# Patient Record
Sex: FEMALE | Race: WHITE | NOT HISPANIC OR LATINO | Employment: OTHER | URBAN - METROPOLITAN AREA
[De-identification: names, ages, dates, MRNs, and addresses within clinical notes are randomized per-mention and may not be internally consistent; named-entity substitution may affect disease eponyms.]

---

## 2017-01-03 RX ORDER — CITALOPRAM 10 MG/1
TABLET ORAL
Qty: 90 TABLET | Refills: 0 | Status: SHIPPED | OUTPATIENT
Start: 2017-01-03 | End: 2017-03-28 | Stop reason: SDUPTHER

## 2017-01-12 ENCOUNTER — PATIENT MESSAGE (OUTPATIENT)
Dept: FAMILY MEDICINE | Facility: CLINIC | Age: 67
End: 2017-01-12

## 2017-01-13 NOTE — TELEPHONE ENCOUNTER
Obie Smith,   I am considering chiropractic care for my lower back and wanted your opinion before I commit.   The clinic is [facility], the chiropractor is Jamarcus Sanchez. I don't know if you are familiar with him, but I had a preliminary evaluation which included x rays. I felt comfortable with the treatment plan, but am interested in your opinion of this type of care.   Thank you,   Yolie Brambila

## 2017-01-30 ENCOUNTER — OFFICE VISIT (OUTPATIENT)
Dept: FAMILY MEDICINE | Facility: CLINIC | Age: 67
End: 2017-01-30
Payer: MEDICARE

## 2017-01-30 VITALS
RESPIRATION RATE: 16 BRPM | BODY MASS INDEX: 23.13 KG/M2 | SYSTOLIC BLOOD PRESSURE: 130 MMHG | WEIGHT: 135.5 LBS | HEART RATE: 60 BPM | TEMPERATURE: 99 F | HEIGHT: 64 IN | DIASTOLIC BLOOD PRESSURE: 70 MMHG

## 2017-01-30 DIAGNOSIS — H81.10 BPPV (BENIGN PAROXYSMAL POSITIONAL VERTIGO), UNSPECIFIED LATERALITY: Primary | ICD-10-CM

## 2017-01-30 PROCEDURE — 99214 OFFICE O/P EST MOD 30 MIN: CPT | Mod: S$PBB,,, | Performed by: PHYSICIAN ASSISTANT

## 2017-01-30 PROCEDURE — 99999 PR PBB SHADOW E&M-EST. PATIENT-LVL III: CPT | Mod: PBBFAC,,, | Performed by: PHYSICIAN ASSISTANT

## 2017-01-30 PROCEDURE — 99213 OFFICE O/P EST LOW 20 MIN: CPT | Mod: PBBFAC,PO | Performed by: PHYSICIAN ASSISTANT

## 2017-01-30 RX ORDER — METRONIDAZOLE 7.5 MG/G
CREAM TOPICAL
Refills: 2 | Status: ON HOLD | COMMUNITY
Start: 2016-11-01 | End: 2017-09-14

## 2017-01-30 RX ORDER — ONDANSETRON 4 MG/1
4 TABLET, ORALLY DISINTEGRATING ORAL EVERY 8 HOURS PRN
Qty: 20 TABLET | Refills: 1 | Status: SHIPPED | OUTPATIENT
Start: 2017-01-30 | End: 2017-02-06

## 2017-01-30 RX ORDER — MECLIZINE HYDROCHLORIDE 25 MG/1
25 TABLET ORAL 3 TIMES DAILY PRN
Qty: 30 TABLET | Refills: 2 | Status: SHIPPED | OUTPATIENT
Start: 2017-01-30 | End: 2018-09-20

## 2017-01-30 NOTE — PROGRESS NOTES
Subjective:       Patient ID: Yolie Brambila is a 66 y.o. female.    Chief Complaint: Ear Fullness (pt c/o dizziness, headache x 2 days, unsteady on feet) and Nausea    HPI   Dizziness started yesterday after workout at [employer]  Wave of nausea at times  No vomiting  Had HA yesterday but not today  No fever   Review of Systems   Constitutional: Positive for activity change. Negative for appetite change, chills, diaphoresis, fatigue, fever and unexpected weight change.   HENT: Negative for congestion, ear pain, hearing loss, postnasal drip, sinus pressure, sore throat and tinnitus.    Eyes: Negative.    Respiratory: Negative.  Negative for cough.    Cardiovascular: Negative for chest pain, palpitations and leg swelling.   Gastrointestinal: Positive for nausea. Negative for abdominal distention, abdominal pain, constipation, diarrhea and vomiting.   Endocrine: Negative.    Genitourinary: Negative.    Musculoskeletal: Negative.    Skin: Negative.  Negative for rash.   Neurological: Positive for dizziness and headaches. Negative for tremors, seizures, syncope, facial asymmetry, speech difficulty, weakness, light-headedness and numbness.       Objective:      Physical Exam   Constitutional: She is oriented to person, place, and time. She appears well-developed and well-nourished. No distress.   HENT:   Head: Normocephalic and atraumatic.   Left Ear: External ear normal.   Nose: Nose normal.   Mouth/Throat: Oropharynx is clear and moist. No oropharyngeal exudate.   Cerumen in R ear canal  TM's appear normal   Eyes: Conjunctivae and EOM are normal. Pupils are equal, round, and reactive to light. No scleral icterus.   Neck: Normal range of motion. Neck supple. No tracheal deviation present. No thyromegaly present.   Carotids clear   Cardiovascular: Normal rate, regular rhythm, normal heart sounds and intact distal pulses.  Exam reveals no gallop and no friction rub.    No murmur heard.  Pulmonary/Chest: Effort normal and  breath sounds normal. No respiratory distress. She has no wheezes. She has no rales.   Musculoskeletal: She exhibits no edema.   Lymphadenopathy:     She has no cervical adenopathy.   Neurological: She is alert and oriented to person, place, and time. She has normal reflexes. She displays normal reflexes. No cranial nerve deficit. She exhibits normal muscle tone. Coordination normal.   Romberg normal   Skin: Skin is warm and dry. No rash noted.   Psychiatric: She has a normal mood and affect. Her behavior is normal. Judgment and thought content normal.   Vitals reviewed.      Assessment:       1. BPPV (benign paroxysmal positional vertigo), unspecified laterality        Plan:       Yolie PANIAGUA was seen today for ear fullness and nausea.    Diagnoses and all orders for this visit:    BPPV (benign paroxysmal positional vertigo), unspecified laterality  -     ondansetron (ZOFRAN-ODT) 4 MG TbDL; Take 1 tablet (4 mg total) by mouth every 8 (eight) hours as needed.    Other orders  -     meclizine (ANTIVERT) 25 mg tablet; Take 1 tablet (25 mg total) by mouth 3 (three) times daily as needed.      Decrease activity  BPPV handout given to PT  Discussed ENT referral if needed

## 2017-02-20 ENCOUNTER — OFFICE VISIT (OUTPATIENT)
Dept: FAMILY MEDICINE | Facility: CLINIC | Age: 67
End: 2017-02-20
Payer: MEDICARE

## 2017-02-20 VITALS
HEIGHT: 64 IN | SYSTOLIC BLOOD PRESSURE: 130 MMHG | WEIGHT: 135.69 LBS | HEART RATE: 60 BPM | TEMPERATURE: 97 F | BODY MASS INDEX: 23.17 KG/M2 | DIASTOLIC BLOOD PRESSURE: 64 MMHG | OXYGEN SATURATION: 98 %

## 2017-02-20 DIAGNOSIS — J06.9 VIRAL URI: Primary | ICD-10-CM

## 2017-02-20 PROCEDURE — 99213 OFFICE O/P EST LOW 20 MIN: CPT | Mod: S$PBB,,, | Performed by: NURSE PRACTITIONER

## 2017-02-20 PROCEDURE — 99213 OFFICE O/P EST LOW 20 MIN: CPT | Mod: PBBFAC,PO | Performed by: NURSE PRACTITIONER

## 2017-02-20 PROCEDURE — 99999 PR PBB SHADOW E&M-EST. PATIENT-LVL III: CPT | Mod: PBBFAC,,, | Performed by: NURSE PRACTITIONER

## 2017-02-20 RX ORDER — DOXYCYCLINE 50 MG/1
CAPSULE ORAL
Refills: 2 | COMMUNITY
Start: 2017-02-11 | End: 2017-08-21 | Stop reason: ALTCHOICE

## 2017-02-20 NOTE — PROGRESS NOTES
Subjective:       Patient ID: Yolie Brambila is a 66 y.o. female.    Chief Complaint: Cough (x 2days) and Chest Congestion    Cough   Associated symptoms include headaches and wheezing (lying down ). Pertinent negatives include no chest pain, chills, ear pain, fever, sore throat or shortness of breath.      Patient experiencing cough for two days, non productive dry cough   Patient took Nyquil last night for cough and effective until 4am.   Experiencing a dull headache that lasts all day, ibuprofein relieves headache  States that she takes zyrtec and flonase daily but not taken today, does not help with the cough  Patient denies fever, chills, shortness of breath, chest pain, asthma or any restrictive airway illnesses     Review of Systems   Constitutional: Negative for chills and fever.   HENT: Negative for congestion, ear pain, sore throat and trouble swallowing.    Respiratory: Positive for cough and wheezing (lying down ). Negative for chest tightness and shortness of breath.    Cardiovascular: Negative for chest pain and palpitations.   Gastrointestinal: Negative for abdominal pain, constipation, diarrhea, nausea and vomiting.   Neurological: Positive for headaches. Negative for dizziness, speech difficulty and weakness.   Psychiatric/Behavioral: Negative for sleep disturbance.       Objective:      Physical Exam   Constitutional: She is oriented to person, place, and time. She appears well-developed and well-nourished.   HENT:   Head: Normocephalic and atraumatic.   Right Ear: Hearing, external ear and ear canal normal.   Left Ear: Hearing, external ear and ear canal normal.   Nose: Nose normal.   Mouth/Throat: Uvula is midline and mucous membranes are normal. Posterior oropharyngeal erythema present.   Eyes: EOM are normal. Pupils are equal, round, and reactive to light.   Neck: Normal range of motion. Neck supple.   Cardiovascular: Normal rate, regular rhythm, normal heart sounds and intact distal pulses.     Pulmonary/Chest: Effort normal and breath sounds normal.   Abdominal: Soft. Bowel sounds are normal.   Musculoskeletal: Normal range of motion.   Neurological: She is alert and oriented to person, place, and time.   Skin: Skin is warm and dry.   Psychiatric: She has a normal mood and affect. Her behavior is normal. Judgment and thought content normal.   Nursing note and vitals reviewed.      Assessment:       1. Viral URI        Plan:     Yolie PANIAGUA was seen today for cough and chest congestion.    Diagnoses and all orders for this visit:    Viral URI  Expectant management reviewed: increase fluid intake, otc analgesics prn, mucinex and antihistamines for sx relief. Call if sx persist or worsen.

## 2017-03-13 RX ORDER — GABAPENTIN 300 MG/1
CAPSULE ORAL
Qty: 90 CAPSULE | Refills: 0 | Status: SHIPPED | OUTPATIENT
Start: 2017-03-13 | End: 2017-08-23 | Stop reason: SDUPTHER

## 2017-03-28 RX ORDER — CITALOPRAM 10 MG/1
TABLET ORAL
Qty: 90 TABLET | Refills: 0 | Status: SHIPPED | OUTPATIENT
Start: 2017-03-28 | End: 2017-06-24 | Stop reason: SDUPTHER

## 2017-06-26 RX ORDER — CITALOPRAM 10 MG/1
TABLET ORAL
Qty: 90 TABLET | Refills: 0 | Status: SHIPPED | OUTPATIENT
Start: 2017-06-26 | End: 2017-09-26 | Stop reason: SDUPTHER

## 2017-06-29 RX ORDER — GABAPENTIN 300 MG/1
CAPSULE ORAL
Qty: 90 CAPSULE | Refills: 0 | Status: SHIPPED | OUTPATIENT
Start: 2017-06-29 | End: 2017-08-21 | Stop reason: SDUPTHER

## 2017-08-21 ENCOUNTER — HOSPITAL ENCOUNTER (OUTPATIENT)
Dept: RADIOLOGY | Facility: HOSPITAL | Age: 67
Discharge: HOME | End: 2017-08-21
Attending: PHYSICIAN ASSISTANT
Payer: MEDICARE

## 2017-08-21 ENCOUNTER — OFFICE VISIT (OUTPATIENT)
Dept: FAMILY MEDICINE | Facility: CLINIC | Age: 67
End: 2017-08-21
Payer: MEDICARE

## 2017-08-21 VITALS
BODY MASS INDEX: 23.18 KG/M2 | WEIGHT: 135.81 LBS | TEMPERATURE: 98 F | HEIGHT: 64 IN | DIASTOLIC BLOOD PRESSURE: 80 MMHG | SYSTOLIC BLOOD PRESSURE: 132 MMHG | OXYGEN SATURATION: 98 % | HEART RATE: 71 BPM

## 2017-08-21 DIAGNOSIS — M62.830 LUMBAR PARASPINAL MUSCLE SPASM: ICD-10-CM

## 2017-08-21 DIAGNOSIS — M54.40 BILATERAL LOW BACK PAIN WITH SCIATICA, SCIATICA LATERALITY UNSPECIFIED, UNSPECIFIED CHRONICITY: Primary | ICD-10-CM

## 2017-08-21 DIAGNOSIS — M54.40 BILATERAL LOW BACK PAIN WITH SCIATICA, SCIATICA LATERALITY UNSPECIFIED, UNSPECIFIED CHRONICITY: ICD-10-CM

## 2017-08-21 PROCEDURE — 1126F AMNT PAIN NOTED NONE PRSNT: CPT | Mod: ,,, | Performed by: PHYSICIAN ASSISTANT

## 2017-08-21 PROCEDURE — 72110 X-RAY EXAM L-2 SPINE 4/>VWS: CPT | Mod: TC,PO

## 2017-08-21 PROCEDURE — 1159F MED LIST DOCD IN RCRD: CPT | Mod: ,,, | Performed by: PHYSICIAN ASSISTANT

## 2017-08-21 PROCEDURE — 99213 OFFICE O/P EST LOW 20 MIN: CPT | Mod: S$PBB,,, | Performed by: PHYSICIAN ASSISTANT

## 2017-08-21 PROCEDURE — 99999 PR PBB SHADOW E&M-EST. PATIENT-LVL V: CPT | Mod: PBBFAC,,, | Performed by: PHYSICIAN ASSISTANT

## 2017-08-21 PROCEDURE — 1157F ADVNC CARE PLAN IN RCRD: CPT | Mod: ,,, | Performed by: PHYSICIAN ASSISTANT

## 2017-08-21 PROCEDURE — 72110 X-RAY EXAM L-2 SPINE 4/>VWS: CPT | Mod: 26,,, | Performed by: RADIOLOGY

## 2017-08-21 PROCEDURE — 99215 OFFICE O/P EST HI 40 MIN: CPT | Mod: PBBFAC,25,PN | Performed by: PHYSICIAN ASSISTANT

## 2017-08-21 RX ORDER — AZELASTINE 1 MG/ML
SPRAY, METERED NASAL
Refills: 4 | COMMUNITY
Start: 2017-07-18 | End: 2019-03-20

## 2017-08-21 RX ORDER — CHLORZOXAZONE 500 MG/1
TABLET ORAL
Qty: 60 TABLET | Refills: 5 | Status: SHIPPED | OUTPATIENT
Start: 2017-08-21 | End: 2018-11-08 | Stop reason: SDUPTHER

## 2017-08-21 RX ORDER — TRIAMCINOLONE ACETONIDE 55 UG/1
1 SPRAY, METERED NASAL DAILY
COMMUNITY

## 2017-08-21 NOTE — PROGRESS NOTES
Subjective:       Patient ID: Yolie Brambila is a 66 y.o. female.    Chief Complaint: Back Pain (6-8 weeks now )    HPI   Pt was doing a twisting motion when spasm occurred   Hx of back problems in past  Pt had previous back surgery   Back is improving but still has spasm and discomfort ( sciatic ) down both legs R>L  Review of Systems   Constitutional: Positive for activity change. Negative for appetite change, chills, diaphoresis, fatigue, fever and unexpected weight change.   HENT: Negative.    Eyes: Negative.    Respiratory: Negative.    Cardiovascular: Negative.  Negative for chest pain and leg swelling.   Gastrointestinal: Negative.  Negative for abdominal pain.   Endocrine: Negative.    Genitourinary: Negative.  Negative for dysuria, hematuria and pelvic pain.   Musculoskeletal: Positive for back pain. Negative for arthralgias, gait problem, joint swelling, myalgias, neck pain and neck stiffness.   Skin: Negative.  Negative for rash.   Neurological: Negative.  Negative for numbness and headaches.       Objective:      Physical Exam   Constitutional: She appears well-developed and well-nourished. No distress.   HENT:   Head: Normocephalic and atraumatic.   Eyes: Conjunctivae are normal. No scleral icterus.   Neck: Normal range of motion. Neck supple. No tracheal deviation present. No thyromegaly present.   Cardiovascular: Normal rate, regular rhythm, normal heart sounds and intact distal pulses.  Exam reveals no gallop and no friction rub.    No murmur heard.  Pulmonary/Chest: Effort normal and breath sounds normal. No respiratory distress. She has no wheezes. She has no rales.   Musculoskeletal: She exhibits tenderness. She exhibits no edema.   Tight and tender lumbar paravertebral muscles R>L  Mild bilat sciatic tenderness noted  No vertebral tenderness on percussion  SLR's 80 degrees bilat without back pain   Lymphadenopathy:     She has no cervical adenopathy.   Skin: Skin is warm and dry. No rash  noted.   Vitals reviewed.      Assessment:       1. Bilateral low back pain with sciatica, sciatica laterality unspecified, unspecified chronicity    2. Lumbar paraspinal muscle spasm        Plan:       Yolie PANIAGUA was seen today for back pain.    Diagnoses and all orders for this visit:    Bilateral low back pain with sciatica, sciatica laterality unspecified, unspecified chronicity  -     chlorzoxazone (PARAFON FORTE) 500 mg Tab; 1/2 tablet tid  -     X-Ray Lumbar Spine Complete 5 View; Future  -     Ambulatory consult to Physical Therapy    Lumbar paraspinal muscle spasm  -     chlorzoxazone (PARAFON FORTE) 500 mg Tab; 1/2 tablet tid  -     X-Ray Lumbar Spine Complete 5 View; Future  -     Ambulatory consult to Physical Therapy    discussed otc's  Home PT

## 2017-08-23 RX ORDER — GABAPENTIN 300 MG/1
CAPSULE ORAL
Qty: 180 CAPSULE | Refills: 0 | Status: SHIPPED | OUTPATIENT
Start: 2017-08-23 | End: 2017-11-19 | Stop reason: SDUPTHER

## 2017-08-23 NOTE — TELEPHONE ENCOUNTER
----- Message from Mary Gomez sent at 8/23/2017 11:10 AM CDT -----  Pharmacy is calling for gabapentin refill    [facility] [address] [place] [address] [address] [facility]  [address] [facility] [address]  Phone: [phone] Fax: [phone]

## 2017-09-13 ENCOUNTER — ANESTHESIA EVENT (OUTPATIENT)
Dept: SURGERY | Facility: HOSPITAL | Age: 67
End: 2017-09-13
Payer: MEDICARE

## 2017-09-14 ENCOUNTER — ANESTHESIA (OUTPATIENT)
Dept: SURGERY | Facility: HOSPITAL | Age: 67
End: 2017-09-14
Payer: MEDICARE

## 2017-09-14 ENCOUNTER — SURGERY (OUTPATIENT)
Age: 67
End: 2017-09-14

## 2017-09-14 ENCOUNTER — HOSPITAL ENCOUNTER (OUTPATIENT)
Facility: HOSPITAL | Age: 67
Discharge: HOME | End: 2017-09-14
Attending: OTOLARYNGOLOGY | Admitting: OTOLARYNGOLOGY
Payer: MEDICARE

## 2017-09-14 DIAGNOSIS — J34.2 DEVIATED SEPTUM: ICD-10-CM

## 2017-09-14 DIAGNOSIS — J34.3 HYPERTROPHY OF NASAL TURBINATES: Primary | ICD-10-CM

## 2017-09-14 PROCEDURE — 36000707: Mod: PO | Performed by: OTOLARYNGOLOGY

## 2017-09-14 PROCEDURE — 27201423 OPTIME MED/SURG SUP & DEVICES STERILE SUPPLY: Mod: PO | Performed by: OTOLARYNGOLOGY

## 2017-09-14 PROCEDURE — D9220A PRA ANESTHESIA: Mod: ANES,,, | Performed by: ANESTHESIOLOGY

## 2017-09-14 PROCEDURE — 71000039 HC RECOVERY, EACH ADD'L HOUR: Mod: PO | Performed by: OTOLARYNGOLOGY

## 2017-09-14 PROCEDURE — 36000706: Mod: PO | Performed by: OTOLARYNGOLOGY

## 2017-09-14 PROCEDURE — 25000003 PHARM REV CODE 250: Mod: PO | Performed by: NURSE ANESTHETIST, CERTIFIED REGISTERED

## 2017-09-14 PROCEDURE — D9220A PRA ANESTHESIA: Mod: CRNA,,, | Performed by: NURSE ANESTHETIST, CERTIFIED REGISTERED

## 2017-09-14 PROCEDURE — 63600175 PHARM REV CODE 636 W HCPCS: Mod: PO | Performed by: OTOLARYNGOLOGY

## 2017-09-14 PROCEDURE — 63600175 PHARM REV CODE 636 W HCPCS: Mod: PO | Performed by: NURSE ANESTHETIST, CERTIFIED REGISTERED

## 2017-09-14 PROCEDURE — 37000008 HC ANESTHESIA 1ST 15 MINUTES: Mod: PO | Performed by: OTOLARYNGOLOGY

## 2017-09-14 PROCEDURE — 25000003 PHARM REV CODE 250: Mod: PO | Performed by: ANESTHESIOLOGY

## 2017-09-14 PROCEDURE — 37000009 HC ANESTHESIA EA ADD 15 MINS: Mod: PO | Performed by: OTOLARYNGOLOGY

## 2017-09-14 PROCEDURE — 71000033 HC RECOVERY, INTIAL HOUR: Mod: PO | Performed by: OTOLARYNGOLOGY

## 2017-09-14 PROCEDURE — 25000003 PHARM REV CODE 250: Mod: PO | Performed by: OTOLARYNGOLOGY

## 2017-09-14 PROCEDURE — 63600175 PHARM REV CODE 636 W HCPCS: Mod: PO | Performed by: ANESTHESIOLOGY

## 2017-09-14 RX ORDER — OXYCODONE HYDROCHLORIDE 5 MG/1
5 TABLET ORAL
Status: DISCONTINUED | OUTPATIENT
Start: 2017-09-14 | End: 2017-09-14 | Stop reason: HOSPADM

## 2017-09-14 RX ORDER — ACETAMINOPHEN 10 MG/ML
INJECTION, SOLUTION INTRAVENOUS
Status: DISCONTINUED | OUTPATIENT
Start: 2017-09-14 | End: 2017-09-14

## 2017-09-14 RX ORDER — PROPOFOL 10 MG/ML
VIAL (ML) INTRAVENOUS
Status: DISCONTINUED | OUTPATIENT
Start: 2017-09-14 | End: 2017-09-14

## 2017-09-14 RX ORDER — KETAMINE HYDROCHLORIDE 100 MG/ML
INJECTION, SOLUTION INTRAMUSCULAR; INTRAVENOUS
Status: DISCONTINUED | OUTPATIENT
Start: 2017-09-14 | End: 2017-09-14

## 2017-09-14 RX ORDER — ONDANSETRON 2 MG/ML
INJECTION INTRAMUSCULAR; INTRAVENOUS
Status: DISCONTINUED | OUTPATIENT
Start: 2017-09-14 | End: 2017-09-14

## 2017-09-14 RX ORDER — HYDROCODONE BITARTRATE AND ACETAMINOPHEN 5; 325 MG/1; MG/1
1 TABLET ORAL EVERY 4 HOURS PRN
Status: DISCONTINUED | OUTPATIENT
Start: 2017-09-14 | End: 2017-09-14 | Stop reason: HOSPADM

## 2017-09-14 RX ORDER — ROCURONIUM BROMIDE 10 MG/ML
INJECTION, SOLUTION INTRAVENOUS
Status: DISCONTINUED | OUTPATIENT
Start: 2017-09-14 | End: 2017-09-14

## 2017-09-14 RX ORDER — SODIUM CHLORIDE 0.9 % (FLUSH) 0.9 %
3 SYRINGE (ML) INJECTION
Status: DISCONTINUED | OUTPATIENT
Start: 2017-09-14 | End: 2017-09-14 | Stop reason: HOSPADM

## 2017-09-14 RX ORDER — LIDOCAINE HYDROCHLORIDE AND EPINEPHRINE 10; 10 MG/ML; UG/ML
INJECTION, SOLUTION INFILTRATION; PERINEURAL
Status: DISCONTINUED | OUTPATIENT
Start: 2017-09-14 | End: 2017-09-14 | Stop reason: HOSPADM

## 2017-09-14 RX ORDER — MIDAZOLAM HYDROCHLORIDE 1 MG/ML
INJECTION INTRAMUSCULAR; INTRAVENOUS
Status: DISCONTINUED | OUTPATIENT
Start: 2017-09-14 | End: 2017-09-14

## 2017-09-14 RX ORDER — FENTANYL CITRATE 50 UG/ML
INJECTION, SOLUTION INTRAMUSCULAR; INTRAVENOUS
Status: DISCONTINUED | OUTPATIENT
Start: 2017-09-14 | End: 2017-09-14

## 2017-09-14 RX ORDER — LIDOCAINE HCL/PF 100 MG/5ML
SYRINGE (ML) INTRAVENOUS
Status: DISCONTINUED | OUTPATIENT
Start: 2017-09-14 | End: 2017-09-14

## 2017-09-14 RX ORDER — LIDOCAINE HYDROCHLORIDE 10 MG/ML
1 INJECTION, SOLUTION EPIDURAL; INFILTRATION; INTRACAUDAL; PERINEURAL
Status: DISCONTINUED | OUTPATIENT
Start: 2017-09-14 | End: 2017-09-14 | Stop reason: HOSPADM

## 2017-09-14 RX ORDER — SODIUM CHLORIDE 0.9 G/100ML
IRRIGANT IRRIGATION
Status: DISCONTINUED | OUTPATIENT
Start: 2017-09-14 | End: 2017-09-14 | Stop reason: HOSPADM

## 2017-09-14 RX ORDER — SODIUM CHLORIDE, SODIUM LACTATE, POTASSIUM CHLORIDE, CALCIUM CHLORIDE 600; 310; 30; 20 MG/100ML; MG/100ML; MG/100ML; MG/100ML
10 INJECTION, SOLUTION INTRAVENOUS CONTINUOUS
Status: DISCONTINUED | OUTPATIENT
Start: 2017-09-15 | End: 2017-09-14 | Stop reason: HOSPADM

## 2017-09-14 RX ORDER — EPINEPHRINE 1 MG/ML
INJECTION INTRAMUSCULAR; INTRAVENOUS; SUBCUTANEOUS
Status: DISCONTINUED | OUTPATIENT
Start: 2017-09-14 | End: 2017-09-14 | Stop reason: HOSPADM

## 2017-09-14 RX ORDER — DEXAMETHASONE SODIUM PHOSPHATE 4 MG/ML
INJECTION, SOLUTION INTRA-ARTICULAR; INTRALESIONAL; INTRAMUSCULAR; INTRAVENOUS; SOFT TISSUE
Status: DISCONTINUED | OUTPATIENT
Start: 2017-09-14 | End: 2017-09-14

## 2017-09-14 RX ORDER — FENTANYL CITRATE 50 UG/ML
25 INJECTION, SOLUTION INTRAMUSCULAR; INTRAVENOUS EVERY 5 MIN PRN
Status: DISCONTINUED | OUTPATIENT
Start: 2017-09-14 | End: 2017-09-14 | Stop reason: HOSPADM

## 2017-09-14 RX ADMIN — EPINEPHRINE 1 MG: 1 INJECTION, SOLUTION INTRAMUSCULAR; INTRAVENOUS; SUBCUTANEOUS at 08:09

## 2017-09-14 RX ADMIN — LIDOCAINE HYDROCHLORIDE AND EPINEPHRINE 10 ML: 10; 10 INJECTION, SOLUTION INFILTRATION; PERINEURAL at 08:09

## 2017-09-14 RX ADMIN — ONDANSETRON 4 MG: 2 INJECTION, SOLUTION INTRAMUSCULAR; INTRAVENOUS at 08:09

## 2017-09-14 RX ADMIN — EPHEDRINE SULFATE 5 MG: 50 INJECTION, SOLUTION INTRAMUSCULAR; INTRAVENOUS; SUBCUTANEOUS at 08:09

## 2017-09-14 RX ADMIN — KETAMINE HYDROCHLORIDE 25 MG: 100 INJECTION, SOLUTION, CONCENTRATE INTRAMUSCULAR; INTRAVENOUS at 08:09

## 2017-09-14 RX ADMIN — OXYCODONE HYDROCHLORIDE 5 MG: 5 TABLET ORAL at 10:09

## 2017-09-14 RX ADMIN — FENTANYL CITRATE 50 MCG: 50 INJECTION, SOLUTION INTRAMUSCULAR; INTRAVENOUS at 07:09

## 2017-09-14 RX ADMIN — FENTANYL CITRATE 50 MCG: 50 INJECTION, SOLUTION INTRAMUSCULAR; INTRAVENOUS at 08:09

## 2017-09-14 RX ADMIN — DEXAMETHASONE SODIUM PHOSPHATE 8 MG: 4 INJECTION, SOLUTION INTRAMUSCULAR; INTRAVENOUS at 08:09

## 2017-09-14 RX ADMIN — SODIUM CHLORIDE, SODIUM LACTATE, POTASSIUM CHLORIDE, AND CALCIUM CHLORIDE 10 ML/HR: .6; .31; .03; .02 INJECTION, SOLUTION INTRAVENOUS at 07:09

## 2017-09-14 RX ADMIN — SODIUM CHLORIDE 9 ML: 0.9 IRRIGANT IRRIGATION at 08:09

## 2017-09-14 RX ADMIN — FENTANYL CITRATE 25 MCG: 50 INJECTION INTRAMUSCULAR; INTRAVENOUS at 10:09

## 2017-09-14 RX ADMIN — PROPOFOL 150 MG: 10 INJECTION, EMULSION INTRAVENOUS at 08:09

## 2017-09-14 RX ADMIN — ROCURONIUM BROMIDE 30 MG: 10 INJECTION, SOLUTION INTRAVENOUS at 08:09

## 2017-09-14 RX ADMIN — ACETAMINOPHEN 1000 MG: 10 INJECTION, SOLUTION INTRAVENOUS at 08:09

## 2017-09-14 RX ADMIN — MIDAZOLAM HYDROCHLORIDE 2 MG: 1 INJECTION, SOLUTION INTRAMUSCULAR; INTRAVENOUS at 07:09

## 2017-09-14 RX ADMIN — SODIUM CHLORIDE, SODIUM LACTATE, POTASSIUM CHLORIDE, AND CALCIUM CHLORIDE: .6; .31; .03; .02 INJECTION, SOLUTION INTRAVENOUS at 09:09

## 2017-09-14 RX ADMIN — EPHEDRINE SULFATE 10 MG: 50 INJECTION, SOLUTION INTRAMUSCULAR; INTRAVENOUS; SUBCUTANEOUS at 08:09

## 2017-09-14 RX ADMIN — LIDOCAINE HYDROCHLORIDE 100 MG: 20 INJECTION PARENTERAL at 08:09

## 2017-09-14 NOTE — OP NOTE
DATE OF PROCEDURE:  09/14/2017    PREOPERATIVE DIAGNOSES:  Deviated septum, inferior turbinate hypertrophy with   nasal obstruction and mild obstructive sleep apnea and allergic rhinitis.    POSTOPERATIVE DIAGNOSES:  Deviated septum, inferior turbinate hypertrophy with   nasal obstruction and mild obstructive sleep apnea and allergic rhinitis.    PROCEDURES:  1.  Septoplasty.  2.  Bilateral inferior turbinate submucosal resection of inferior turbinates.  3.  Bilateral inferior turbinate outfracture.    ANESTHESIA:  GETA.    SURGEON:  Kraig Maxwell M.D.    ESTIMATED BLOOD LOSS:  Less than 30 mL    PACKING:  Marge.    FINDINGS:  S-shaped septal deviation with inferior turbinate hypertrophy.    COMPLICATIONS:  None.    DISPOSITION:  Stable to Recovery Room.    INDICATION FOR PROCEDURE:  This is a 66-year-old female referred over by Dr. Dylon Ragland for evaluation of snoring and mild obstructive sleep apnea.  The   patient underwent a sleep study showing very mild CARYL with snoring, had pillar   palatal implants with persistence of snoring.  She reported nasal obstruction.    She had been treated with nasal sprays for allergies as well as mild allergic   rhinitis, but limited improvement.  She was noted to have an S-shaped septal   deviation and turbinate hypertrophy.  She was with some lateral wall collapse.    The patient used Breathe Right strips successfully, wished to proceed with   septoplasty, turbinate reduction.  Risks, benefits and alternative of therapies   were discussed with the patient who wished to proceed.    DESCRIPTION OF PROCEDURE:  On 09/14/2017, the patient was taken to the Operating   Room and after adequate general anesthesia was obtained, she was placed in the   beach chair position and draped in normal standard fashion.  Both nasal cavities   were decongested with Afrin pledgets and nasal hair was trimmed, packs were   removed and the septum was then injected with 1% lidocaine with  epinephrine   and packed again.  Then some preoperative pictures were taken, right   hemitransfixion incision was made, mucoperichondrial osteal flap was raised   preserving a 1 cm inferior strut, superior strut, cartilage was divided and   mucoperichondrial osteal flap was raised along on the opposite side.  [place]   swivel knife was used to harvest the deviated portion and the anterior septum   was placed on the back table for modification replacement at the end of the   procedure.  Then, [place] Thru-Cuts were used to remove some of the   bony portions of septum superiorly.  The deviated portion of the inferior septum   was also removed using suction Frontier knife.  Reexamination showed marked   improvement in nasal diameter.  Therefore, the inferior turbinates were   examined, both the anterior heads were injected with local and then using an   inferior turbinate, 2 mm microdebrider wand at 3000 rpm.  Submucosal pocket was   made to the length of the whole entire inferior turbinate and was reduced   submucosally.  Once this was completed, both inferior turbinates were   outfractured with a Panacea elevator and Real elevator.  There was noted to be   marked improvement.  The harvested cartilage was then modified and placed back   into the anterior pocket and then hemitransfixion incision was closed with 5-0   chromic interrupted stitches and then the mucosal envelope was reapproximated   and cartilage start stabilized with a 4-0 plain gut in running quilting mattress   technique.  Then, both sides were clean.  Some postoperative pictures were   taken and Marge powder was placed bilaterally.  The patient then was awoken   from general anesthesia after orogastric suction and then transferred to the   Recovery Room.  The patient tolerated the procedure well.  There were no   complications.      CHEN/SAHIL  dd: 09/14/2017 09:38:16 (CDT)  td: 09/14/2017 10:32:58 (CDT)  Doc ID   #[id number]  Job ID #[id number]    CC:  Dylon Ragland M.D.

## 2017-09-14 NOTE — ANESTHESIA PREPROCEDURE EVALUATION
09/14/2017  Yolie Brambila is a 66 y.o., female.    Pre-op Assessment    I have reviewed the Patient Summary Reports.     I have reviewed the Nursing Notes.      Review of Systems  Anesthesia Hx:  No problems with previous Anesthesia    Social:  Non-Smoker    EENT/Dental:   chronic allergic rhinitis   Cardiovascular:  Cardiovascular Normal     Pulmonary:   Sleep Apnea    Renal/:  Renal/ Normal     Hepatic/GI:  Hepatic/GI Normal    Neurological:   Headaches    Endocrine:  Endocrine Normal    Psych:   anxiety depression          Physical Exam  General:  Well nourished    Airway/Jaw/Neck:  Airway Findings: Mouth Opening: Normal Tongue: Normal  General Airway Assessment: Adult  Mallampati: I  TM Distance: 4-6 cm       Chest/Lungs:  Chest/Lungs Findings: Clear to auscultation, Normal Respiratory Rate     Heart/Vascular:  Heart Findings: Rate: Normal  Rhythm: Regular Rhythm             Anesthesia Plan  Type of Anesthesia, risks & benefits discussed:  Anesthesia Type:  general  Patient's Preference:   Intra-op Monitoring Plan: standard ASA monitors  Intra-op Monitoring Plan Comments:   Post Op Pain Control Plan:   Post Op Pain Control Plan Comments:   Induction:   IV  Beta Blocker:  Patient is not currently on a Beta-Blocker (No further documentation required).       Informed Consent: Patient understands risks and agrees with Anesthesia plan.  Questions answered. Anesthesia consent signed with patient.  ASA Score: 2     Day of Surgery Review of History & Physical: I have interviewed and examined the patient. I have reviewed the patient's H&P dated:  There are no significant changes.          Ready For Surgery From Anesthesia Perspective.

## 2017-09-14 NOTE — ANESTHESIA POSTPROCEDURE EVALUATION
"Anesthesia Post Evaluation    Patient: Yolie Brambila    Procedure(s) Performed: Procedure(s) (LRB):  RESECTION-TURBINATES (SMR) (N/A)  SEPTOPLASTY (N/A)    Final Anesthesia Type: general  Patient location during evaluation: PACU  Patient participation: Yes- Able to Participate  Level of consciousness: awake and alert and oriented  Post-procedure vital signs: reviewed and stable  Pain management: adequate  Airway patency: patent  PONV status at discharge: No PONV  Anesthetic complications: no      Cardiovascular status: hemodynamically stable  Respiratory status: unassisted, spontaneous ventilation and room air  Hydration status: euvolemic  Follow-up not needed.        Visit Vitals  BP (!) 146/65 (BP Location: Left arm, Patient Position: Sitting)   Pulse 76   Temp 36.9 °C (98.4 °F)   Resp 16   Ht 5' 4" (1.626 m)   Wt 61.2 kg (135 lb)   SpO2 95%   Breastfeeding? No   BMI 23.17 kg/m²       Pain/Yesy Score: Pain Assessment Performed: Yes (9/14/2017 10:48 AM)  Presence of Pain: complains of pain/discomfort (9/14/2017 10:48 AM)  Pain Rating Prior to Med Admin: 5 (9/14/2017 10:24 AM)  Yesy Score: 10 (9/14/2017 10:48 AM)      "

## 2017-09-14 NOTE — DISCHARGE INSTRUCTIONS
SINUS SURGERY  DOS:   May take tub bath in 24 hours   Minimal activity for 24 hours. Gradually resume normal activities in five (5) days.   Keep your head elevated at all times on two pillows.   Lie only on your back.   Expect bloody drainage. Use gauze bandage under nose for drainage. Change as often as needed.   Sip water frequently for mouth dryness.   Vaporizer may be used to humidify room air.   Splints may be present and will be removed by physician.   Advance to regular diet as tolerated  ?  Afrin Nasal Spray 2 sprays each nostril twice daily as needed for        bleeding, not to be used for longer than two days.  ?  Normal Saline Nasal Spray 3-4 times per day for dryness   Resume home medications as prescribed    DONT:   Do not blow nose for one week. If you must sneeze, open mouth and sneeze through mouth.   Do not use C-Pap unless approved by MD.   No showers.   No driving for 24 hours or while taking narcotic pain medication   DO NOT TAKE ADDITIONAL TYLENOL/ACETAMINOPHEN WHILE TAKING NARCOTIC PAIN MEDICATION THAT CONTAINS TYLENOL/ACETAMINOPHEN.    CALL PHYSICIAN FOR:   Continued bleeding after using Afrin spray as directed.   Drainage (pus) from the nose.   Fever.101   Persistent pain not relieved by pain medication.    Make return appointment with ____________for_____________  at [phone]  FOR EMERGENCIES CONTACT YOUR DOCTOR @[phone]    Discharge Instructions: After Your Surgery  Youve just had surgery. During surgery, you were given medicine called anesthesia to keep you relaxed and free of pain. After surgery, you may have some pain or nausea. This is common. Here are some tips for feeling better and getting well after surgery.     Stay on schedule with your medicine.   Going home  Your healthcare provider will show you how to take care of yourself when you go home. He or she will also answer your questions. Have an adult family member or friend drive you home. For the  first 24 hours after your surgery:  · Do not drive or use heavy equipment.  · Do not make important decisions or sign legal papers.  · Do not drink alcohol.  · Have someone stay with you, if needed. He or she can watch for problems and help keep you safe.  Be sure to go to all follow-up visits with your healthcare provider. And rest after your surgery for as long as your healthcare provider tells you to.  Coping with pain  If you have pain after surgery, pain medicine will help you feel better. Take it as told, before pain becomes severe. Also, ask your healthcare provider or pharmacist about other ways to control pain. This might be with heat, ice, or relaxation. And follow any other instructions your surgeon or nurse gives you.  Tips for taking pain medicine  To get the best relief possible, remember these points:  · Pain medicines can upset your stomach. Taking them with a little food may help.  · Most pain relievers taken by mouth need at least 20 to 30 minutes to start to work.  · Taking medicine on a schedule can help you remember to take it. Try to time your medicine so that you can take it before starting an activity. This might be before you get dressed, go for a walk, or sit down for dinner.  · Constipation is a common side effect of pain medicines. Call your healthcare provider before taking any medicines such as laxatives or stool softeners to help ease constipation. Also ask if you should skip any foods. Drinking lots of fluids and eating foods such as fruits and vegetables that are high in fiber can also help. Remember, do not take laxatives unless your surgeon has prescribed them.  · Drinking alcohol and taking pain medicine can cause dizziness and slow your breathing. It can even be deadly. Do not drink alcohol while taking pain medicine.  · Pain medicine can make you react more slowly to things. Do not drive or run machinery while taking pain medicine.  Your healthcare provider may tell you to take  acetaminophen to help ease your pain. Ask him or her how much you are supposed to take each day. Acetaminophen or other pain relievers may interact with your prescription medicines or other over-the-counter (OTC) medicines. Some prescription medicines have acetaminophen and other ingredients. Using both prescription and OTC acetaminophen for pain can cause you to overdose. Read the labels on your OTC medicines with care. This will help you to clearly know the list of ingredients, how much to take, and any warnings. It may also help you not take too much acetaminophen. If you have questions or do not understand the information, ask your pharmacist or healthcare provider to explain it to you before you take the OTC medicine.  Managing nausea  Some people have an upset stomach after surgery. This is often because of anesthesia, pain, or pain medicine, or the stress of surgery. These tips will help you handle nausea and eat healthy foods as you get better. If you were on a special food plan before surgery, ask your healthcare provider if you should follow it while you get better. These tips may help:  · Do not push yourself to eat. Your body will tell you when to eat and how much.  · Start off with clear liquids and soup. They are easier to digest.  · Next try semi-solid foods, such as mashed potatoes, applesauce, and gelatin, as you feel ready.  · Slowly move to solid foods. Dont eat fatty, rich, or spicy foods at first.  · Do not force yourself to have 3 large meals a day. Instead eat smaller amounts more often.  · Take pain medicines with a small amount of solid food, such as crackers or toast, to avoid nausea.     Call your surgeon if  · You still have pain an hour after taking medicine. The medicine may not be strong enough.  · You feel too sleepy, dizzy, or groggy. The medicine may be too strong.  · You have side effects like nausea, vomiting, or skin changes, such as rash, itching, or hives.       If you have  obstructive sleep apnea  You were given anesthesia medicine during surgery to keep you comfortable and free of pain. After surgery, you may have more apnea spells because of this medicine and other medicines you were given. The spells may last longer than usual.   At home:  · Keep using the continuous positive airway pressure (CPAP) device when you sleep. Unless your healthcare provider tells you not to, use it when you sleep, day or night. CPAP is a common device used to treat obstructive sleep apnea.  · Talk with your provider before taking any pain medicine, muscle relaxants, or sedatives. Your provider will tell you about the possible dangers of taking these medicines.  Date Last Reviewed: 12/1/2016  © 2887-3281 [employer]. [address] [place] [address], [place] [address]. All rights reserved. This information is not intended as a substitute for professional medical care. Always follow your healthcare professional's instructions.

## 2017-09-14 NOTE — TRANSFER OF CARE
"Anesthesia Transfer of Care Note    Patient: Yolie Brambila    Procedure(s) Performed: Procedure(s) (LRB):  RESECTION-TURBINATES (SMR) (N/A)  SEPTOPLASTY (N/A)    Patient location: PACU    Anesthesia Type: general    Transport from OR: Transported from OR on room air with adequate spontaneous ventilation    Post pain: adequate analgesia    Post assessment: no apparent anesthetic complications and tolerated procedure well    Post vital signs: stable    Level of consciousness: lethargic and responds to stimulation    Nausea/Vomiting: no nausea/vomiting    Complications: none    Transfer of care protocol was followed      Last vitals:   Visit Vitals  BP (!) 172/73 (BP Location: Right arm, Patient Position: Sitting)   Pulse 63   Temp 36.4 °C (97.5 °F) (Skin)   Resp 18   Ht 5' 4" (1.626 m)   Wt 61.2 kg (135 lb)   SpO2 98%   Breastfeeding? No   BMI 23.17 kg/m²     "

## 2017-09-14 NOTE — BRIEF OP NOTE
Septoplasty & IT Reduction  Brief Operative Note     SUMMARY     Surgery Date: 9/14/2017     Surgeon(s) and Role:     * Kraig Maxwell MD - Primary    Assisting Surgeon: None    Pre-op Diagnosis:  Deviated nasal septum [J34.2]  Hypertrophy of nasal turbinates [J34.3]  Obstructive sleep apnea (adult) (pediatric) [G47.33]    Post-op Diagnosis:  Post-Op Diagnosis Codes:     * Deviated nasal septum [J34.2]     * Hypertrophy of nasal turbinates [J34.3]     * Obstructive sleep apnea (adult) (pediatric) [G47.33]    Procedure(s) (LRB):  RESECTION-TURBINATES (SMR) (N/A)  SEPTOPLASTY (N/A)    Anesthesia: General    Description of the findings of the procedure: Septoplasty & IT Reduction    Findings/Key Components: s-shaped deviation and ITH    Estimated Blood Loss: * No values recorded between 9/14/2017  8:09 AM and 9/14/2017  9:27 AM *         Specimens:   Specimen (12h ago through future)    None          Discharge Note    SUMMARY     Admit Date: 9/14/2017    Discharge Date and Time:  09/14/2017 9:31 AM    Hospital Course (synopsis of major diagnoses, care, treatment, and services provided during the course of the hospital stay): no problems    Final Diagnosis: Post-Op Diagnosis Codes:     * Deviated nasal septum [J34.2]     * Hypertrophy of nasal turbinates [J34.3]     * Obstructive sleep apnea (adult) (pediatric) [G47.33]    Disposition: Home or Self care  Follow Up/Patient Instructions: Head elevation >30 degrees, no nose blowing/sneezing x 2 weeks. Start nasal saline lavages (>4x/day) in 24 hrs if not bleeding. Hold until bleeding has stopped.    Medications:  Reconciled Home Medications:   Current Discharge Medication List      CONTINUE these medications which have NOT CHANGED    Details   azelastine (ASTELIN) 137 mcg (0.1 %) nasal spray INL 2 SPRAYS IEN BID MORNING AND NIGHT UTD  Refills: 4      chlorzoxazone (PARAFON FORTE) 500 mg Tab 1/2 tablet tid  Qty: 60 tablet, Refills: 5    Associated Diagnoses: Bilateral  low back pain with sciatica, sciatica laterality unspecified, unspecified chronicity; Lumbar paraspinal muscle spasm      citalopram (CELEXA) 10 MG tablet TAKE 1 TABLET BY MOUTH EVERY DAY  Qty: 90 tablet, Refills: 0      cycloSPORINE (RESTASIS) 0.05 % ophthalmic emulsion Place 1 drop into both eyes 2 (two) times daily.      etodolac (LODINE) 400 MG tablet Take 1 tablet (400 mg total) by mouth 2 (two) times daily.  Qty: 60 tablet, Refills: 5      gabapentin (NEURONTIN) 300 MG capsule take 2 tablets at night  Qty: 180 capsule, Refills: 0      sodium chloride (SALINE NASAL) 0.65 % nasal spray 1 spray by Nasal route as needed for Congestion.      triamcinolone (NASACORT) 55 mcg nasal inhaler 1 spray by Nasal route once daily.      meclizine (ANTIVERT) 25 mg tablet Take 1 tablet (25 mg total) by mouth 3 (three) times daily as needed.  Qty: 30 tablet, Refills: 2             Discharge Procedure Orders  Diet general     Other restrictions (specify):   Order Comments: Patient has the following activity restrictions:  Head of bed 45-60 degree elevation.  Activity AD INGRID, no strenuous activity for 2 weeks.  No nose blowing, sneezing or bending over for 2 weeks.  May apply drip pad to nose if needed for bleeding (call MD if more than 3 drip pads are needed per hour)  Follow up appointment scheduled with Dr. Maxwell.  If not made, please schedule.  Any questions or problems, patient is to call [phone].    May use CPAP if needed for sleep       Follow-up Information     Kraig Maxwell MD In 2 weeks.    Specialty:  Otolaryngology  Contact information:  [address] [facility] [address] [phone]                   Kraig Maxwell MD, FAAOA

## 2017-09-14 NOTE — PLAN OF CARE
SEE EPIC DETAILS FOR PACU TIME PT HERE S/P RHINOPLASTY ET AL.  PAIN MGT WITH POST OP MEDS AS ORDERED.  PT MILEY WELL.  , LUIS, AT BEDSIDE FOR DISCHARGE TEACHING.  ALREADY HAS WITH HIM HER HYDROCODONE PER RX GIVEN TO PT PER SURGEON.  TIME ALLOWED FOR QUESTIONS.

## 2017-09-15 VITALS
RESPIRATION RATE: 16 BRPM | HEIGHT: 64 IN | BODY MASS INDEX: 23.05 KG/M2 | HEART RATE: 76 BPM | SYSTOLIC BLOOD PRESSURE: 146 MMHG | OXYGEN SATURATION: 95 % | WEIGHT: 135 LBS | DIASTOLIC BLOOD PRESSURE: 65 MMHG | TEMPERATURE: 98 F

## 2017-09-27 RX ORDER — CITALOPRAM 10 MG/1
TABLET ORAL
Qty: 90 TABLET | Refills: 0 | Status: SHIPPED | OUTPATIENT
Start: 2017-09-27 | End: 2018-01-01 | Stop reason: SDUPTHER

## 2017-10-05 ENCOUNTER — OFFICE VISIT (OUTPATIENT)
Dept: FAMILY MEDICINE | Facility: CLINIC | Age: 67
End: 2017-10-05
Payer: MEDICARE

## 2017-10-05 VITALS
TEMPERATURE: 98 F | SYSTOLIC BLOOD PRESSURE: 112 MMHG | BODY MASS INDEX: 22.94 KG/M2 | WEIGHT: 134.38 LBS | HEIGHT: 64 IN | DIASTOLIC BLOOD PRESSURE: 70 MMHG | HEART RATE: 64 BPM

## 2017-10-05 DIAGNOSIS — Z00.00 ROUTINE ADULT HEALTH MAINTENANCE: Primary | ICD-10-CM

## 2017-10-05 DIAGNOSIS — F32.A DEPRESSION, UNSPECIFIED DEPRESSION TYPE: ICD-10-CM

## 2017-10-05 DIAGNOSIS — G47.33 OSA (OBSTRUCTIVE SLEEP APNEA): ICD-10-CM

## 2017-10-05 DIAGNOSIS — E78.1 HYPERTRIGLYCERIDEMIA: ICD-10-CM

## 2017-10-05 DIAGNOSIS — J34.2 DEVIATED SEPTUM: ICD-10-CM

## 2017-10-05 DIAGNOSIS — G60.9 IDIOPATHIC PERIPHERAL NEUROPATHY: ICD-10-CM

## 2017-10-05 DIAGNOSIS — M54.50 CHRONIC BILATERAL LOW BACK PAIN WITHOUT SCIATICA: ICD-10-CM

## 2017-10-05 DIAGNOSIS — G89.29 CHRONIC BILATERAL LOW BACK PAIN WITHOUT SCIATICA: ICD-10-CM

## 2017-10-05 PROCEDURE — 99999 PR PBB SHADOW E&M-EST. PATIENT-LVL III: CPT | Mod: PBBFAC,,, | Performed by: FAMILY MEDICINE

## 2017-10-05 PROCEDURE — 99214 OFFICE O/P EST MOD 30 MIN: CPT | Mod: S$PBB,,, | Performed by: FAMILY MEDICINE

## 2017-10-05 PROCEDURE — G0008 ADMIN INFLUENZA VIRUS VAC: HCPCS | Mod: PBBFAC,PN

## 2017-10-05 PROCEDURE — 99213 OFFICE O/P EST LOW 20 MIN: CPT | Mod: PBBFAC,PN,25 | Performed by: FAMILY MEDICINE

## 2017-10-05 RX ORDER — ETODOLAC 400 MG/1
400 TABLET, FILM COATED ORAL 2 TIMES DAILY
Qty: 60 TABLET | Refills: 5 | Status: SHIPPED | OUTPATIENT
Start: 2017-10-05 | End: 2018-09-20

## 2017-10-05 NOTE — PROGRESS NOTES
"Subjective:       Patient ID: Yolie Brambila is a 67 y.o. female.    Chief Complaint: Annual Exam    Here for her yearly checkup. NO concerns, no problems today.    She was seen for acute back strain in August.  Doing better now. Has not returned to yoga. Had previous referral for PT, never got a call, does not want to follow up with PT now. Pain doing better now, she exercises at [employer].    Doing well after her nasal septal surgery. No complications.    Chlorthaxazone no longer covered by insurance It helps her so she paid for it. Used flexaril in past, and that was effective.      Review of Systems   Constitutional: Negative for activity change, appetite change, fatigue, fever and unexpected weight change.   HENT: Negative for postnasal drip, rhinorrhea, sinus pain and sinus pressure.    Eyes: Negative.    Respiratory: Negative for cough, shortness of breath and wheezing.    Cardiovascular: Negative for chest pain, palpitations and leg swelling.   Gastrointestinal: Negative for abdominal pain, blood in stool, constipation, diarrhea, nausea and vomiting.   Endocrine: Negative.    Genitourinary: Negative for frequency, hematuria and urgency.   Musculoskeletal: Positive for back pain and myalgias.   Neurological: Positive for headaches (yesterday, fumes from painting house).   Psychiatric/Behavioral: Negative.        Objective:     Blood pressure 112/70, pulse 64, temperature 98 °F (36.7 °C), temperature source Oral, height 5' 4" (1.626 m), weight 61 kg (134 lb 5.9 oz).      Physical Exam   Constitutional: She is oriented to person, place, and time. She appears well-developed and well-nourished.   HENT:   Head: Normocephalic and atraumatic.   Right Ear: External ear normal.   Left Ear: External ear normal.   Nose: Nose normal.   Mouth/Throat: Oropharynx is clear and moist.   Eyes: Conjunctivae and EOM are normal.   Neck: Normal range of motion. Neck supple.   Cardiovascular: Normal rate, regular rhythm, normal " heart sounds and intact distal pulses.    Pulmonary/Chest: Effort normal and breath sounds normal.   Abdominal: Soft. Bowel sounds are normal.   Musculoskeletal: Normal range of motion.   Neurological: She is alert and oriented to person, place, and time.   Skin: Skin is warm and dry.   Psychiatric: She has a normal mood and affect. Her behavior is normal. Judgment and thought content normal.       Assessment:       1. Routine adult health maintenance    2. Depression, unspecified depression type    3. CARYL (obstructive sleep apnea)    4. Idiopathic peripheral neuropathy    5. Deviated septum    6. Chronic bilateral low back pain without sciatica        Plan:       1. Health maintenance  -flu shot given today  -refills of medications given    2. LBP  -shown some exercises for lower back pain and strength    3. Hypertriglyceridemia  -Lipid, BMP ordered

## 2017-10-10 ENCOUNTER — LAB VISIT (OUTPATIENT)
Dept: LAB | Facility: HOSPITAL | Age: 67
End: 2017-10-10
Attending: FAMILY MEDICINE
Payer: MEDICARE

## 2017-10-10 DIAGNOSIS — E78.1 HYPERTRIGLYCERIDEMIA: ICD-10-CM

## 2017-10-10 LAB
ANION GAP SERPL CALC-SCNC: 10 MMOL/L
BUN SERPL-MCNC: 15 MG/DL
CALCIUM SERPL-MCNC: 9.2 MG/DL
CHLORIDE SERPL-SCNC: 104 MMOL/L
CHOLEST SERPL-MCNC: 199 MG/DL
CHOLEST/HDLC SERPL: 3.8 {RATIO}
CO2 SERPL-SCNC: 27 MMOL/L
CREAT SERPL-MCNC: 0.8 MG/DL
EST. GFR  (AFRICAN AMERICAN): >60 ML/MIN/1.73 M^2
EST. GFR  (NON AFRICAN AMERICAN): >60 ML/MIN/1.73 M^2
GLUCOSE SERPL-MCNC: 89 MG/DL
HDLC SERPL-MCNC: 53 MG/DL
HDLC SERPL: 26.6 %
LDLC SERPL CALC-MCNC: 108.6 MG/DL
NONHDLC SERPL-MCNC: 146 MG/DL
POTASSIUM SERPL-SCNC: 4.3 MMOL/L
SODIUM SERPL-SCNC: 141 MMOL/L
TRIGL SERPL-MCNC: 187 MG/DL

## 2017-10-10 PROCEDURE — 80061 LIPID PANEL: CPT

## 2017-10-10 PROCEDURE — 36415 COLL VENOUS BLD VENIPUNCTURE: CPT | Mod: PO

## 2017-10-10 PROCEDURE — 80048 BASIC METABOLIC PNL TOTAL CA: CPT

## 2017-11-20 RX ORDER — GABAPENTIN 300 MG/1
CAPSULE ORAL
Qty: 180 CAPSULE | Refills: 3 | Status: SHIPPED | OUTPATIENT
Start: 2017-11-20 | End: 2018-03-15 | Stop reason: SDUPTHER

## 2018-01-02 RX ORDER — CITALOPRAM 10 MG/1
TABLET ORAL
Qty: 90 TABLET | Refills: 0 | Status: SHIPPED | OUTPATIENT
Start: 2018-01-02 | End: 2018-03-21 | Stop reason: SDUPTHER

## 2018-03-15 ENCOUNTER — PATIENT MESSAGE (OUTPATIENT)
Dept: FAMILY MEDICINE | Facility: CLINIC | Age: 68
End: 2018-03-15

## 2018-03-15 RX ORDER — GABAPENTIN 300 MG/1
600 CAPSULE ORAL 2 TIMES DAILY
Qty: 360 CAPSULE | Refills: 3 | Status: SHIPPED | OUTPATIENT
Start: 2018-03-15 | End: 2018-04-18 | Stop reason: DRUGHIGH

## 2018-03-16 RX ORDER — GABAPENTIN 600 MG/1
600 TABLET ORAL 2 TIMES DAILY
Qty: 180 TABLET | Refills: 1 | Status: SHIPPED | OUTPATIENT
Start: 2018-03-16 | End: 2018-09-20

## 2018-03-16 NOTE — TELEPHONE ENCOUNTER
----- Message from Efrain So sent at 3/16/2018  2:07 PM CDT -----  Contact: Parviz Jj with [facility]'s pharmacy called regarding gabapentin Rx insurance will not pay for it. Call to get dosage changed to 600 mg. Call back at [phone]

## 2018-03-21 RX ORDER — CITALOPRAM 10 MG/1
TABLET ORAL
Qty: 90 TABLET | Refills: 0 | Status: SHIPPED | OUTPATIENT
Start: 2018-03-21 | End: 2018-06-13 | Stop reason: SDUPTHER

## 2018-04-02 ENCOUNTER — PATIENT MESSAGE (OUTPATIENT)
Dept: FAMILY MEDICINE | Facility: CLINIC | Age: 68
End: 2018-04-02

## 2018-04-18 ENCOUNTER — OFFICE VISIT (OUTPATIENT)
Dept: FAMILY MEDICINE | Facility: CLINIC | Age: 68
End: 2018-04-18
Payer: MEDICARE

## 2018-04-18 VITALS
DIASTOLIC BLOOD PRESSURE: 72 MMHG | WEIGHT: 136.38 LBS | HEIGHT: 64 IN | BODY MASS INDEX: 23.28 KG/M2 | SYSTOLIC BLOOD PRESSURE: 138 MMHG | TEMPERATURE: 98 F

## 2018-04-18 DIAGNOSIS — H25.033 ANTERIOR SUBCAPSULAR POLAR AGE-RELATED CATARACT OF BOTH EYES: ICD-10-CM

## 2018-04-18 DIAGNOSIS — G60.9 IDIOPATHIC PERIPHERAL NEUROPATHY: Primary | ICD-10-CM

## 2018-04-18 DIAGNOSIS — G47.33 OSA (OBSTRUCTIVE SLEEP APNEA): ICD-10-CM

## 2018-04-18 PROCEDURE — 99999 PR PBB SHADOW E&M-EST. PATIENT-LVL III: CPT | Mod: PBBFAC,,, | Performed by: FAMILY MEDICINE

## 2018-04-18 PROCEDURE — 99214 OFFICE O/P EST MOD 30 MIN: CPT | Mod: S$PBB,,, | Performed by: FAMILY MEDICINE

## 2018-04-18 PROCEDURE — 99213 OFFICE O/P EST LOW 20 MIN: CPT | Mod: PBBFAC,PN | Performed by: FAMILY MEDICINE

## 2018-04-18 NOTE — PROGRESS NOTES
"Subjective:       Patient ID: Yolie Brambila is a 67 y.o. female.    Chief Complaint: Pre-op Exam    She is having cataract surgery with Dr. Hassan.  Good health. She had lumbar surgery years ago and a nasal septoplasty in Sept 2017.  No anesthesia problems or excess bleeding. Peripheral neuropathic pain controlled with gabapentin 600 bid. Anxiety controlled with citalopram.   Past Medical History, Surgical History, Family History, Social History, Medications and Allergies reviewed.         Review of Systems   Constitutional: Negative for fever and unexpected weight change.   Eyes: Positive for visual disturbance (daytime glare).   Respiratory: Negative for cough and shortness of breath.    Cardiovascular: Negative for chest pain, palpitations and leg swelling.   Neurological: Negative for weakness and headaches.       Objective:     Blood pressure 138/72, temperature 97.8 °F (36.6 °C), height 5' 4" (1.626 m), weight 61.8 kg (136 lb 5.7 oz).      Physical Exam   Constitutional: She appears well-developed and well-nourished. No distress.   Eyes:   bilat cataracts   Cardiovascular: Normal rate, regular rhythm and normal heart sounds.    No carotid bruits   Pulmonary/Chest: Effort normal and breath sounds normal. No respiratory distress. She has no wheezes.   Musculoskeletal: She exhibits no edema.   Neurological: She is alert.   Psychiatric: She has a normal mood and affect.       Assessment:       1. Idiopathic peripheral neuropathy    2. CARYL (obstructive sleep apnea)    3. Anterior subcapsular polar age-related cataract of both eyes        Plan:       She had a normal bmp in October. Cleared for cataract surgery. Note to Dr. Hassan     "

## 2018-06-13 RX ORDER — CITALOPRAM 10 MG/1
10 TABLET ORAL DAILY
Qty: 90 TABLET | Refills: 2 | Status: SHIPPED | OUTPATIENT
Start: 2018-06-13 | End: 2019-10-10 | Stop reason: ALTCHOICE

## 2018-06-13 RX ORDER — CITALOPRAM 10 MG/1
TABLET ORAL
Qty: 90 TABLET | Refills: 3 | Status: SHIPPED | OUTPATIENT
Start: 2018-06-13 | End: 2018-09-20

## 2018-09-08 ENCOUNTER — PATIENT MESSAGE (OUTPATIENT)
Dept: FAMILY MEDICINE | Facility: CLINIC | Age: 68
End: 2018-09-08

## 2018-09-20 ENCOUNTER — OFFICE VISIT (OUTPATIENT)
Dept: FAMILY MEDICINE | Facility: CLINIC | Age: 68
End: 2018-09-20
Payer: MEDICARE

## 2018-09-20 VITALS
HEIGHT: 64 IN | TEMPERATURE: 98 F | WEIGHT: 135.81 LBS | DIASTOLIC BLOOD PRESSURE: 64 MMHG | SYSTOLIC BLOOD PRESSURE: 136 MMHG | HEART RATE: 60 BPM | BODY MASS INDEX: 23.18 KG/M2

## 2018-09-20 DIAGNOSIS — F33.42 RECURRENT MAJOR DEPRESSIVE DISORDER, IN FULL REMISSION: Primary | ICD-10-CM

## 2018-09-20 DIAGNOSIS — G47.33 OSA (OBSTRUCTIVE SLEEP APNEA): ICD-10-CM

## 2018-09-20 DIAGNOSIS — Z12.31 ENCOUNTER FOR SCREENING MAMMOGRAM FOR BREAST CANCER: ICD-10-CM

## 2018-09-20 PROCEDURE — 90662 IIV NO PRSV INCREASED AG IM: CPT | Mod: PBBFAC,PN

## 2018-09-20 PROCEDURE — 99999 PR PBB SHADOW E&M-EST. PATIENT-LVL III: CPT | Mod: PBBFAC,,, | Performed by: FAMILY MEDICINE

## 2018-09-20 PROCEDURE — 99214 OFFICE O/P EST MOD 30 MIN: CPT | Mod: S$PBB,,, | Performed by: FAMILY MEDICINE

## 2018-09-20 PROCEDURE — 99213 OFFICE O/P EST LOW 20 MIN: CPT | Mod: PBBFAC,PN,25 | Performed by: FAMILY MEDICINE

## 2018-09-20 RX ORDER — GABAPENTIN 600 MG/1
600 TABLET ORAL NIGHTLY
Qty: 180 TABLET | Refills: 1
Start: 2018-09-20 | End: 2019-03-20 | Stop reason: SDUPTHER

## 2018-09-20 RX ORDER — ROPINIROLE 0.25 MG/1
0.5 TABLET, FILM COATED ORAL NIGHTLY
Qty: 60 TABLET | Refills: 1 | Status: SHIPPED | OUTPATIENT
Start: 2018-09-20 | End: 2018-11-08 | Stop reason: SDUPTHER

## 2018-09-20 NOTE — PATIENT INSTRUCTIONS
Reduce gabapentin to 600 at night.   Try requip .25 to .5 mg at night.   Consider seeing Dr. Frankel again

## 2018-09-20 NOTE — PROGRESS NOTES
Subjective:       Patient ID: Yolie Brambila is a 67 y.o. female.    Chief Complaint: Annual Exam (annual check up. Poss due for lab. Needs flu shot and)    She is here for an annual exam.  She has been in pretty good health.  She does have a history of chronic peripheral neuropathy.  She describes the symptoms in her feet and going up her legs as a throbbing pins and needle sensation.  She does have to frequently move her feet and the symptoms interfere with sleep.  She has been able to get some partial relief with gabapentin but despite the increasing dose the symptoms have been bothering her more.  She does not sleep well.  She gets some increased discomfort after she has walked on the elliptical .  She had an evaluation by Dr. Wagoner in 2014.  That included nerve conduction test that showed some mild musculosensory neuropathy.  She complains of some right lateral hip pain that interferes with sleeping on the right side.  She has allergic rhinitis and takes symptomatic medication.  Her depression is well controlled with psy tele friend.  She had a past history of us drug active sleep apnea but her sinus surgery helped with that.  Past Medical History, Surgical History, Family History, Social History, Medications and Allergies reviewed.         Review of Systems   Constitutional: Negative for fatigue, fever and unexpected weight change.   HENT: Negative for congestion, hearing loss and rhinorrhea.    Eyes: Negative for photophobia and visual disturbance.   Respiratory: Negative for cough, shortness of breath and wheezing.    Cardiovascular: Negative for chest pain and palpitations.   Gastrointestinal: Negative for abdominal pain, blood in stool, constipation, diarrhea and nausea.   Genitourinary: Negative for difficulty urinating, dysuria, hematuria, urgency, vaginal bleeding and vaginal discharge.   Musculoskeletal: Negative for arthralgias, back pain and joint swelling.   Neurological: Positive for  "numbness. Negative for headaches.       Objective:     Blood pressure 136/64, pulse 60, temperature 98.2 °F (36.8 °C), temperature source Oral, height 5' 4" (1.626 m), weight 61.6 kg (135 lb 12.9 oz).      Physical Exam   Constitutional: She is oriented to person, place, and time. She appears well-developed and well-nourished. No distress.   HENT:   Right Ear: External ear normal.   Left Ear: External ear normal.   Mouth/Throat: No oropharyngeal exudate.   Eyes: Conjunctivae and EOM are normal. Pupils are equal, round, and reactive to light. No scleral icterus.   Neck: Normal range of motion. No thyromegaly present.   Cardiovascular: Normal rate, regular rhythm and normal heart sounds.   No murmur heard.  Pulmonary/Chest: Effort normal. No respiratory distress. She has no wheezes. She has no rales. She exhibits no tenderness.   Abdominal: Soft. She exhibits no distension and no mass. There is no tenderness.   Musculoskeletal: She exhibits no edema.   Her hip range of motion is normal.  She does have some pain just below her anterior pelvic rim that is reproduced by resisted hip abduction.  No trochanteric tenderness   Lymphadenopathy:     She has no cervical adenopathy.   Neurological: She is alert and oriented to person, place, and time.   Ft sensation is intact to light touch in monofilament.  She does have a decreased vibratory sensation in both big toes.  Vibratory sensation in the smaller toes seems almost normal.   Skin: No rash noted.   Psychiatric: She has a normal mood and affect.       Assessment:       1. Recurrent major depressive disorder, in full remission    2. CARYL (obstructive sleep apnea)    3. Encounter for screening mammogram for breast cancer        Plan:       Mammogram.  Reduce gabapentin to 600 mg HS.  I will give a trial of ropinirole 0.25 mg HS.  I wonder if a component of her symptoms is restless leg      "

## 2018-09-24 ENCOUNTER — HOSPITAL ENCOUNTER (OUTPATIENT)
Dept: RADIOLOGY | Facility: HOSPITAL | Age: 68
Discharge: HOME | End: 2018-09-24
Attending: FAMILY MEDICINE
Payer: MEDICARE

## 2018-09-24 DIAGNOSIS — Z12.31 ENCOUNTER FOR SCREENING MAMMOGRAM FOR BREAST CANCER: ICD-10-CM

## 2018-09-24 PROCEDURE — 77067 SCR MAMMO BI INCL CAD: CPT | Mod: 26,,, | Performed by: RADIOLOGY

## 2018-09-24 PROCEDURE — 77063 BREAST TOMOSYNTHESIS BI: CPT | Mod: 26,,, | Performed by: RADIOLOGY

## 2018-09-24 PROCEDURE — 77063 BREAST TOMOSYNTHESIS BI: CPT | Mod: TC,PO

## 2018-11-08 DIAGNOSIS — M62.830 LUMBAR PARASPINAL MUSCLE SPASM: ICD-10-CM

## 2018-11-08 DIAGNOSIS — M54.40 BILATERAL LOW BACK PAIN WITH SCIATICA, SCIATICA LATERALITY UNSPECIFIED, UNSPECIFIED CHRONICITY: ICD-10-CM

## 2018-11-08 RX ORDER — CHLORZOXAZONE 500 MG/1
TABLET ORAL
Qty: 60 TABLET | Refills: 0 | Status: SHIPPED | OUTPATIENT
Start: 2018-11-08 | End: 2019-10-10

## 2018-11-08 RX ORDER — ROPINIROLE 0.25 MG/1
0.5 TABLET, FILM COATED ORAL NIGHTLY
Qty: 60 TABLET | Refills: 3 | Status: SHIPPED | OUTPATIENT
Start: 2018-11-08 | End: 2019-03-13 | Stop reason: SDUPTHER

## 2018-11-08 NOTE — TELEPHONE ENCOUNTER
Last seen on: 09/20/2018    Next appt: N/A    Last refill: 09/20/2018    Allergies:   Review of patient's allergies indicates:   Allergen Reactions    Penicillins Shortness Of Breath         Labs: Please review.    CMP  Sodium   Date Value Ref Range Status   10/10/2017 141 136 - 145 mmol/L Final     Potassium   Date Value Ref Range Status   10/10/2017 4.3 3.5 - 5.1 mmol/L Final     Chloride   Date Value Ref Range Status   10/10/2017 104 95 - 110 mmol/L Final     CO2   Date Value Ref Range Status   10/10/2017 27 23 - 29 mmol/L Final     Glucose   Date Value Ref Range Status   10/10/2017 89 70 - 110 mg/dL Final     BUN, Bld   Date Value Ref Range Status   10/10/2017 15 8 - 23 mg/dL Final     Creatinine   Date Value Ref Range Status   10/10/2017 0.8 0.5 - 1.4 mg/dL Final     Calcium   Date Value Ref Range Status   10/10/2017 9.2 8.7 - 10.5 mg/dL Final     Total Protein   Date Value Ref Range Status   05/24/2016 7.1 6.0 - 8.4 g/dL Final     Albumin   Date Value Ref Range Status   05/24/2016 4.0 3.5 - 5.2 g/dL Final     Total Bilirubin   Date Value Ref Range Status   05/24/2016 0.5 0.1 - 1.0 mg/dL Final     Comment:     For infants and newborns, interpretation of results should be based  on gestational age, weight and in agreement with clinical  observations.  Premature Infant recommended reference ranges:  Up to 24 hours.............<8.0 mg/dL  Up to 48 hours............<12.0 mg/dL  3-5 days..................<15.0 mg/dL  6-29 days.................<15.0 mg/dL       Alkaline Phosphatase   Date Value Ref Range Status   05/24/2016 76 55 - 135 U/L Final     AST   Date Value Ref Range Status   05/24/2016 20 10 - 40 U/L Final     ALT   Date Value Ref Range Status   05/24/2016 16 10 - 44 U/L Final     Anion Gap   Date Value Ref Range Status   10/10/2017 10 8 - 16 mmol/L Final     eGFR if    Date Value Ref Range Status   10/10/2017 >60.0 >60 mL/min/1.73 m^2 Final     eGFR if non    Date Value Ref  Range Status   10/10/2017 >60.0 >60 mL/min/1.73 m^2 Final     Comment:     Calculation used to obtain the estimated glomerular filtration  rate (eGFR) is the CKD-EPI equation. Since race is unknown   in our information system, the eGFR values for   -American and Non--American patients are given   for each creatinine result.         Lab Results   Component Value Date    TSH 1.306 04/28/2014       Lab Results   Component Value Date    WBC 4.22 05/24/2016    HGB 13.2 05/24/2016    HCT 40.3 05/24/2016    MCV 94 05/24/2016     05/24/2016         Please review! Thank you!

## 2018-11-12 RX ORDER — GABAPENTIN 600 MG/1
TABLET ORAL
Qty: 180 TABLET | Refills: 0 | Status: SHIPPED | OUTPATIENT
Start: 2018-11-12 | End: 2019-02-03 | Stop reason: SDUPTHER

## 2019-02-04 RX ORDER — GABAPENTIN 600 MG/1
TABLET ORAL
Qty: 180 TABLET | Refills: 0 | Status: SHIPPED | OUTPATIENT
Start: 2019-02-04 | End: 2019-04-24

## 2019-02-28 ENCOUNTER — HOSPITAL ENCOUNTER (OUTPATIENT)
Dept: RADIOLOGY | Facility: HOSPITAL | Age: 69
Discharge: HOME | End: 2019-02-28
Attending: FAMILY MEDICINE
Payer: MEDICARE

## 2019-02-28 ENCOUNTER — OFFICE VISIT (OUTPATIENT)
Dept: FAMILY MEDICINE | Facility: CLINIC | Age: 69
End: 2019-02-28
Payer: MEDICARE

## 2019-02-28 DIAGNOSIS — M19.071 PRIMARY OSTEOARTHRITIS OF BOTH FEET: ICD-10-CM

## 2019-02-28 DIAGNOSIS — M19.072 PRIMARY OSTEOARTHRITIS OF BOTH FEET: ICD-10-CM

## 2019-02-28 DIAGNOSIS — G60.9 IDIOPATHIC PERIPHERAL NEUROPATHY: Primary | ICD-10-CM

## 2019-02-28 DIAGNOSIS — N95.2 ATROPHIC VAGINITIS: ICD-10-CM

## 2019-02-28 DIAGNOSIS — M70.61 TROCHANTERIC BURSITIS OF RIGHT HIP: ICD-10-CM

## 2019-02-28 PROCEDURE — 73630 X-RAY EXAM OF FOOT: CPT | Mod: 26,50,, | Performed by: RADIOLOGY

## 2019-02-28 PROCEDURE — 73521 X-RAY EXAM HIPS BI 2 VIEWS: CPT | Mod: 26,,, | Performed by: RADIOLOGY

## 2019-02-28 PROCEDURE — 99214 PR OFFICE/OUTPT VISIT, EST, LEVL IV, 30-39 MIN: ICD-10-PCS | Mod: 25,S$PBB,, | Performed by: FAMILY MEDICINE

## 2019-02-28 PROCEDURE — 99999 PR PBB SHADOW E&M-EST. PATIENT-LVL III: CPT | Mod: PBBFAC,,, | Performed by: FAMILY MEDICINE

## 2019-02-28 PROCEDURE — 99214 OFFICE O/P EST MOD 30 MIN: CPT | Mod: 25,S$PBB,, | Performed by: FAMILY MEDICINE

## 2019-02-28 PROCEDURE — 99213 OFFICE O/P EST LOW 20 MIN: CPT | Mod: PBBFAC,25,PN | Performed by: FAMILY MEDICINE

## 2019-02-28 PROCEDURE — 73521 XR HIPS BILATERAL 2 VIEW INCL AP PELVIS: ICD-10-PCS | Mod: 26,,, | Performed by: RADIOLOGY

## 2019-02-28 PROCEDURE — 73630 X-RAY EXAM OF FOOT: CPT | Mod: 50,TC,PN

## 2019-02-28 PROCEDURE — 73521 X-RAY EXAM HIPS BI 2 VIEWS: CPT | Mod: TC,PN

## 2019-02-28 PROCEDURE — 20610 DRAIN/INJ JOINT/BURSA W/O US: CPT | Mod: PBBFAC,PN | Performed by: FAMILY MEDICINE

## 2019-02-28 PROCEDURE — 99999 PR PBB SHADOW E&M-EST. PATIENT-LVL III: ICD-10-PCS | Mod: PBBFAC,,, | Performed by: FAMILY MEDICINE

## 2019-02-28 PROCEDURE — 20610 LARGE JOINT ASPIRATION/INJECTION: ICD-10-PCS | Mod: S$PBB,RT,, | Performed by: FAMILY MEDICINE

## 2019-02-28 PROCEDURE — 73630 XR FOOT COMPLETE 3 VIEW BILATERAL: ICD-10-PCS | Mod: 26,50,, | Performed by: RADIOLOGY

## 2019-02-28 RX ORDER — ESTRADIOL 0.1 MG/G
1 CREAM VAGINAL DAILY
Qty: 30 G | Refills: 11 | Status: SHIPPED | OUTPATIENT
Start: 2019-02-28 | End: 2023-01-29 | Stop reason: SDUPTHER

## 2019-02-28 RX ORDER — TRIAMCINOLONE ACETONIDE 40 MG/ML
40 INJECTION, SUSPENSION INTRA-ARTICULAR; INTRAMUSCULAR
Status: DISCONTINUED | OUTPATIENT
Start: 2019-02-28 | End: 2019-02-28 | Stop reason: HOSPADM

## 2019-02-28 RX ADMIN — TRIAMCINOLONE ACETONIDE 40 MG: 40 INJECTION, SUSPENSION INTRA-ARTICULAR; INTRAMUSCULAR at 09:02

## 2019-02-28 NOTE — PROGRESS NOTES
Subjective:       Patient ID: Yolie Brambila is a 68 y.o. female.    Chief Complaint:  Hip pain foot pain and vaginal dryness  I had seen her for right foot pain in September.  It has gotten worse.  It interferes with her sleeping especially since she likes to sleep on her side.  Mostly right hip pain but some on the left.  She does not complain of increased pain with walking or standing.  No trauma no overuse.  The pain does go down to her knee on the lateral aspect of her leg.  She has been exercising using an elliptical  and a bicycle and doing some resistance work.  She has a history of previous sciatica but this feels different.  She does not complain of back pain. Bilateral foot pain limits her walking and also her exercising.  We have been blaming it on her peripheral neuropathy but it sounds more like a soreness on the bottom of her feet.  She does have tingling sensation is well but does not describe a burning sensation.  Vaginal dryness is interfering with intercourse.  No history of breast cancer.      Review of Systems   Constitutional: Negative for activity change and unexpected weight change.   HENT: Negative for hearing loss, rhinorrhea and trouble swallowing.    Eyes: Positive for discharge and visual disturbance.   Respiratory: Negative for chest tightness and wheezing.    Cardiovascular: Negative for chest pain and palpitations.   Gastrointestinal: Negative for blood in stool, constipation, diarrhea and vomiting.   Endocrine: Negative for polydipsia and polyuria.   Genitourinary: Positive for vaginal pain. Negative for difficulty urinating, dysuria, hematuria and menstrual problem.   Musculoskeletal: Positive for arthralgias. Negative for joint swelling and neck pain.   Neurological: Negative for weakness and headaches.   Psychiatric/Behavioral: Negative for confusion and dysphoric mood.       Objective:     There were no vitals taken for this visit.      Physical Exam   Constitutional:  She appears well-developed and well-nourished. No distress.   Cardiovascular: Normal rate and regular rhythm.   Pulmonary/Chest: Effort normal and breath sounds normal.   Musculoskeletal: She exhibits no edema.   Full range of motion of both hips.  She does get some right hip pain with full flexion.  No pain with abduction or adduction.  No pain with resisted rotation.  She is tender just posterior to the upper right greater trochanter.  Similar but less tender on the left.  Ft exam shows some tenderness dorsal and plantar 1st MTPs bilaterally. No heel tenderness. Good range of motion of the toes ankle and forefoot.   Neurological: She is alert.   Absent monofilament on the right great toe and middle toe but present on the small toe.  Monofilament sensation present on the left.  Decreased vibration sense bilateral toes.       Assessment:       1. Idiopathic peripheral neuropathy    2. Atrophic vaginitis    3. Trochanteric bursitis of right hip    4. Primary osteoarthritis of both feet        Plan:       We discussed options for trochanteric bursitis.  She was not interested in physical therapy because she has not had success for other problems with physical therapy.  I injected her right greater trochanteric bursa as below.  Hip x-ray and foot x-ray and podiatry consult.  Estradiol vaginal cream.

## 2019-02-28 NOTE — PROCEDURES
Large Joint Aspiration/Injection  Date/Time: 2/28/2019 9:42 AM  Performed by: Dylon Ragland MD  Authorized by: Dylon Ragland MD     Consent Done?:  Yes (Verbal)  Indications:  Pain  Needle size:  25 G  Medications:  40 mg triamcinolone acetonide 40 mg/mL    Additional Comments: Injected posterior aspect of right greater trochanteric bursa with 1 cc kenalog and 4 cc marcaine.  Alcohol prep and sterile gloves.

## 2019-03-12 ENCOUNTER — OFFICE VISIT (OUTPATIENT)
Dept: PODIATRY | Facility: CLINIC | Age: 69
End: 2019-03-12
Payer: MEDICARE

## 2019-03-12 VITALS
DIASTOLIC BLOOD PRESSURE: 64 MMHG | SYSTOLIC BLOOD PRESSURE: 140 MMHG | WEIGHT: 135.81 LBS | BODY MASS INDEX: 23.18 KG/M2 | HEART RATE: 57 BPM | HEIGHT: 64 IN

## 2019-03-12 DIAGNOSIS — G60.9 IDIOPATHIC PERIPHERAL NEUROPATHY: Primary | ICD-10-CM

## 2019-03-12 DIAGNOSIS — M79.672 BILATERAL FOOT PAIN: ICD-10-CM

## 2019-03-12 DIAGNOSIS — M79.671 BILATERAL FOOT PAIN: ICD-10-CM

## 2019-03-12 DIAGNOSIS — M54.16 LUMBAR RADICULOPATHY, CHRONIC: ICD-10-CM

## 2019-03-12 PROCEDURE — 99214 OFFICE O/P EST MOD 30 MIN: CPT | Mod: PBBFAC,PN | Performed by: PODIATRIST

## 2019-03-12 PROCEDURE — 99203 OFFICE O/P NEW LOW 30 MIN: CPT | Mod: S$PBB,,, | Performed by: PODIATRIST

## 2019-03-12 PROCEDURE — 99999 PR PBB SHADOW E&M-EST. PATIENT-LVL IV: ICD-10-PCS | Mod: PBBFAC,,, | Performed by: PODIATRIST

## 2019-03-12 PROCEDURE — 99203 PR OFFICE/OUTPT VISIT, NEW, LEVL III, 30-44 MIN: ICD-10-PCS | Mod: S$PBB,,, | Performed by: PODIATRIST

## 2019-03-12 PROCEDURE — 99999 PR PBB SHADOW E&M-EST. PATIENT-LVL IV: CPT | Mod: PBBFAC,,, | Performed by: PODIATRIST

## 2019-03-12 NOTE — LETTER
March 24, 2019      Dylon Ragland MD  [address]  [facility] [address]           [facility]  [address] [facility] [address] [place] [address]  Phone: [phone]          Patient: Yolie Brambila   MR Number: [id number]   YOB: 1950   Date of Visit: 3/12/2019       Dear Dr. Dylon Ragland:    Thank you for referring Yolie Brambila to me for evaluation. Attached you will find relevant portions of my assessment and plan of care.    If you have questions, please do not hesitate to call me. I look forward to following Yolie Brambila along with you.    Sincerely,    Rich Valdivia, DPCARYL    Enclosure  CC:  No Recipients    If you would like to receive this communication electronically, please contact [email or web address] or [phone] to request more information on [employer] Link access.    For providers and/or their staff who would like to refer a patient to [facility], please contact us through our one-stop-shop provider referral line, [facility] , at [phone].    If you feel you have received this communication in error or would no longer like to receive these types of communications, please e-mail [email or web address]

## 2019-03-12 NOTE — PATIENT INSTRUCTIONS
600 mg Alpha Lipoic Acid with a Complex B vitamin daily    Jay Torres found at varsity    2. Supportive shoes at all times (athletic shoe including red, new balance, asics, HOKA or casual shoes like Dansko, Cheyenne, Naot, Vionoic, Fit flop  clog or wedge with extra heel padding and arch support. For house slippers would recommend Fitflop or Spenco found on amazon.com, Never walk barefoot or in flats.    ([employer] sports, [place], [place] running company, Masseys, Goodfeet, Cantilever, Feet First, Foot Solutions, Therapeutic shoes, SAS, [facility] pro shop) http://www.StarCard.com/    3. Orthotic (recommend the following brands: Superfeet, Spenco, Powerstep, Sof Sole Fit Series)

## 2019-03-13 RX ORDER — ROPINIROLE 0.25 MG/1
0.5 TABLET, FILM COATED ORAL NIGHTLY
Qty: 60 TABLET | Refills: 3 | Status: SHIPPED | OUTPATIENT
Start: 2019-03-13 | End: 2019-06-14 | Stop reason: SDUPTHER

## 2019-03-13 NOTE — TELEPHONE ENCOUNTER
Pt Of Dylon Ragland MD    Last seen on: 2-28-19    Next appt: n/a    Last refill: 11-8-18    Allergies:   Review of patient's allergies indicates:   Allergen Reactions    Penicillins Shortness Of Breath       [facility]   [facility] [address] [place] [address] [address] [facility] [address] [facility]  [address] [facility] [address]  Phone: [phone] Fax: [phone]    Labs: Please review.      Please review! Thank you!

## 2019-03-20 ENCOUNTER — OFFICE VISIT (OUTPATIENT)
Dept: FAMILY MEDICINE | Facility: CLINIC | Age: 69
End: 2019-03-20
Payer: MEDICARE

## 2019-03-20 VITALS
WEIGHT: 135.13 LBS | HEIGHT: 64 IN | DIASTOLIC BLOOD PRESSURE: 60 MMHG | HEART RATE: 74 BPM | TEMPERATURE: 98 F | SYSTOLIC BLOOD PRESSURE: 110 MMHG | BODY MASS INDEX: 23.07 KG/M2

## 2019-03-20 DIAGNOSIS — J06.9 VIRAL URI WITH COUGH: Primary | ICD-10-CM

## 2019-03-20 DIAGNOSIS — J30.9 CHRONIC ALLERGIC RHINITIS: ICD-10-CM

## 2019-03-20 PROCEDURE — 99214 OFFICE O/P EST MOD 30 MIN: CPT | Mod: PBBFAC,PN | Performed by: FAMILY MEDICINE

## 2019-03-20 PROCEDURE — 99999 PR PBB SHADOW E&M-EST. PATIENT-LVL IV: ICD-10-PCS | Mod: PBBFAC,,, | Performed by: FAMILY MEDICINE

## 2019-03-20 PROCEDURE — 99213 PR OFFICE/OUTPT VISIT, EST, LEVL III, 20-29 MIN: ICD-10-PCS | Mod: S$PBB,,, | Performed by: FAMILY MEDICINE

## 2019-03-20 PROCEDURE — 99213 OFFICE O/P EST LOW 20 MIN: CPT | Mod: S$PBB,,, | Performed by: FAMILY MEDICINE

## 2019-03-20 PROCEDURE — 99999 PR PBB SHADOW E&M-EST. PATIENT-LVL IV: CPT | Mod: PBBFAC,,, | Performed by: FAMILY MEDICINE

## 2019-03-20 RX ORDER — MONTELUKAST SODIUM 10 MG/1
10 TABLET ORAL NIGHTLY
Qty: 30 TABLET | Refills: 0 | Status: SHIPPED | OUTPATIENT
Start: 2019-03-20 | End: 2019-04-19

## 2019-03-20 RX ORDER — BENZONATATE 100 MG/1
100 CAPSULE ORAL 3 TIMES DAILY PRN
Qty: 30 CAPSULE | Refills: 2 | Status: SHIPPED | OUTPATIENT
Start: 2019-03-20 | End: 2019-04-19

## 2019-03-20 RX ORDER — CODEINE PHOSPHATE AND GUAIFENESIN 10; 100 MG/5ML; MG/5ML
5 SOLUTION ORAL EVERY 8 HOURS PRN
Qty: 180 ML | Refills: 1 | Status: SHIPPED | OUTPATIENT
Start: 2019-03-20 | End: 2019-03-30

## 2019-03-20 NOTE — PROGRESS NOTES
"Subjective:       Patient ID: Yolie Brambila is a 68 y.o. female.    Chief Complaint: Cough (Cough x 1.5 wk. Started w/ ST. Some sinus/facial pressure.)    69 yo, F, presents c/o Cough x 1.5 wks. Cough is non productive, has not improved with  Zyrtec, Nasacort, delsym. Pt has chronic allergic rhinitis, reports that she normally has a runny nose and sneezing. States this cough is different from her chronic allergic rhinitis. She reports the cough is worse at night, she is having difficulty sleeping. Reports sinus pressure, denies fever/chills, chest pain, NVD, denies gastrointestinal reflux.       Review of Systems   Constitutional: Negative for activity change and unexpected weight change.   HENT: Positive for rhinorrhea and sinus pressure. Negative for hearing loss, sinus pain, sore throat and trouble swallowing.    Eyes: Positive for redness. Negative for pain, discharge and visual disturbance.        Chronic dry eye, reports chronic lacrimation   Respiratory: Positive for cough. Negative for chest tightness, shortness of breath and wheezing.    Cardiovascular: Negative for chest pain and palpitations.   Gastrointestinal: Negative for blood in stool, constipation, diarrhea and vomiting.   Endocrine: Negative for polyuria.   Genitourinary: Negative for difficulty urinating, dysuria, hematuria and menstrual problem.   Musculoskeletal: Negative for arthralgias, joint swelling and neck pain.   Neurological: Negative for weakness and headaches.   Psychiatric/Behavioral: Negative for confusion and dysphoric mood.       Objective:     Blood pressure 110/60, pulse 74, temperature 98.3 °F (36.8 °C), temperature source Oral, height 5' 4" (1.626 m), weight 61.3 kg (135 lb 2.3 oz).    Physical Exam   Constitutional: She is oriented to person, place, and time. She appears well-developed and well-nourished. No distress.   HENT:   Head: Normocephalic and atraumatic.   Right Ear: External ear normal.   Left Ear: External ear " normal.   Mouth/Throat: Oropharynx is clear and moist. No oropharyngeal exudate.   Clear rhinorrhea, partial occlusion of R- nares   Eyes: Conjunctivae, EOM and lids are normal. Pupils are equal, round, and reactive to light. Right eye exhibits no discharge and no exudate. Left eye exhibits no discharge and no exudate. No scleral icterus.   Neck: Normal range of motion. Neck supple.   Cardiovascular: Normal rate, regular rhythm, normal heart sounds and intact distal pulses.   Pulmonary/Chest: Effort normal and breath sounds normal. No stridor. No respiratory distress. She has no wheezes. She has no rales. She exhibits no tenderness.   Musculoskeletal: Normal range of motion.   Lymphadenopathy:     She has no cervical adenopathy.   Neurological: She is alert and oriented to person, place, and time. She displays normal reflexes. She exhibits normal muscle tone.   Skin: She is not diaphoretic.   Psychiatric: She has a normal mood and affect. Her behavior is normal. Judgment and thought content normal.       Assessment:       1. Viral URI with cough    2. Chronic allergic rhinitis        Plan:         Viral URI w/ cough  - benzonatate 100mg  - guanfenesin- codeine 100-10mg   - montlukast 10mg   -F/u in clinic if cough not better by friday

## 2019-03-22 ENCOUNTER — TELEPHONE (OUTPATIENT)
Dept: FAMILY MEDICINE | Facility: CLINIC | Age: 69
End: 2019-03-22

## 2019-03-22 ENCOUNTER — PATIENT MESSAGE (OUTPATIENT)
Dept: PODIATRY | Facility: CLINIC | Age: 69
End: 2019-03-22

## 2019-03-22 RX ORDER — DOXYCYCLINE 100 MG/1
100 TABLET ORAL 2 TIMES DAILY
Qty: 14 TABLET | Refills: 0 | Status: SHIPPED | OUTPATIENT
Start: 2019-03-22 | End: 2019-03-29

## 2019-03-22 NOTE — TELEPHONE ENCOUNTER
Please see message and advise.     Contacted and spoke with pt who says that her cough has not improved since her recent visit with Dr. Ragland and was told by Dr. Ragland that if there is no improvement to contact the clinic. Pt is still complaining of cough and now has ear throbbing with white noise sound.

## 2019-03-22 NOTE — TELEPHONE ENCOUNTER
----- Message from Sherine Allen sent at 3/22/2019  7:50 AM CDT -----  Contact: pt  Pt states that when she was in on Wed the Dr told her if she is no better to call the office so that is what she is doing. Pt would like for the nurse to please give her a call back at ..[phone]

## 2019-03-22 NOTE — TELEPHONE ENCOUNTER
----- Message from Tri Holcomb sent at 3/22/2019  4:48 PM CDT -----  Contact: patient  Type: Needs Medical Advice    Who Called:  patient  Symptoms (please be specific):  na  How long has patient had these symptoms:  jonathan  Pharmacy name and phone #:  [facility]  Best Call Back Number: [phone]  Additional Information: patient states she was suppose to call her back but she has not received a call. Patient refused to give me anymore info. Please call back to advise

## 2019-03-22 NOTE — TELEPHONE ENCOUNTER
----- Message from Tri Holcomb sent at 3/22/2019  4:48 PM CDT -----  Contact: patient  Type: Needs Medical Advice    Who Called:  patient  Symptoms (please be specific):  na  How long has patient had these symptoms:  jonathan  Pharmacy name and phone #:  [facility]  Best Call Back Number: [phone]  Additional Information: patient states she was suppose to call her back but she has not received a call. Patient refused to give me anymore info. Please call back to advise

## 2019-03-24 NOTE — PROGRESS NOTES
Subjective:      Patient ID: Yolie Brambila is a 68 y.o. female.    Chief Complaint: Peripheral Neuropathy and Foot Pain (b/l foot pain hurts when walks a 1-2 miles the whole bottom of the foot )    Yolie PANIAGUA is a 68 y.o. female who presents to the podiatry clinic  with complaint of  bilateral foot pain. Onset of the symptoms was several months ago. Precipitating event: none known. Current symptoms include: burning pain worsening symptoms after a period of activity. Aggravating factors: any weight bearing for long periods of times. Symptoms have gradually worsened. Evaluation to date: none. Treatment to date: none. Patients rates pain 7/10 on pain scale. Relates a history of chronic back problems.        Review of Systems   Constitution: Negative for chills and fever.   Cardiovascular: Negative for claudication and leg swelling.   Respiratory: Negative for shortness of breath.    Skin: Positive for nail changes. Negative for itching and rash.   Musculoskeletal: Positive for arthritis. Negative for muscle cramps, muscle weakness and myalgias.   Gastrointestinal: Negative for nausea and vomiting.   Neurological: Positive for numbness and paresthesias. Negative for focal weakness.           Objective:      Physical Exam   Constitutional: She is oriented to person, place, and time. She appears well-developed and well-nourished. No distress.   Cardiovascular:   Pulses:       Dorsalis pedis pulses are 2+ on the right side, and 2+ on the left side.        Posterior tibial pulses are 2+ on the right side, and 2+ on the left side.   < 3 sec capillary refill time to toes 1-5 bilateral. Toes and feet are warm to touch proximally with normal distal cooling b/l. There is some hair growth on the feet and toes b/l. There is no edema b/l. No spider veins or varicosities present b/l.      Musculoskeletal:   No pain with palpation or ROM of pedal joints.    Equinus noted b/l ankles with < 10 deg DF noted. MMT 5/5 in DF/PF/Inv/Ev  resistance with no reproduction of pain in any direction. Passive range of motion of ankle and pedal joints is painless b/l.     Neurological: She is alert and oriented to person, place, and time. She has normal strength. She displays no atrophy and no tremor. A sensory deficit is present. She exhibits normal muscle tone.   Negative tinel sign bilateral. Diminished vibratory sensation bilateral.   Skin: Skin is warm, dry and intact. No abrasion, no bruising, no burn, no ecchymosis, no laceration, no lesion, no petechiae and no rash noted. She is not diaphoretic. No cyanosis or erythema. No pallor. Nails show no clubbing.   Skin temperature, texture and turgor within normal limits.   Psychiatric: She has a normal mood and affect. Her behavior is normal.             Assessment:       Encounter Diagnoses   Name Primary?    Idiopathic peripheral neuropathy Yes    Lumbar radiculopathy, chronic     Bilateral foot pain          Plan:       Yolie PANIAGUA was seen today for peripheral neuropathy and foot pain.    Diagnoses and all orders for this visit:    Idiopathic peripheral neuropathy  -     Ambulatory consult to Pain Clinic    Lumbar radiculopathy, chronic  -     Ambulatory consult to Pain Clinic    Bilateral foot pain  -     Ambulatory consult to Pain Clinic      I counseled the patient on her conditions, their implications and medical management.    Patient will obtain over the counter arch supports and wear them in shoes whenever possible.  Athletic shoes intended for walking or running are usually best.    Recommend supportive shoes specifically Bondi Hoka    600 mg Alpha lipoic acid and B complex vitamin daily    I believe her pain is from the chronic back issues, ambulatory consult to pain clinic    Return JASS Valdivia DPM

## 2019-03-25 ENCOUNTER — PATIENT MESSAGE (OUTPATIENT)
Dept: PODIATRY | Facility: CLINIC | Age: 69
End: 2019-03-25

## 2019-04-16 ENCOUNTER — LAB VISIT (OUTPATIENT)
Dept: LAB | Facility: HOSPITAL | Age: 69
End: 2019-04-16
Attending: ANESTHESIOLOGY
Payer: MEDICARE

## 2019-04-16 ENCOUNTER — OFFICE VISIT (OUTPATIENT)
Dept: PAIN MEDICINE | Facility: CLINIC | Age: 69
End: 2019-04-16
Payer: MEDICARE

## 2019-04-16 VITALS
HEIGHT: 64 IN | SYSTOLIC BLOOD PRESSURE: 158 MMHG | HEART RATE: 60 BPM | BODY MASS INDEX: 23.49 KG/M2 | OXYGEN SATURATION: 99 % | DIASTOLIC BLOOD PRESSURE: 72 MMHG | WEIGHT: 137.56 LBS | RESPIRATION RATE: 18 BRPM | TEMPERATURE: 97 F

## 2019-04-16 DIAGNOSIS — M51.36 DDD (DEGENERATIVE DISC DISEASE), LUMBAR: ICD-10-CM

## 2019-04-16 DIAGNOSIS — M54.16 BILATERAL LUMBAR RADICULOPATHY: Primary | ICD-10-CM

## 2019-04-16 DIAGNOSIS — M54.16 BILATERAL LUMBAR RADICULOPATHY: ICD-10-CM

## 2019-04-16 DIAGNOSIS — G60.9 IDIOPATHIC PERIPHERAL NEUROPATHY: ICD-10-CM

## 2019-04-16 LAB
CREAT SERPL-MCNC: 0.8 MG/DL (ref 0.5–1.4)
EST. GFR  (AFRICAN AMERICAN): >60 ML/MIN/1.73 M^2
EST. GFR  (NON AFRICAN AMERICAN): >60 ML/MIN/1.73 M^2

## 2019-04-16 PROCEDURE — 99215 OFFICE O/P EST HI 40 MIN: CPT | Mod: PBBFAC,PN | Performed by: ANESTHESIOLOGY

## 2019-04-16 PROCEDURE — 36415 COLL VENOUS BLD VENIPUNCTURE: CPT | Mod: PO

## 2019-04-16 PROCEDURE — 82565 ASSAY OF CREATININE: CPT

## 2019-04-16 PROCEDURE — 99999 PR PBB SHADOW E&M-EST. PATIENT-LVL V: CPT | Mod: PBBFAC,,, | Performed by: ANESTHESIOLOGY

## 2019-04-16 PROCEDURE — 99204 OFFICE O/P NEW MOD 45 MIN: CPT | Mod: S$PBB,,, | Performed by: ANESTHESIOLOGY

## 2019-04-16 PROCEDURE — 99204 PR OFFICE/OUTPT VISIT, NEW, LEVL IV, 45-59 MIN: ICD-10-PCS | Mod: S$PBB,,, | Performed by: ANESTHESIOLOGY

## 2019-04-16 PROCEDURE — 99999 PR PBB SHADOW E&M-EST. PATIENT-LVL V: ICD-10-PCS | Mod: PBBFAC,,, | Performed by: ANESTHESIOLOGY

## 2019-04-16 NOTE — PROGRESS NOTES
This note was completed with dictation software and grammatical errors may exist.    CC:  Bilateral foot pain, back pain, leg pain    HPI:  The patient is a 68-year-old woman with a history of anxiety, depression, previous lumbar surgery who presents in referral from Dr. Adam for bilateral foot pain. The patient reports developing bilateral foot pain about 25 years ago with numbness and tingling, has been diagnosed with neuropathy but workup has not revealed any particular  etiology.  This has been progressing over the years and has gone from being located only in the feet and now extending proximally up to the knees bilaterally.  She describes it as throbbing, tingling with abnormal sensation, particularly worse with standing, walking, exercising.  She does get some relief with rest, heat and medications. She has been taking gabapentin for years, several years has been on 600 mg twice daily with about 40% relief.  She also has a history of low back and buttock pain, ended up having severe right leg pain and in 2001 underwent laminectomy, unclear what level but reports that her right back and leg pain resolved at that time.  However over the years she has developed more bilateral buttock pain, thigh pain worse with standing and walking, describes this as dull.  She denies any weakness in her legs, denies any bowel or bladder incontinence.    Pain intervention history:  She has done physical therapy multiple times, chiropractic care without relief.  She has not tried Lyrica or Cymbalta.  She has been taking gabapentin 600 mg twice daily with about 40% relief.      ROS:  She reports back pain only.  Balance of review of systems is negative.    Past Medical History:   Diagnosis Date    Anxiety     Arthritis     Chronic allergic rhinitis 11/19/2012    Depression     Herniated lumbar intervertebral disc     L3    Migraine     painless with numbness       Past Surgical History:   Procedure Laterality Date     "COLONOSCOPY N/A 2013    [facility]    NASAL SEPTUM SURGERY N/A 2017    RESECTION-TURBINATES (SMR) N/A 2017    [facility] OR    SEPTOPLASTY N/A 2017    [facility] OR    SPINE SURGERY  2001    L4    TONSILLECTOMY         Social History     Socioeconomic History    Marital status:      Spouse name: Not on file    Number of children: Not on file    Years of education: Not on file    Highest education level: Not on file   Occupational History    Not on file   Social Needs    Financial resource strain: Not on file    Food insecurity:     Worry: Not on file     Inability: Not on file    Transportation needs:     Medical: Not on file     Non-medical: Not on file   Tobacco Use    Smoking status: Former Smoker     Last attempt to quit: 1973     Years since quittin.3    Smokeless tobacco: Never Used   Substance and Sexual Activity    Alcohol use: Yes     Comment: 1 glass of wine per day    Drug use: No    Sexual activity: Not on file   Lifestyle    Physical activity:     Days per week: Not on file     Minutes per session: Not on file    Stress: Not on file   Relationships    Social connections:     Talks on phone: Not on file     Gets together: Not on file     Attends Latter day service: Not on file     Active member of club or organization: Not on file     Attends meetings of clubs or organizations: Not on file     Relationship status: Not on file   Other Topics Concern    Not on file   Social History Narrative    Not on file         Medications/Allergies: See med card    Vitals:    19 0922   BP: (!) 158/72   Pulse: 60   Resp: 18   Temp: 97.4 °F (36.3 °C)   TempSrc: Oral   SpO2: 99%   Weight: 62.4 kg (137 lb 9.1 oz)   Height: 5' 4" (1.626 m)   PainSc:   4   PainLoc: Back         Physical exam:    Gen: A and O x3, pleasant, well-groomed  Skin: No rashes or obvious " lesions  HEENT: PERRLA, no obvious deformities on ears or in canals. Trachea midline.  CVS: Regular rate and rhythm, normal palpable pulses.  Resp:No increased work of breathing, symmetrical chest rise.  Abdomen: Soft, NT/ND.  Musculoskeletal:  No antalgic gait.     Neuro:  Lower extremities: 5/5 strength bilaterally  Reflexes: Patellar 0+, Achilles 0+ bilaterally.  Sensory:  Intact and symmetrical to light touch and pinprick in L2-S1 dermatomes except for decreased sensation to light touch and pinprick from her knees distally bilaterally.    Lumbar spine:  Lumbar spine: ROM is full with flexion extension with increased pain in the bilateral buttocks on extension and especially oblique extension.   Magnus's test causes no increased pain on either side.    Supine straight leg raise is negative bilaterally.    Internal and external rotation of the hip causes no increased pain on either side.  Myofascial exam: No tenderness to palpation across lumbar paraspinous muscles.    Imagin17 Xray L-spine  Five views lumbar spine were obtained (AP, lateral, right oblique, left oblique and spot views). There is a upper lumbar dextroscoliosis and a mid lumbar levoscoliosis with superimposed multilevel degenerative disc and facet disease. There is loss of disc space height throughout the lumbar region there is lower lumbar degenerative facet disease.    4/15/14 BL Lower extremity EMG:   Mild sensorimotor predominantly axonal neuropathy.    Assessment:    The patient is a 68-year-old woman with a history of anxiety, depression, previous lumbar surgery who presents in referral from Dr. Adam for bilateral foot pain.     1. Bilateral lumbar radiculopathy  MRI Lumbar Spine W WO Cont    Creatinine, serum   2. DDD (degenerative disc disease), lumbar  MRI Lumbar Spine W WO Cont    Creatinine, serum   3. Idiopathic peripheral neuropathy         Plan:  1.  We discussed that she has had neuropathy for many years which seems to  be progressing, has tried gabapentin, we could consider switching her to Lyrica, may consider Cymbalta.  I would like to see an MRI of her lumbar spine however to rule out any stenosis which may cause similar symptoms but I suspect that she has two different issues occurring.  I am going to have her follow up in several weeks to review the images.  We discussed spinal cord stimulation as an option in the future which would likely help her back and also her bilateral leg pain.    Thank you for referring this interesting patient, and I look forward to continuing to collaborate in her care.

## 2019-04-24 ENCOUNTER — OFFICE VISIT (OUTPATIENT)
Dept: PAIN MEDICINE | Facility: CLINIC | Age: 69
End: 2019-04-24
Payer: MEDICARE

## 2019-04-24 VITALS
WEIGHT: 137.88 LBS | HEART RATE: 55 BPM | RESPIRATION RATE: 18 BRPM | SYSTOLIC BLOOD PRESSURE: 140 MMHG | TEMPERATURE: 98 F | DIASTOLIC BLOOD PRESSURE: 62 MMHG | BODY MASS INDEX: 23.67 KG/M2 | OXYGEN SATURATION: 96 %

## 2019-04-24 DIAGNOSIS — M47.816 LUMBAR SPONDYLOSIS: ICD-10-CM

## 2019-04-24 DIAGNOSIS — M54.16 LUMBAR RADICULOPATHY: Primary | ICD-10-CM

## 2019-04-24 PROCEDURE — 99999 PR PBB SHADOW E&M-EST. PATIENT-LVL IV: CPT | Mod: PBBFAC,,, | Performed by: ANESTHESIOLOGY

## 2019-04-24 PROCEDURE — 99999 PR PBB SHADOW E&M-EST. PATIENT-LVL IV: ICD-10-PCS | Mod: PBBFAC,,, | Performed by: ANESTHESIOLOGY

## 2019-04-24 PROCEDURE — 99214 PR OFFICE/OUTPT VISIT, EST, LEVL IV, 30-39 MIN: ICD-10-PCS | Mod: S$PBB,,, | Performed by: ANESTHESIOLOGY

## 2019-04-24 PROCEDURE — 99214 OFFICE O/P EST MOD 30 MIN: CPT | Mod: PBBFAC,PN | Performed by: ANESTHESIOLOGY

## 2019-04-24 PROCEDURE — 99214 OFFICE O/P EST MOD 30 MIN: CPT | Mod: S$PBB,,, | Performed by: ANESTHESIOLOGY

## 2019-04-24 RX ORDER — PREGABALIN 50 MG/1
50 CAPSULE ORAL 2 TIMES DAILY
Qty: 60 CAPSULE | Refills: 1 | Status: SHIPPED | OUTPATIENT
Start: 2019-04-24 | End: 2019-08-07

## 2019-04-24 RX ORDER — ALPRAZOLAM 0.5 MG/1
1 TABLET, ORALLY DISINTEGRATING ORAL ONCE AS NEEDED
Status: CANCELLED | OUTPATIENT
Start: 2019-05-20 | End: 2030-10-15

## 2019-04-24 NOTE — H&P (VIEW-ONLY)
This note was completed with dictation software and grammatical errors may exist.    CC:  Bilateral foot pain, back pain, leg pain    HPI:  The patient is a 68-year-old woman with a history of anxiety, depression, previous lumbar surgery who presents in referral from Dr. Adam for bilateral foot pain. She returns in follow-up today for bilateral lower leg pain, numbness and tingling.  Since the last visit, she has had an MRI of her lumbar spine and we reviewed this with her below.  She denies any worsening pain or weakness, no new bowel or bladder incontinence.    Previous history:  The patient reports developing bilateral foot pain about 25 years ago with numbness and tingling, has been diagnosed with neuropathy but workup has not revealed any particular  etiology.  This has been progressing over the years and has gone from being located only in the feet and now extending proximally up to the knees bilaterally.  She describes it as throbbing, tingling with abnormal sensation, particularly worse with standing, walking, exercising.  She does get some relief with rest, heat and medications. She has been taking gabapentin for years, several years has been on 600 mg twice daily with about 40% relief.  She also has a history of low back and buttock pain, ended up having severe right leg pain and in 2001 underwent laminectomy, unclear what level but reports that her right back and leg pain resolved at that time.  However over the years she has developed more bilateral buttock pain, thigh pain worse with standing and walking, describes this as dull.  She denies any weakness in her legs, denies any bowel or bladder incontinence.    Pain intervention history:  She has done physical therapy multiple times, chiropractic care without relief.  She has not tried Lyrica or Cymbalta.  She has been taking gabapentin 600 mg twice daily with about 40% relief.      ROS:  She reports back pain only.  Balance of review of systems is  negative.    Past Medical History:   Diagnosis Date    Anxiety     Arthritis     Chronic allergic rhinitis 2012    Depression     Herniated lumbar intervertebral disc     L3    Migraine     painless with numbness       Past Surgical History:   Procedure Laterality Date    COLONOSCOPY N/A 2013    [facility]    NASAL SEPTUM SURGERY N/A 2017    RESECTION-TURBINATES (SMR) N/A 2017    [facility]    SEPTOPLASTY N/A 2017    [facility]    SPINE SURGERY  2001    L4    TONSILLECTOMY         Social History     Socioeconomic History    Marital status:      Spouse name: Not on file    Number of children: Not on file    Years of education: Not on file    Highest education level: Not on file   Occupational History    Not on file   Social Needs    Financial resource strain: Not on file    Food insecurity:     Worry: Not on file     Inability: Not on file    Transportation needs:     Medical: Not on file     Non-medical: Not on file   Tobacco Use    Smoking status: Former Smoker     Last attempt to quit: 1973     Years since quittin.4    Smokeless tobacco: Never Used   Substance and Sexual Activity    Alcohol use: Yes     Comment: 1 glass of wine per day    Drug use: No    Sexual activity: Not on file   Lifestyle    Physical activity:     Days per week: Not on file     Minutes per session: Not on file    Stress: Not on file   Relationships    Social connections:     Talks on phone: Not on file     Gets together: Not on file     Attends [employer] service: Not on file     Active member of club or organization: Not on file     Attends meetings of clubs or organizations: Not on file     Relationship status: Not on file   Other Topics Concern    Not on file   Social History Narrative    Not on file         Medications/Allergies: See med card    Vitals:     19 0949   BP: (!) 140/62   Pulse: (!) 55   Resp: 18   Temp: 97.9 °F (36.6 °C)   TempSrc: Oral   SpO2: 96%   Weight: 62.5 kg (137 lb 14.4 oz)   PainSc:   4   PainLoc: Back         Physical exam:  Gen: A and O x3, pleasant, well-groomed  Skin: No rashes or obvious lesions  HEENT: PERRLA, no obvious deformities on ears or in canals. Trachea midline.  CVS: Regular rate and rhythm, normal palpable pulses.  Resp:No increased work of breathing, symmetrical chest rise.  Abdomen: Soft, NT/ND.  Musculoskeletal:  No antalgic gait.     Neuro:  Lower extremities: 5/5 strength bilaterally  Reflexes: Patellar 0+, Achilles 0+ bilaterally.  Sensory:  Intact and symmetrical to light touch and pinprick in L2-S1 dermatomes except for decreased sensation to light touch and pinprick from her knees distally bilaterally.    Lumbar spine:  Lumbar spine: ROM is full with flexion extension with increased pain in the bilateral buttocks on extension and especially oblique extension.   Magnus's test causes no increased pain on either side.    Supine straight leg raise is negative bilaterally.    Internal and external rotation of the hip causes no increased pain on either side.  Myofascial exam: No tenderness to palpation across lumbar paraspinous muscles.    Imagin17 Xray L-spine  Five views lumbar spine were obtained (AP, lateral, right oblique, left oblique and spot views). There is a upper lumbar dextroscoliosis and a mid lumbar levoscoliosis with superimposed multilevel degenerative disc and facet disease. There is loss of disc space height throughout the lumbar region there is lower lumbar degenerative facet disease.    4/15/14 BL Lower extremity EMG:   Mild sensorimotor predominantly axonal neuropathy.    19 MRI L-spine:  L5-S1: Spondylosis associated with disc space narrowing and mild marginal anterior spondylotic osteophytes.  There is a disc protrusion and osteophyte complex in the left neural foramen that causes a  moderate left foraminal stenosis.  There are postoperative changes from a left L5-S1 laminectomy without definite signs for recurrent disc herniations.  There is facet arthropathy.  L4-5: Disc space narrowing associated with marginal anterior spondylotic osteophytes.  There is a mild circumferential annular disc bulge.  Facet arthropathy noted bilaterally.  There is also at least a moderate right lateral recess stenosis.  L3-4: Disc degeneration with with the or disc space narrowing and marginal anterior spondylotic osteophyte.  There is a broad-based posterior osteophyte the and disc bulge complex with compression of the thecal sac greater on the right.  There is a moderate to severe right lateral recess stenosis (image 32 series 5002.  There is associated facet arthropathy.  Mild central canal spinal stenosis.  L2-3: Disc degeneration with disc space narrowing.  There is a broad-based circumferential annular disc bulge.  In the left neural foramen in addition a disc protrusion with moderate obscuration of the foraminal fat is noted.  In the far lateral aspect of the neural foramen (image 16 series 5 the disc protrusion contacts the exiting left L2 root.  Clinical correlation for a left L2 radiculopathy suggested.  L1-2: Disc degeneration with disc space narrowing and marginal anterior spondylotic osteophyte.  There is a broad-based left paracentral disc protrusion with compression of the thecal sac anterolaterally.  There is an extension of the disc protrusion in the left neural foramen associated with at least a moderate foraminal stenosis.  Compression of the exiting left L1 root far laterally in the foramen not excluded.  Right neural foramen is unremarkable.  T12-L1: No disc herniations or spinal stenosis or foraminal stenosis.    Assessment:    The patient is a 68-year-old woman with a history of anxiety, depression, previous lumbar surgery who presents in referral from Dr. Adam for bilateral foot pain.      1. Lumbar radiculopathy  Vital signs    Verify informed consent    Notify physician     Notify physician     Notify physician (specify)    Diet NPO    Case Request Operating Room: Injection-steroid-epidural-lumbar    Place in Outpatient    alprazolam ODT dissolvable tablet 1 mg   2. Lumbar spondylosis         Plan:1.  We reviewed her lumbar spine MRI together which demonstrates foraminal narrowing out to the left side at L5/S1 and recess stenosis out to the right side at L4/5 in addition to canal narrowing at L3/4.  While these changes are not without a doubt causing her lower leg pain, before proceeding with any more invasive treatments for her neuropathy, we discussed trying an epidural steroid injection to see if this changes her symptoms.  I am going to set her up with an L5/S1 CHEIKH, were going to approach from the right side since she has had a hemilaminectomy on the left side.  2.  I am also going to change her gabapentin over to Lyrica starting at 50 mg twice daily and then we will continue increasing from there.  I will have her follow up in several weeks or sooner as needed.

## 2019-04-24 NOTE — PROGRESS NOTES
This note was completed with dictation software and grammatical errors may exist.    CC:  Bilateral foot pain, back pain, leg pain    HPI:  The patient is a 68-year-old woman with a history of anxiety, depression, previous lumbar surgery who presents in referral from Dr. Adam for bilateral foot pain. She returns in follow-up today for bilateral lower leg pain, numbness and tingling.  Since the last visit, she has had an MRI of her lumbar spine and we reviewed this with her below.  She denies any worsening pain or weakness, no new bowel or bladder incontinence.    Previous history:  The patient reports developing bilateral foot pain about 25 years ago with numbness and tingling, has been diagnosed with neuropathy but workup has not revealed any particular  etiology.  This has been progressing over the years and has gone from being located only in the feet and now extending proximally up to the knees bilaterally.  She describes it as throbbing, tingling with abnormal sensation, particularly worse with standing, walking, exercising.  She does get some relief with rest, heat and medications. She has been taking gabapentin for years, several years has been on 600 mg twice daily with about 40% relief.  She also has a history of low back and buttock pain, ended up having severe right leg pain and in 2001 underwent laminectomy, unclear what level but reports that her right back and leg pain resolved at that time.  However over the years she has developed more bilateral buttock pain, thigh pain worse with standing and walking, describes this as dull.  She denies any weakness in her legs, denies any bowel or bladder incontinence.    Pain intervention history:  She has done physical therapy multiple times, chiropractic care without relief.  She has not tried Lyrica or Cymbalta.  She has been taking gabapentin 600 mg twice daily with about 40% relief.      ROS:  She reports back pain only.  Balance of review of systems is  negative.    Past Medical History:   Diagnosis Date    Anxiety     Arthritis     Chronic allergic rhinitis 2012    Depression     Herniated lumbar intervertebral disc     L3    Migraine     painless with numbness       Past Surgical History:   Procedure Laterality Date    COLONOSCOPY N/A 2013    [facility]    NASAL SEPTUM SURGERY N/A 2017    RESECTION-TURBINATES (SMR) N/A 2017    [facility]    SEPTOPLASTY N/A 2017    [facility]    SPINE SURGERY  2001    L4    TONSILLECTOMY         Social History     Socioeconomic History    Marital status:      Spouse name: Not on file    Number of children: Not on file    Years of education: Not on file    Highest education level: Not on file   Occupational History    Not on file   Social Needs    Financial resource strain: Not on file    Food insecurity:     Worry: Not on file     Inability: Not on file    Transportation needs:     Medical: Not on file     Non-medical: Not on file   Tobacco Use    Smoking status: Former Smoker     Last attempt to quit: 1973     Years since quittin.4    Smokeless tobacco: Never Used   Substance and Sexual Activity    Alcohol use: Yes     Comment: 1 glass of wine per day    Drug use: No    Sexual activity: Not on file   Lifestyle    Physical activity:     Days per week: Not on file     Minutes per session: Not on file    Stress: Not on file   Relationships    Social connections:     Talks on phone: Not on file     Gets together: Not on file     Attends [employer] service: Not on file     Active member of club or organization: Not on file     Attends meetings of clubs or organizations: Not on file     Relationship status: Not on file   Other Topics Concern    Not on file   Social History Narrative    Not on file         Medications/Allergies: See med card    Vitals:     19 0949   BP: (!) 140/62   Pulse: (!) 55   Resp: 18   Temp: 97.9 °F (36.6 °C)   TempSrc: Oral   SpO2: 96%   Weight: 62.5 kg (137 lb 14.4 oz)   PainSc:   4   PainLoc: Back         Physical exam:  Gen: A and O x3, pleasant, well-groomed  Skin: No rashes or obvious lesions  HEENT: PERRLA, no obvious deformities on ears or in canals. Trachea midline.  CVS: Regular rate and rhythm, normal palpable pulses.  Resp:No increased work of breathing, symmetrical chest rise.  Abdomen: Soft, NT/ND.  Musculoskeletal:  No antalgic gait.     Neuro:  Lower extremities: 5/5 strength bilaterally  Reflexes: Patellar 0+, Achilles 0+ bilaterally.  Sensory:  Intact and symmetrical to light touch and pinprick in L2-S1 dermatomes except for decreased sensation to light touch and pinprick from her knees distally bilaterally.    Lumbar spine:  Lumbar spine: ROM is full with flexion extension with increased pain in the bilateral buttocks on extension and especially oblique extension.   Magnus's test causes no increased pain on either side.    Supine straight leg raise is negative bilaterally.    Internal and external rotation of the hip causes no increased pain on either side.  Myofascial exam: No tenderness to palpation across lumbar paraspinous muscles.    Imagin17 Xray L-spine  Five views lumbar spine were obtained (AP, lateral, right oblique, left oblique and spot views). There is a upper lumbar dextroscoliosis and a mid lumbar levoscoliosis with superimposed multilevel degenerative disc and facet disease. There is loss of disc space height throughout the lumbar region there is lower lumbar degenerative facet disease.    4/15/14 BL Lower extremity EMG:   Mild sensorimotor predominantly axonal neuropathy.    19 MRI L-spine:  L5-S1: Spondylosis associated with disc space narrowing and mild marginal anterior spondylotic osteophytes.  There is a disc protrusion and osteophyte complex in the left neural foramen that causes a  moderate left foraminal stenosis.  There are postoperative changes from a left L5-S1 laminectomy without definite signs for recurrent disc herniations.  There is facet arthropathy.  L4-5: Disc space narrowing associated with marginal anterior spondylotic osteophytes.  There is a mild circumferential annular disc bulge.  Facet arthropathy noted bilaterally.  There is also at least a moderate right lateral recess stenosis.  L3-4: Disc degeneration with with the or disc space narrowing and marginal anterior spondylotic osteophyte.  There is a broad-based posterior osteophyte the and disc bulge complex with compression of the thecal sac greater on the right.  There is a moderate to severe right lateral recess stenosis (image 32 series 5002.  There is associated facet arthropathy.  Mild central canal spinal stenosis.  L2-3: Disc degeneration with disc space narrowing.  There is a broad-based circumferential annular disc bulge.  In the left neural foramen in addition a disc protrusion with moderate obscuration of the foraminal fat is noted.  In the far lateral aspect of the neural foramen (image 16 series 5 the disc protrusion contacts the exiting left L2 root.  Clinical correlation for a left L2 radiculopathy suggested.  L1-2: Disc degeneration with disc space narrowing and marginal anterior spondylotic osteophyte.  There is a broad-based left paracentral disc protrusion with compression of the thecal sac anterolaterally.  There is an extension of the disc protrusion in the left neural foramen associated with at least a moderate foraminal stenosis.  Compression of the exiting left L1 root far laterally in the foramen not excluded.  Right neural foramen is unremarkable.  T12-L1: No disc herniations or spinal stenosis or foraminal stenosis.    Assessment:    The patient is a 68-year-old woman with a history of anxiety, depression, previous lumbar surgery who presents in referral from Dr. Adam for bilateral foot pain.      1. Lumbar radiculopathy  Vital signs    Verify informed consent    Notify physician     Notify physician     Notify physician (specify)    Diet NPO    Case Request Operating Room: Injection-steroid-epidural-lumbar    Place in Outpatient    alprazolam ODT dissolvable tablet 1 mg   2. Lumbar spondylosis         Plan:1.  We reviewed her lumbar spine MRI together which demonstrates foraminal narrowing out to the left side at L5/S1 and recess stenosis out to the right side at L4/5 in addition to canal narrowing at L3/4.  While these changes are not without a doubt causing her lower leg pain, before proceeding with any more invasive treatments for her neuropathy, we discussed trying an epidural steroid injection to see if this changes her symptoms.  I am going to set her up with an L5/S1 CHEIKH, were going to approach from the right side since she has had a hemilaminectomy on the left side.  2.  I am also going to change her gabapentin over to Lyrica starting at 50 mg twice daily and then we will continue increasing from there.  I will have her follow up in several weeks or sooner as needed.

## 2019-04-29 ENCOUNTER — PATIENT MESSAGE (OUTPATIENT)
Dept: SURGERY | Facility: HOSPITAL | Age: 69
End: 2019-04-29

## 2019-04-30 ENCOUNTER — PATIENT MESSAGE (OUTPATIENT)
Dept: SURGERY | Facility: HOSPITAL | Age: 69
End: 2019-04-30

## 2019-04-30 NOTE — TELEPHONE ENCOUNTER
Increase to 100 mg per night for three days and if no side effects, then increase to 100 mg twice daily to see if she has response.

## 2019-05-12 ENCOUNTER — PATIENT MESSAGE (OUTPATIENT)
Dept: SURGERY | Facility: HOSPITAL | Age: 69
End: 2019-05-12

## 2019-05-13 RX ORDER — PREGABALIN 100 MG/1
100 CAPSULE ORAL 2 TIMES DAILY
Qty: 60 CAPSULE | Refills: 2 | Status: SHIPPED | OUTPATIENT
Start: 2019-05-13 | End: 2019-08-15 | Stop reason: SDUPTHER

## 2019-05-17 DIAGNOSIS — M51.36 DDD (DEGENERATIVE DISC DISEASE), LUMBAR: Primary | ICD-10-CM

## 2019-05-20 ENCOUNTER — HOSPITAL ENCOUNTER (OUTPATIENT)
Dept: RADIOLOGY | Facility: HOSPITAL | Age: 69
Discharge: HOME | End: 2019-05-20
Attending: ANESTHESIOLOGY
Payer: MEDICARE

## 2019-05-20 ENCOUNTER — HOSPITAL ENCOUNTER (OUTPATIENT)
Facility: HOSPITAL | Age: 69
Discharge: HOME | End: 2019-05-20
Attending: ANESTHESIOLOGY | Admitting: ANESTHESIOLOGY
Payer: MEDICARE

## 2019-05-20 VITALS
OXYGEN SATURATION: 98 % | WEIGHT: 135 LBS | DIASTOLIC BLOOD PRESSURE: 65 MMHG | HEIGHT: 64 IN | HEART RATE: 50 BPM | TEMPERATURE: 98 F | RESPIRATION RATE: 16 BRPM | SYSTOLIC BLOOD PRESSURE: 142 MMHG | BODY MASS INDEX: 23.05 KG/M2

## 2019-05-20 DIAGNOSIS — M51.36 DDD (DEGENERATIVE DISC DISEASE), LUMBAR: ICD-10-CM

## 2019-05-20 DIAGNOSIS — M47.816 LUMBAR SPONDYLOSIS: ICD-10-CM

## 2019-05-20 DIAGNOSIS — M54.16 LUMBAR RADICULOPATHY: Primary | ICD-10-CM

## 2019-05-20 PROCEDURE — 25500020 PHARM REV CODE 255: Mod: PO | Performed by: ANESTHESIOLOGY

## 2019-05-20 PROCEDURE — 63600175 PHARM REV CODE 636 W HCPCS: Mod: PO | Performed by: ANESTHESIOLOGY

## 2019-05-20 PROCEDURE — 62323 PR INJ LUMBAR/SACRAL, W/IMAGING GUIDANCE: ICD-10-PCS | Mod: ,,, | Performed by: ANESTHESIOLOGY

## 2019-05-20 PROCEDURE — 76000 FLUOROSCOPY <1 HR PHYS/QHP: CPT | Mod: TC,PO

## 2019-05-20 PROCEDURE — 25000003 PHARM REV CODE 250: Mod: PO | Performed by: ANESTHESIOLOGY

## 2019-05-20 PROCEDURE — 62323 NJX INTERLAMINAR LMBR/SAC: CPT | Mod: PO | Performed by: ANESTHESIOLOGY

## 2019-05-20 PROCEDURE — 62323 NJX INTERLAMINAR LMBR/SAC: CPT | Mod: ,,, | Performed by: ANESTHESIOLOGY

## 2019-05-20 RX ORDER — ALPRAZOLAM 0.5 MG/1
1 TABLET, ORALLY DISINTEGRATING ORAL ONCE AS NEEDED
Status: COMPLETED | OUTPATIENT
Start: 2019-05-20 | End: 2019-05-20

## 2019-05-20 RX ORDER — LIDOCAINE HYDROCHLORIDE 10 MG/ML
INJECTION, SOLUTION EPIDURAL; INFILTRATION; INTRACAUDAL; PERINEURAL
Status: DISCONTINUED | OUTPATIENT
Start: 2019-05-20 | End: 2019-05-20 | Stop reason: HOSPADM

## 2019-05-20 RX ORDER — METHYLPREDNISOLONE ACETATE 80 MG/ML
INJECTION, SUSPENSION INTRA-ARTICULAR; INTRALESIONAL; INTRAMUSCULAR; SOFT TISSUE
Status: DISCONTINUED | OUTPATIENT
Start: 2019-05-20 | End: 2019-05-20 | Stop reason: HOSPADM

## 2019-05-20 RX ADMIN — ALPRAZOLAM 1 MG: 0.5 TABLET, ORALLY DISINTEGRATING ORAL at 01:05

## 2019-05-20 NOTE — DISCHARGE INSTRUCTIONS
Home care instructions  Apply ice pack to the injection site for 20 minutes periods for the first 24 hrs for soreness/discomfort at injection site DO NOT USE HEAT FOR 24 HOURS  Keep site clean and dry for 24 hours, remove bandaid when desired  Do not drive until tomorrow  Take care when walking after a lumbar injection  Avoid strenuous activities for 2 days  Make take 2 weeks to feel the full effects   Resume home medication as prescribed today  Resume Aspirin, Plavix, or Coumadin the day after the procedure unless otherwise instructed.    SEE IMMEDIATE MEDICAL HELP FOR:  Severe increase in your usual pain or appearance of new pain  Prolonged or increasing weakness or numbness in the legs or arms  Drainage, redness, active bleeding, or increased swelling at the injection site  Temperature over 100.0 degrees F.  Headache that increases when your head is upright and decreases when you lie flat    CALL 911 OR GO DIRECTLY TO EMERGENCY DEPARTMENT FOR:  Shortness of breath, chest pain, or problems breathing      Recovery After Procedural Sedation (Adult)  You have been given medicine by vein to make you sleep during your surgery. This may have included both a pain medicine and sleeping medicine. Most of the effects have worn off. But you may still have some drowsiness for the next 6 to 8 hours.  Home care  Follow these guidelines when you get home:  · For the next 8 hours, you should be watched by a responsible adult. This person should make sure your condition is not getting worse.  · Don't drink any alcohol for the next 24 hours.  · Don't drive, operate dangerous machinery, or make important business or personal decisions during the next 24 hours.  Note: Your healthcare provider may tell you not to take any medicine by mouth for pain or sleep in the next 4 hours. These medicines may react with the medicines you were given in the hospital. This could cause a much stronger response than usual.  Follow-up care  Follow up  with your healthcare provider if you are not alert and back to your usual level of activity within 12 hours.  When to seek medical advice  Call your healthcare provider right away if any of these occur:  · Drowsiness gets worse  · Weakness or dizziness gets worse  · Repeated vomiting  · You can't be awakened   Date Last Reviewed: 10/18/2016  © 2911-8516 [employer]. [address] [place] [address], [place] [address]. All rights reserved. This information is not intended as a substitute for professional medical care. Always follow your healthcare professional's instructions.

## 2019-05-20 NOTE — DISCHARGE SUMMARY
[facility]  Discharge Note  Short Stay    Admit Date: 5/20/2019    Discharge Date: 5/20/2019    Attending Physician: Patrick Goyal MD     Discharge Provider: Patrick Goyal    Diagnoses:  Active Hospital Problems    Diagnosis  POA    *Lumbar radiculopathy [M54.16]  Yes      Resolved Hospital Problems   No resolved problems to display.       Discharged Condition: good    Final Diagnoses: Lumbar radiculopathy [M54.16]    Disposition: Home or Self Care    Hospital Course: no complications, uneventful    Outcome of Hospitalization, Treatment, Procedure, or Surgery:  Patient was admitted for outpatient procedure. The patient underwent procedure without complications and are discharged home    Follow up/Patient Instructions:  Follow up as scheduled in Pain Management clinic in 3-4 weeks/Patient has received instructions and follow up date and time    Medications:  Continue previous medications    Discharge Procedure Orders   Call MD for:  temperature >100.4     Call MD for:  severe uncontrolled pain     Call MD for:  redness, tenderness, or signs of infection (pain, swelling, redness, odor or green/yellow discharge around incision site)     Call MD for:  severe persistent headache     No dressing needed         Discharge Procedure Orders (must include Diet, Follow-up, Activity):   Discharge Procedure Orders (must include Diet, Follow-up, Activity)   Call MD for:  temperature >100.4     Call MD for:  severe uncontrolled pain     Call MD for:  redness, tenderness, or signs of infection (pain, swelling, redness, odor or green/yellow discharge around incision site)     Call MD for:  severe persistent headache     No dressing needed

## 2019-05-22 ENCOUNTER — PATIENT MESSAGE (OUTPATIENT)
Dept: PAIN MEDICINE | Facility: CLINIC | Age: 69
End: 2019-05-22

## 2019-06-10 ENCOUNTER — OFFICE VISIT (OUTPATIENT)
Dept: PAIN MEDICINE | Facility: CLINIC | Age: 69
End: 2019-06-10
Payer: MEDICARE

## 2019-06-10 VITALS
SYSTOLIC BLOOD PRESSURE: 118 MMHG | OXYGEN SATURATION: 98 % | TEMPERATURE: 98 F | WEIGHT: 136.88 LBS | HEART RATE: 60 BPM | RESPIRATION RATE: 18 BRPM | BODY MASS INDEX: 23.5 KG/M2 | DIASTOLIC BLOOD PRESSURE: 52 MMHG

## 2019-06-10 DIAGNOSIS — M54.16 LUMBAR RADICULOPATHY: ICD-10-CM

## 2019-06-10 DIAGNOSIS — M54.16 BILATERAL LUMBAR RADICULOPATHY: Primary | ICD-10-CM

## 2019-06-10 DIAGNOSIS — M51.36 DDD (DEGENERATIVE DISC DISEASE), LUMBAR: ICD-10-CM

## 2019-06-10 DIAGNOSIS — G60.9 IDIOPATHIC PERIPHERAL NEUROPATHY: ICD-10-CM

## 2019-06-10 DIAGNOSIS — M47.816 LUMBAR SPONDYLOSIS: ICD-10-CM

## 2019-06-10 PROCEDURE — 99213 PR OFFICE/OUTPT VISIT, EST, LEVL III, 20-29 MIN: ICD-10-PCS | Mod: S$PBB,,, | Performed by: ANESTHESIOLOGY

## 2019-06-10 PROCEDURE — 99213 OFFICE O/P EST LOW 20 MIN: CPT | Mod: S$PBB,,, | Performed by: ANESTHESIOLOGY

## 2019-06-10 PROCEDURE — 99999 PR PBB SHADOW E&M-EST. PATIENT-LVL III: ICD-10-PCS | Mod: PBBFAC,,, | Performed by: ANESTHESIOLOGY

## 2019-06-10 PROCEDURE — 99213 OFFICE O/P EST LOW 20 MIN: CPT | Mod: PBBFAC,PN | Performed by: ANESTHESIOLOGY

## 2019-06-10 PROCEDURE — 99999 PR PBB SHADOW E&M-EST. PATIENT-LVL III: CPT | Mod: PBBFAC,,, | Performed by: ANESTHESIOLOGY

## 2019-06-10 RX ORDER — PREDNISOLONE ACETATE 10 MG/ML
1 SUSPENSION/ DROPS OPHTHALMIC 4 TIMES DAILY
COMMUNITY
Start: 2019-06-04 | End: 2019-07-04

## 2019-06-10 RX ORDER — DOXYCYCLINE 100 MG/1
100 CAPSULE ORAL EVERY 12 HOURS
COMMUNITY
Start: 2019-06-04 | End: 2019-06-14

## 2019-06-10 NOTE — PROGRESS NOTES
This note was completed with dictation software and grammatical errors may exist.    CC:  Bilateral foot pain, back pain, leg pain    HPI:  The patient is a 68-year-old woman with a history of anxiety, depression, previous lumbar surgery who presents in referral from Dr. Adam for bilateral foot pain. She returns in follow-up today for low back, bilateral leg pain and bilateral foot pain. She is status post L5/S1 CHEIKH with approach from the right side due to previous left hemilaminectomy, L5/S1 CHEIKH performed on 05/20/2019 with greater than 70% relief ongoing of her back and leg pain. She still does have some occasional right lateral hip pain, reports that she has had bursitis in her right hip with injections in the past and it has helped.  This seems to be coming back slightly, bothering her at night.  She continues to have bilateral foot pain, this was unchanged with the injection.  However we started her on Lyrica and she is taking 100 mg twice daily and she reports that this has actually helped her feet a good deal, reports 30% relief with this.    Previous history:  The patient reports developing bilateral foot pain about 25 years ago with numbness and tingling, has been diagnosed with neuropathy but workup has not revealed any particular  etiology.  This has been progressing over the years and has gone from being located only in the feet and now extending proximally up to the knees bilaterally.  She describes it as throbbing, tingling with abnormal sensation, particularly worse with standing, walking, exercising.  She does get some relief with rest, heat and medications. She has been taking gabapentin for years, several years has been on 600 mg twice daily with about 40% relief.  She also has a history of low back and buttock pain, ended up having severe right leg pain and in 2001 underwent laminectomy, unclear what level but reports that her right back and leg pain resolved at that time.  However over the  years she has developed more bilateral buttock pain, thigh pain worse with standing and walking, describes this as dull.  She denies any weakness in her legs, denies any bowel or bladder incontinence.    Pain intervention history:  She has done physical therapy multiple times, chiropractic care without relief.  She has not tried Lyrica or Cymbalta.  She has been taking gabapentin 600 mg twice daily with about 40% relief.  L5/S1 CHEIKH performed on 05/20/2019 with greater than 70% relief ongoing of her back and leg pain, approach from the right due to previous left hemilaminectomy.      ROS:  She reports back pain only.  Balance of review of systems is negative.    Past Medical History:   Diagnosis Date    Anxiety     Arthritis     Chronic allergic rhinitis 11/19/2012    Depression     Herniated lumbar intervertebral disc     L3    Migraine     painless with numbness       Past Surgical History:   Procedure Laterality Date    COLONOSCOPY N/A 12/9/2013    [facility]    Injection-steroid-epidural-lumbar N/A 5/20/2019    [facility] Patrick [facility]    NASAL SEPTUM SURGERY N/A 09/2017    RESECTION-TURBINATES (SMR) N/A 9/14/2017    [facility] Kraig Maxwell [facility]    SEPTOPLASTY N/A 9/14/2017    [facility]    SPINE SURGERY  2001    L4    TONSILLECTOMY         Social History     Socioeconomic History    Marital status:      Spouse name: Not on file    Number of children: Not on file    Years of education: Not on file    Highest education level: Not on file   Occupational History    Not on file   Social Needs    Financial resource strain: Not on file    Food insecurity:     Worry: Not on file     Inability: Not on file    Transportation needs:     Medical: Not on file     Non-medical: Not on file   Tobacco Use    Smoking status: Former Smoker     Last attempt to quit: 12/4/1973     Years since  quittin.5    Smokeless tobacco: Never Used   Substance and Sexual Activity    Alcohol use: Yes     Comment: 1 glass of wine per day    Drug use: No    Sexual activity: Not on file   Lifestyle    Physical activity:     Days per week: Not on file     Minutes per session: Not on file    Stress: Not on file   Relationships    Social connections:     Talks on phone: Not on file     Gets together: Not on file     Attends Orthodoxy service: Not on file     Active member of club or organization: Not on file     Attends meetings of clubs or organizations: Not on file     Relationship status: Not on file   Other Topics Concern    Not on file   Social History Narrative    Not on file         Medications/Allergies: See med card    Vitals:    06/10/19 0829   BP: (!) 118/52   Pulse: 60   Resp: 18   Temp: 97.5 °F (36.4 °C)   TempSrc: Oral   SpO2: 98%   Weight: 62.1 kg (136 lb 14.5 oz)   PainSc:   2   PainLoc: Back         Physical exam:  Gen: A and O x3, pleasant, well-groomed  Skin: No rashes or obvious lesions  HEENT: PERRLA, no obvious deformities on ears or in canals. Trachea midline.  CVS: Regular rate and rhythm, normal palpable pulses.  Resp:No increased work of breathing, symmetrical chest rise.  Abdomen: Soft, NT/ND.  Musculoskeletal:  No antalgic gait.     Neuro:  Lower extremities: 5/5 strength bilaterally  Reflexes: Patellar 0+, Achilles 0+ bilaterally.  Sensory:  Intact and symmetrical to light touch and pinprick in L2-S1 dermatomes except for decreased sensation to light touch and pinprick from her knees distally bilaterally.    Lumbar spine:  Lumbar spine: ROM is full with flexion extension with no major increased pain   Magnus's test causes no increased pain on either side.    Supine straight leg raise is negative bilaterally.    Internal and external rotation of the hip causes no increased pain on either side.  Myofascial exam: No tenderness to palpation across lumbar paraspinous  muscles.    Imagin17 Xray L-spine  Five views lumbar spine were obtained (AP, lateral, right oblique, left oblique and spot views). There is a upper lumbar dextroscoliosis and a mid lumbar levoscoliosis with superimposed multilevel degenerative disc and facet disease. There is loss of disc space height throughout the lumbar region there is lower lumbar degenerative facet disease.    4/15/14 BL Lower extremity EMG:   Mild sensorimotor predominantly axonal neuropathy.    19 MRI L-spine:  L5-S1: Spondylosis associated with disc space narrowing and mild marginal anterior spondylotic osteophytes.  There is a disc protrusion and osteophyte complex in the left neural foramen that causes a moderate left foraminal stenosis.  There are postoperative changes from a left L5-S1 laminectomy without definite signs for recurrent disc herniations.  There is facet arthropathy.  L4-5: Disc space narrowing associated with marginal anterior spondylotic osteophytes.  There is a mild circumferential annular disc bulge.  Facet arthropathy noted bilaterally.  There is also at least a moderate right lateral recess stenosis.  L3-4: Disc degeneration with with the or disc space narrowing and marginal anterior spondylotic osteophyte.  There is a broad-based posterior osteophyte the and disc bulge complex with compression of the thecal sac greater on the right.  There is a moderate to severe right lateral recess stenosis (image 32 series 5002.  There is associated facet arthropathy.  Mild central canal spinal stenosis.  L2-3: Disc degeneration with disc space narrowing.  There is a broad-based circumferential annular disc bulge.  In the left neural foramen in addition a disc protrusion with moderate obscuration of the foraminal fat is noted.  In the far lateral aspect of the neural foramen (image 16 series 5 the disc protrusion contacts the exiting left L2 root.  Clinical correlation for a left L2 radiculopathy suggested.  L1-2:  Disc degeneration with disc space narrowing and marginal anterior spondylotic osteophyte.  There is a broad-based left paracentral disc protrusion with compression of the thecal sac anterolaterally.  There is an extension of the disc protrusion in the left neural foramen associated with at least a moderate foraminal stenosis.  Compression of the exiting left L1 root far laterally in the foramen not excluded.  Right neural foramen is unremarkable.  T12-L1: No disc herniations or spinal stenosis or foraminal stenosis.    Assessment:    The patient is a 68-year-old woman with a history of anxiety, depression, previous lumbar surgery who presents in referral from Dr. Adam for bilateral foot pain.     1. Bilateral lumbar radiculopathy     2. DDD (degenerative disc disease), lumbar     3. Idiopathic peripheral neuropathy     4. Lumbar spondylosis     5. Lumbar radiculopathy         Plan:  1.  Since she is doing well I will have her follow up in three months or sooner as needed.  We can refill her Lyrica at that time.

## 2019-06-14 RX ORDER — ROPINIROLE 0.25 MG/1
TABLET, FILM COATED ORAL
Qty: 60 TABLET | Refills: 0 | Status: SHIPPED | OUTPATIENT
Start: 2019-06-14 | End: 2019-08-05 | Stop reason: SDUPTHER

## 2019-06-15 RX ORDER — CITALOPRAM 10 MG/1
TABLET ORAL
Qty: 90 TABLET | Refills: 3 | Status: SHIPPED | OUTPATIENT
Start: 2019-06-15 | End: 2019-08-07 | Stop reason: SDUPTHER

## 2019-08-01 ENCOUNTER — PATIENT MESSAGE (OUTPATIENT)
Dept: FAMILY MEDICINE | Facility: CLINIC | Age: 69
End: 2019-08-01

## 2019-08-05 RX ORDER — ROPINIROLE 0.25 MG/1
TABLET, FILM COATED ORAL
Qty: 60 TABLET | Refills: 0 | Status: SHIPPED | OUTPATIENT
Start: 2019-08-05 | End: 2019-09-09 | Stop reason: SDUPTHER

## 2019-08-07 ENCOUNTER — OFFICE VISIT (OUTPATIENT)
Dept: FAMILY MEDICINE | Facility: CLINIC | Age: 69
End: 2019-08-07
Payer: MEDICARE

## 2019-08-07 VITALS
DIASTOLIC BLOOD PRESSURE: 72 MMHG | HEART RATE: 52 BPM | BODY MASS INDEX: 23.11 KG/M2 | HEIGHT: 64 IN | TEMPERATURE: 98 F | WEIGHT: 135.38 LBS | SYSTOLIC BLOOD PRESSURE: 142 MMHG

## 2019-08-07 DIAGNOSIS — M70.61 TROCHANTERIC BURSITIS OF BOTH HIPS: ICD-10-CM

## 2019-08-07 DIAGNOSIS — M70.62 TROCHANTERIC BURSITIS OF BOTH HIPS: ICD-10-CM

## 2019-08-07 DIAGNOSIS — Z80.0 FHX: COLON CANCER: ICD-10-CM

## 2019-08-07 DIAGNOSIS — M54.16 LUMBAR RADICULOPATHY: ICD-10-CM

## 2019-08-07 DIAGNOSIS — G25.81 RESTLESS LEG: ICD-10-CM

## 2019-08-07 DIAGNOSIS — Z12.11 COLON CANCER SCREENING: ICD-10-CM

## 2019-08-07 DIAGNOSIS — J30.9 CHRONIC ALLERGIC RHINITIS: Primary | ICD-10-CM

## 2019-08-07 PROCEDURE — 99214 OFFICE O/P EST MOD 30 MIN: CPT | Mod: S$PBB,25,, | Performed by: FAMILY MEDICINE

## 2019-08-07 PROCEDURE — 99999 PR PBB SHADOW E&M-EST. PATIENT-LVL III: CPT | Mod: PBBFAC,,, | Performed by: FAMILY MEDICINE

## 2019-08-07 PROCEDURE — 20610 LARGE JOINT ASPIRATION/INJECTION: R GREATER TROCHANTERIC BURSA, L GREATER TROCHANTERIC BURSA: ICD-10-PCS | Mod: 50,S$PBB,, | Performed by: FAMILY MEDICINE

## 2019-08-07 PROCEDURE — 99213 OFFICE O/P EST LOW 20 MIN: CPT | Mod: PBBFAC,PN | Performed by: FAMILY MEDICINE

## 2019-08-07 PROCEDURE — 20610 DRAIN/INJ JOINT/BURSA W/O US: CPT | Mod: 50,PBBFAC,PN | Performed by: FAMILY MEDICINE

## 2019-08-07 PROCEDURE — 99999 PR PBB SHADOW E&M-EST. PATIENT-LVL III: ICD-10-PCS | Mod: PBBFAC,,, | Performed by: FAMILY MEDICINE

## 2019-08-07 PROCEDURE — 99214 PR OFFICE/OUTPT VISIT, EST, LEVL IV, 30-39 MIN: ICD-10-PCS | Mod: S$PBB,25,, | Performed by: FAMILY MEDICINE

## 2019-08-07 RX ORDER — TRIAMCINOLONE ACETONIDE 40 MG/ML
80 INJECTION, SUSPENSION INTRA-ARTICULAR; INTRAMUSCULAR
Status: DISCONTINUED | OUTPATIENT
Start: 2019-08-07 | End: 2019-08-07 | Stop reason: HOSPADM

## 2019-08-07 RX ADMIN — TRIAMCINOLONE ACETONIDE 80 MG: 40 INJECTION, SUSPENSION INTRA-ARTICULAR; INTRAMUSCULAR at 05:08

## 2019-08-07 NOTE — PROGRESS NOTES
"Subjective:       Patient ID: Yolie Brambila is a 68 y.o. female.    Chief Complaint: Pre-op Exam (Preop clearance, eye surgery. 8/20/19)    She needs preop clearance for eye surgery.  She is having a conjunctiva procedure because of dry eyes.  Her ophthalmologist is Dr. Alma Delia Hassan.  I reviewed her medication list.  It is possible that Zyrtec and or ropinirole cause some of her dry eyes.  She had no side effects with previous cataract surgery.  She has no heart or lung disease. She is complaining of a recurrence of trochanteric bursitis and would like injections.  She got good relief from back and upper leg pain with an epidural injection.  She has a family history of colon cancer and is due for colonoscopy.    Review of Systems   Constitutional: Negative for activity change, fever and unexpected weight change.   HENT: Negative for hearing loss, rhinorrhea and trouble swallowing.    Eyes: Positive for discharge. Negative for visual disturbance.   Respiratory: Negative for chest tightness, shortness of breath and wheezing.    Cardiovascular: Negative for chest pain and palpitations.   Gastrointestinal: Negative for blood in stool, constipation, diarrhea and vomiting.   Endocrine: Negative for polydipsia and polyuria.   Genitourinary: Negative for difficulty urinating, dysuria, hematuria and menstrual problem.   Musculoskeletal: Positive for arthralgias. Negative for joint swelling and neck pain.   Neurological: Negative for weakness and headaches.   Psychiatric/Behavioral: Negative for confusion and dysphoric mood.       Objective:     Blood pressure (!) 142/72, pulse (!) 52, temperature 98.2 °F (36.8 °C), temperature source Oral, height 5' 4" (1.626 m), weight 61.4 kg (135 lb 5.8 oz).      Physical Exam   Constitutional: She appears well-developed and well-nourished. No distress.   Cardiovascular: Normal rate and regular rhythm.   Pulmonary/Chest: Effort normal and breath sounds normal.   Musculoskeletal: She " exhibits no edema.   She has some tenderness over both trochanteric bursae   Neurological: She is alert.       Assessment:       1. Chronic allergic rhinitis    2. Colon cancer screening    3. Restless leg    4. Lumbar radiculopathy    5. FHx: colon cancer        Plan:       Cleared for eye surgery.  I will send a note to Dr. Alma Delia Hassan.  We may consider adjusting some of her medications in the future.  Injection as below

## 2019-08-07 NOTE — PROCEDURES
Large Joint Aspiration/Injection: R greater trochanteric bursa, L greater trochanteric bursa  Date/Time: 8/7/2019 5:23 PM  Performed by: Dylon Ragland MD  Authorized by: Dylon Ragland MD     Indications:  Pain    Location:  Hip  Site:  R greater trochanteric bursa and L greater trochanteric bursa  Needle size:  25 G  Medications:  80 mg triamcinolone acetonide 40 mg/mL    Additional Comments: Injected each trochanteric bursa with 1 cc kenalog and 4 cc marcaine. betadyne prep and sterile gloves.

## 2019-08-15 RX ORDER — PREGABALIN 100 MG/1
CAPSULE ORAL
Qty: 60 CAPSULE | Refills: 2 | Status: SHIPPED | OUTPATIENT
Start: 2019-08-15 | End: 2019-11-26 | Stop reason: SDUPTHER

## 2019-09-09 ENCOUNTER — OFFICE VISIT (OUTPATIENT)
Dept: PAIN MEDICINE | Facility: CLINIC | Age: 69
End: 2019-09-09
Payer: MEDICARE

## 2019-09-09 VITALS
SYSTOLIC BLOOD PRESSURE: 142 MMHG | WEIGHT: 134.25 LBS | HEIGHT: 64 IN | DIASTOLIC BLOOD PRESSURE: 60 MMHG | HEART RATE: 59 BPM | BODY MASS INDEX: 22.92 KG/M2

## 2019-09-09 DIAGNOSIS — M54.16 LUMBAR RADICULOPATHY: ICD-10-CM

## 2019-09-09 DIAGNOSIS — G60.9 IDIOPATHIC PERIPHERAL NEUROPATHY: Primary | ICD-10-CM

## 2019-09-09 PROCEDURE — 99213 PR OFFICE/OUTPT VISIT, EST, LEVL III, 20-29 MIN: ICD-10-PCS | Mod: S$PBB,,, | Performed by: ANESTHESIOLOGY

## 2019-09-09 PROCEDURE — 99999 PR PBB SHADOW E&M-EST. PATIENT-LVL III: ICD-10-PCS | Mod: PBBFAC,,, | Performed by: ANESTHESIOLOGY

## 2019-09-09 PROCEDURE — 99213 OFFICE O/P EST LOW 20 MIN: CPT | Mod: S$PBB,,, | Performed by: ANESTHESIOLOGY

## 2019-09-09 PROCEDURE — 99213 OFFICE O/P EST LOW 20 MIN: CPT | Mod: PBBFAC,PN | Performed by: ANESTHESIOLOGY

## 2019-09-09 PROCEDURE — 99999 PR PBB SHADOW E&M-EST. PATIENT-LVL III: CPT | Mod: PBBFAC,,, | Performed by: ANESTHESIOLOGY

## 2019-09-09 RX ORDER — PREGABALIN 75 MG/1
75 CAPSULE ORAL 2 TIMES DAILY
Qty: 90 CAPSULE | Refills: 2 | Status: SHIPPED | OUTPATIENT
Start: 2019-09-09 | End: 2019-10-10

## 2019-09-09 RX ORDER — ROPINIROLE 0.25 MG/1
TABLET, FILM COATED ORAL
Qty: 180 TABLET | Refills: 3 | Status: SHIPPED | OUTPATIENT
Start: 2019-09-09 | End: 2020-06-08

## 2019-09-09 NOTE — PROGRESS NOTES
This note was completed with dictation software and grammatical errors may exist.    CC:  Bilateral foot pain, back pain, leg pain    HPI:  The patient is a 68-year-old woman with a history of anxiety, depression, previous lumbar surgery who presents in referral from Dr. Adam for bilateral foot pain. She returns in follow-up today for back pain, bilateral foot pain. She still has excellent relief from her last lumbar epidural steroid injection so that she no longer has any significant back pain.  She continues to have some burning pain in her feet but states that the Lyrica his continued to do very well. However, we discussed that she feels that she is having incomplete voids with urination, states that this began happening when she started taking the Lyrica and she had similar issues when taking gabapentin in the past.  She denies any abdominal pain, denies any urinary tract infections.        Previous history:  The patient reports developing bilateral foot pain about 25 years ago with numbness and tingling, has been diagnosed with neuropathy but workup has not revealed any particular  etiology.  This has been progressing over the years and has gone from being located only in the feet and now extending proximally up to the knees bilaterally.  She describes it as throbbing, tingling with abnormal sensation, particularly worse with standing, walking, exercising.  She does get some relief with rest, heat and medications. She has been taking gabapentin for years, several years has been on 600 mg twice daily with about 40% relief.  She also has a history of low back and buttock pain, ended up having severe right leg pain and in 2001 underwent laminectomy, unclear what level but reports that her right back and leg pain resolved at that time.  However over the years she has developed more bilateral buttock pain, thigh pain worse with standing and walking, describes this as dull.  She denies any weakness in her legs,  denies any bowel or bladder incontinence.    Pain intervention history:  She has done physical therapy multiple times, chiropractic care without relief.  She has not tried Lyrica or Cymbalta.  She has been taking gabapentin 600 mg twice daily with about 40% relief.  L5/S1 CHEIKH performed on 2019 with greater than 70% relief ongoing of her back and leg pain, approach from the right due to previous left hemilaminectomy.      ROS:  She reports back pain only.  Balance of review of systems is negative.    Past Medical History:   Diagnosis Date    Anxiety     Arthritis     Chronic allergic rhinitis 2012    Depression     Herniated lumbar intervertebral disc     L3    Migraine     painless with numbness       Past Surgical History:   Procedure Laterality Date    COLONOSCOPY N/A 2013    [facility]    Injection-steroid-epidural-lumbar N/A 2019    [facility] Patrick [facility]    NASAL SEPTUM SURGERY N/A 2017    RESECTION-TURBINATES (SMR) N/A 2017    [facility] Kraig [facility]    SEPTOPLASTY N/A 2017    [facility]    SPINE SURGERY  2001    L4    TONSILLECTOMY         Social History     Socioeconomic History    Marital status:      Spouse name: Not on file    Number of children: Not on file    Years of education: Not on file    Highest education level: Not on file   Occupational History    Not on file   Social Needs    Financial resource strain: Not hard at all    Food insecurity:     Worry: Never true     Inability: Never true    Transportation needs:     Medical: No     Non-medical: No   Tobacco Use    Smoking status: Former Smoker     Last attempt to quit: 1973     Years since quittin.7    Smokeless tobacco: Never Used   Substance and Sexual Activity    Alcohol use: Yes     Frequency: 4 or more times a week     Drinks per session: 1 or 2      "Binge frequency: Never     Comment: 1 glass of wine per day    Drug use: No    Sexual activity: Not on file   Lifestyle    Physical activity:     Days per week: 3 days     Minutes per session: 40 min    Stress: Not at all   Relationships    Social connections:     Talks on phone: More than three times a week     Gets together: Twice a week     Attends Mandaen service: Not on file     Active member of club or organization: No     Attends meetings of clubs or organizations: Never     Relationship status:    Other Topics Concern    Not on file   Social History Narrative    Not on file         Medications/Allergies: See med card    Vitals:    19 0851   BP: (!) 142/60   Pulse: (!) 59   Weight: 60.9 kg (134 lb 4.2 oz)   Height: 5' 4" (1.626 m)   PainSc: 0-No pain         Physical exam:  Gen: A and O x3, pleasant, well-groomed  Skin: No rashes or obvious lesions  HEENT: PERRLA, no obvious deformities on ears or in canals. Trachea midline.  CVS: Regular rate and rhythm, normal palpable pulses.  Resp:No increased work of breathing, symmetrical chest rise.  Abdomen: Soft, NT/ND.  Musculoskeletal:  No antalgic gait.     Neuro:  Lower extremities: 5/5 strength bilaterally  Reflexes: Patellar 0+, Achilles 0+ bilaterally.  Sensory:  Intact and symmetrical to light touch and pinprick in L2-S1 dermatomes except for decreased sensation to light touch and pinprick from her knees distally bilaterally.    Lumbar spine:  Lumbar spine: ROM is full with flexion extension with no major increased pain   Magnus's test causes no increased pain on either side.    Supine straight leg raise is negative bilaterally.    Internal and external rotation of the hip causes no increased pain on either side.  Myofascial exam: No tenderness to palpation across lumbar paraspinous muscles.    Imagin17 Xray L-spine  Five views lumbar spine were obtained (AP, lateral, right oblique, left oblique and spot views). There is a " upper lumbar dextroscoliosis and a mid lumbar levoscoliosis with superimposed multilevel degenerative disc and facet disease. There is loss of disc space height throughout the lumbar region there is lower lumbar degenerative facet disease.    4/15/14 BL Lower extremity EMG:   Mild sensorimotor predominantly axonal neuropathy.    4/17/19 MRI L-spine:  L5-S1: Spondylosis associated with disc space narrowing and mild marginal anterior spondylotic osteophytes.  There is a disc protrusion and osteophyte complex in the left neural foramen that causes a moderate left foraminal stenosis.  There are postoperative changes from a left L5-S1 laminectomy without definite signs for recurrent disc herniations.  There is facet arthropathy.  L4-5: Disc space narrowing associated with marginal anterior spondylotic osteophytes.  There is a mild circumferential annular disc bulge.  Facet arthropathy noted bilaterally.  There is also at least a moderate right lateral recess stenosis.  L3-4: Disc degeneration with with the or disc space narrowing and marginal anterior spondylotic osteophyte.  There is a broad-based posterior osteophyte the and disc bulge complex with compression of the thecal sac greater on the right.  There is a moderate to severe right lateral recess stenosis (image 32 series 5002.  There is associated facet arthropathy.  Mild central canal spinal stenosis.  L2-3: Disc degeneration with disc space narrowing.  There is a broad-based circumferential annular disc bulge.  In the left neural foramen in addition a disc protrusion with moderate obscuration of the foraminal fat is noted.  In the far lateral aspect of the neural foramen (image 16 series 5 the disc protrusion contacts the exiting left L2 root.  Clinical correlation for a left L2 radiculopathy suggested.  L1-2: Disc degeneration with disc space narrowing and marginal anterior spondylotic osteophyte.  There is a broad-based left paracentral disc protrusion with  compression of the thecal sac anterolaterally.  There is an extension of the disc protrusion in the left neural foramen associated with at least a moderate foraminal stenosis.  Compression of the exiting left L1 root far laterally in the foramen not excluded.  Right neural foramen is unremarkable.  T12-L1: No disc herniations or spinal stenosis or foraminal stenosis.    Assessment:    The patient is a 68-year-old woman with a history of anxiety, depression, previous lumbar surgery who presents in referral from Dr. Adam for bilateral foot pain.     1. Idiopathic peripheral neuropathy     2. Lumbar radiculopathy         Plan:  1.  She is doing fairly well, other than the incomplete voiding that she is having, we suspect that this may be due to the Lyrica.  I am going to change her dose from 100 mg twice daily down to 75 mg twice daily.  I have sent this to the pharmacy for her.  She is going to contact me if this has helped or not.  Otherwise she is going to follow up in six months or sooner as needed.

## 2019-09-19 ENCOUNTER — PATIENT MESSAGE (OUTPATIENT)
Dept: PAIN MEDICINE | Facility: CLINIC | Age: 69
End: 2019-09-19

## 2019-09-19 ENCOUNTER — PATIENT MESSAGE (OUTPATIENT)
Dept: FAMILY MEDICINE | Facility: CLINIC | Age: 69
End: 2019-09-19

## 2019-09-24 ENCOUNTER — IMMUNIZATION (OUTPATIENT)
Dept: FAMILY MEDICINE | Facility: CLINIC | Age: 69
End: 2019-09-24
Payer: MEDICARE

## 2019-09-24 PROCEDURE — 90662 IIV NO PRSV INCREASED AG IM: CPT | Mod: PBBFAC,PN

## 2019-10-10 ENCOUNTER — OFFICE VISIT (OUTPATIENT)
Dept: FAMILY MEDICINE | Facility: CLINIC | Age: 69
End: 2019-10-10
Payer: MEDICARE

## 2019-10-10 VITALS
TEMPERATURE: 98 F | DIASTOLIC BLOOD PRESSURE: 62 MMHG | HEIGHT: 64 IN | BODY MASS INDEX: 22.49 KG/M2 | WEIGHT: 131.75 LBS | SYSTOLIC BLOOD PRESSURE: 134 MMHG

## 2019-10-10 DIAGNOSIS — R33.9 INCOMPLETE BLADDER EMPTYING: Primary | ICD-10-CM

## 2019-10-10 DIAGNOSIS — M54.16 LUMBAR RADICULOPATHY: ICD-10-CM

## 2019-10-10 DIAGNOSIS — F33.42 RECURRENT MAJOR DEPRESSIVE DISORDER, IN FULL REMISSION: ICD-10-CM

## 2019-10-10 DIAGNOSIS — G60.9 IDIOPATHIC PERIPHERAL NEUROPATHY: ICD-10-CM

## 2019-10-10 PROCEDURE — 99214 OFFICE O/P EST MOD 30 MIN: CPT | Mod: PBBFAC,PN | Performed by: FAMILY MEDICINE

## 2019-10-10 PROCEDURE — 99214 OFFICE O/P EST MOD 30 MIN: CPT | Mod: S$PBB,,, | Performed by: FAMILY MEDICINE

## 2019-10-10 PROCEDURE — 99999 PR PBB SHADOW E&M-EST. PATIENT-LVL IV: ICD-10-PCS | Mod: PBBFAC,,, | Performed by: FAMILY MEDICINE

## 2019-10-10 PROCEDURE — 99999 PR PBB SHADOW E&M-EST. PATIENT-LVL IV: CPT | Mod: PBBFAC,,, | Performed by: FAMILY MEDICINE

## 2019-10-10 PROCEDURE — 99214 PR OFFICE/OUTPT VISIT, EST, LEVL IV, 30-39 MIN: ICD-10-PCS | Mod: S$PBB,,, | Performed by: FAMILY MEDICINE

## 2019-10-10 RX ORDER — DIPHENHYDRAMINE HCL 25 MG
25 CAPSULE ORAL EVERY 6 HOURS PRN
COMMUNITY
End: 2020-06-01

## 2019-10-10 NOTE — PROGRESS NOTES
"Subjective:       Patient ID: Yolie Brambila is a 69 y.o. female.    Chief Complaint: Annual Exam (Annual check up. Poss due for lab and mammo.)    She sees Dr. Goyal for back pain with sciatica.  Lyrica has been helping somewhat.  She has been having trouble with bladder emptying.  That has been a problem for the past year.  She feels that she cannot completely empty her bladder.  She does not complain of dysuria or frequency or nocturia.  She urinates about every 3 hr.  Dr. Goyal suggested that maybe her psy tele parameter was slowing her bladder emptying.  She is taking 10 mg.  She was started on it in 2004 when her son went to [place].  She has had trouble with depression in the past.  She has a positive family history of anxiety and depression.  She is currently emotionally stable.  Her symptoms in the past have been impaired sleep, impaired concentration, excessive worrying, irritability.  She has allergic rhinitis and has been taking some Benadryl.    Review of Systems   Constitutional: Negative for fatigue, fever and unexpected weight change.   Respiratory: Negative for cough and shortness of breath.    Cardiovascular: Negative for chest pain, palpitations and leg swelling.   Genitourinary: Positive for difficulty urinating. Negative for frequency and urgency.   Musculoskeletal: Positive for back pain.       Objective:     Blood pressure 134/62, temperature 98.3 °F (36.8 °C), temperature source Oral, height 5' 3.5" (1.613 m), weight 59.8 kg (131 lb 11.6 oz).      Physical Exam   Constitutional: She appears well-developed and well-nourished.   Cardiovascular: Normal rate and regular rhythm.   Pulmonary/Chest: Effort normal and breath sounds normal. No respiratory distress.   Neurological: She is alert.       Assessment:       1. Incomplete bladder emptying    2. Recurrent major depressive disorder, in full remission    3. Idiopathic peripheral neuropathy    4. Lumbar radiculopathy        Plan:     "   Taper off citalopram.  It may be that she does not need a at antidepressant.  We might consider Wellbutrin or Prozac if needed.  Urology consult if difficulty urinating persists.

## 2019-10-23 ENCOUNTER — PATIENT MESSAGE (OUTPATIENT)
Dept: FAMILY MEDICINE | Facility: CLINIC | Age: 69
End: 2019-10-23

## 2019-10-23 RX ORDER — SCOLOPAMINE TRANSDERMAL SYSTEM 1 MG/1
1 PATCH, EXTENDED RELEASE TRANSDERMAL
Qty: 4 PATCH | Refills: 1 | Status: SHIPPED | OUTPATIENT
Start: 2019-10-23 | End: 2020-06-29

## 2019-11-26 RX ORDER — PREGABALIN 100 MG/1
CAPSULE ORAL
Qty: 60 CAPSULE | Refills: 2 | Status: SHIPPED | OUTPATIENT
Start: 2019-11-26 | End: 2020-03-05

## 2020-01-15 ENCOUNTER — TELEPHONE (OUTPATIENT)
Dept: PAIN MEDICINE | Facility: CLINIC | Age: 70
End: 2020-01-15

## 2020-01-15 NOTE — TELEPHONE ENCOUNTER
----- Message from Gypsy Perez sent at 1/14/2020  4:17 PM CST -----  Contact: patient  Type: Needs Medical Advice    Who Called:  Patient  Best Call Back Number:   Additional Information: Per patient would like to schedule 6 month follow up-please advise-thank you

## 2020-01-15 NOTE — TELEPHONE ENCOUNTER
----- Message from Kyle Velez sent at 1/15/2020  9:28 AM CST -----  Contact: pt  Type:  Patient Returning Call    Who Called:  pt  Who Left Message for Patient:  Linette  Does the patient know what this is regarding?:    Best Call Back Number: [phone]  Additional Information:  Pt just needs to schedule an appointment. Told pt I could help her with that. Phone got disconnected. She will be calling central scheduling back to schedule a 6 month follow up

## 2020-01-27 ENCOUNTER — TELEPHONE (OUTPATIENT)
Dept: GASTROENTEROLOGY | Facility: CLINIC | Age: 70
End: 2020-01-27

## 2020-02-06 ENCOUNTER — TELEPHONE (OUTPATIENT)
Dept: GASTROENTEROLOGY | Facility: CLINIC | Age: 70
End: 2020-02-06

## 2020-02-06 NOTE — TELEPHONE ENCOUNTER
Spoke with pt and scheduled ordered procedure. Prep instructions and reminder letter placed in mail, phone number provided for questions.

## 2020-02-27 ENCOUNTER — PATIENT MESSAGE (OUTPATIENT)
Dept: ENDOSCOPY | Facility: HOSPITAL | Age: 70
End: 2020-02-27

## 2020-03-02 ENCOUNTER — HOSPITAL ENCOUNTER (OUTPATIENT)
Facility: HOSPITAL | Age: 70
Discharge: HOME | End: 2020-03-02
Attending: INTERNAL MEDICINE | Admitting: INTERNAL MEDICINE
Payer: MEDICARE

## 2020-03-02 ENCOUNTER — ANESTHESIA (OUTPATIENT)
Dept: ENDOSCOPY | Facility: HOSPITAL | Age: 70
End: 2020-03-02
Payer: MEDICARE

## 2020-03-02 ENCOUNTER — ANESTHESIA EVENT (OUTPATIENT)
Dept: ENDOSCOPY | Facility: HOSPITAL | Age: 70
End: 2020-03-02
Payer: MEDICARE

## 2020-03-02 DIAGNOSIS — Z12.11 SCREEN FOR COLON CANCER: ICD-10-CM

## 2020-03-02 PROCEDURE — G0105 COLORECTAL SCRN; HI RISK IND: HCPCS | Mod: ,,, | Performed by: INTERNAL MEDICINE

## 2020-03-02 PROCEDURE — D9220A PRA ANESTHESIA: ICD-10-PCS | Mod: ANES,,, | Performed by: ANESTHESIOLOGY

## 2020-03-02 PROCEDURE — 37000009 HC ANESTHESIA EA ADD 15 MINS: Mod: PO | Performed by: INTERNAL MEDICINE

## 2020-03-02 PROCEDURE — 63600175 PHARM REV CODE 636 W HCPCS: Mod: PO | Performed by: INTERNAL MEDICINE

## 2020-03-02 PROCEDURE — 63600175 PHARM REV CODE 636 W HCPCS: Mod: PO | Performed by: NURSE ANESTHETIST, CERTIFIED REGISTERED

## 2020-03-02 PROCEDURE — G0105 COLORECTAL SCRN; HI RISK IND: ICD-10-PCS | Mod: ,,, | Performed by: INTERNAL MEDICINE

## 2020-03-02 PROCEDURE — G0121 COLON CA SCRN NOT HI RSK IND: HCPCS | Mod: PO | Performed by: INTERNAL MEDICINE

## 2020-03-02 PROCEDURE — D9220A PRA ANESTHESIA: ICD-10-PCS | Mod: CRNA,,, | Performed by: NURSE ANESTHETIST, CERTIFIED REGISTERED

## 2020-03-02 PROCEDURE — 37000008 HC ANESTHESIA 1ST 15 MINUTES: Mod: PO | Performed by: INTERNAL MEDICINE

## 2020-03-02 PROCEDURE — D9220A PRA ANESTHESIA: Mod: CRNA,,, | Performed by: NURSE ANESTHETIST, CERTIFIED REGISTERED

## 2020-03-02 PROCEDURE — D9220A PRA ANESTHESIA: Mod: ANES,,, | Performed by: ANESTHESIOLOGY

## 2020-03-02 RX ORDER — SODIUM CHLORIDE, SODIUM LACTATE, POTASSIUM CHLORIDE, CALCIUM CHLORIDE 600; 310; 30; 20 MG/100ML; MG/100ML; MG/100ML; MG/100ML
INJECTION, SOLUTION INTRAVENOUS CONTINUOUS
Status: DISCONTINUED | OUTPATIENT
Start: 2020-03-02 | End: 2020-03-02 | Stop reason: HOSPADM

## 2020-03-02 RX ORDER — PROPOFOL 10 MG/ML
VIAL (ML) INTRAVENOUS
Status: DISCONTINUED | OUTPATIENT
Start: 2020-03-02 | End: 2020-03-02

## 2020-03-02 RX ORDER — SODIUM CHLORIDE 0.9 % (FLUSH) 0.9 %
10 SYRINGE (ML) INJECTION
Status: DISCONTINUED | OUTPATIENT
Start: 2020-03-02 | End: 2020-03-02 | Stop reason: HOSPADM

## 2020-03-02 RX ORDER — LIDOCAINE HCL/PF 100 MG/5ML
SYRINGE (ML) INTRAVENOUS
Status: DISCONTINUED | OUTPATIENT
Start: 2020-03-02 | End: 2020-03-02

## 2020-03-02 RX ADMIN — SODIUM CHLORIDE, SODIUM LACTATE, POTASSIUM CHLORIDE, AND CALCIUM CHLORIDE: .6; .31; .03; .02 INJECTION, SOLUTION INTRAVENOUS at 12:03

## 2020-03-02 RX ADMIN — PROPOFOL 100 MG: 10 INJECTION, EMULSION INTRAVENOUS at 12:03

## 2020-03-02 RX ADMIN — PROPOFOL 40 MG: 10 INJECTION, EMULSION INTRAVENOUS at 01:03

## 2020-03-02 RX ADMIN — PROPOFOL 40 MG: 10 INJECTION, EMULSION INTRAVENOUS at 12:03

## 2020-03-02 RX ADMIN — PROPOFOL 50 MG: 10 INJECTION, EMULSION INTRAVENOUS at 12:03

## 2020-03-02 RX ADMIN — LIDOCAINE HYDROCHLORIDE 100 MG: 20 INJECTION, SOLUTION INTRAVENOUS at 12:03

## 2020-03-02 NOTE — DISCHARGE SUMMARY
Discharge Note  Short Stay      SUMMARY     Admit Date: 3/2/2020    Attending Physician: Flash Garnett MD     Discharge Physician: Flash Garnett MD    Discharge Date: 3/2/2020 1:08 PM    Final Diagnosis: Screening [Z13.9]    Disposition: HOME OR SELF CARE    Patient Instructions:   Current Discharge Medication List      CONTINUE these medications which have NOT CHANGED    Details   cycloSPORINE (RESTASIS) 0.05 % ophthalmic emulsion Place 1 drop into both eyes 2 (two) times daily.      pregabalin (LYRICA) 100 MG capsule TAKE 1 CAPSULE(100 MG) BY MOUTH TWICE DAILY  Qty: 60 capsule, Refills: 2      rOPINIRole (REQUIP) 0.25 MG tablet TAKE 2 TABLETS BY MOUTH EVERY EVENING  Qty: 180 tablet, Refills: 3      diphenhydrAMINE (BENADRYL) 25 mg capsule Take 25 mg by mouth every 6 (six) hours as needed for Itching.      estradiol (ESTRACE) 0.01 % (0.1 mg/gram) vaginal cream Place 1 g vaginally once daily.  Qty: 30 g, Refills: 11      scopolamine (TRANSDERM-SCOP) 1.3-1.5 mg (1 mg over 3 days) Place 1 patch onto the skin every 72 hours.  Qty: 4 patch, Refills: 1      sodium chloride (SALINE NASAL) 0.65 % nasal spray 1 spray by Nasal route as needed for Congestion.      triamcinolone (NASACORT) 55 mcg nasal inhaler 1 spray by Nasal route once daily.             Discharge Procedure Orders (must include Diet, Follow-up, Activity)    Follow Up:  Follow up with PCP as previously scheduled  Resume routine diet.  Activity as tolerated.    No driving day of procedure.

## 2020-03-02 NOTE — [employer] PATIENT INSTRUCTIONS
Discharge Summary/Instructions after an Endoscopic Procedure  Patient Name: Yolie Brambila  Patient MRN: [id number]  Patient YOB: 1950 Monday, March 02, 2020  Flash Garnett MD  RESTRICTIONS:  During your procedure today, you received medications for sedation.  These   medications may affect your judgment, balance and coordination.  Therefore,   for 24 hours, you have the following restrictions:   - DO NOT drive a car, operate machinery, make legal/financial decisions,   sign important papers or drink alcohol.    ACTIVITY:  Today: no heavy lifting, straining or running due to procedural   sedation/anesthesia.  The following day: return to full activity including work.  DIET:  Eat and drink normally unless instructed otherwise.     TREATMENT FOR COMMON SIDE EFFECTS:  - Mild abdominal pain, nausea, belching, bloating or excessive gas:  rest,   eat lightly and use a heating pad.  - Sore Throat: treat with throat lozenges and/or gargle with warm salt   water.  - Because air was used during the procedure, expelling large amounts of air   from your rectum or belching is normal.  - If a bowel prep was taken, you may not have a bowel movement for 1-3 days.    This is normal.  SYMPTOMS TO WATCH FOR AND REPORT TO YOUR PHYSICIAN:  1. Abdominal pain or bloating, other than gas cramps.  2. Chest pain.  3. Back pain.  4. Signs of infection such as: chills or fever occurring within 24 hours   after the procedure.  5. Rectal bleeding, which would show as bright red, maroon, or black stools.   (A tablespoon of blood from the rectum is not serious, especially if   hemorrhoids are present.)  6. Vomiting.  7. Weakness or dizziness.  GO DIRECTLY TO THE NEAREST EMERGENCY ROOM IF YOU HAVE ANY OF THE FOLLOWING:      Difficulty breathing              Chills and/or fever over 101 F   Persistent vomiting and/or vomiting blood   Severe abdominal pain   Severe chest pain   Black, tarry stools   Bleeding- more than one  tablespoon   Any other symptom or condition that you feel may need urgent attention  Your doctor recommends these additional instructions:  If any biopsies were taken, your doctors clinic will contact you in 1 to 2   weeks with any results.  Your physician has recommended a repeat colonoscopy in five years for   screening purposes.   You are being discharged to home.  For questions, problems or results please call your physician - Flash Garnett MD at Work:  [phone].  EMERGENCY PHONE NUMBER: [phone], LAB RESULTS: [phone]  IF A COMPLICATION OR EMERGENCY SITUATION ARISES AND YOU ARE UNABLE TO REACH   YOUR PHYSICIAN - GO DIRECTLY TO THE EMERGENCY ROOM.  ___________________________________________  Nurse Signature  ___________________________________________  Patient/Designated Responsible Party Signature  Flash Garnett MD  3/2/2020 1:08:05 PM  This report has been verified and signed electronically.  [employer]

## 2020-03-02 NOTE — ANESTHESIA PREPROCEDURE EVALUATION
03/02/2020  Yolie Brambila is a 69 y.o., female.    Anesthesia Evaluation    I have reviewed the Patient Summary Reports.    I have reviewed the Nursing Notes.      Review of Systems  Anesthesia Hx:  No problems with previous Anesthesia    Pulmonary:   Sleep Apnea        Physical Exam  General:  Well nourished    Airway/Jaw/Neck:  Airway Findings: Mallampati: II                Anesthesia Plan  Type of Anesthesia, risks & benefits discussed:  Anesthesia Type:  general  Patient's Preference:   Intra-op Monitoring Plan:   Intra-op Monitoring Plan Comments:   Post Op Pain Control Plan:   Post Op Pain Control Plan Comments:   Induction:   IV  Beta Blocker:  Patient is not currently on a Beta-Blocker (No further documentation required).       Informed Consent: Patient understands risks and agrees with Anesthesia plan.  Questions answered. Anesthesia consent signed with patient.  ASA Score: 2     Day of Surgery Review of History & Physical:    H&P update referred to the surgeon.         Ready For Surgery From Anesthesia Perspective.

## 2020-03-02 NOTE — H&P
History & Physical - Short Stay  Gastroenterology      SUBJECTIVE:     Procedure: Colonoscopy    Chief Complaint/Indication for Procedure: Family History of Colon Cancer    PTA Medications   Medication Sig    cycloSPORINE (RESTASIS) 0.05 % ophthalmic emulsion Place 1 drop into both eyes 2 (two) times daily.    pregabalin (LYRICA) 100 MG capsule TAKE 1 CAPSULE(100 MG) BY MOUTH TWICE DAILY    rOPINIRole (REQUIP) 0.25 MG tablet TAKE 2 TABLETS BY MOUTH EVERY EVENING    diphenhydrAMINE (BENADRYL) 25 mg capsule Take 25 mg by mouth every 6 (six) hours as needed for Itching.    estradiol (ESTRACE) 0.01 % (0.1 mg/gram) vaginal cream Place 1 g vaginally once daily. (Patient not taking: Reported on 2/28/2020)    scopolamine (TRANSDERM-SCOP) 1.3-1.5 mg (1 mg over 3 days) Place 1 patch onto the skin every 72 hours.    sodium chloride (SALINE NASAL) 0.65 % nasal spray 1 spray by Nasal route as needed for Congestion.    triamcinolone (NASACORT) 55 mcg nasal inhaler 1 spray by Nasal route once daily.       Review of patient's allergies indicates:   Allergen Reactions    Penicillins Shortness Of Breath        Past Medical History:   Diagnosis Date    Anxiety     Arthritis     Chronic allergic rhinitis 11/19/2012    Depression     Herniated lumbar intervertebral disc     L3    Migraine     painless with numbness     Past Surgical History:   Procedure Laterality Date    COLONOSCOPY      EPIDURAL STEROID INJECTION INTO LUMBAR SPINE N/A 5/20/2019    Procedure: Injection-steroid-epidural-lumbar;  Surgeon: Patrick Goyal MD;  Location: [facility] OR;  Service: Pain Management;  Laterality: N/A;  L5/S1 from right side    NASAL SEPTUM SURGERY N/A 09/2017    SPINE SURGERY  2001    L4    TONSILLECTOMY       Family History   Problem Relation Age of Onset    Cancer Mother 78        colon cancer    Heart disease Mother 78        cabg    Diabetes Father     Heart disease Father 71        cabg    Diabetes Brother      Cancer Brother         CM L.    Heart disease Brother 50        Mi     Social History     Tobacco Use    Smoking status: Former Smoker     Last attempt to quit: 1973     Years since quittin.2    Smokeless tobacco: Never Used   Substance Use Topics    Alcohol use: Yes     Frequency: 4 or more times a week     Drinks per session: 1 or 2     Binge frequency: Never     Comment: 1 glass of wine per day    Drug use: No         OBJECTIVE:     Vital Signs (Most Recent)       Physical Exam:                                                       GENERAL:  Comfortable, in no acute distress.                                 HEENT EXAM:  Nonicteric.  No adenopathy.  Oropharynx is clear.               NECK:  Supple.                                                               LUNGS:  Clear.                                                               CARDIAC:  Regular rate and rhythm.  S1, S2.  No murmur.                      ABDOMEN:  Soft, positive bowel sounds, nontender.  No hepatosplenomegaly or masses.  No rebound or guarding.                                             EXTREMITIES:  No edema.     MENTAL STATUS:  Normal, alert and oriented.      ASSESSMENT/PLAN:     Assessment: Family History of Colon Cancer    Plan: Colonoscopy    Anesthesia Plan: General    ASA Grade: ASA 2 - Patient with mild systemic disease with no functional limitations    MALLAMPATI SCORE:  I (soft palate, uvula, fauces, and tonsillar pillars visible)

## 2020-03-02 NOTE — TRANSFER OF CARE
"Anesthesia Transfer of Care Note    Patient: Yolie Brambila    Procedure(s) Performed: Procedure(s) (LRB):  COLONOSCOPY (N/A)    Patient location: PACU    Anesthesia Type: general    Transport from OR: Transported from OR on room air with adequate spontaneous ventilation    Post pain: adequate analgesia    Post assessment: no apparent anesthetic complications and tolerated procedure well    Post vital signs: stable    Level of consciousness: sedated    Nausea/Vomiting: no nausea/vomiting    Complications: none    Transfer of care protocol was followed      Last vitals:   Visit Vitals  BP (!) 145/80   Pulse (!) 54   Temp 36.6 °C (97.9 °F)   Resp 16   Ht 5' 3" (1.6 m)   Wt 59 kg (130 lb)   SpO2 98%   Breastfeeding? No   BMI 23.03 kg/m²     "

## 2020-03-02 NOTE — ANESTHESIA POSTPROCEDURE EVALUATION
Anesthesia Post Evaluation    Patient: Yolie Brambila    Procedure(s) Performed: Procedure(s) (LRB):  COLONOSCOPY (N/A)    Final Anesthesia Type: general    Patient location during evaluation: PACU  Patient participation: Yes- Able to Participate  Level of consciousness: awake and alert  Post-procedure vital signs: reviewed and stable  Pain management: adequate  Airway patency: patent    PONV status at discharge: No PONV  Anesthetic complications: no      Cardiovascular status: blood pressure returned to baseline and hemodynamically stable  Respiratory status: unassisted  Hydration status: euvolemic  Follow-up not needed.          Vitals Value Taken Time   /74 3/2/2020  1:30 PM   Temp 36.4 °C (97.5 °F) 3/2/2020  1:08 PM   Pulse 74 3/2/2020  1:30 PM   Resp 18 3/2/2020  1:30 PM   SpO2 98 % 3/2/2020  1:30 PM         Event Time     Out of Recovery 13:35:52          Pain/Yesy Score: Yesy Score: 10 (3/2/2020  1:31 PM)

## 2020-03-03 VITALS
WEIGHT: 130 LBS | OXYGEN SATURATION: 98 % | HEIGHT: 63 IN | RESPIRATION RATE: 18 BRPM | TEMPERATURE: 98 F | SYSTOLIC BLOOD PRESSURE: 130 MMHG | DIASTOLIC BLOOD PRESSURE: 74 MMHG | HEART RATE: 74 BPM | BODY MASS INDEX: 23.04 KG/M2

## 2020-03-05 RX ORDER — PREGABALIN 100 MG/1
CAPSULE ORAL
Qty: 60 CAPSULE | Refills: 2 | Status: SHIPPED | OUTPATIENT
Start: 2020-03-05 | End: 2020-03-06

## 2020-03-06 RX ORDER — PREGABALIN 100 MG/1
CAPSULE ORAL
Qty: 60 CAPSULE | Refills: 2 | Status: SHIPPED | OUTPATIENT
Start: 2020-03-06 | End: 2020-05-26 | Stop reason: SDUPTHER

## 2020-04-25 ENCOUNTER — PATIENT MESSAGE (OUTPATIENT)
Dept: FAMILY MEDICINE | Facility: CLINIC | Age: 70
End: 2020-04-25

## 2020-04-27 NOTE — TELEPHONE ENCOUNTER
Please review and advise if you will be in clinic prior to June 1 and info about her citalopram. Thanks

## 2020-05-26 ENCOUNTER — OFFICE VISIT (OUTPATIENT)
Dept: PAIN MEDICINE | Facility: CLINIC | Age: 70
End: 2020-05-26
Payer: MEDICARE

## 2020-05-26 VITALS
BODY MASS INDEX: 23.04 KG/M2 | WEIGHT: 130.06 LBS | OXYGEN SATURATION: 98 % | RESPIRATION RATE: 18 BRPM | HEART RATE: 64 BPM | DIASTOLIC BLOOD PRESSURE: 64 MMHG | SYSTOLIC BLOOD PRESSURE: 158 MMHG | TEMPERATURE: 98 F

## 2020-05-26 DIAGNOSIS — G60.9 IDIOPATHIC PERIPHERAL NEUROPATHY: Primary | ICD-10-CM

## 2020-05-26 PROCEDURE — 99213 PR OFFICE/OUTPT VISIT, EST, LEVL III, 20-29 MIN: ICD-10-PCS | Mod: S$PBB,,, | Performed by: ANESTHESIOLOGY

## 2020-05-26 PROCEDURE — 99213 OFFICE O/P EST LOW 20 MIN: CPT | Mod: S$PBB,,, | Performed by: ANESTHESIOLOGY

## 2020-05-26 PROCEDURE — 99999 PR PBB SHADOW E&M-EST. PATIENT-LVL III: ICD-10-PCS | Mod: PBBFAC,,, | Performed by: ANESTHESIOLOGY

## 2020-05-26 PROCEDURE — 99999 PR PBB SHADOW E&M-EST. PATIENT-LVL III: CPT | Mod: PBBFAC,,, | Performed by: ANESTHESIOLOGY

## 2020-05-26 PROCEDURE — 99213 OFFICE O/P EST LOW 20 MIN: CPT | Mod: PBBFAC,PN | Performed by: ANESTHESIOLOGY

## 2020-05-26 RX ORDER — PREGABALIN 100 MG/1
100 CAPSULE ORAL 2 TIMES DAILY
Qty: 60 CAPSULE | Refills: 2 | Status: SHIPPED | OUTPATIENT
Start: 2020-05-26 | End: 2020-10-15

## 2020-05-26 RX ORDER — AMITRIPTYLINE HYDROCHLORIDE 10 MG/1
10 TABLET, FILM COATED ORAL NIGHTLY
Qty: 30 TABLET | Refills: 2 | Status: SHIPPED | OUTPATIENT
Start: 2020-05-26 | End: 2020-08-10

## 2020-05-26 NOTE — PROGRESS NOTES
This note was completed with dictation software and grammatical errors may exist.    CC:  Bilateral foot pain, back pain, leg pain    HPI:  The patient is a 69-year-old woman with a history of anxiety, depression, previous lumbar surgery who presents in referral from Dr. Adam for bilateral foot pain.  She returns in follow-up today, reports that her back continues to do fairly well.  However she is having worsening bilateral foot pain.  Since the COVID pandemic, she has not been able to go to the gym so she has been walking more often up to 3 miles a day.  She reports that while she has enjoyed the exercise, walking seems to make her foot pain much worse since she is having troubles sleeping because of the pain.  She states that walking causes the feet to burn much more than the other exercises that she was doing which included elliptical, light weights.  Since we had last seen her we, we had tried decreasing her Lyrica down to 75 milligrams but she ended up going back up to 100 milligrams twice daily and it does help slightly.    Previous history:  The patient reports developing bilateral foot pain about 25 years ago with numbness and tingling, has been diagnosed with neuropathy but workup has not revealed any particular  etiology.  This has been progressing over the years and has gone from being located only in the feet and now extending proximally up to the knees bilaterally.  She describes it as throbbing, tingling with abnormal sensation, particularly worse with standing, walking, exercising.  She does get some relief with rest, heat and medications. She has been taking gabapentin for years, several years has been on 600 mg twice daily with about 40% relief.  She also has a history of low back and buttock pain, ended up having severe right leg pain and in 2001 underwent laminectomy, unclear what level but reports that her right back and leg pain resolved at that time.  However over the years she has  developed more bilateral buttock pain, thigh pain worse with standing and walking, describes this as dull.  She denies any weakness in her legs, denies any bowel or bladder incontinence.    Pain intervention history:  She has done physical therapy multiple times, chiropractic care without relief.  She has not tried Cymbalta.  She has been taking gabapentin 600 mg twice daily with about 40% relief, switched to Lyrica.  L5/S1 CHEIKH performed on 05/20/2019 with greater than 70% relief ongoing of her back and leg pain, approach from the right due to previous left hemilaminectomy.      ROS:  She reports back pain only.  Balance of review of systems is negative.    Past Medical History:   Diagnosis Date    Anxiety     Arthritis     Chronic allergic rhinitis 11/19/2012    Depression     Herniated lumbar intervertebral disc     L3    Migraine     painless with numbness       Past Surgical History:   Procedure Laterality Date    COLONOSCOPY      COLONOSCOPY N/A 3/2/2020    Procedure: COLONOSCOPY;  Surgeon: Flash Garnett MD;  Location: [facility] ENDO;  Service: Endoscopy;  Laterality: N/A;    EPIDURAL STEROID INJECTION INTO LUMBAR SPINE N/A 5/20/2019    Procedure: Injection-steroid-epidural-lumbar;  Surgeon: Patrick Goyal MD;  Location: [facility] OR;  Service: Pain Management;  Laterality: N/A;  L5/S1 from right side    NASAL SEPTUM SURGERY N/A 09/2017    SPINE SURGERY  2001    L4    TONSILLECTOMY         Social History     Socioeconomic History    Marital status:      Spouse name: Not on file    Number of children: Not on file    Years of education: Not on file    Highest education level: Not on file   Occupational History    Not on file   Social Needs    Financial resource strain: Not hard at all    Food insecurity:     Worry: Never true     Inability: Never true    Transportation needs:     Medical: No     Non-medical: No   Tobacco Use    Smoking status: Former Smoker     Last attempt to quit:  1973     Years since quittin.5    Smokeless tobacco: Never Used   Substance and Sexual Activity    Alcohol use: Yes     Frequency: 4 or more times a week     Drinks per session: 1 or 2     Binge frequency: Never     Comment: 1 glass of wine per day    Drug use: No    Sexual activity: Not on file   Lifestyle    Physical activity:     Days per week: 3 days     Minutes per session: 40 min    Stress: Not at all   Relationships    Social connections:     Talks on phone: More than three times a week     Gets together: Twice a week     Attends Congregation service: Not on file     Active member of club or organization: No     Attends meetings of clubs or organizations: Never     Relationship status:    Other Topics Concern    Not on file   Social History Narrative    Not on file         Medications/Allergies: See med card    Vitals:    20 0926   BP: (!) 158/64   Pulse: 64   Resp: 18   Temp: 98.1 °F (36.7 °C)   TempSrc: Temporal   SpO2: 98%   Weight: 59 kg (130 lb 1.1 oz)   PainSc:   4   PainLoc: Foot         Physical exam:  Gen: A and O x3, pleasant, well-groomed  Skin: No rashes or obvious lesions  HEENT: PERRLA, no obvious deformities on ears or in canals. Trachea midline.  CVS: Regular rate and rhythm, normal palpable pulses.  Resp:No increased work of breathing, symmetrical chest rise.  Abdomen: Soft, NT/ND.  Musculoskeletal:  No antalgic gait.     Neuro:  Lower extremities: 5/5 strength bilaterally  Reflexes: Patellar 0+, Achilles 0+ bilaterally.  Sensory:  Intact and symmetrical to light touch and pinprick in L2-S1 dermatomes except for decreased sensation to light touch and pinprick from her knees distally bilaterally.    Lumbar spine:  Lumbar spine: ROM is full with flexion extension with no major increased pain   Magnus's test causes no increased pain on either side.    Supine straight leg raise is negative bilaterally.    Internal and external rotation of the hip causes no increased  pain on either side.  Myofascial exam: No tenderness to palpation across lumbar paraspinous muscles.    Imagin17 Xray L-spine  Five views lumbar spine were obtained (AP, lateral, right oblique, left oblique and spot views). There is a upper lumbar dextroscoliosis and a mid lumbar levoscoliosis with superimposed multilevel degenerative disc and facet disease. There is loss of disc space height throughout the lumbar region there is lower lumbar degenerative facet disease.    4/15/14 BL Lower extremity EMG:   Mild sensorimotor predominantly axonal neuropathy.    19 MRI L-spine:  L5-S1: Spondylosis associated with disc space narrowing and mild marginal anterior spondylotic osteophytes.  There is a disc protrusion and osteophyte complex in the left neural foramen that causes a moderate left foraminal stenosis.  There are postoperative changes from a left L5-S1 laminectomy without definite signs for recurrent disc herniations.  There is facet arthropathy.  L4-5: Disc space narrowing associated with marginal anterior spondylotic osteophytes.  There is a mild circumferential annular disc bulge.  Facet arthropathy noted bilaterally.  There is also at least a moderate right lateral recess stenosis.  L3-4: Disc degeneration with with the or disc space narrowing and marginal anterior spondylotic osteophyte.  There is a broad-based posterior osteophyte the and disc bulge complex with compression of the thecal sac greater on the right.  There is a moderate to severe right lateral recess stenosis (image 32 series 5002.  There is associated facet arthropathy.  Mild central canal spinal stenosis.  L2-3: Disc degeneration with disc space narrowing.  There is a broad-based circumferential annular disc bulge.  In the left neural foramen in addition a disc protrusion with moderate obscuration of the foraminal fat is noted.  In the far lateral aspect of the neural foramen (image 16 series 5 the disc protrusion contacts the  exiting left L2 root.  Clinical correlation for a left L2 radiculopathy suggested.  L1-2: Disc degeneration with disc space narrowing and marginal anterior spondylotic osteophyte.  There is a broad-based left paracentral disc protrusion with compression of the thecal sac anterolaterally.  There is an extension of the disc protrusion in the left neural foramen associated with at least a moderate foraminal stenosis.  Compression of the exiting left L1 root far laterally in the foramen not excluded.  Right neural foramen is unremarkable.  T12-L1: No disc herniations or spinal stenosis or foraminal stenosis.    Assessment:    The patient is a 69-year-old woman with a history of anxiety, depression, previous lumbar surgery who presents in referral from Dr. Adam for bilateral foot pain.     1. Idiopathic peripheral neuropathy         Plan:  1.  We discussed that for her neuropathy since it is bothering her more at night, I am going to try a TCA with her, I have given her Elavil 10 milligrams to take at night over the next month, if it is not helping I will have her try 20 milligrams.  If she is not finding any benefit from this, we may consider Cymbalta in the future.  2.  We also discussed trying to get back into the gym so that she may stay away from walking which likely has aggravated her pain, she should get back on the elliptical and perhaps try stationary bicycle is well.

## 2020-06-01 ENCOUNTER — OFFICE VISIT (OUTPATIENT)
Dept: FAMILY MEDICINE | Facility: CLINIC | Age: 70
End: 2020-06-01
Payer: MEDICARE

## 2020-06-01 VITALS
HEIGHT: 63 IN | BODY MASS INDEX: 23.65 KG/M2 | SYSTOLIC BLOOD PRESSURE: 146 MMHG | WEIGHT: 133.5 LBS | DIASTOLIC BLOOD PRESSURE: 74 MMHG | HEART RATE: 58 BPM | OXYGEN SATURATION: 98 % | TEMPERATURE: 98 F

## 2020-06-01 DIAGNOSIS — M70.61 GREATER TROCHANTERIC BURSITIS OF RIGHT HIP: ICD-10-CM

## 2020-06-01 DIAGNOSIS — G60.9 IDIOPATHIC PERIPHERAL NEUROPATHY: ICD-10-CM

## 2020-06-01 DIAGNOSIS — I10 ESSENTIAL HYPERTENSION: Primary | ICD-10-CM

## 2020-06-01 PROCEDURE — 99214 OFFICE O/P EST MOD 30 MIN: CPT | Mod: PBBFAC,PN | Performed by: FAMILY MEDICINE

## 2020-06-01 PROCEDURE — 20610 DRAIN/INJ JOINT/BURSA W/O US: CPT | Mod: S$PBB,RT,, | Performed by: FAMILY MEDICINE

## 2020-06-01 PROCEDURE — 99999 PR PBB SHADOW E&M-EST. PATIENT-LVL IV: CPT | Mod: PBBFAC,,, | Performed by: FAMILY MEDICINE

## 2020-06-01 PROCEDURE — 20610 PR DRAIN/INJECT LARGE JOINT/BURSA: ICD-10-PCS | Mod: S$PBB,RT,, | Performed by: FAMILY MEDICINE

## 2020-06-01 PROCEDURE — 99214 PR OFFICE/OUTPT VISIT, EST, LEVL IV, 30-39 MIN: ICD-10-PCS | Mod: S$PBB,25,, | Performed by: FAMILY MEDICINE

## 2020-06-01 PROCEDURE — 99999 PR PBB SHADOW E&M-EST. PATIENT-LVL IV: ICD-10-PCS | Mod: PBBFAC,,, | Performed by: FAMILY MEDICINE

## 2020-06-01 PROCEDURE — 99214 OFFICE O/P EST MOD 30 MIN: CPT | Mod: S$PBB,25,, | Performed by: FAMILY MEDICINE

## 2020-06-01 RX ORDER — TRIAMCINOLONE ACETONIDE 40 MG/ML
40 INJECTION, SUSPENSION INTRA-ARTICULAR; INTRAMUSCULAR
Status: DISCONTINUED | OUTPATIENT
Start: 2020-06-01 | End: 2020-06-01 | Stop reason: HOSPADM

## 2020-06-01 RX ORDER — NAPROXEN 250 MG/1
500 TABLET ORAL 2 TIMES DAILY WITH MEALS
COMMUNITY

## 2020-06-01 RX ORDER — CITALOPRAM 10 MG/1
10 TABLET ORAL DAILY
COMMUNITY
End: 2020-09-03

## 2020-06-01 RX ADMIN — TRIAMCINOLONE ACETONIDE 40 MG: 40 INJECTION, SUSPENSION INTRA-ARTICULAR; INTRAMUSCULAR at 08:06

## 2020-06-01 NOTE — PROGRESS NOTES
"Subjective:       Patient ID: Yolie Brambila is a 69 y.o. female.    Chief Complaint: Hip Pain (rt hip pain)    Right hip pain.  It has been bothering her about 1 and half months this time.  I injected her right greater trochanteric bursa in August of 2019 with good results.  She has idiopathic peripheral neuropathy.  She has been taking Lyrica and recently amitriptyline was added with good results.  She has had some elevated blood pressure readings.  There is high blood pressure in her family as well as coronary disease.    Review of Systems   Constitutional: Negative for fever and unexpected weight change.   Respiratory: Negative for cough and shortness of breath.    Cardiovascular: Negative for chest pain, palpitations and leg swelling.   Musculoskeletal: Positive for arthralgias. Negative for back pain (Previous back pain was improved with epidural steroid injection).       Objective:     Blood pressure (!) 146/74, pulse (!) 58, temperature 97.7 °F (36.5 °C), temperature source Oral, height 5' 3" (1.6 m), weight 60.6 kg (133 lb 7.8 oz), SpO2 98 %.      Physical Exam   Constitutional: She appears well-developed and well-nourished. She appears distressed.   Cardiovascular: Normal rate and regular rhythm.   Pulmonary/Chest: Effort normal and breath sounds normal.   Musculoskeletal: She exhibits no edema.   Full range of motion of the lumbar spine.  No sacroiliac or gluteal tenderness.  She is tender over the upper posterior part of the greater trochanter on the right.       Assessment:       1. Essential hypertension    2. Greater trochanteric bursitis of right hip    3. Idiopathic peripheral neuropathy        Plan:       I injected her right greater trochanteric bursa.  Monitor blood pressure in turn to clinic in 1 month.  Lab work ordered.  Dash diet.      "

## 2020-06-01 NOTE — PROCEDURES
Large Joint Aspiration/Injection  Date/Time: 6/1/2020 8:00 AM  Performed by: Dylon Ragland MD  Authorized by: Dylon Ragland MD     Consent Done?:  Yes (Verbal)  Indications:  Pain    Details:  Needle Size:  25 G  Medications:  40 mg triamcinolone acetonide 40 mg/mL     Injected right greater trochanteric bursa with 1 cc kenalog and 4 cc marcaine. betadyne prep and exam gloves.

## 2020-06-01 NOTE — PATIENT INSTRUCTIONS
Record blood pressure      Eating Heart-Healthy Food: Using the DASH Plan    Eating for your heart doesnt have to be hard or boring. You just need to know how to make healthier choices. The DASH eating plan has been developed to help you do just that. DASH stands for Dietary Approaches to Stop Hypertension. It is a plan that has been proven to be healthier for your heart and to lower your risk for high blood pressure. It can also help lower your risk for cancer, heart disease, osteoporosis, and diabetes.  Choosing from each food group  Choose foods from each of the food groups below each day. Try to get the recommended number of servings for each food group. The serving numbers are based on a diet of 2,000 calories a day. Talk to your doctor if youre unsure about your calorie needs. Along with getting the correct servings, the DASH plan also recommends a sodium intake less than 2,300 mg per day.        Grains  Servings: 6 to 8 a day  A serving is:  · 1 slice bread  · 1 ounce dry cereal  · Half a cup cooked rice, pasta or cereal  Best choices: Whole grains and any grains high in fiber. Vegetables  Servings: 4 to 5 a day  A serving is:  · 1 cup raw leafy vegetable  · Half a cup cut-up raw or cooked vegetable  · Half a cup vegetable juice  Best choices: Fresh or frozen vegetables prepared without added salt or fat.   Fruits  Servings: 4 to 5 a day  A serving is:  · 1 medium fruit  · One-quarter cup dried fruit  · Half a cup fresh, frozen, or canned fruit  · Half a cup of 100% fruit juices  Best choices: A variety of fresh fruits of different colors. Whole fruits are a better choice than fruit juices. Low-fat or fat-free dairy  Servings: 2 to 3 a day  A serving is:  · 1 cup milk  · 1 cup yogurt  · One and a half ounces cheese  Best choices: Skim or 1% milk, low-fat or fat-free yogurt or buttermilk, and low-fat cheeses.         Lean meats, poultry, fish  Servings: 6 or fewer a day  A serving is:  · 1 ounce cooked  meats, poultry, or fish  · 1 egg  Best choices: Lean poultry and fish. Trim away visible fat. Broil, grill, roast, or boil instead of frying. Remove skin from poultry before eating. Limit how much red meat you eat.  Nuts, seeds, beans  Servings: 4 to 5 a week  A serving is:  · One-third cup nuts (one and a half ounces)  · 2 tablespoons nut butter or seeds  · Half a cup cooked dry beans or legumes  Best choices: Dry roasted nuts with no salt added, lentils, kidney beans, garbanzo beans, and whole lares beans.   Fats and oils  Servings: 2 to 3 a day  A serving is:  · 1 teaspoon vegetable oil  · 1 teaspoon soft margarine  · 1 tablespoon mayonnaise  · 2 tablespoons salad dressing  Best choices: Nut and vegetable oils (nontropical vegetable oils), such as olive and canola oil. Sweets  Servings: 5 a week or fewer  A serving is:  · 1 tablespoon sugar, maple syrup, or honey  · 1 tablespoon jam or jelly  · 1 half-ounce jelly beans (about 15)  · 1 cup lemonade  Best choices: Dried fruit can be a satisfying sweet. Choose low-fat sweets. And watch your serving sizes!      For more on the DASH eating plan, visit:  www.nhlbi.nih.gov/health/health-topics/topics/dash   Date Last Reviewed: 6/1/2016  © 5197-8922 [employer] [address] [place] [address], [place] [address]. All rights reserved. This information is not intended as a substitute for professional medical care. Always follow your healthcare professional's instructions.

## 2020-06-02 ENCOUNTER — LAB VISIT (OUTPATIENT)
Dept: LAB | Facility: HOSPITAL | Age: 70
End: 2020-06-02
Attending: FAMILY MEDICINE
Payer: MEDICARE

## 2020-06-02 DIAGNOSIS — I10 ESSENTIAL HYPERTENSION: ICD-10-CM

## 2020-06-02 LAB
ERYTHROCYTE [DISTWIDTH] IN BLOOD BY AUTOMATED COUNT: 12.2 % (ref 11.5–14.5)
HCT VFR BLD AUTO: 45.3 % (ref 37–48.5)
HGB BLD-MCNC: 14.3 G/DL (ref 12–16)
MCH RBC QN AUTO: 30.8 PG (ref 27–31)
MCHC RBC AUTO-ENTMCNC: 31.6 G/DL (ref 32–36)
MCV RBC AUTO: 97 FL (ref 82–98)
PLATELET # BLD AUTO: 305 K/UL (ref 150–350)
PMV BLD AUTO: 10.6 FL (ref 9.2–12.9)
RBC # BLD AUTO: 4.65 M/UL (ref 4–5.4)
WBC # BLD AUTO: 7.12 K/UL (ref 3.9–12.7)

## 2020-06-02 PROCEDURE — 80053 COMPREHEN METABOLIC PANEL: CPT

## 2020-06-02 PROCEDURE — 36415 COLL VENOUS BLD VENIPUNCTURE: CPT | Mod: PN

## 2020-06-02 PROCEDURE — 80061 LIPID PANEL: CPT

## 2020-06-02 PROCEDURE — 85027 COMPLETE CBC AUTOMATED: CPT

## 2020-06-03 LAB
ALBUMIN SERPL BCP-MCNC: 4.5 G/DL (ref 3.5–5.2)
ALP SERPL-CCNC: 76 U/L (ref 55–135)
ALT SERPL W/O P-5'-P-CCNC: 13 U/L (ref 10–44)
ANION GAP SERPL CALC-SCNC: 9 MMOL/L (ref 8–16)
AST SERPL-CCNC: 20 U/L (ref 10–40)
BILIRUB SERPL-MCNC: 0.6 MG/DL (ref 0.1–1)
BUN SERPL-MCNC: 16 MG/DL (ref 8–23)
CALCIUM SERPL-MCNC: 9.8 MG/DL (ref 8.7–10.5)
CHLORIDE SERPL-SCNC: 102 MMOL/L (ref 95–110)
CHOLEST SERPL-MCNC: 206 MG/DL (ref 120–199)
CHOLEST/HDLC SERPL: 3.1 {RATIO} (ref 2–5)
CO2 SERPL-SCNC: 27 MMOL/L (ref 23–29)
CREAT SERPL-MCNC: 0.8 MG/DL (ref 0.5–1.4)
EST. GFR  (AFRICAN AMERICAN): >60 ML/MIN/1.73 M^2
EST. GFR  (NON AFRICAN AMERICAN): >60 ML/MIN/1.73 M^2
GLUCOSE SERPL-MCNC: 125 MG/DL (ref 70–110)
HDLC SERPL-MCNC: 66 MG/DL (ref 40–75)
HDLC SERPL: 32 % (ref 20–50)
LDLC SERPL CALC-MCNC: 117.2 MG/DL (ref 63–159)
NONHDLC SERPL-MCNC: 140 MG/DL
POTASSIUM SERPL-SCNC: 4.1 MMOL/L (ref 3.5–5.1)
PROT SERPL-MCNC: 7.7 G/DL (ref 6–8.4)
SODIUM SERPL-SCNC: 138 MMOL/L (ref 136–145)
TRIGL SERPL-MCNC: 114 MG/DL (ref 30–150)

## 2020-06-09 ENCOUNTER — OFFICE VISIT (OUTPATIENT)
Dept: URGENT CARE | Facility: CLINIC | Age: 70
End: 2020-06-09
Payer: MEDICARE

## 2020-06-09 VITALS
OXYGEN SATURATION: 97 % | HEART RATE: 70 BPM | DIASTOLIC BLOOD PRESSURE: 76 MMHG | SYSTOLIC BLOOD PRESSURE: 170 MMHG | WEIGHT: 130 LBS | TEMPERATURE: 98 F | BODY MASS INDEX: 22.2 KG/M2 | HEIGHT: 64 IN

## 2020-06-09 DIAGNOSIS — S49.92XA SHOULDER INJURY, LEFT, INITIAL ENCOUNTER: Primary | ICD-10-CM

## 2020-06-09 PROCEDURE — 73030 XR SHOULDER TRAUMA 3 VIEW LEFT: ICD-10-PCS | Mod: LT,S$GLB,, | Performed by: RADIOLOGY

## 2020-06-09 PROCEDURE — 99214 PR OFFICE/OUTPT VISIT, EST, LEVL IV, 30-39 MIN: ICD-10-PCS | Mod: S$GLB,,, | Performed by: PHYSICIAN ASSISTANT

## 2020-06-09 PROCEDURE — 73030 X-RAY EXAM OF SHOULDER: CPT | Mod: LT,S$GLB,, | Performed by: RADIOLOGY

## 2020-06-09 PROCEDURE — 99214 OFFICE O/P EST MOD 30 MIN: CPT | Mod: S$GLB,,, | Performed by: PHYSICIAN ASSISTANT

## 2020-06-09 RX ORDER — HYDROCODONE BITARTRATE AND ACETAMINOPHEN 5; 325 MG/1; MG/1
1 TABLET ORAL EVERY 6 HOURS PRN
Qty: 10 TABLET | Refills: 0 | Status: SHIPPED | OUTPATIENT
Start: 2020-06-09 | End: 2020-06-29

## 2020-06-10 ENCOUNTER — OFFICE VISIT (OUTPATIENT)
Dept: PHYSICAL MEDICINE AND REHAB | Facility: CLINIC | Age: 70
End: 2020-06-10
Payer: MEDICARE

## 2020-06-10 VITALS — WEIGHT: 130 LBS | BODY MASS INDEX: 22.2 KG/M2 | HEIGHT: 64 IN

## 2020-06-10 DIAGNOSIS — S46.012A TRAUMATIC COMPLETE TEAR OF LEFT ROTATOR CUFF, INITIAL ENCOUNTER: Primary | ICD-10-CM

## 2020-06-10 PROCEDURE — 99204 PR OFFICE/OUTPT VISIT, NEW, LEVL IV, 45-59 MIN: ICD-10-PCS | Mod: 25,S$PBB,GC, | Performed by: PHYSICAL MEDICINE & REHABILITATION

## 2020-06-10 PROCEDURE — 99213 OFFICE O/P EST LOW 20 MIN: CPT | Mod: PBBFAC,PN,25 | Performed by: PHYSICAL MEDICINE & REHABILITATION

## 2020-06-10 PROCEDURE — 99204 OFFICE O/P NEW MOD 45 MIN: CPT | Mod: 25,S$PBB,GC, | Performed by: PHYSICAL MEDICINE & REHABILITATION

## 2020-06-10 PROCEDURE — 99999 PR PBB SHADOW E&M-EST. PATIENT-LVL III: ICD-10-PCS | Mod: PBBFAC,,, | Performed by: PHYSICAL MEDICINE & REHABILITATION

## 2020-06-10 PROCEDURE — 99999 PR PBB SHADOW E&M-EST. PATIENT-LVL III: CPT | Mod: PBBFAC,,, | Performed by: PHYSICAL MEDICINE & REHABILITATION

## 2020-06-10 PROCEDURE — 76881 PR US EXTREMITY OR AXILLA, TISSUE/MUSCLE/JOINT/NERVE; COMPLETE: ICD-10-PCS | Mod: 26,S$PBB,, | Performed by: PHYSICAL MEDICINE & REHABILITATION

## 2020-06-10 PROCEDURE — 76881 US COMPL JOINT R-T W/IMG: CPT | Mod: 26,S$PBB,, | Performed by: PHYSICAL MEDICINE & REHABILITATION

## 2020-06-10 PROCEDURE — 76881 US COMPL JOINT R-T W/IMG: CPT | Mod: PBBFAC,PN | Performed by: PHYSICAL MEDICINE & REHABILITATION

## 2020-06-10 NOTE — LETTER
Tasneem 10, 2020      [facility]  [address] [facility] [address]           [facility]  [address] [facility] [facility] [address]  Phone: [phone]  Fax: [phone]          Patient: Yolie Brambila   MR Number: [id number]   YOB: 1950   Date of Visit: 6/10/2020       Dear Yasmeen Servin:    Thank you for referring Yolie Brambila to me for evaluation. Attached you will find relevant portions of my assessment and plan of care.    If you have questions, please do not hesitate to call me. I look forward to following Yolie Brambila along with you.    Sincerely,    Yovany Amador MD    Enclosure  CC:  No Recipients    If you would like to receive this communication electronically, please contact [email or web address] or [phone] to request more information on [facility] access.    For providers and/or their staff who would like to refer a patient to [facility], please contact us through our one-stop-shop provider referral line, [facility], at [phone].    If you feel you have received this communication in error or would no longer like to receive these types of communications, please e-mail [email or web address]

## 2020-06-10 NOTE — PROGRESS NOTES
"[facility]    Consulting Provider: Yasmeen Servin PA    CHIEF COMPLAINT:   Chief Complaint   Patient presents with    Shoulder Pain     Left       HISTORY OF PRESENT ILLNESS: Yolie Brambila is a RHD 69 y.o. female s/p lumbar fusion PMH anxiety and depression who presents to me for evaluation for left shoulder pain.  Yesterday, she was standing on her elevated dog bed cleaning overhead when she fell off the bed. She does not recall how she fell but denies head trauma/LOC. Immediately, she had severe sharp pain in the entire shoulder that "almost made me pass out."  That night, she went to urgent care where she was given a shoulder sling and Norco. She has been taking 2.5 mg Q4H which has alleviated her pain and helped her sleep. The pain is still sharp and severe, worse with all movement especially leaning forward, and located upper 1/3rd humerus anteriorly.  She reports increased swelling in the arm and hand today. She is not using the arm. She is very upset about not being able to do the elliptical or resistance training at [employer] as she just resumed her exercise routine.  She also has right GTBursitis which was injected by her PCP, Dr. Ragladn, with excellent relief. She denied neck pain, numbness, tingling, warmth, or erythema of the joint.    Review of Systems   Constitutional: Negative for fever.   HENT: Negative for drooling.    Eyes: Negative for discharge.   Respiratory: Negative for choking.    Cardiovascular: Negative for chest pain.   Genitourinary: Negative for flank pain.   Skin: Negative for wound.   Allergic/Immunologic: Negative for immunocompromised state.   Neurological: Negative for tremors and syncope.   Psychiatric/Behavioral: Negative for behavioral problems.     Past Medical History:   Past Medical History:   Diagnosis Date    Anxiety     Arthritis     Chronic allergic rhinitis 11/19/2012    Depression     Herniated lumbar intervertebral disc     L3    Migraine  "    painless with numbness       Past Surgical History:   Past Surgical History:   Procedure Laterality Date    COLONOSCOPY      COLONOSCOPY N/A 3/2/2020    Procedure: COLONOSCOPY;  Surgeon: Flash Garnett MD;  Location: [facility] ENDO;  Service: Endoscopy;  Laterality: N/A;    EPIDURAL STEROID INJECTION INTO LUMBAR SPINE N/A 5/20/2019    Procedure: Injection-steroid-epidural-lumbar;  Surgeon: Patrick Goyal MD;  Location: [facility] OR;  Service: Pain Management;  Laterality: N/A;  L5/S1 from right side    NASAL SEPTUM SURGERY N/A 09/2017    SPINE SURGERY  2001    L4    TONSILLECTOMY         Family History:   Family History   Problem Relation Age of Onset    Cancer Mother 78        colon cancer    Heart disease Mother 78        cabg    Diabetes Father     Heart disease Father 71        cabg    Diabetes Brother     Cancer Brother         CM L.    Heart disease Brother 50        Mi       Medications:   Current Outpatient Medications on File Prior to Visit   Medication Sig Dispense Refill    amitriptyline (ELAVIL) 10 MG tablet Take 1 tablet (10 mg total) by mouth every evening. 30 tablet 2    citalopram (CELEXA) 10 MG tablet Take 10 mg by mouth once daily.      cycloSPORINE (RESTASIS) 0.05 % ophthalmic emulsion Place 1 drop into both eyes 2 (two) times daily.      estradiol (ESTRACE) 0.01 % (0.1 mg/gram) vaginal cream Place 1 g vaginally once daily. 30 g 11    HYDROcodone-acetaminophen (NORCO) 5-325 mg per tablet Take 1 tablet by mouth every 6 (six) hours as needed for Pain. 10 tablet 0    naproxen (NAPROSYN) 250 MG tablet Take 500 mg by mouth 2 (two) times daily with meals.      pregabalin (LYRICA) 100 MG capsule Take 1 capsule (100 mg total) by mouth 2 (two) times daily. 60 capsule 2    rOPINIRole (REQUIP) 0.25 MG tablet TAKE 2 TABLETS BY MOUTH EVERY EVENING 180 tablet 3    scopolamine (TRANSDERM-SCOP) 1.3-1.5 mg (1 mg over 3 days) Place 1 patch onto the skin every 72 hours. 4 patch 1     "triamcinolone (NASACORT) 55 mcg nasal inhaler 1 spray by Nasal route once daily.       No current facility-administered medications on file prior to visit.        Allergies:   Review of patient's allergies indicates:   Allergen Reactions    Penicillins Shortness Of Breath       Social History:   Social History     Socioeconomic History    Marital status:      Spouse name: Not on file    Number of children: Not on file    Years of education: Not on file    Highest education level: Not on file   Occupational History    Not on file   Social Needs    Financial resource strain: Not hard at all    Food insecurity:     Worry: Never true     Inability: Never true    Transportation needs:     Medical: No     Non-medical: No   Tobacco Use    Smoking status: Former Smoker     Last attempt to quit: 1973     Years since quittin.5    Smokeless tobacco: Never Used   Substance and Sexual Activity    Alcohol use: Yes     Frequency: 4 or more times a week     Drinks per session: 1 or 2     Binge frequency: Never     Comment: 1 glass of wine per day    Drug use: No    Sexual activity: Not on file   Lifestyle    Physical activity:     Days per week: 3 days     Minutes per session: 40 min    Stress: Not at all   Relationships    Social connections:     Talks on phone: More than three times a week     Gets together: Twice a week     Attends [employer] service: Not on file     Active member of club or organization: No     Attends meetings of clubs or organizations: Never     Relationship status:    Other Topics Concern    Not on file   Social History Narrative    Not on file     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 No apparent distress. Pleasant.  HENT:   Head: Normocephalic and atraumatic. "   Eyes: Right eye exhibits no discharge. Left eye exhibits no discharge. No scleral icterus.   Pulmonary/Chest: Effort normal. No respiratory distress.   Abdominal: There is no guarding.   Neurological: Alert and oriented to person, place, and time.   Psychiatric: Behavior is normal.   Back Exam     Reflexes   Biceps: 2/4      Left Shoulder Exam     Tenderness   The patient is experiencing tenderness in the acromion and biceps tendon (Point of maximal tenderness at proximal 3rd of humerus anteriorly).    Range of Motion   Active abduction: 0 (All ROM significantly limited due to pain)   Passive abduction: 20   External rotation: 0   Forward flexion: 0     Other   Erythema: absent  Scars: absent  Sensation: normal  Pulse: present         INSPECTION: There is no swelling, ecchymoses, erythema or gross deformity of the left shoulder.    Imaging  X-ray of the left shoulder from 6/10/20:  Impression       1. No acute displaced fracture or dislocation of the left shoulder noting degenerative changes and additional findings above.      Diagnostic Ultrasound Exam:  FINDINGS: Real time examination was performed. Selected static and dynamic images were obtained, and correlated with prior x-ray and other available imaging.  Of note, the exam was somewhat limited secondary to inability for the patient to position appropriately secondary to pain.    The left shoulder was examined and findings were as follows: the long head of the biceps tendon was observed to have a normal fibular appearance and was positioned appropriately in the bicipital groove. There was a small amount of hypoechoic fluid surrounding the tendon. The acromioclavicular joint has some cortical irregularity consistent with degenerative change, however there was no significant AC displacement. There is no evidence for abnormal translation of the acromioclavicular joint on cross-body adduction.     Although the exam was limited due to pain, the rotator cuff was  examined. The subscapularis is heterogenous in echotexture and did appear to move in unison with the humerus on limited internal and external rotation.  As such, there is not appear to be complete tearing of the subscapularis tendon, however I cannot rule out partial/incomplete tears due to limited visualization. The supraspinatus was not clearly visualized, were the patient was unable to assume the Crass grasp position limiting visualization.  The distal fibers however were not visualized, which is concerning for full-thickness tearing with some retraction. The infraspinatus was heterogenous in internal echotexture consistent with tendonopathy, however no significant tearing was visualized. The teres minor was not assessed. The subacromial/subdeltoid bursa was thickened.     There was a moderate effusion within the posterior glenohumeral recess. The spinoglenoid notch is normal, without a suprascapular ganglion cyst.    Data Reviewed: X-ray, ultrasound    Supportive Actions: Independent visualization of images or test specimens    ASSESSMENT:   1. Traumatic complete tear of left rotator cuff, initial encounter         PLAN:     1. Time was spent reviewing the above diagnosis in depth with Yolie PANIAGUA today, including acute management and rehabilitation.     2. Her history and physical exam are suggestive of left rotator cuff tear.  Diagnostic ultrasound exam today of the left shoulder (see report above for full details), was somewhat limited secondary to limitations in positioning secondary to severe pain.  There was however evidence of complete tearing of the supraspinatus tendon with significant intra-articular effusion.  we discussed that with complete tearing, surgical intervention may be her best option, considering her age and her degree of physical activity. We discussed potential treatment plans with patient to include corticosteroid injection/effusion aspiration, physical therapy and rest, and surgical  "evaluation. After further discussion, we agreed to consult with Orthopedic surgery.       3. Discussed treatments to address pain including additional oral medications such as NSAIDs, topical medications, and aspiration.  We discussed that I cannot guarantee significant pain reduction from the glenohumeral aspiration.  Due to potential upcoming arthroscopic surgery, we will hold injections of corticosteroid.  She may continue her current pain regiment with Norco and plans to transition to Ibuprofen and ice.  Continue sling for comfort care.     4. MRI of left shoulder ordered for preop planning.    5. RTC PRN.  She has been set up to see Dr. Deng to discuss arthroscopic surgery.    This is a consult from Yasmeen Servin. Please see the "Communications" section of Epic to see how the consulting physician received the report of today's findings and recommendations. If it's an [facility] physician, it will be forwarded to his/her "in basket".    The above note was completed, in part, with the aid of Dragon dictation software/hardware. Translation errors may be present.    "

## 2020-06-10 NOTE — PATIENT INSTRUCTIONS
Shoulder Pain with Uncertain Cause  Shoulder pain can have many causes. Pain often comes from the structures that surround the shoulder joint. These are the joint capsule, ligaments, tendons, muscles, and bursa. Pain can also come from cartilage in the joint. Cartilage can become worn out or injured. Its important to know whats causing your pain so the healthcare provider can use the correct treatment. But sometimes its difficult to find the exact cause of shoulder pain. You may need to see a specialist (orthopedist). You may also need special tests such as a CT scan or MRI. The provider may need to use special tools to look inside the joint (arthroscopy).  Shoulder pain can be treated with a sling or a device that keeps your shoulder from moving. You can take an anti-inflammatory medicine such as ibuprofen to ease pain. You may need to do special shoulder exercises. Follow up with a specialist if the pain is severe or doesnt go away after a few weeks.  Home care  Follow these tips when caring for yourself at home:  · If a sling was given to you, leave it in place for the time advised by your healthcare provider. If you arent sure how long to wear it, ask for advice. If the sling becomes loose, adjust it so that your forearm is level with the ground. Your shoulder should feel well supported.  · Put an ice pack on the injured area for 20 minutes every 1 to 2 hours the first day. You can make your own ice pack by putting ice cubes in a plastic bag. Wrap the bag in a thin towel. Continue with ice packs 3 to 4 times a day for the next 2 days. Then use the pack as needed to ease pain and swelling.  · You may use acetaminophen or ibuprofen to control pain, unless another pain medicine was prescribed. If you have chronic liver or kidney disease, talk with your healthcare provider before using these medicines. Also talk with your provider if youve ever had a stomach ulcer or GI bleeding.  · Shoulder pain may seem  worse at night, when there is less to distract you from the pain. If you sleep on your side, try to keep weight off your painful shoulder. Propping pillows behind you may stop you from rolling over onto that shoulder during sleep.   · Shoulder and elbow joints can become stiff if left in a sling for too long. You should start range of motion exercises about 7 to 10 days after the injury. Talk with your provider to find out what type of exercises to do and how soon to start.  · You can take the sling off to shower or bathe.  Follow-up care  Follow up with your healthcare provider if you dont start to get better in the next 5 days.  When to seek medical advice  Call your healthcare provider right away if any of these occur:  · Pain or swelling gets worse or continues for more than a few days  · Your hand or fingers become cold, blue, numb, or tingly  · Large amount of bruising on your shoulder or upper arm  · Difficulty moving your hand or fingers  · Weakness in your hand or fingers  · Your shoulder becomes stiff  · It feels like your shoulder is popping out  · You are less able to do your daily activities  Date Last Reviewed: 10/1/2016  © 8308-5250 [employer]. [address] [place] [address], [place] [address]. All rights reserved. This information is not intended as a substitute for professional medical care. Always follow your healthcare professional's instructions.

## 2020-06-10 NOTE — PROGRESS NOTES
"Subjective:       Patient ID: Yolie Brambila is a 69 y.o. female.    Vitals:  height is 5' 4" (1.626 m) and weight is 59 kg (130 lb). Her temperature is 98.3 °F (36.8 °C). Her blood pressure is 170/76 (abnormal) and her pulse is 70. Her oxygen saturation is 97%.     Chief Complaint: Shoulder Injury    PT tripped over the dog bed about an hour. Lt shoulder is injuryed    Shoulder Injury    The incident occurred at home. The left shoulder is affected. The incident occurred 6 to 12 hours ago. Injury mechanism: dog bed  The pain radiates to the left arm. The pain is at a severity of 7/10. The pain is severe. Pertinent negatives include no chest pain, muscle weakness, numbness or tingling. The symptoms are aggravated by movement. She has tried elevation for the symptoms. The treatment provided mild relief.       Constitution: Negative for fatigue.   HENT: Negative for facial swelling and facial trauma.    Neck: Negative for neck stiffness.   Cardiovascular: Negative for chest trauma and chest pain.   Eyes: Negative for eye trauma, double vision and blurred vision.   Gastrointestinal: Negative for abdominal trauma, abdominal pain and rectal bleeding.   Genitourinary: Negative for hematuria, missed menses, genital trauma and pelvic pain.   Musculoskeletal: Positive for pain, trauma, joint pain, joint swelling and abnormal ROM of joint.   Skin: Negative for color change, wound, abrasion, laceration, erythema and bruising.   Neurological: Negative for dizziness, history of vertigo, light-headedness, coordination disturbances, altered mental status, loss of consciousness and numbness.   Hematologic/Lymphatic: Negative for history of bleeding disorder.   Psychiatric/Behavioral: Negative for altered mental status.       Objective:      Physical Exam   Constitutional: She appears well-developed and well-nourished. No distress.   HENT:   Head: Normocephalic and atraumatic.   Right Ear: External ear normal.   Left Ear: External " ear normal.   Eyes: Conjunctivae and EOM are normal.   Cardiovascular:   Pulses:       Radial pulses are 2+ on the left side.   Pulmonary/Chest: Effort normal. No respiratory distress.   Musculoskeletal:        Left upper arm: She exhibits bony tenderness.        Arms:  Neurological: She is alert. No sensory deficit.   Skin: Skin is warm, dry, not diaphoretic and no rash. erythema  Psychiatric: She has a normal mood and affect. Her behavior is normal. Judgment and thought content normal.   Nursing note and vitals reviewed.        Assessment:       1. Shoulder injury, left, initial encounter        Plan:         Shoulder injury, left, initial encounter  -     XR SHOULDER TRAUMA 3 VIEW LEFT; Future; Expected date: 06/09/2020    Xr Shoulder Trauma 3 View Left    Result Date: 6/9/2020  EXAMINATION: XR SHOULDER TRAUMA 3 VIEW LEFT CLINICAL HISTORY: Unspecified injury of left shoulder and upper arm, initial encounter TECHNIQUE: Three views of the left shoulder were performed. COMPARISON None FINDINGS: Three views left shoulder. There is osteopenia.  Degenerative changes are noted of the glenohumeral joint and acromioclavicular joint.  There is calcific tendinosis.  No findings to suggest acute displaced left rib fracture.  The left humeral head maintains appropriate relationship with the glenoid.     1. No acute displaced fracture or dislocation of the left shoulder noting degenerative changes and additional findings above. Electronically signed by: Deon Beauchamp MD Date:    06/09/2020 Time:    19:22  -     HME - OTHER  -     Ambulatory referral/consult to Orthopedics    Other orders  -     HYDROcodone-acetaminophen (NORCO) 5-325 mg per tablet; Take 1 tablet by mouth every 6 (six) hours as needed for Pain.  Dispense: 10 tablet; Refill: 0    Patient with a significant amount of pain despite negative x-ray.  Will place in shoulder sling and place referral for orthopedics.  Small amount of pain medication given.  Explained  that this is a narcotic and has potential for addiction.  Discussed no drinking, driving, operating heavy machinery while on this medication.  She does have experience with this pain medication in the past and understands.    Patient Instructions     Shoulder Pain with Uncertain Cause  Shoulder pain can have many causes. Pain often comes from the structures that surround the shoulder joint. These are the joint capsule, ligaments, tendons, muscles, and bursa. Pain can also come from cartilage in the joint. Cartilage can become worn out or injured. Its important to know whats causing your pain so the healthcare provider can use the correct treatment. But sometimes its difficult to find the exact cause of shoulder pain. You may need to see a specialist (orthopedist). You may also need special tests such as a CT scan or MRI. The provider may need to use special tools to look inside the joint (arthroscopy).  Shoulder pain can be treated with a sling or a device that keeps your shoulder from moving. You can take an anti-inflammatory medicine such as ibuprofen to ease pain. You may need to do special shoulder exercises. Follow up with a specialist if the pain is severe or doesnt go away after a few weeks.  Home care  Follow these tips when caring for yourself at home:  · If a sling was given to you, leave it in place for the time advised by your healthcare provider. If you arent sure how long to wear it, ask for advice. If the sling becomes loose, adjust it so that your forearm is level with the ground. Your shoulder should feel well supported.  · Put an ice pack on the injured area for 20 minutes every 1 to 2 hours the first day. You can make your own ice pack by putting ice cubes in a plastic bag. Wrap the bag in a thin towel. Continue with ice packs 3 to 4 times a day for the next 2 days. Then use the pack as needed to ease pain and swelling.  · You may use acetaminophen or ibuprofen to control pain, unless another  pain medicine was prescribed. If you have chronic liver or kidney disease, talk with your healthcare provider before using these medicines. Also talk with your provider if youve ever had a stomach ulcer or GI bleeding.  · Shoulder pain may seem worse at night, when there is less to distract you from the pain. If you sleep on your side, try to keep weight off your painful shoulder. Propping pillows behind you may stop you from rolling over onto that shoulder during sleep.   · Shoulder and elbow joints can become stiff if left in a sling for too long. You should start range of motion exercises about 7 to 10 days after the injury. Talk with your provider to find out what type of exercises to do and how soon to start.  · You can take the sling off to shower or bathe.  Follow-up care  Follow up with your healthcare provider if you dont start to get better in the next 5 days.  When to seek medical advice  Call your healthcare provider right away if any of these occur:  · Pain or swelling gets worse or continues for more than a few days  · Your hand or fingers become cold, blue, numb, or tingly  · Large amount of bruising on your shoulder or upper arm  · Difficulty moving your hand or fingers  · Weakness in your hand or fingers  · Your shoulder becomes stiff  · It feels like your shoulder is popping out  · You are less able to do your daily activities  Date Last Reviewed: 10/1/2016  © 4338-8316 [employer]. [address] [place] [address], [place] [address]. All rights reserved. This information is not intended as a substitute for professional medical care. Always follow your healthcare professional's instructions.

## 2020-06-15 ENCOUNTER — PATIENT OUTREACH (OUTPATIENT)
Dept: ADMINISTRATIVE | Facility: HOSPITAL | Age: 70
End: 2020-06-15

## 2020-06-15 DIAGNOSIS — Z78.0 POST-MENOPAUSAL: Primary | ICD-10-CM

## 2020-06-15 NOTE — PROGRESS NOTES
Chart review completed 06/15/2020.  Care Everywhere updates requested and reviewed.  Immunizations reconciled. Media reviewed.     DIS, [facility], and [facility] reviewed.  [facility] orders placed for bone density scan.  Message sent to patient's portal.    Health Maintenance Due   Topic Date Due    Shingles Vaccine (2 of 3) 03/19/2014    Cervical Cancer Screening  10/07/2014    DEXA SCAN  12/08/2019

## 2020-06-15 NOTE — LETTER
June 22, 2020    Yolie ARCELIA AltamiranoKosta  [address]  [place] [address]             [facility]  [address]  [facility] [address]  Phone: [phone] [facility] is committed to your overall health.  To help you get the most out of each of your visits, we will review your information to make sure you are up to date on all of your recommended tests and/or procedures.       Dylon Ragland MD  has found that your chart shows you may be due for the following:     Pap Smear   Bone density scan       If you have had any of the above done at another facility, please bring the records with you or Fax them to [phone] so that your record at [facility] will be complete. If you have not had any of these tests or procedures done recently and would like to complete this testing ,  please call [phone] or send a message through your [facility] portal to your provider's office.     If you have an upcoming scheduled appointment for the above test and/or procedures, please disregard this letter.     If you are currently taking medication, please bring it with you to your appointment for review.       Thank you for letting us care for you,         Funmilayo Mcrae, Care Coordinator   [facility]   Phone: [phone]   Fax: [phone]

## 2020-06-16 ENCOUNTER — PATIENT OUTREACH (OUTPATIENT)
Dept: ADMINISTRATIVE | Facility: OTHER | Age: 70
End: 2020-06-16

## 2020-06-17 ENCOUNTER — OFFICE VISIT (OUTPATIENT)
Dept: ORTHOPEDICS | Facility: CLINIC | Age: 70
End: 2020-06-17
Payer: MEDICARE

## 2020-06-17 VITALS — WEIGHT: 130 LBS | RESPIRATION RATE: 16 BRPM | BODY MASS INDEX: 22.2 KG/M2 | HEIGHT: 64 IN

## 2020-06-17 DIAGNOSIS — S42.292A CLOSED 3-PART FRACTURE OF PROXIMAL HUMERUS, LEFT, INITIAL ENCOUNTER: Primary | ICD-10-CM

## 2020-06-17 PROCEDURE — 99203 PR OFFICE/OUTPT VISIT, NEW, LEVL III, 30-44 MIN: ICD-10-PCS | Mod: S$PBB,,, | Performed by: ORTHOPAEDIC SURGERY

## 2020-06-17 PROCEDURE — 99203 OFFICE O/P NEW LOW 30 MIN: CPT | Mod: S$PBB,,, | Performed by: ORTHOPAEDIC SURGERY

## 2020-06-17 PROCEDURE — 99213 OFFICE O/P EST LOW 20 MIN: CPT | Mod: PBBFAC,PN | Performed by: ORTHOPAEDIC SURGERY

## 2020-06-17 PROCEDURE — 99999 PR PBB SHADOW E&M-EST. PATIENT-LVL III: ICD-10-PCS | Mod: PBBFAC,,, | Performed by: ORTHOPAEDIC SURGERY

## 2020-06-17 PROCEDURE — 99999 PR PBB SHADOW E&M-EST. PATIENT-LVL III: CPT | Mod: PBBFAC,,, | Performed by: ORTHOPAEDIC SURGERY

## 2020-06-17 NOTE — LETTER
June 17, 2020      Yovany Amador MD  [address] [place] [address]   [address]  [facility] [address]           [facility]  [address] [place] [address]  Phone: [phone]          Patient: Yolie Brambila   MR Number: [id number]   YOB: 1950   Date of Visit: 6/17/2020       Dear Dr. Yovany Amador:    Thank you for referring Yolie Brambila to me for evaluation. Attached you will find relevant portions of my assessment and plan of care.    If you have questions, please do not hesitate to call me. I look forward to following Yolie Brambila along with you.    Sincerely,    Ant Deng MD    Enclosure  CC:  No Recipients    If you would like to receive this communication electronically, please contact [email or web address] or [phone] to request more information on [facility] access.    For providers and/or their staff who would like to refer a patient to [facility], please contact us through our one-stop-shop provider referral line, [facility] , at [phone].    If you feel you have received this communication in error or would no longer like to receive these types of communications, please e-mail [email or web address]

## 2020-06-17 NOTE — PROGRESS NOTES
Past Medical History:   Diagnosis Date    Anxiety     Arthritis     Chronic allergic rhinitis 11/19/2012    Depression     Herniated lumbar intervertebral disc     L3    Migraine     painless with numbness       Past Surgical History:   Procedure Laterality Date    COLONOSCOPY      COLONOSCOPY N/A 3/2/2020    Procedure: COLONOSCOPY;  Surgeon: Flash Garnett MD;  Location: [facility] ENDO;  Service: Endoscopy;  Laterality: N/A;    EPIDURAL STEROID INJECTION INTO LUMBAR SPINE N/A 5/20/2019    Procedure: Injection-steroid-epidural-lumbar;  Surgeon: Patrick Goyal MD;  Location: [facility] OR;  Service: Pain Management;  Laterality: N/A;  L5/S1 from right side    NASAL SEPTUM SURGERY N/A 09/2017    SPINE SURGERY  2001    L4    TONSILLECTOMY         Current Outpatient Medications   Medication Sig    amitriptyline (ELAVIL) 10 MG tablet Take 1 tablet (10 mg total) by mouth every evening.    citalopram (CELEXA) 10 MG tablet Take 10 mg by mouth once daily.    cycloSPORINE (RESTASIS) 0.05 % ophthalmic emulsion Place 1 drop into both eyes 2 (two) times daily.    HYDROcodone-acetaminophen (NORCO) 5-325 mg per tablet Take 1 tablet by mouth every 6 (six) hours as needed for Pain.    naproxen (NAPROSYN) 250 MG tablet Take 500 mg by mouth 2 (two) times daily with meals.    pregabalin (LYRICA) 100 MG capsule Take 1 capsule (100 mg total) by mouth 2 (two) times daily.    rOPINIRole (REQUIP) 0.25 MG tablet TAKE 2 TABLETS BY MOUTH EVERY EVENING    scopolamine (TRANSDERM-SCOP) 1.3-1.5 mg (1 mg over 3 days) Place 1 patch onto the skin every 72 hours.    triamcinolone (NASACORT) 55 mcg nasal inhaler 1 spray by Nasal route once daily.    estradiol (ESTRACE) 0.01 % (0.1 mg/gram) vaginal cream Place 1 g vaginally once daily.     No current facility-administered medications for this visit.        Review of patient's allergies indicates:   Allergen Reactions    Penicillins Shortness Of Breath       Family History    Problem Relation Age of Onset    Cancer Mother 78        colon cancer    Heart disease Mother 78        cabg    Diabetes Father     Heart disease Father 71        cabg    Diabetes Brother     Cancer Brother         CM L.    Heart disease Brother 50        Mi       Social History     Socioeconomic History    Marital status:      Spouse name: Not on file    Number of children: Not on file    Years of education: Not on file    Highest education level: Not on file   Occupational History    Not on file   Social Needs    Financial resource strain: Not hard at all    Food insecurity     Worry: Never true     Inability: Never true    Transportation needs     Medical: No     Non-medical: No   Tobacco Use    Smoking status: Former Smoker     Quit date: 1973     Years since quittin.5    Smokeless tobacco: Never Used   Substance and Sexual Activity    Alcohol use: Yes     Frequency: 4 or more times a week     Drinks per session: 1 or 2     Binge frequency: Never     Comment: 1 glass of wine per day    Drug use: No    Sexual activity: Not on file   Lifestyle    Physical activity     Days per week: 3 days     Minutes per session: 40 min    Stress: Not at all   Relationships    Social connections     Talks on phone: More than three times a week     Gets together: Twice a week     Attends Spiritism service: Not on file     Active member of club or organization: No     Attends meetings of clubs or organizations: Never     Relationship status:    Other Topics Concern    Not on file   Social History Narrative    Not on file       Chief Complaint:   Chief Complaint   Patient presents with    Shoulder Pain     left shoulder injury        History of present illness:  69-year-old female seen a little more than 1 week after a fall onto her left shoulder.  Patient was tripped by some dogs.  Patient had significant pain at the time and inability to raise her arm up.  X-rays initially showed  no fracture and there was concerned about rotator cuff injury.  MRI was done which showed intact rotator cuff but a nondisplaced proximal humerus fracture.  Pain is slowly improved over the course of the week and she is starting to get a little more mobility back in the arm.  Rates her pain today is a 4/10.      Review of Systems:    Constitution: Negative for chills, fever, and sweats.  Negative for unexplained weight loss.    HENT:  Negative for headaches and blurry vision.    Cardiovascular:Negative for chest pain or irregular heart beat. Negative for hypertension.    Respiratory:  Negative for cough and shortness of breath.    Gastrointestinal: Negative for abdominal pain, heartburn, melena, nausea, and vomitting.    Genitourinary:  Negative bladder incontinence and dysuria.    Musculoskeletal:  See HPI    Neurological: Negative for numbness.    Psychiatric/Behavioral: Negative for depression.  The patient is not nervous/anxious.      Endocrine: Negative for polyuria    Hematologic/Lymphatic: Negative for bleeding problem.  Does not bruise/bleed easily.    Skin: Negative for poor would healing and rash      Physical Examination:    Vital Signs:    Vitals:    06/17/20 0821   Resp: 16       Body mass index is 22.31 kg/m².    This a well-developed, well nourished patient in no acute distress.  They are alert and oriented and cooperative to examination.  Pt. walks without an antalgic gait.      Examination left shoulder shows no rashes or erythema.  There are no masses or atrophy.  Patient has decreased active and passive range of motion because of pain.  4/5 strength.  2+ radial pulse.  Normal light touch sensation in all distributions.    Examination of the right shoulder shows no rashes or erythema. There are no masses, ecchymosis, or atrophy. The patient has full range of motion in forward flexion, external rotation, and internal rotation to the mid T-spine. The patient has - impingement signs. - Buffalo's test.  - Speeds test. Nontender to palpation over a.c. joint. Normal stability anteriorly, posteriorly, and negative sulcus sign. Passive range of motion: Forward flexion of 180°, external rotation at 90° of 90°, internal rotation of 50°, and external rotation at 0° of 50°. 2+ radial pulse. Intact axillary, radial, median and ulnar sensation. 5 out of 5 resisted forward flexion, external rotation, and negative lift off test.      X-rays:  X-rays of the left shoulder available for review which show a very small nondisplaced crack of the humeral neck    MRI of the left shoulder:1. There is marrow contusion, fracture nondisplaced appearance of the humeral head with maintained glenohumeral alignment.  2. There is a moderate amount of joint fluid present with some minimal debris.  3. The joint capsule is patulous overall with some pericapsular stranding indicative of injury, sprain sequela inferiorly predominant as well as anteriorly.  This correlates with some incongruity, incomplete overall visualization of the inferior glenohumeral ligamentous components.  4. The rotator cuff musculature is grossly intact with chronic tendinopathy and sub surface articular partial tears supraspinatus and infraspinatus predominantly.  5. There is subacromial spurring and narrowing demonstrated.  6. There is diffuse soft tissue and perimuscular edema stranding demonstrated overall throughout.  This could be seen with posttraumatic as well as postinflammatory related sequela.  No organized subcutaneous fluid collections are currently appreciated.     Assessment::  Left proximal humerus fracture    Plan:  Reviewed the x-rays and the MRI with her today.  We talked about proximal humerus fractures and their treatment.  Patient will continue to use the sling and protected range of motion.  She can start to progress her mobility as the pain improves.  I will see her back in 3 weeks.  No new x-ray needed.  We will likely start some occupational or  physical therapy at that time.    This note was created using [employer] voice recognition software that occasionally misinterpreted phrases or words.    Consult note is delivered via Epic messaging service.

## 2020-06-29 ENCOUNTER — OFFICE VISIT (OUTPATIENT)
Dept: FAMILY MEDICINE | Facility: CLINIC | Age: 70
End: 2020-06-29
Payer: MEDICARE

## 2020-06-29 VITALS
HEIGHT: 64 IN | OXYGEN SATURATION: 98 % | HEART RATE: 73 BPM | WEIGHT: 134.69 LBS | SYSTOLIC BLOOD PRESSURE: 146 MMHG | DIASTOLIC BLOOD PRESSURE: 52 MMHG | TEMPERATURE: 97 F | BODY MASS INDEX: 22.99 KG/M2

## 2020-06-29 DIAGNOSIS — I10 ESSENTIAL HYPERTENSION: Primary | ICD-10-CM

## 2020-06-29 DIAGNOSIS — R73.01 IMPAIRED FASTING GLUCOSE: ICD-10-CM

## 2020-06-29 PROCEDURE — 99999 PR PBB SHADOW E&M-EST. PATIENT-LVL III: ICD-10-PCS | Mod: PBBFAC,,, | Performed by: FAMILY MEDICINE

## 2020-06-29 PROCEDURE — 99214 OFFICE O/P EST MOD 30 MIN: CPT | Mod: S$PBB,,, | Performed by: FAMILY MEDICINE

## 2020-06-29 PROCEDURE — 93005 ELECTROCARDIOGRAM TRACING: CPT | Mod: PBBFAC,PN | Performed by: INTERNAL MEDICINE

## 2020-06-29 PROCEDURE — 93010 ELECTROCARDIOGRAM REPORT: CPT | Mod: S$PBB,,, | Performed by: INTERNAL MEDICINE

## 2020-06-29 PROCEDURE — 99999 PR PBB SHADOW E&M-EST. PATIENT-LVL III: CPT | Mod: PBBFAC,,, | Performed by: FAMILY MEDICINE

## 2020-06-29 PROCEDURE — 93010 EKG 12-LEAD: ICD-10-PCS | Mod: S$PBB,,, | Performed by: INTERNAL MEDICINE

## 2020-06-29 PROCEDURE — 99213 OFFICE O/P EST LOW 20 MIN: CPT | Mod: PBBFAC,25,PN | Performed by: FAMILY MEDICINE

## 2020-06-29 PROCEDURE — 99214 PR OFFICE/OUTPT VISIT, EST, LEVL IV, 30-39 MIN: ICD-10-PCS | Mod: S$PBB,,, | Performed by: FAMILY MEDICINE

## 2020-06-29 RX ORDER — LOSARTAN POTASSIUM 50 MG/1
50 TABLET ORAL DAILY
Qty: 90 TABLET | Refills: 3 | Status: SHIPPED | OUTPATIENT
Start: 2020-06-29 | End: 2021-06-23 | Stop reason: SDUPTHER

## 2020-06-29 NOTE — PROGRESS NOTES
"Subjective:       Patient ID: Yolie Brambila is a 69 y.o. female.    Chief Complaint: follow up hypertension    Follow-up hypertension.  Her blood pressure has been persistently elevated.  She does admit to salty foods.  She does not have much weight to lose.  She had a recent left humeral fracture.  It is being treated with rest and she will probably start rehab in a week.  Her hip bursitis is better.  I do not find an EKG in her chart.  She went to [facility] emergency room in January of 2015 for chest pain.  Apparently the workup was negative.    Review of Systems   Constitutional: Negative for unexpected weight change.   Respiratory: Positive for shortness of breath (Mild dyspnea climbing stairs).    Cardiovascular: Negative for chest pain.         Objective:     Blood pressure (!) 146/52, pulse 73, temperature 97.3 °F (36.3 °C), temperature source Oral, height 5' 4" (1.626 m), weight 61.1 kg (134 lb 11.2 oz), SpO2 98 %.      Physical Exam  Constitutional:       Appearance: She is not ill-appearing.   Cardiovascular:      Rate and Rhythm: Normal rate and regular rhythm.   Pulmonary:      Effort: Pulmonary effort is normal.      Breath sounds: Normal breath sounds. No wheezing.   Musculoskeletal:         General: No swelling.   Neurological:      Mental Status: She is alert.   Psychiatric:         Mood and Affect: Mood normal.         Assessment:       1. Essential hypertension    2. Impaired fasting glucose        Plan:       Add losartan 50 mg.  She has a family history of diabetes.  Her most recent blood sugar was 125.  We will repeat in 2 months.    I reviewed an EKG today.  It was basically normal.    "

## 2020-07-08 ENCOUNTER — OFFICE VISIT (OUTPATIENT)
Dept: ORTHOPEDICS | Facility: CLINIC | Age: 70
End: 2020-07-08
Payer: MEDICARE

## 2020-07-08 VITALS — WEIGHT: 134 LBS | HEIGHT: 64 IN | TEMPERATURE: 100 F | BODY MASS INDEX: 22.88 KG/M2

## 2020-07-08 DIAGNOSIS — S42.292A CLOSED 3-PART FRACTURE OF PROXIMAL HUMERUS, LEFT, INITIAL ENCOUNTER: Primary | ICD-10-CM

## 2020-07-08 PROCEDURE — 99999 PR PBB SHADOW E&M-EST. PATIENT-LVL III: CPT | Mod: PBBFAC,,, | Performed by: ORTHOPAEDIC SURGERY

## 2020-07-08 PROCEDURE — 99213 OFFICE O/P EST LOW 20 MIN: CPT | Mod: PBBFAC,PN | Performed by: ORTHOPAEDIC SURGERY

## 2020-07-08 PROCEDURE — 99213 PR OFFICE/OUTPT VISIT, EST, LEVL III, 20-29 MIN: ICD-10-PCS | Mod: S$PBB,,, | Performed by: ORTHOPAEDIC SURGERY

## 2020-07-08 PROCEDURE — 99999 PR PBB SHADOW E&M-EST. PATIENT-LVL III: ICD-10-PCS | Mod: PBBFAC,,, | Performed by: ORTHOPAEDIC SURGERY

## 2020-07-08 PROCEDURE — 99213 OFFICE O/P EST LOW 20 MIN: CPT | Mod: S$PBB,,, | Performed by: ORTHOPAEDIC SURGERY

## 2020-07-08 NOTE — PROGRESS NOTES
Past Medical History:   Diagnosis Date    Anxiety     Arthritis     Chronic allergic rhinitis 11/19/2012    Depression     Herniated lumbar intervertebral disc     L3    Migraine     painless with numbness       Past Surgical History:   Procedure Laterality Date    COLONOSCOPY      COLONOSCOPY N/A 3/2/2020    Procedure: COLONOSCOPY;  Surgeon: Flash Garnett MD;  Location: [facility] ENDO;  Service: Endoscopy;  Laterality: N/A;    EPIDURAL STEROID INJECTION INTO LUMBAR SPINE N/A 5/20/2019    Procedure: Injection-steroid-epidural-lumbar;  Surgeon: Patrick Goyal MD;  Location: [facility] OR;  Service: Pain Management;  Laterality: N/A;  L5/S1 from right side    NASAL SEPTUM SURGERY N/A 09/2017    SPINE SURGERY  2001    L4    TONSILLECTOMY         Current Outpatient Medications   Medication Sig    amitriptyline (ELAVIL) 10 MG tablet Take 1 tablet (10 mg total) by mouth every evening.    citalopram (CELEXA) 10 MG tablet Take 10 mg by mouth once daily.    cycloSPORINE (RESTASIS) 0.05 % ophthalmic emulsion Place 1 drop into both eyes 2 (two) times daily.    losartan (COZAAR) 50 MG tablet Take 1 tablet (50 mg total) by mouth once daily.    naproxen (NAPROSYN) 250 MG tablet Take 500 mg by mouth 2 (two) times daily with meals.    pregabalin (LYRICA) 100 MG capsule Take 1 capsule (100 mg total) by mouth 2 (two) times daily.    rOPINIRole (REQUIP) 0.25 MG tablet TAKE 2 TABLETS BY MOUTH EVERY EVENING    triamcinolone (NASACORT) 55 mcg nasal inhaler 1 spray by Nasal route once daily.    estradiol (ESTRACE) 0.01 % (0.1 mg/gram) vaginal cream Place 1 g vaginally once daily.     No current facility-administered medications for this visit.        Review of patient's allergies indicates:   Allergen Reactions    Penicillins Shortness Of Breath       Family History   Problem Relation Age of Onset    Cancer Mother 78        colon cancer    Heart disease Mother 78        cabg    Diabetes Father     Heart disease  Father 71        cabg    Diabetes Brother     Cancer Brother         CM L.    Heart disease Brother 50        Mi       Social History     Socioeconomic History    Marital status:      Spouse name: Not on file    Number of children: Not on file    Years of education: Not on file    Highest education level: Not on file   Occupational History    Not on file   Social Needs    Financial resource strain: Not hard at all    Food insecurity     Worry: Never true     Inability: Never true    Transportation needs     Medical: No     Non-medical: No   Tobacco Use    Smoking status: Former Smoker     Quit date: 1973     Years since quittin.6    Smokeless tobacco: Never Used   Substance and Sexual Activity    Alcohol use: Yes     Frequency: 4 or more times a week     Drinks per session: 1 or 2     Binge frequency: Never     Comment: 1 glass of wine per day    Drug use: No    Sexual activity: Not on file   Lifestyle    Physical activity     Days per week: 3 days     Minutes per session: 40 min    Stress: Not at all   Relationships    Social connections     Talks on phone: More than three times a week     Gets together: Twice a week     Attends [employer] service: Not on file     Active member of club or organization: No     Attends meetings of clubs or organizations: Never     Relationship status:    Other Topics Concern    Not on file   Social History Narrative    Not on file       Chief Complaint:   Chief Complaint   Patient presents with    Left Shoulder - Pain, Follow-up       History of present illness:  69-year-old female seen a little more than 1 week after a fall onto her left shoulder.Date of injury .   Patient was tripped by some dogs.  Patient had significant pain at the time and inability to raise her arm up.  X-rays initially showed no fracture and there was concerned about rotator cuff injury.  MRI was done which showed intact rotator cuff but a nondisplaced  proximal humerus fracture.  Pain continues to improve.  Her range of motions much better but just feels weak still.      Review of Systems:    Constitution: Negative for chills, fever, and sweats.  Negative for unexplained weight loss.    HENT:  Negative for headaches and blurry vision.    Cardiovascular:Negative for chest pain or irregular heart beat. Negative for hypertension.    Respiratory:  Negative for cough and shortness of breath.    Gastrointestinal: Negative for abdominal pain, heartburn, melena, nausea, and vomitting.    Genitourinary:  Negative bladder incontinence and dysuria.    Musculoskeletal:  See HPI    Neurological: Negative for numbness.    Psychiatric/Behavioral: Negative for depression.  The patient is not nervous/anxious.      Endocrine: Negative for polyuria    Hematologic/Lymphatic: Negative for bleeding problem.  Does not bruise/bleed easily.    Skin: Negative for poor would healing and rash      Physical Examination:    Vital Signs:    Vitals:    07/08/20 0902   Temp: 99.7 °F (37.6 °C)       Body mass index is 23 kg/m².    This a well-developed, well nourished patient in no acute distress.  They are alert and oriented and cooperative to examination.  Pt. walks without an antalgic gait.      Examination left shoulder shows no rashes or erythema.  There are no masses or atrophy.  Patient has improving active and passive range of motion with less pain.  4/5 strength.  2+ radial pulse.  Normal light touch sensation in all distributions.        X-rays:  X-rays of the left shoulder available for review which show a very small nondisplaced crack of the humeral neck    MRI of the left shoulder:1. There is marrow contusion, fracture nondisplaced appearance of the humeral head with maintained glenohumeral alignment.  2. There is a moderate amount of joint fluid present with some minimal debris.  3. The joint capsule is patulous overall with some pericapsular stranding indicative of injury, sprain  sequela inferiorly predominant as well as anteriorly.  This correlates with some incongruity, incomplete overall visualization of the inferior glenohumeral ligamentous components.  4. The rotator cuff musculature is grossly intact with chronic tendinopathy and sub surface articular partial tears supraspinatus and infraspinatus predominantly.  5. There is subacromial spurring and narrowing demonstrated.  6. There is diffuse soft tissue and perimuscular edema stranding demonstrated overall throughout.  This could be seen with posttraumatic as well as postinflammatory related sequela.  No organized subcutaneous fluid collections are currently appreciated.     Assessment::  Left proximal humerus fracture    Plan:  Reviewed the x-rays and the MRI with her today.  We talked about proximal humerus fractures and their treatment.  She can stop the sling.  We will start some formal physical therapy.  I will see her back in 6 weeks.  X-ray of the left shoulder at that time.    This note was created using M Scale[employer] voice recognition software that occasionally misinterpreted phrases or words.    Consult note is delivered via Epic messaging service.

## 2020-07-09 ENCOUNTER — CLINICAL SUPPORT (OUTPATIENT)
Dept: REHABILITATION | Facility: HOSPITAL | Age: 70
End: 2020-07-09
Attending: ORTHOPAEDIC SURGERY
Payer: MEDICARE

## 2020-07-09 DIAGNOSIS — S42.292A CLOSED 3-PART FRACTURE OF PROXIMAL HUMERUS, LEFT, INITIAL ENCOUNTER: ICD-10-CM

## 2020-07-09 DIAGNOSIS — M62.81 MUSCLE WEAKNESS: ICD-10-CM

## 2020-07-09 DIAGNOSIS — M25.60 DECREASED RANGE OF MOTION: ICD-10-CM

## 2020-07-09 DIAGNOSIS — R52 PAIN: ICD-10-CM

## 2020-07-09 PROCEDURE — 97161 PT EVAL LOW COMPLEX 20 MIN: CPT | Mod: PN

## 2020-07-09 PROCEDURE — 97110 THERAPEUTIC EXERCISES: CPT | Mod: PN

## 2020-07-10 NOTE — PLAN OF CARE
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  During the evaluation, additional symptoms were mentioned and discussed including:  Noticing over past year or so, gradual haziness/filminess left eye like always wanted to clean lens on glasses.  Noticing it is blurry for distance, driving, tv, night vision.  Marked worsening depth perception.  No dm, no injury to eye, neg fh of young cataracts, mom had phaco in her mid 70's.  Patient used lot of oral courses of c-s for allergy issues.   Recent evaluation by optometrist at [facility].  Did not give glasses because could not improve vision os to justify expense, was told to be evaluated for cataract surgery  PAST MEDICAL HISTORY, PAST OCULAR HISTORY, FAMILY HISTORY AND SOCIAL HISTORY:  I reviewed the technician's history as outlined above; there are no additions/changes.  REVIEW OF SYSTEMS:  No eye pain, diplopia, field cuts, blackouts of vision, headaches, nausea, vomiting or jaw claudication.  Alert and oriented x 3, in no acute distress  EXTERNAL EXAM:  normal strength, normal sensation, negative periorbital/lacrimal palpation and no adenopathy  FIELD:  FULL TO CONFRONTATION OU  EOM (extraocular movements):  Full OU (both eyes) with normal obliques  ALIGNMENT:  orthophoric at distance and near.  SLIT LAMP EXAM:       LIDS, LASHES AND LACRIMAL:  OD (right eye): dermatochalasis //OS (left eye): dermatochalasis       CONJUNCTIVAE AND SCLERAE:  OD (right eye): benign flat conj pigmentation inferior fornix //OS (left eye): clear          CORNEA:  OD (right eye): clear  //OS (left eye):  clear       ANTERIOR CHAMBER:  OD (right eye): deep and quiet  // OS (left eye): deep and quiet       IRIS:  OD (right eye): round and regular without neovascularization  //OS (left eye): round and regular without  [facility]  Physical Therapy Initial Evaluation    Name: Yolie Brambila  Clinic Number: [id number]    Therapy Diagnosis:   Encounter Diagnoses   Name Primary?    Closed 3-part fracture of proximal humerus, left, initial encounter     Pain     Decreased range of motion     Muscle weakness      Physician: Ant Deng,*    Physician Orders: PT Eval and Treat,  Follow a protocol for proximal humerus fracture treated non operatively  Medical Diagnosis from Referral: Closed 3-part fracture of proximal humerus, left, initial encounter  Evaluation Date: 7/9/2020  Authorization Period Expiration: 7/10/2021  Plan of Care Expiration: 9/18/2020  Visit # / Visits authorized: 1/ 13    Time In: 2:45  Time Out: 3:30  Total Billable Time: 45 minutes    Precautions: anxiety and depression    Subjective   Date of onset: June 9, 2020  History of current condition - Yolie reports: pt states she tripped on her dogs' beds and fell on her L shoulder last month.  She states the pain has subsided, but she still has some bruising to L upper arm.  Pt states she has been able to start to use her arm to wash her hair, but it hurts after awhile.  Pt states she is R hand dominant.     Medical History:   Past Medical History:   Diagnosis Date    Anxiety     Arthritis     Chronic allergic rhinitis 11/19/2012    Depression     Herniated lumbar intervertebral disc     L3    Migraine     painless with numbness       Surgical History:   Yolie Brambila  has a past surgical history that includes Spine surgery (2001); Tonsillectomy; Nasal septum surgery (N/A, 09/2017); Epidural steroid injection into lumbar spine (N/A, 5/20/2019); Colonoscopy; and Colonoscopy (N/A, 3/2/2020).    Medications:   Yolie PANIAGUA has a current medication list which includes the following prescription(s): amitriptyline, citalopram, cyclosporine, estradiol, losartan, naproxen, pregabalin, ropinirole, and triamcinolone.    Allergies:    Review of patient's allergies indicates:   Allergen Reactions    Penicillins Shortness Of Breath        Imaging, MRI studies: Impression:     1. There is marrow contusion, fracture nondisplaced appearance of the humeral head with maintained glenohumeral alignment.  2. There is a moderate amount of joint fluid present with some minimal debris.  3. The joint capsule is patulous overall with some pericapsular stranding indicative of injury, sprain sequela inferiorly predominant as well as anteriorly.  This correlates with some incongruity, incomplete overall visualization of the inferior glenohumeral ligamentous components.  4. The rotator cuff musculature is grossly intact with chronic tendinopathy and sub surface articular partial tears supraspinatus and infraspinatus predominantly.  5. There is subacromial spurring and narrowing demonstrated.  6. There is diffuse soft tissue and perimuscular edema stranding demonstrated overall throughout.  This could be seen with posttraumatic as well as postinflammatory related sequela.  No organized subcutaneous fluid collections are currently appreciated.    Prior Therapy: none  Social History: 2 story house,  lives with their spouse and 3 dogs   Occupation: retired  Prior Level of Function: able to perform all ADLs and exercises without shoulder pain  Current Level of Function: difficulty and pain with using L UE for ADLs, overhead, and unable to perform ex at gym    Pain:  Current 3/10, worst 7/10, best 0/10   Location: left upper arm  Aggravating Factors: L arm overhead for prolonged time to wash hair, leaning on L upper extremity, opening jars  Easing Factors: Tylenol, Salon Pas    Pts goals: quilting on machine, using UE machines at gym, full use of L UE without pain    Objective     Observation: pt is pleasant and cooperative    Posture: slight fwd head      Passive Range of Motion: (deg)  Shoulder Left   Flexion 90   ER at 0 40      Active Range of Motion:  neovascularization       LENS:  OD (right eye): 1+ nuclear sclerosis and trace posterior subcapsular cataract  //OS (left eye): 2+ nuclear sclerosis, 2+ posterior subcapsular cataract and 2+ anterior subcapsular cataract    POSTERIOR SEGMENT EXAM:       VITREOUS:  OD (right eye):  clear // OS (left eye):  clear       OPTIC NERVE:  OD (right eye):  flat, pink, healthy with + SVP and C/D 0.35 //OS (left eye):  flat, pink, healthy with + SVP and C/D 0.35       MACULA:  OD (right eye):  healthy with sharp foveal reflex   //OS (left eye):  healthy with sharp foveal reflex       VESSELS:  OD (right eye):  healthy with normal A-V ratio    //OS (left eye):  healthy with normal A-V ratio       PERIPHERY:  OD (right eye):  flat, healthy without tears or retinal detachment    //OS (left eye):  flat, healthy without tears or retinal detachment    ASSESSMENT/PLAN:  1. CATARACT, OU(OS>OD), consistent with visual acuity/lifestyle complaints LEFT EYE:  I had a detailed discussion with Ms. Leo on the natural history, treatment options, expectations, and risks of cataract surgery. All her questions were answered. She is very symptomatic, and wants/needs to see better, so she elects to proceed with phacoemulsification with intraocular lens, OS. I will schedule the surgery.      2. Benign conjunctival pigmentation od:  Reassured, and counseled:  Follow with regular optometry exams, call sooner prn progressive changes in size, change in color, etc      SCHEDULE PHACO + IOL AS DISCUSSED, OR IMMEDIATELY PRN (as needed) AS INSTRUCTED.      PLEASE SCHEDULE THE FOLLOWING SURGERY:    Remove Cataract with phacoemulsification and insertion of intraocular lens implant (64730), LEFT EYE  DIAGNOSIS:  Cataract  H26.9  Surgery scheduling requirements include:  Facility: [facility] ([facility])  Admission Type: OUTPATIENT  Anesthesia: REGIONAL WITH MAC  Length of Procedure: 30 minutes  Date: next available  Medical Preop: YES WITH  (deg)  Shoulder Left Right   Flexion 85 150   Abduction 65 164   ER at 0 35 70   ER at 90 Unable to perform 70   IR Unable to perform T7     Will assess shoulder strength next visit.     strength: Leo L2 (avg of 3 trials in lbs)  R: 45, 45, 45 = 45  L: 40, 40, 40 = 40    Sensation: grossly intact to light touch      FOTO: CMS Impairment/Limitation/Restriction for FOTO Upper Arm Survey  Status Limitation G-Code CMS Severity Modifier  Intake 48% 52% Current Status CK - At least 40 percent but less than 60 percent  Predicted 66% 34% Goal Status+ CJ - At least 20 percent but less than 40 percent    TREATMENT   Treatment Time In: 3:15  Treatment Time Out: 3:30  Total Treatment time separate from Evaluation: 15 minutes    Yolie received therapeutic exercises to develop ROM and flexibility for 15 minutes including:  PROM of L shoulder in flexion and ER (pain free ROM)  AA sh ER with 1# dowel in supine to 40 deg (pain free) 10x5 sec  Attempted AA sh flex supine with dowel, but stopped due to pt unable to relax L UE to perform pain free  Table slides for sh flexion 10x5 sec  Gripping teal putty  Pendulums x10    Will add: PROM in all planes, submaximal L shoulder isometrics (IR, ER, ext, abd)    Home Exercises and Patient Education Provided    Education provided:   - reviewed precautions including avoiding WBing onto L UE  -healing process and importance of regaining full shoulder ROM  -role of PT  Pt gave verbal understanding to all education provided    Written Home Exercises Provided: yes.  Exercises were reviewed and Yolie was able to demonstrate them prior to the end of the session.  Yolie demonstrated good  understanding of the education provided.     See EMR under Patient Instructions for exercises provided 7/9/2020.    Assessment   Yolie PANIAGUA is a 69 y.o. female referred to outpatient Physical Therapy with a medical diagnosis of Closed 3-part fracture of proximal humerus, left, initial encounter. Pt presents with  PMD (primary medical doctor)   A-Scan: needed  Postoperative: POSTOP DAY 1, 1 WEEK AND 4 TO 5 WEEKS AT [facility] ALL WITH ME                 L shoulder pain, decreased L shoulder ROM, decreased  strength, decreased functional mobility.    Pt prognosis is Good.   Pt will benefit from skilled outpatient Physical Therapy to address the deficits stated above and in the chart below, provide pt/family education, and to maximize pt's level of independence.     Plan of care discussed with patient: Yes  Pt's spiritual, cultural and educational needs considered and patient is agreeable to the plan of care and goals as stated below:     Anticipated Barriers for therapy: none    Medical Necessity is demonstrated by the following  History  Co-morbidities and personal factors that may impact the plan of care Co-morbidities:   anxiety and depression    Personal Factors:   no deficits     moderate   Examination  Body Structures and Functions, activity limitations and participation restrictions that may impact the plan of care Body Regions:   upper extremities    Body Systems:    ROM  strength    Participation Restrictions:   Quilting, exercising    Activity limitations:   Learning and applying knowledge  no deficits    General Tasks and Commands  no deficits    Communication  no deficits    Mobility  lifting and carrying objects  fine hand use (grasping/picking up)    Self care  washing oneself (bathing, drying, washing hands)  caring for body parts (brushing teeth, shaving, grooming)  dressing    Domestic Life  shopping  cooking  doing house work (cleaning house, washing dishes, laundry)    Interactions/Relationships  no deficits    Life Areas  no deficits    Community and Social Life  community life  recreation and leisure         high   Clinical Presentation stable and uncomplicated low   Decision Making/ Complexity Score: low       GOALS: Short Term Goals:  3 weeks  1.Report decreased    L shoulder    pain  <   / =  3  /10  to increase tolerance for ADLs  2. Increased L shoulder PROM flexion to 150 deg for required ROM for ADLs  3. Increased L shoulder AROM  flexion to 90 deg without shoulder hike for increased ease with dressing.  4.  Increased L shoulder PROM abduction to 150 deg for increased ease with ADLs.  5. Increased L shoulder PROM ER at 90 deg abd to 70 deg for increased ROM for ADLs  5. Pt to tolerate HEP to improve ROM and independence with ADL's  6. Pt will report ability to sleep through the night without L shoulder pain.    Will assess UE strength in 2 weeks and add appropriate goals per protocol    Long Term Goals: 10 weeks  1.Report decreased    L shoulder    pain  <   / =  1  /10  to increase tolerance for ADLs  2. Increased L shoulder AROM flexion to 140 deg for increased ease reaching overhead.   3. Increased L shoulder AROM abduction to 140 deg for increased ease with upper body dressing.  4.  Increased L shoulder IR to T10 for increased ease donning bras.  5. Pt to tolerate HEP to improve ROM and independence with ADL's        Plan   Plan of care Certification: 7/9/2020 to 9/18/2020.    Outpatient Physical Therapy 2 times weekly for 10 weeks to include the following interventions: Electrical Stimulation  , Manual Therapy, Moist Heat/ Ice, Patient Education, Self Care, Therapeutic Activites and Therapeutic Exercise.     Kacie Ferrell, PT

## 2020-07-13 ENCOUNTER — CLINICAL SUPPORT (OUTPATIENT)
Dept: REHABILITATION | Facility: HOSPITAL | Age: 70
End: 2020-07-13
Payer: MEDICARE

## 2020-07-13 DIAGNOSIS — R52 PAIN: ICD-10-CM

## 2020-07-13 DIAGNOSIS — M62.81 MUSCLE WEAKNESS: ICD-10-CM

## 2020-07-13 DIAGNOSIS — M25.60 DECREASED RANGE OF MOTION: ICD-10-CM

## 2020-07-13 PROCEDURE — 97110 THERAPEUTIC EXERCISES: CPT | Mod: PN

## 2020-07-13 NOTE — PROGRESS NOTES
Physical Therapy Treatment Note     Name: Yolie Brambila  Clinic Number: [id number]    Therapy Diagnosis: No diagnosis found.  Physician: Ant Deng,*    Visit Date: 7/13/2020    Physician Orders: PT Eval and Treat,  Follow a protocol for proximal humerus fracture treated non operatively  Medical Diagnosis from Referral: Closed 3-part fracture of proximal humerus, left, initial encounter  Evaluation Date: 7/9/2020  Authorization Period Expiration: 7/10/2021  Plan of Care Expiration: 9/18/2020  Visit # / Visits authorized: 2/ 13    Time In: 2:45  Time Out: 3:30  Total Billable Time: 40 minutes    Precautions: anxiety and depression      Subjective     Pt reports: pt states she may have over done it with the gripping putty because her whole arm was sore last night.  She was compliant with home exercise program.  Response to previous treatment: did well  Functional change: too soon to tell    Pain: 4/10  Location: left shoulder and upper arm    Objective     PROM L shoulder flexion in supine: 115 deg    Yolie received therapeutic exercises to develop strength, ROM and flexibility for 40 minutes including:  PROM of L shoulder in flexion, scaption, abduction, IR and ER (pain free ROM)  AA sh ER with 1# dowel in supine to 40 deg (pain free) 10x5 sec  Attempted AA sh flex supine with dowel, but stopped due to pt unable to relax L UE to perform pain free  Table slides for sh flexion 10x5 sec (vc for PROM)  Gripping red digi    Pendulums x10 (vc for PROM)  Supine submaximal sh isometrics by PT--IR, ER, ext, abd (10x5 sec ea)    Yolie received cold pack for 10 minutes to L shoulder (seated with L UE supported on mat).      Home Exercises Provided and Patient Education Provided     Education provided:   - pt educated on importance of PROM for a stretch and avoiding carol muscles with PROM exercises  -ice for decreased soreness  -importance of regaining full ROM  Pt gave verbal understanding to all  education provided    Written Home Exercises Provided: Patient instructed to cont prior HEP.  Exercises were reviewed and Yolie was able to demonstrate them prior to the end of the session.  Yolie demonstrated good  understanding of the education provided.     See EMR under Patient Instructions for exercises provided 7/9/2020.    Assessment     Pt tolerated tx session well.  She was able to relax more for PROM by PT.  She continues to require cues to relax muscles for PROM and AAROM exercises.  She will continue to benefit from skilled PT for increased L shoulder ROM and function.    Yolie is progressing well towards her goals.   Pt prognosis is Good.     Pt will continue to benefit from skilled outpatient physical therapy to address the deficits listed in the problem list box on initial evaluation, provide pt/family education and to maximize pt's level of independence in the home and community environment.     Pt's spiritual, cultural and educational needs considered and pt agreeable to plan of care and goals.     Anticipated barriers to physical therapy: none    Goals:   Short Term Goals:  3 weeks (progressing)  1.Report decreased    L shoulder    pain  <   / =  3  /10  to increase tolerance for ADLs  2. Increased L shoulder PROM flexion to 150 deg for required ROM for ADLs  3. Increased L shoulder AROM flexion to 90 deg without shoulder hike for increased ease with dressing.  4.  Increased L shoulder PROM abduction to 150 deg for increased ease with ADLs.  5. Increased L shoulder PROM ER at 90 deg abd to 70 deg for increased ROM for ADLs  5. Pt to tolerate HEP to improve ROM and independence with ADL's  6. Pt will report ability to sleep through the night without L shoulder pain.    Will assess UE strength in 2 weeks and add appropriate goals per protocol    Long Term Goals: 10 weeks (progressing)  1.Report decreased    L shoulder    pain  <   / =  1  /10  to increase tolerance for ADLs  2. Increased L shoulder  AROM flexion to 140 deg for increased ease reaching overhead.   3. Increased L shoulder AROM abduction to 140 deg for increased ease with upper body dressing.  4.  Increased L shoulder IR to T10 for increased ease donning bras.  5. Pt to tolerate HEP to improve ROM and independence with ADL's          Plan     Continue PT towards established goals.  Follow [facility] protocol for proximal humerus fx (non-operative).    Kacie Ferrell, PT

## 2020-07-17 ENCOUNTER — CLINICAL SUPPORT (OUTPATIENT)
Dept: REHABILITATION | Facility: HOSPITAL | Age: 70
End: 2020-07-17
Payer: MEDICARE

## 2020-07-17 DIAGNOSIS — M25.60 DECREASED RANGE OF MOTION: ICD-10-CM

## 2020-07-17 DIAGNOSIS — Z71.89 COMPLEX CARE COORDINATION: ICD-10-CM

## 2020-07-17 DIAGNOSIS — M62.81 MUSCLE WEAKNESS: ICD-10-CM

## 2020-07-17 DIAGNOSIS — R52 PAIN: Primary | ICD-10-CM

## 2020-07-17 PROCEDURE — 97110 THERAPEUTIC EXERCISES: CPT | Mod: PN

## 2020-07-17 NOTE — PROGRESS NOTES
Physical Therapy Treatment Note     Name: Yolie Brambila  Clinic Number: [id number]    Therapy Diagnosis:   Encounter Diagnoses   Name Primary?    Pain Yes    Decreased range of motion     Muscle weakness      Physician: Ant Deng,*    Visit Date: 7/17/2020    Physician Orders: PT Eval and Treat,  Follow a protocol for proximal humerus fracture treated non operatively  Medical Diagnosis from Referral: Closed 3-part fracture of proximal humerus, left, initial encounter  Evaluation Date: 7/9/2020  Authorization Period Expiration: 7/10/2021  Plan of Care Expiration: 9/18/2020  Visit # / Visits authorized: 3/ 13    Time In: 8:49  Time Out: 9:40  Total Billable Time: 41 minutes    Precautions: anxiety and depression    Subjective     Pt reports: Pt reports her shoulder feels ok. She reached over her night stand this morning and felt pain but this has subsided. She has taken more iburpofen since her visit Monday, but she has also done more with her shoulder including cooking and moving pans.   She was compliant with home exercise program.  Response to previous treatment: felt very good  Functional change: able to reach into a cabinet, company last night and able to cook and lift pans     Pain: 4/10  Location: left shoulder and upper arm    Objective     PROM L shoulder flexion in supine: 115 deg    Yolie received therapeutic exercises to develop strength, ROM and flexibility for 41 minutes including:  PROM of L shoulder in flexion, scaption, abduction, IR and ER (pain free ROM)  AA sh ER with 1# dowel in supine to 40 deg (pain free) 10x5 sec  Attempted AA sh flex supine with dowel, but stopped due to pt unable to relax L UE to perform pain free  Table slides for sh flexion 10x5 sec (vc for PROM)  Gripping red digi    Pendulums x10 (vc for PROM)  Supine submaximal sh isometrics by PT--IR, ER, ext, abd (10x5 sec ea)  Scapular retraction seated x 10  Biceps curl x 20   Wrist flex/ext seated with elbow  flexed x 10 each    Next visit: Scapular strengthening - shrugs and depressions.     Yolie received cold pack for 10 minutes to L shoulder (seated with L UE supported on mat).    Home Exercises Provided and Patient Education Provided     Education provided:   - exercises in pain free range  - scapular stabilization to decrease stress to GH joint  -ice for decreased soreness  Pt gave verbal understanding to all education provided    Written Home Exercises Provided: yes.  Exercises were reviewed and Yolie was able to demonstrate them prior to the end of the session.  Yolie demonstrated good  understanding of the education provided.     See EMR under Patient Instructions for exercises provided 7/9/2020, 7/17/2020.    Assessment     Pt continues with limitations to L shoulder flexion and requires cues to relax arm. She was progressed with scapular strengthening and lower arm strengthening without reports of increased symptoms to L shoulder.  Yolie is progressing well towards her goals.   Pt prognosis is Good.     Pt will continue to benefit from skilled outpatient physical therapy to address the deficits listed in the problem list box on initial evaluation, provide pt/family education and to maximize pt's level of independence in the home and community environment.     Pt's spiritual, cultural and educational needs considered and pt agreeable to plan of care and goals.     Anticipated barriers to physical therapy: none    Goals:   Short Term Goals:  3 weeks (progressing)  1.Report decreased    L shoulder    pain  <   / =  3  /10  to increase tolerance for ADLs  2. Increased L shoulder PROM flexion to 150 deg for required ROM for ADLs  3. Increased L shoulder AROM flexion to 90 deg without shoulder hike for increased ease with dressing.  4.  Increased L shoulder PROM abduction to 150 deg for increased ease with ADLs.  5. Increased L shoulder PROM ER at 90 deg abd to 70 deg for increased ROM for ADLs  5. Pt to tolerate  HEP to improve ROM and independence with ADL's  6. Pt will report ability to sleep through the night without L shoulder pain.    Will assess UE strength in 2 weeks and add appropriate goals per protocol    Long Term Goals: 10 weeks (progressing)  1.Report decreased    L shoulder    pain  <   / =  1  /10  to increase tolerance for ADLs  2. Increased L shoulder AROM flexion to 140 deg for increased ease reaching overhead.   3. Increased L shoulder AROM abduction to 140 deg for increased ease with upper body dressing.  4.  Increased L shoulder IR to T10 for increased ease donning bras.  5. Pt to tolerate HEP to improve ROM and independence with ADL's    Plan     Continue PT towards established goals.  Follow [facility] protocol for proximal humerus fx (non-operative). Scapular strengthening - shrugs and depressions.     Luis Enrique Cornell, PT

## 2020-07-28 ENCOUNTER — CLINICAL SUPPORT (OUTPATIENT)
Dept: REHABILITATION | Facility: HOSPITAL | Age: 70
End: 2020-07-28
Payer: MEDICARE

## 2020-07-28 DIAGNOSIS — M25.60 DECREASED RANGE OF MOTION: ICD-10-CM

## 2020-07-28 DIAGNOSIS — M62.81 MUSCLE WEAKNESS: ICD-10-CM

## 2020-07-28 DIAGNOSIS — R52 PAIN: Primary | ICD-10-CM

## 2020-07-28 PROCEDURE — 97110 THERAPEUTIC EXERCISES: CPT | Mod: PN

## 2020-07-28 PROCEDURE — 97140 MANUAL THERAPY 1/> REGIONS: CPT | Mod: PN

## 2020-07-28 NOTE — PROGRESS NOTES
Physical Therapy Treatment Note     Name: Yolie Brambila  Clinic Number: [id number]    Therapy Diagnosis:   Encounter Diagnoses   Name Primary?    Pain Yes    Decreased range of motion     Muscle weakness      Physician: Ant Deng,*    Visit Date: 7/28/2020    Physician Orders: PT Eval and Treat,  Follow a protocol for proximal humerus fracture treated non operatively  Medical Diagnosis from Referral: Closed 3-part fracture of proximal humerus, left, initial encounter  Evaluation Date: 7/9/2020  Authorization Period Expiration: 7/10/2021  Plan of Care Expiration: 9/18/2020  Visit # / Visits authorized: 4/ 13    Time In: 8:48  Time Out: 9:40  Total Billable Time: 40 minutes    Precautions: anxiety and depression, L proximal humerus fx on 6/9/2020    Subjective     Pt reports: Pt was out of clinic last week due to self quarantine after possible exposure to COVID-19. She reports the person they were exposed to was negative. Her shoulder has been feeling pretty good. She is using her shoulder within pain free limitations.    She was compliant with home exercise program.  Response to previous treatment: felt very good  Functional change: able to reach into a cabinet, company last night and able to cook and lift pans     Pain: 2/10  Location: left shoulder and upper arm    Objective     Fx occurred on 6/9/2020 - 7/28/2020: 7 weeks (Phase III of protocol)     AAROM L shoulder flexion in supine:   Flexion - 122, 131 with wand for shoulder flexion  ER at 45 degrees - 62    Yolie received therapeutic exercises to develop strength, ROM and flexibility for 30 minutes including:  PROM of L shoulder in flexion, abduction and ER (pain free ROM)  AA sh ER with 1# dowel in supine to end range 10x5 sec  AA sh flex supine with dowel, but stopped due to pt unable to relax L UE to perform pain free  Scapular retraction seated 2 x 10  Biceps curl x 20   Pulley flexion x 2 minutes  Supine SA punch 2 x 10   Side lying  shoulder ER 2 x 10     Next visit: wall walks flexion, scapular PNF, progress to weights with supine flexion    Yolie received the following manual therapy techniques: Joint mobilizations, Myofacial release and Soft tissue Mobilization were applied to the: L shoulder for 10 minutes, including:  STM R deltoid, subscapularis, mid trap, upper trap  MFR pec major    Yolie received cold pack for 10 minutes to L shoulder (seated with L UE supported on mat).    Home Exercises Provided and Patient Education Provided     Education provided:   - exercises in pain free range  - ice for decreased soreness  - possible soreness along shoulder blade due to exercises today  Pt gave verbal understanding to all education provided    Written Home Exercises Provided: Patient instructed to cont prior HEP.  Exercises were reviewed and Yolie was able to demonstrate them prior to the end of the session.  Yolie demonstrated good  understanding of the education provided.     See EMR under Patient Instructions for exercises provided 7/9/2020, 7/17/2020.    Assessment     Pt presents with increased L shoulder AAROM. She was progressed with exercises in pain free range. Ache to L shoulder noted at end of treatment.    Yolie is progressing well towards her goals.   Pt prognosis is Good.     Pt will continue to benefit from skilled outpatient physical therapy to address the deficits listed in the problem list box on initial evaluation, provide pt/family education and to maximize pt's level of independence in the home and community environment.     Pt's spiritual, cultural and educational needs considered and pt agreeable to plan of care and goals.     Anticipated barriers to physical therapy: none    Goals:   Short Term Goals:  3 weeks (progressing)  1.Report decreased    L shoulder    pain  <   / =  3  /10  to increase tolerance for ADLs  2. Increased L shoulder PROM flexion to 150 deg for required ROM for ADLs  3. Increased L shoulder AROM  flexion to 90 deg without shoulder hike for increased ease with dressing.  4.  Increased L shoulder PROM abduction to 150 deg for increased ease with ADLs.  5. Increased L shoulder PROM ER at 90 deg abd to 70 deg for increased ROM for ADLs  5. Pt to tolerate HEP to improve ROM and independence with ADL's  6. Pt will report ability to sleep through the night without L shoulder pain.    Will assess UE strength in 2 weeks and add appropriate goals per protocol    Long Term Goals: 10 weeks (progressing)  1.Report decreased    L shoulder    pain  <   / =  1 /10  to increase tolerance for ADLs  2. Increased L shoulder AROM flexion to 140 deg for increased ease reaching overhead.   3. Increased L shoulder AROM abduction to 140 deg for increased ease with upper body dressing.  4.  Increased L shoulder IR to T10 for increased ease donning bras.  5. Pt to tolerate HEP to improve ROM and independence with ADL's    Plan     Continue PT towards established goals.  Follow [facility] protocol for proximal humerus fx (non-operative).   Wall slides,     Luis Enrique Cornell, PT

## 2020-08-03 ENCOUNTER — CLINICAL SUPPORT (OUTPATIENT)
Dept: REHABILITATION | Facility: HOSPITAL | Age: 70
End: 2020-08-03
Payer: MEDICARE

## 2020-08-03 DIAGNOSIS — M25.60 DECREASED RANGE OF MOTION: ICD-10-CM

## 2020-08-03 DIAGNOSIS — M62.81 MUSCLE WEAKNESS: ICD-10-CM

## 2020-08-03 DIAGNOSIS — R52 PAIN: ICD-10-CM

## 2020-08-03 PROCEDURE — 97110 THERAPEUTIC EXERCISES: CPT | Mod: PN

## 2020-08-03 NOTE — PROGRESS NOTES
Physical Therapy Treatment Note     Name: Yolie Brambila  Clinic Number: [id number]    Therapy Diagnosis:   Encounter Diagnoses   Name Primary?    Pain     Decreased range of motion     Muscle weakness      Physician: Ant Deng,*    Visit Date: 8/3/2020    Physician Orders: PT Eval and Treat,  Follow a protocol for proximal humerus fracture treated non operatively  Medical Diagnosis from Referral: Closed 3-part fracture of proximal humerus, left, initial encounter  Evaluation Date: 7/9/2020  Authorization Period Expiration: 7/10/2021  Plan of Care Expiration: 9/18/2020  Visit # / Visits authorized: 5/ 13    Time In: 11:35  Time Out: 12:15  Total Billable Time: 40 minutes    Precautions: anxiety and depression, L proximal humerus fx on 6/9/2020    Subjective     Pt reports: Pt states she lifted her sewing machine on Thursday and her L shoulder has been sore ever since.    She was compliant with home exercise program.  Response to previous treatment: felt very good  Functional change: able to reach into a cabinet    Pain: 4-5/10  Location: left shoulder and upper arm    Objective     Fx occurred on 8/3/2020 - 8/7/2020: 8 weeks (Phase III of protocol)     AAROM L shoulder flexion in supine:   Flexion - 122  ER at 45 degrees - 50    Yolie received therapeutic exercises to develop strength, ROM and flexibility for 40 minutes including:  PROM of L shoulder in flexion, abduction and ER (pain free ROM)  AA sh ER with 1# dowel in supine to end range 10x5 sec  AA sh flex supine with dowel, but stopped due to pt unable to relax L UE to perform pain free  Rows YTB 2 x 10  Sh hyperext x10 AROM  Biceps curl 2x10 1#   Pulley flexion and abduction x 2 minutes ea  Supine SA punch 2 x 10   Side lying shoulder ER 2 x 10   Sh flex wall slides x10    Next visit: scapular PNF, progress to weights with supine flexion    Yolie received the following manual therapy techniques: Joint mobilizations, Myofacial release and Soft  tissue Mobilization were applied to the: L shoulder for 5 minutes, including:  STM R deltoid, mid trap, upper trap    Yolie received cold pack for 5 minutes to L shoulder (seated with L UE supported on mat).    Home Exercises Provided and Patient Education Provided     Education provided:   - exercises in pain free range  - ice for decreased soreness  - get back to HEP consistently  Pt gave verbal understanding to all education provided    Written Home Exercises Provided: Patient instructed to cont prior HEP.  Exercises were reviewed and Yolie was able to demonstrate them prior to the end of the session.  Yolie demonstrated good  understanding of the education provided.     See EMR under Patient Instructions for exercises provided 7/9/2020, 7/17/2020.    Assessment     Pt did not progress with L shoulder ROM today after lifting a sewing machine last week.  She has not done her HEP since the incident due to increased soreness to L shoulder.  She reported some soreness to L shoulder after sh abduction pulleys, which subsided with rest.  Pt was able to progress to wall slides without increased pain, but some fatigue noted.  Yolie is progressing well towards her goals.   Pt prognosis is Good.     Pt will continue to benefit from skilled outpatient physical therapy to address the deficits listed in the problem list box on initial evaluation, provide pt/family education and to maximize pt's level of independence in the home and community environment.     Pt's spiritual, cultural and educational needs considered and pt agreeable to plan of care and goals.     Anticipated barriers to physical therapy: none    Goals:   Short Term Goals:  3 weeks (progressing)  1.Report decreased    L shoulder    pain  <   / =  3  /10  to increase tolerance for ADLs  2. Increased L shoulder PROM flexion to 150 deg for required ROM for ADLs  3. Increased L shoulder AROM flexion to 90 deg without shoulder hike for increased ease with  dressing.  4.  Increased L shoulder PROM abduction to 150 deg for increased ease with ADLs.  5. Increased L shoulder PROM ER at 90 deg abd to 70 deg for increased ROM for ADLs  5. Pt to tolerate HEP to improve ROM and independence with ADL's  6. Pt will report ability to sleep through the night without L shoulder pain.    Will assess UE strength in 2 weeks and add appropriate goals per protocol    Long Term Goals: 10 weeks (progressing)  1.Report decreased    L shoulder    pain  <   / =  1  /10  to increase tolerance for ADLs  2. Increased L shoulder AROM flexion to 140 deg for increased ease reaching overhead.   3. Increased L shoulder AROM abduction to 140 deg for increased ease with upper body dressing.  4.  Increased L shoulder IR to T10 for increased ease donning bras.  5. Pt to tolerate HEP to improve ROM and independence with ADL's    Plan     Continue PT towards established goals.  Follow [facility] protocol for proximal humerus fx (non-operative).   Wall slides,     Kacie Ferrell, PT

## 2020-08-06 ENCOUNTER — CLINICAL SUPPORT (OUTPATIENT)
Dept: REHABILITATION | Facility: HOSPITAL | Age: 70
End: 2020-08-06
Payer: MEDICARE

## 2020-08-06 DIAGNOSIS — M25.60 DECREASED RANGE OF MOTION: ICD-10-CM

## 2020-08-06 DIAGNOSIS — R52 PAIN: ICD-10-CM

## 2020-08-06 DIAGNOSIS — M62.81 MUSCLE WEAKNESS: ICD-10-CM

## 2020-08-06 PROCEDURE — 97110 THERAPEUTIC EXERCISES: CPT | Mod: PN

## 2020-08-06 NOTE — PROGRESS NOTES
Physical Therapy Treatment Note     Name: Yolie Brambila  Clinic Number: [id number]    Therapy Diagnosis:   Encounter Diagnoses   Name Primary?    Pain     Decreased range of motion     Muscle weakness      Physician: Ant Deng,*    Visit Date: 8/6/2020    Physician Orders: PT Eval and Treat,  Follow a protocol for proximal humerus fracture treated non operatively  Medical Diagnosis from Referral: Closed 3-part fracture of proximal humerus, left, initial encounter  Evaluation Date: 7/9/2020  Authorization Period Expiration: 7/10/2021  Plan of Care Expiration: 9/18/2020  Visit # / Visits authorized: 6/ 13    Time In: 8:45  Time Out: 9:40  Total Billable Time: 40 minutes    Precautions: anxiety and depression, L proximal humerus fx on 6/9/2020    Subjective     Pt reports: Pt states her L arm is not sore any more.  She states she was able to do some of her HEP, but not all due to watching her grandkids.  She was somewhat compliant with home exercise program.  Response to previous treatment: did well  Functional change: able to reach into a cabinet    Pain: 1/10  Location: left shoulder and upper arm    Objective     Fx occurred on 8/3/2020 - 8/7/2020: 8 weeks (Phase III of protocol)     AAROM L shoulder flexion in supine:   Flexion - 125  ER at 45 degrees - 55    Yolie received therapeutic exercises to develop strength, ROM and flexibility for 45 minutes including:  PROM of L shoulder in flexion, abduction and ER (pain free ROM)  AA sh ER with 1# dowel in supine to end range 10x5 sec  AA sh flex supine with dowel x10  AROM sh flex in supine x10  SL scap PNF (protraction/retraction)x10  SL scap depression 10x5 sec  SL sh flex x10  SL sh ER 2x10  Rows YTB 2 x 10  Sh hyperext x10 AROM  AA sh IR slides 1# dowel x10  Biceps curl 2x10 1#   Pulley flexion and abduction x 2 minutes ea  Supine SA punch 2 x 10   Sh flex wall slides x10    Yolie received cold pack for 5 minutes to L shoulder (seated with L UE  supported on mat).    Home Exercises Provided and Patient Education Provided     Education provided:   - exercises in pain free range  - ice for decreased soreness  - get back to HEP consistently  Pt gave verbal understanding to all education provided    Written Home Exercises Provided: Patient instructed to cont prior HEP.  Exercises were reviewed and Yolie was able to demonstrate them prior to the end of the session.  Yolie demonstrated good  understanding of the education provided.     See EMR under Patient Instructions for exercises provided 7/9/2020, 7/17/2020.    Assessment     Pt was able to perform AROM shoulder flexion and increased L shoulder ROM on pulleys without pain.  Pt reported 0/10 pain to L shoulder post tx.  She will continue to benefit from PT for increased L shoulder ROM and improved periscap strength.     Yolie is progressing well towards her goals.   Pt prognosis is Good.     Pt will continue to benefit from skilled outpatient physical therapy to address the deficits listed in the problem list box on initial evaluation, provide pt/family education and to maximize pt's level of independence in the home and community environment.     Pt's spiritual, cultural and educational needs considered and pt agreeable to plan of care and goals.     Anticipated barriers to physical therapy: none    Goals:   Short Term Goals:  3 weeks (progressing)  1.Report decreased    L shoulder    pain  <   / =  3  /10  to increase tolerance for ADLs  2. Increased L shoulder PROM flexion to 150 deg for required ROM for ADLs  3. Increased L shoulder AROM flexion to 90 deg without shoulder hike for increased ease with dressing.  4.  Increased L shoulder PROM abduction to 150 deg for increased ease with ADLs.  5. Increased L shoulder PROM ER at 90 deg abd to 70 deg for increased ROM for ADLs  5. Pt to tolerate HEP to improve ROM and independence with ADL's  6. Pt will report ability to sleep through the night without L  shoulder pain.    Will assess UE strength in 2 weeks and add appropriate goals per protocol    Long Term Goals: 10 weeks (progressing)  1.Report decreased    L shoulder    pain  <   / =  1  /10  to increase tolerance for ADLs  2. Increased L shoulder AROM flexion to 140 deg for increased ease reaching overhead.   3. Increased L shoulder AROM abduction to 140 deg for increased ease with upper body dressing.  4.  Increased L shoulder IR to T10 for increased ease donning bras.  5. Pt to tolerate HEP to improve ROM and independence with ADL's    Plan     Continue PT towards established goals.  Follow [facility] protocol for proximal humerus fx (non-operative).       Kacie Ferrell, PT

## 2020-08-10 ENCOUNTER — CLINICAL SUPPORT (OUTPATIENT)
Dept: REHABILITATION | Facility: HOSPITAL | Age: 70
End: 2020-08-10
Payer: MEDICARE

## 2020-08-10 DIAGNOSIS — R52 PAIN: ICD-10-CM

## 2020-08-10 DIAGNOSIS — M62.81 MUSCLE WEAKNESS: ICD-10-CM

## 2020-08-10 DIAGNOSIS — M25.60 DECREASED RANGE OF MOTION: ICD-10-CM

## 2020-08-10 PROCEDURE — 97110 THERAPEUTIC EXERCISES: CPT | Mod: PN

## 2020-08-10 NOTE — PROGRESS NOTES
Physical Therapy Treatment Note     Name: Yolie Brambila  Clinic Number: [id number]    Therapy Diagnosis:   Encounter Diagnoses   Name Primary?    Pain     Decreased range of motion     Muscle weakness      Physician: Ant Deng,*    Visit Date: 8/10/2020    Physician Orders: PT Eval and Treat,  Follow a protocol for proximal humerus fracture treated non operatively  Medical Diagnosis from Referral: Closed 3-part fracture of proximal humerus, left, initial encounter  Evaluation Date: 7/9/2020  Authorization Period Expiration: 7/10/2021  Plan of Care Expiration: 9/18/2020  Visit # / Visits authorized: 7/ 13    Time In: 10:50  Time Out: 11:45  Total Billable Time: 45 minutes    Precautions: anxiety and depression, L proximal humerus fx on 6/9/2020    Subjective     Pt reports: Pt states she does not have pain to L shoulder.  She was somewhat compliant with home exercise program.  Response to previous treatment: did well  Functional change: pt able to shave under L arm this am without pain    Pain: 0/10  Location: left shoulder and upper arm    Objective     Fx occurred on 8/10/2020 - 8/14/2020: 9 weeks (Phase III of protocol)     AAROM L shoulder flexion in supine:   Flexion - 130 deg  ER at 45 degrees - 55 deg    Yolie received therapeutic exercises to develop strength, ROM and flexibility for 45 minutes including:  PROM of L shoulder in flexion, abduction and ER (pain free ROM)  AA sh ER with 1# dowel in supine to end range 10x5 sec  AA sh flex supine with dowel x10  AROM sh flex in supine x10  Supine SA punch 2x10 1#  SL scap PNF (protraction/retraction)x10  SL scap depression 10x5 sec  SL sh flex x10  SL sh ER 2x10  Rows YTB 2 x 10  Sh hyperext x10 AROM  AA sh IR slides 1# dowel x10  Biceps curl 2x10 1#   Pulley flexion and abduction x 2 minutes ea  Sh flex wall slides x10    Yolie received cold pack for 8 minutes to L shoulder (seated with L UE supported on mat).    Home Exercises Provided and  Patient Education Provided     Education provided:   - exercises in pain free range  - ice for decreased soreness  - get back to HEP consistently  Pt gave verbal understanding to all education provided    Written Home Exercises Provided: yes  Exercises were reviewed and Yolie was able to demonstrate them prior to the end of the session.  Yolie demonstrated good  understanding of the education provided.     See EMR under Patient Instructions for exercises provided 7/9/2020, 7/17/2020, 8/10/2020    Assessment     Pt continues to improve with increased L shoulder ROM. She requires cues to perform lower trap activation with AROM.  Will continue to progress L shoulder ROM and periscap strengthening/scap stabilization.   Yolie is progressing well towards her goals.   Pt prognosis is Good.     Pt will continue to benefit from skilled outpatient physical therapy to address the deficits listed in the problem list box on initial evaluation, provide pt/family education and to maximize pt's level of independence in the home and community environment.     Pt's spiritual, cultural and educational needs considered and pt agreeable to plan of care and goals.     Anticipated barriers to physical therapy: none    Goals:   Short Term Goals:  3 weeks (progressing)  1.Report decreased    L shoulder    pain  <   / =  3  /10  to increase tolerance for ADLs  2. Increased L shoulder PROM flexion to 150 deg for required ROM for ADLs  3. Increased L shoulder AROM flexion to 90 deg without shoulder hike for increased ease with dressing.  4.  Increased L shoulder PROM abduction to 150 deg for increased ease with ADLs.  5. Increased L shoulder PROM ER at 90 deg abd to 70 deg for increased ROM for ADLs  5. Pt to tolerate HEP to improve ROM and independence with ADL's  6. Pt will report ability to sleep through the night without L shoulder pain.    Will assess UE strength in 2 weeks and add appropriate goals per protocol    Long Term Goals: 10  weeks (progressing)  1.Report decreased    L shoulder    pain  <   / =  1  /10  to increase tolerance for ADLs  2. Increased L shoulder AROM flexion to 140 deg for increased ease reaching overhead.   3. Increased L shoulder AROM abduction to 140 deg for increased ease with upper body dressing.  4.  Increased L shoulder IR to T10 for increased ease donning bras.  5. Pt to tolerate HEP to improve ROM and independence with ADL's    Plan     Continue PT towards established goals.  Follow [facility] protocol for proximal humerus fx (non-operative).       Kacie Ferrell, PT

## 2020-08-13 ENCOUNTER — CLINICAL SUPPORT (OUTPATIENT)
Dept: REHABILITATION | Facility: HOSPITAL | Age: 70
End: 2020-08-13
Payer: MEDICARE

## 2020-08-13 DIAGNOSIS — R52 PAIN: ICD-10-CM

## 2020-08-13 DIAGNOSIS — M62.81 MUSCLE WEAKNESS: ICD-10-CM

## 2020-08-13 DIAGNOSIS — M25.60 DECREASED RANGE OF MOTION: ICD-10-CM

## 2020-08-13 PROCEDURE — 97110 THERAPEUTIC EXERCISES: CPT | Mod: PN

## 2020-08-13 NOTE — PROGRESS NOTES
Physical Therapy Treatment Note     Name: Yolie Brambila  Clinic Number: [id number]    Therapy Diagnosis:   Encounter Diagnoses   Name Primary?    Pain     Decreased range of motion     Muscle weakness      Physician: Ant Deng,*    Visit Date: 8/13/2020    Physician Orders: PT Eval and Treat,  Follow a protocol for proximal humerus fracture treated non operatively  Medical Diagnosis from Referral: Closed 3-part fracture of proximal humerus, left, initial encounter  Evaluation Date: 7/9/2020  Authorization Period Expiration: 7/10/2021  Plan of Care Expiration: 9/18/2020  Visit # / Visits authorized: 8/ 13    Time In: 8:55  Time Out: 9:45  Total Billable Time: 40 minutes    Precautions: anxiety and depression, L proximal humerus fx on 6/9/2020    Subjective     Pt reports: Pt states she does not have pain to L shoulder.  She states she's been doing well since last visit.  She was somewhat compliant with home exercise program.  Response to previous treatment: did well  Functional change: pt able to shave under L arm this am without pain    Pain: 0/10  Location: left shoulder and upper arm    Objective     Fx occurred on 8/10/2020 - 8/14/2020: 9 weeks (Phase III of protocol)     AAROM L shoulder flexion in supine:   Flexion - 130 deg  ER at 45 degrees - 55 deg    Yolie received therapeutic exercises to develop strength, ROM and flexibility for 40 minutes including:  PROM of L shoulder in flexion, abduction and ER (pain free ROM)  AA sh ER with 1# dowel in supine to end range 10x10 sec  AA sh flex supine with dowel 10x10 sec  AROM sh flex in supine x10  Supine SA punch 2x10 1#  SL scap PNF (protraction/retraction)x10  SL scap depression 10x5 sec  SL sh flex 2x10  SL sh ER 2x12 (add 1# next visit)  Sh ext YTB 2x10 (mc for scap depression)  NP Rows YTB 2 x 10  AA sh IR slides 1# dowel x10  Towel sh IR stretch 3x20 sec  Biceps curl 2x10 2#   Pulley flexion and abduction x 2 minutes ea  Sh flex wall slides  x10    Yolie received cold pack for 8 minutes to L shoulder (seated with L UE supported on mat).    Home Exercises Provided and Patient Education Provided     Education provided:   - exercises in pain free range  - ice for decreased soreness  - get back to HEP consistently  Pt gave verbal understanding to all education provided    Written Home Exercises Provided: yes  Exercises were reviewed and Yolie was able to demonstrate them prior to the end of the session.  Yolie demonstrated good  understanding of the education provided.     See EMR under Patient Instructions for exercises provided 7/9/2020, 7/17/2020, 8/10/2020    Assessment     Pt was able to tolerate some increased there ex intensity without exacerbation of symptoms.  She is able to perform L shoulder IR with increased ease and was able to tolerate towel stretch.  She will continue to benefit from PT to improve L shoulder AROM and periscap strength/stabilization for improved functional mobility.   Yolie is progressing well towards her goals.   Pt prognosis is Good.     Pt will continue to benefit from skilled outpatient physical therapy to address the deficits listed in the problem list box on initial evaluation, provide pt/family education and to maximize pt's level of independence in the home and community environment.     Pt's spiritual, cultural and educational needs considered and pt agreeable to plan of care and goals.     Anticipated barriers to physical therapy: none    Goals:   Short Term Goals:  3 weeks (progressing)  1.Report decreased    L shoulder    pain  <   / =  3  /10  to increase tolerance for ADLs  2. Increased L shoulder PROM flexion to 150 deg for required ROM for ADLs  3. Increased L shoulder AROM flexion to 90 deg without shoulder hike for increased ease with dressing.  4.  Increased L shoulder PROM abduction to 150 deg for increased ease with ADLs.  5. Increased L shoulder PROM ER at 90 deg abd to 70 deg for increased ROM for  ADLs  5. Pt to tolerate HEP to improve ROM and independence with ADL's  6. Pt will report ability to sleep through the night without L shoulder pain.    Will assess UE strength in 2 weeks and add appropriate goals per protocol    Long Term Goals: 10 weeks (progressing)  1.Report decreased    L shoulder    pain  <   / =  1  /10  to increase tolerance for ADLs  2. Increased L shoulder AROM flexion to 140 deg for increased ease reaching overhead.   3. Increased L shoulder AROM abduction to 140 deg for increased ease with upper body dressing.  4.  Increased L shoulder IR to T10 for increased ease donning bras.  5. Pt to tolerate HEP to improve ROM and independence with ADL's    Plan     Continue PT towards established goals.  Follow [facility] protocol for proximal humerus fx (non-operative).       Kacie Ferrell, PT

## 2020-08-17 ENCOUNTER — CLINICAL SUPPORT (OUTPATIENT)
Dept: REHABILITATION | Facility: HOSPITAL | Age: 70
End: 2020-08-17
Payer: MEDICARE

## 2020-08-17 DIAGNOSIS — R52 PAIN: ICD-10-CM

## 2020-08-17 DIAGNOSIS — M25.60 DECREASED RANGE OF MOTION: ICD-10-CM

## 2020-08-17 DIAGNOSIS — M62.81 MUSCLE WEAKNESS: ICD-10-CM

## 2020-08-17 PROCEDURE — 97110 THERAPEUTIC EXERCISES: CPT | Mod: PN

## 2020-08-17 NOTE — PROGRESS NOTES
Physical Therapy Treatment Note     Name: Yolie Brambila  Clinic Number: [id number]    Therapy Diagnosis:   Encounter Diagnoses   Name Primary?    Pain     Decreased range of motion     Muscle weakness      Physician: Ant Deng,*    Visit Date: 8/17/2020    Physician Orders: PT Eval and Treat,  Follow a protocol for proximal humerus fracture treated non operatively  Medical Diagnosis from Referral: Closed 3-part fracture of proximal humerus, left, initial encounter  Evaluation Date: 7/9/2020  Authorization Period Expiration: 7/10/2021  Plan of Care Expiration: 9/18/2020  Visit # / Visits authorized: 8/ 13    Time In: 10:00 AM  Time Out: 10:52 AM  Total Billable Time: 44 minutes    Precautions: anxiety and depression, L proximal humerus fx on 6/9/2020    Subjective     Pt reports: she is doing much better, increase in ability to shower, get dressed, dry hair, reach into cabinets. Still has difficulty reaching out to the side, but improving. Overall, feeling stronger.   She was somewhat compliant with home exercise program.  Response to previous treatment: did well  Functional change: pt able to shave under L arm this am without pain    Pain: 0/10 Worst: 4-5/10  Location: left shoulder and upper arm    Objective     Fx occurred on 8/10/2020 - 8/14/2020: 9 weeks (Phase III of protocol)     Observation: pt is pleasant and cooperative     Posture: slight fwd head, L shoulder girdle hiking noted with end range abduction       Active Range of Motion: (deg)  Shoulder Left Right   Flexion 130 150   Abduction 105 164   ER at 0 70 70   ER at 90 38* 70   IR L1* T7   * denotes pain      Right Left Comment   Shoulder flexion: 5/5 4+/5    Shoulder Abduction: 5/5 4/5    Shoulder ER: 5/5 4+/5    Shoulder IR: 5/5 5/5    Elbow Flexion: 5/5 5/5    Elbow Extension: 5/5 5/5     See below See Below          strength: Leo L2 (avg of 3 trials in lbs)  R: 40, 42, 40 = 40  L: 38, 40, 40 = 39     Sensation: grossly  intact to light touch      AAROM L shoulder flexion in supine: - NT this date, see above measurements   Flexion - 130 deg  ER at 45 degrees - 55 deg    Yolie received therapeutic exercises to develop strength, ROM and flexibility for 44 minutes including:  PROM of L shoulder in flexion, abduction and ER (pain free ROM)  AA sh ER with 1# dowel in supine to end range 10x10 sec  AA sh flex supine with dowel 10x10 sec  AROM sh flex in supine x10  Supine SA punch 2x10 1#  SL scap PNF (protraction/retraction)x10  SL scap depression 10x5 sec  SL sh flex 2x10  SL sh ER 1# 2 x 10  Sh ext YTB 2x10 (mc for scap depression)  NP Rows YTB 2 x 10  AA sh IR slides 1# dowel x10  Towel sh IR stretch 3x20 sec  Biceps curl 2x10 2#   Pulley flexion and abduction x 2 minutes ea  Sh flex wall slides x10    Yolie received cold pack for 8 minutes to L shoulder (seated with L UE supported on mat).    Home Exercises Provided and Patient Education Provided     Education provided:   - exercises in pain free range  - ice for decreased soreness  - get back to HEP consistently  Pt gave verbal understanding to all education provided    Written Home Exercises Provided: yes  Exercises were reviewed and Yolie was able to demonstrate them prior to the end of the session.  Yolie demonstrated good  understanding of the education provided.     See EMR under Patient Instructions for exercises provided 7/9/2020, 7/17/2020, 8/10/2020    Assessment   Pt demonstrated improvements in L shoulder girdle AROM, and able to tolerate strength testing this date. Pt continues to demonstrate mild weakness to L shoulder girdle and impaired scapular mechanics. Pt able to tolerate slight progression of therex to include increased resistance with periscapular strengthening for improve scapular mechanics. Pt with pulling sensation to lateral shoulder during final 30 seconds of pulley abduction, and exercise was terminated. She will continue to benefit from PT to improve L  shoulder AROM and periscap strength/stabilization for improved functional mobility.     Yolie is progressing well towards her goals.   Pt prognosis is Good.     Pt will continue to benefit from skilled outpatient physical therapy to address the deficits listed in the problem list box on initial evaluation, provide pt/family education and to maximize pt's level of independence in the home and community environment.     Pt's spiritual, cultural and educational needs considered and pt agreeable to plan of care and goals.     Anticipated barriers to physical therapy: none    Goals:   Short Term Goals:  3 weeks (progressing)  1.Report decreased    L shoulder    pain  <   / =  3  /10  to increase tolerance for ADLs MET  2. Increased L shoulder PROM flexion to 150 deg for required ROM for ADLs  3. Increased L shoulder AROM flexion to 90 deg without shoulder hike for increased ease with dressing. MET  4.  Increased L shoulder PROM abduction to 150 deg for increased ease with ADLs.  5. Increased L shoulder PROM ER at 90 deg abd to 70 deg for increased ROM for ADLs  5. Pt to tolerate HEP to improve ROM and independence with ADL's MET  6. Pt will report ability to sleep through the night without L shoulder pain. MET    Will assess UE strength in 2 weeks and add appropriate goals per protocol    Long Term Goals: 10 weeks (progressing)  1.Report decreased    L shoulder    pain  <   / =  1  /10  to increase tolerance for ADLs  2. Increased L shoulder AROM flexion to 140 deg for increased ease reaching overhead.   3. Increased L shoulder AROM abduction to 140 deg for increased ease with upper body dressing.  4.  Increased L shoulder IR to T10 for increased ease donning bras.  5. Pt to tolerate HEP to improve ROM and independence with ADL's    Plan     Continue PT towards established goals.  Follow [facility] protocol for proximal humerus fx (non-operative).       Radha Zamora, PT

## 2020-08-18 DIAGNOSIS — S42.292A CLOSED 3-PART FRACTURE OF PROXIMAL HUMERUS, LEFT, INITIAL ENCOUNTER: Primary | ICD-10-CM

## 2020-08-19 ENCOUNTER — HOSPITAL ENCOUNTER (OUTPATIENT)
Dept: RADIOLOGY | Facility: HOSPITAL | Age: 70
Discharge: HOME | End: 2020-08-19
Attending: ORTHOPAEDIC SURGERY
Payer: MEDICARE

## 2020-08-19 ENCOUNTER — OFFICE VISIT (OUTPATIENT)
Dept: ORTHOPEDICS | Facility: CLINIC | Age: 70
End: 2020-08-19
Payer: MEDICARE

## 2020-08-19 VITALS — BODY MASS INDEX: 22.88 KG/M2 | HEIGHT: 64 IN | RESPIRATION RATE: 16 BRPM | WEIGHT: 134 LBS

## 2020-08-19 DIAGNOSIS — S42.292D CLOSED 3-PART FRACTURE OF PROXIMAL HUMERUS WITH ROUTINE HEALING, LEFT: Primary | ICD-10-CM

## 2020-08-19 DIAGNOSIS — S42.292A CLOSED 3-PART FRACTURE OF PROXIMAL HUMERUS, LEFT, INITIAL ENCOUNTER: ICD-10-CM

## 2020-08-19 PROCEDURE — 73030 X-RAY EXAM OF SHOULDER: CPT | Mod: 26,LT,, | Performed by: RADIOLOGY

## 2020-08-19 PROCEDURE — 99999 PR PBB SHADOW E&M-EST. PATIENT-LVL III: CPT | Mod: PBBFAC,,, | Performed by: ORTHOPAEDIC SURGERY

## 2020-08-19 PROCEDURE — 73030 XR SHOULDER COMPLETE 2 OR MORE VIEWS LEFT: ICD-10-PCS | Mod: 26,LT,, | Performed by: RADIOLOGY

## 2020-08-19 PROCEDURE — 73030 X-RAY EXAM OF SHOULDER: CPT | Mod: TC,PO,LT

## 2020-08-19 PROCEDURE — 99213 PR OFFICE/OUTPT VISIT, EST, LEVL III, 20-29 MIN: ICD-10-PCS | Mod: S$PBB,,, | Performed by: ORTHOPAEDIC SURGERY

## 2020-08-19 PROCEDURE — 99999 PR PBB SHADOW E&M-EST. PATIENT-LVL III: ICD-10-PCS | Mod: PBBFAC,,, | Performed by: ORTHOPAEDIC SURGERY

## 2020-08-19 PROCEDURE — 99213 OFFICE O/P EST LOW 20 MIN: CPT | Mod: S$PBB,,, | Performed by: ORTHOPAEDIC SURGERY

## 2020-08-19 PROCEDURE — 99213 OFFICE O/P EST LOW 20 MIN: CPT | Mod: PBBFAC,25,PN | Performed by: ORTHOPAEDIC SURGERY

## 2020-08-19 NOTE — PROGRESS NOTES
Past Medical History:   Diagnosis Date    Anxiety     Arthritis     Chronic allergic rhinitis 11/19/2012    Depression     Herniated lumbar intervertebral disc     L3    Migraine     painless with numbness       Past Surgical History:   Procedure Laterality Date    COLONOSCOPY      COLONOSCOPY N/A 3/2/2020    Procedure: COLONOSCOPY;  Surgeon: Flash Garnett MD;  Location: [facility];  Service: Endoscopy;  Laterality: N/A;    EPIDURAL STEROID INJECTION INTO LUMBAR SPINE N/A 5/20/2019    Procedure: Injection-steroid-epidural-lumbar;  Surgeon: Patrick Goyal MD;  Location: [facility] OR;  Service: Pain Management;  Laterality: N/A;  L5/S1 from right side    NASAL SEPTUM SURGERY N/A 09/2017    SPINE SURGERY  2001    L4    TONSILLECTOMY         Current Outpatient Medications   Medication Sig    amitriptyline (ELAVIL) 10 MG tablet TAKE 1 TABLET(10 MG) BY MOUTH EVERY EVENING    citalopram (CELEXA) 10 MG tablet Take 10 mg by mouth once daily.    cycloSPORINE (RESTASIS) 0.05 % ophthalmic emulsion Place 1 drop into both eyes 2 (two) times daily.    losartan (COZAAR) 50 MG tablet Take 1 tablet (50 mg total) by mouth once daily.    naproxen (NAPROSYN) 250 MG tablet Take 500 mg by mouth 2 (two) times daily with meals.    pregabalin (LYRICA) 100 MG capsule Take 1 capsule (100 mg total) by mouth 2 (two) times daily.    rOPINIRole (REQUIP) 0.25 MG tablet TAKE 2 TABLETS BY MOUTH EVERY EVENING    triamcinolone (NASACORT) 55 mcg nasal inhaler 1 spray by Nasal route once daily.    estradiol (ESTRACE) 0.01 % (0.1 mg/gram) vaginal cream Place 1 g vaginally once daily.     No current facility-administered medications for this visit.        Review of patient's allergies indicates:   Allergen Reactions    Penicillins Shortness Of Breath       Family History   Problem Relation Age of Onset    Cancer Mother 78        colon cancer    Heart disease Mother 78        cabg    Diabetes Father     Heart disease Father  71        cabg    Diabetes Brother     Cancer Brother         CM L.    Heart disease Brother 50        Mi       Social History     Socioeconomic History    Marital status:      Spouse name: Not on file    Number of children: Not on file    Years of education: Not on file    Highest education level: Not on file   Occupational History    Not on file   Social Needs    Financial resource strain: Not hard at all    Food insecurity     Worry: Never true     Inability: Never true    Transportation needs     Medical: No     Non-medical: No   Tobacco Use    Smoking status: Former Smoker     Quit date: 1973     Years since quittin.7    Smokeless tobacco: Never Used   Substance and Sexual Activity    Alcohol use: Yes     Frequency: 4 or more times a week     Drinks per session: 1 or 2     Binge frequency: Never     Comment: 1 glass of wine per day    Drug use: No    Sexual activity: Not on file   Lifestyle    Physical activity     Days per week: 3 days     Minutes per session: 40 min    Stress: Not at all   Relationships    Social connections     Talks on phone: More than three times a week     Gets together: Twice a week     Attends Mosque service: Not on file     Active member of club or organization: No     Attends meetings of clubs or organizations: Never     Relationship status:    Other Topics Concern    Not on file   Social History Narrative    Not on file       Chief Complaint:   Chief Complaint   Patient presents with    Left Shoulder - Follow-up, Pain       History of present illness:  69-year-old female seen after a fall onto her left shoulder. Date of injury .   Patient was tripped by some dogs.  Patient had significant pain at the time and inability to raise her arm up.  X-rays initially showed no fracture and there was concern about rotator cuff injury.  MRI was done which showed intact rotator cuff but a nondisplaced proximal humerus fracture.  Pain  continues to improve.  Her range of motion is much better but just feels weak still.      Review of Systems:  Musculoskeletal:  See HPI      Physical Examination:    Vital Signs:    Vitals:    08/19/20 0831   Resp: 16       Body mass index is 23 kg/m².    This a well-developed, well nourished patient in no acute distress.  They are alert and oriented and cooperative to examination.  Pt. walks without an antalgic gait.      Examination left shoulder shows no rashes or erythema.  There are no masses or atrophy.  Patient has improving active and passive range of motion with less pain.  4/5 strength.  2+ radial pulse.  Normal light touch sensation in all distributions.        X-rays:  X-rays of the left shoulder is ordered and review which show a very small nondisplaced crack of the humeral neck    MRI of the left shoulder:1. There is marrow contusion, fracture nondisplaced appearance of the humeral head with maintained glenohumeral alignment.  2. There is a moderate amount of joint fluid present with some minimal debris.  3. The joint capsule is patulous overall with some pericapsular stranding indicative of injury, sprain sequela inferiorly predominant as well as anteriorly.  This correlates with some incongruity, incomplete overall visualization of the inferior glenohumeral ligamentous components.  4. The rotator cuff musculature is grossly intact with chronic tendinopathy and sub surface articular partial tears supraspinatus and infraspinatus predominantly.  5. There is subacromial spurring and narrowing demonstrated.  6. There is diffuse soft tissue and perimuscular edema stranding demonstrated overall throughout.  This could be seen with posttraumatic as well as postinflammatory related sequela.  No organized subcutaneous fluid collections are currently appreciated.     Assessment::  Left proximal humerus fracture    Plan:   We talked about proximal humerus fractures and their treatment.   We will finish out the  last 2 weeks of her physical therapy and then progress to a home program.  We talked about getting back into her exercise routine.  Patient will follow-up as needed.     This note was created using [employer] voice recognition software that occasionally misinterpreted phrases or words.    Consult note is delivered via Epic messaging service.

## 2020-08-20 ENCOUNTER — CLINICAL SUPPORT (OUTPATIENT)
Dept: REHABILITATION | Facility: HOSPITAL | Age: 70
End: 2020-08-20
Payer: MEDICARE

## 2020-08-20 DIAGNOSIS — M62.81 MUSCLE WEAKNESS: ICD-10-CM

## 2020-08-20 DIAGNOSIS — M25.60 DECREASED RANGE OF MOTION: ICD-10-CM

## 2020-08-20 DIAGNOSIS — R52 PAIN: ICD-10-CM

## 2020-08-20 PROCEDURE — 97110 THERAPEUTIC EXERCISES: CPT | Mod: PN

## 2020-08-20 NOTE — PROGRESS NOTES
Physical Therapy Treatment Note     Name: Yolie Brambila  Clinic Number: [id number]    Therapy Diagnosis:   Encounter Diagnoses   Name Primary?    Pain     Decreased range of motion     Muscle weakness      Physician: Ant Deng,*    Visit Date: 8/20/2020    Physician Orders: PT Eval and Treat,  Follow a protocol for proximal humerus fracture treated non operatively  Medical Diagnosis from Referral: Closed 3-part fracture of proximal humerus, left, initial encounter  Evaluation Date: 7/9/2020  Authorization Period Expiration: 7/10/2021  Plan of Care Expiration: 9/18/2020  Visit # / Visits authorized: 8/ 13    Time In: 8:45  Time Out: 9:40  Total Billable Time: 45 minutes    Precautions: anxiety and depression, L proximal humerus fx on 6/9/2020    Subjective     Pt reports: pt states she is doing well and her MD discharged her yesterday.  Pt states she still has some fatigue to L arm with holding blow dryer and unable to hook bra behind back.  She was somewhat compliant with home exercise program.  Response to previous treatment: did well  Functional change: pt able to shave under L arm this am without pain    Pain: 0/10 Worst: 4-5/10  Location: left shoulder and upper arm    Objective     Fx occurred on 8/17/2020 - 8/21/2020: 10 weeks (Phase III of protocol)     Yolie received therapeutic exercises to develop strength, ROM and flexibility for 45 minutes including:  PROM of L shoulder in flexion, abduction and ER (pain free ROM)  AA sh ER with 1# dowel in supine to end range 10x10 sec  AA sh flex supine with dowel 10x10 sec  AROM sh flex in supine x10  Supine SA punch 2x10 2#  Supine PNF D1/D2 (D1 with YTB into flexion pattern)  SL scap PNF (protraction/retraction)x10  SL scap depression 10x5 sec  SL sh flex 2x10  SL sh ER 1# 2 x 10  Sh ext YTB 2x10 (mc for scap depression)  NP Rows YTB 2 x 10  AA sh IR slides 1# dowel x10  Towel sh IR stretch 3x20 sec  Biceps curl 2x10 2#   Sh ext YTB 2x10  Pulley  flexion and abduction x 2 minutes ea (focus on eccentric control returning to neutral)  Sh flex wall slides x10    Yolie received cold pack for 8 minutes to L shoulder (seated with L UE supported on mat).    Home Exercises Provided and Patient Education Provided     Education provided:   - exercises in pain free range  - ice for decreased soreness  - get back to HEP consistently  Pt gave verbal understanding to all education provided    Written Home Exercises Provided: yes  Exercises were reviewed and Yolie was able to demonstrate them prior to the end of the session.  Yolie demonstrated good  understanding of the education provided.     See EMR under Patient Instructions for exercises provided 7/9/2020, 7/17/2020, 8/10/2020    Assessment   Pt was able to tolerate addition of supine shoulder strengthening today without pain.  Will continue to progress L shoulder ROM and UE strengthening as tolerated for increased functional mobility.   She will continue to benefit from PT to improve L shoulder AROM and periscap strength/stabilization for improved functional mobility.     Yolie is progressing well towards her goals.   Pt prognosis is Good.     Pt will continue to benefit from skilled outpatient physical therapy to address the deficits listed in the problem list box on initial evaluation, provide pt/family education and to maximize pt's level of independence in the home and community environment.     Pt's spiritual, cultural and educational needs considered and pt agreeable to plan of care and goals.     Anticipated barriers to physical therapy: none    Goals:   Short Term Goals:  3 weeks (progressing)  1.Report decreased    L shoulder    pain  <   / =  3  /10  to increase tolerance for ADLs MET  2. Increased L shoulder PROM flexion to 150 deg for required ROM for ADLs  3. Increased L shoulder AROM flexion to 90 deg without shoulder hike for increased ease with dressing. MET  4.  Increased L shoulder PROM abduction to  150 deg for increased ease with ADLs.  5. Increased L shoulder PROM ER at 90 deg abd to 70 deg for increased ROM for ADLs  5. Pt to tolerate HEP to improve ROM and independence with ADL's MET  6. Pt will report ability to sleep through the night without L shoulder pain. MET    Long Term Goals: 10 weeks (progressing)  1.Report decreased    L shoulder    pain  <   / =  1  /10  to increase tolerance for ADLs  2. Increased L shoulder AROM flexion to 140 deg for increased ease reaching overhead.   3. Increased L shoulder AROM abduction to 140 deg for increased ease with upper body dressing.  4.  Increased L shoulder IR to T10 for increased ease donning bras.  5. Pt to tolerate HEP to improve ROM and independence with ADL's  6. Pt will demonstrate increased L shoulder abduction strength to 4+/5 to 5/5 for increased ease with ADLs    Plan     Continue PT towards established goals.  Follow [facility] protocol for proximal humerus fx (non-operative).       Kacie Ferrell, PT

## 2020-08-24 ENCOUNTER — CLINICAL SUPPORT (OUTPATIENT)
Dept: REHABILITATION | Facility: HOSPITAL | Age: 70
End: 2020-08-24
Payer: MEDICARE

## 2020-08-24 DIAGNOSIS — M62.81 MUSCLE WEAKNESS: ICD-10-CM

## 2020-08-24 DIAGNOSIS — M25.60 DECREASED RANGE OF MOTION: ICD-10-CM

## 2020-08-24 DIAGNOSIS — R52 PAIN: ICD-10-CM

## 2020-08-24 PROCEDURE — 97110 THERAPEUTIC EXERCISES: CPT | Mod: PN

## 2020-08-24 NOTE — PROGRESS NOTES
Physical Therapy Treatment Note     Name: Yolie Brambila  Clinic Number: [id number]    Therapy Diagnosis:   Encounter Diagnoses   Name Primary?    Pain     Decreased range of motion     Muscle weakness      Physician: Ant Deng,*    Visit Date: 8/24/2020    Physician Orders: PT Eval and Treat,  Follow a protocol for proximal humerus fracture treated non operatively  Medical Diagnosis from Referral: Closed 3-part fracture of proximal humerus, left, initial encounter  Evaluation Date: 7/9/2020  Authorization Period Expiration: 7/10/2021  Plan of Care Expiration: 9/18/2020  Visit # / Visits authorized: 9/ 13    Time In: 12:15  Time Out: 1:00  Total Billable Time: 45 minutes    Precautions: anxiety and depression, L proximal humerus fx on 6/9/2020    Subjective     Pt reports: pt states she does not have any pain today.  She states she went to [employer] on Saturday and had some soreness to L shoulder (bicep and tricep curls)  She was somewhat compliant with home exercise program.  Response to previous treatment: did well  Functional change: pt able to shave under L arm this am without pain    Pain: 0/10   Location: left shoulder and upper arm    Objective     Fx occurred on 8/17/2020 - 8/21/2020: 10 weeks (Phase III of protocol)     Yolie received therapeutic exercises to develop strength, ROM and flexibility for 45 minutes including:  PROM of L shoulder in flexion, abduction and ER (pain free ROM)  AA sh ER with 1# dowel in supine to end range 10x10 sec  AA sh flex supine with dowel 10x10 sec  AROM sh flex in supine x10  Supine SA punch 2x10 2#  Dynamic shoulder stabilization x2 to fatigue  Supine PNF D1 YTB resisting both directions  SL scap PNF (protraction/retraction)x10  SL scap depression 10x5 sec  SL sh flex 2x10  SL sh ER 1# 2 x 10 (increase to 2# next visit)  Sh ext YTB 2x10 (mc for scap depression)  NP Rows YTB 2 x 10  AA sh IR slides 1# dowel x10  Towel sh IR stretch 3x20 sec  NP Biceps curl  2x10 2#   Sh ext YTB 2x10  Pulley flexion and abduction x 2 minutes ea (focus on eccentric control returning to neutral)  NP Sh flex wall slides x10    Yolie received cold pack for 8 minutes to L shoulder (seated with L UE supported on mat).    Home Exercises Provided and Patient Education Provided     Education provided:   - exercises in pain free range  - ice for decreased soreness  - only use very light weights at gym and avoid pain  Pt gave verbal understanding to all education provided    Written Home Exercises Provided: yes  Exercises were reviewed and Yolie was able to demonstrate them prior to the end of the session.  Yolie demonstrated good  understanding of the education provided.     See EMR under Patient Instructions for exercises provided 7/9/2020, 7/17/2020, 8/10/2020    Assessment   Pt is making good progress with increased L shoulder ROM and increased UE strength.  She continues to require cues for scap depression with sh ext to avoid UT compensation.   She will continue to benefit from PT to improve L shoulder AROM and periscap strength/stabilization for improved functional mobility.     Yolie is progressing well towards her goals.   Pt prognosis is Good.     Pt will continue to benefit from skilled outpatient physical therapy to address the deficits listed in the problem list box on initial evaluation, provide pt/family education and to maximize pt's level of independence in the home and community environment.     Pt's spiritual, cultural and educational needs considered and pt agreeable to plan of care and goals.     Anticipated barriers to physical therapy: none    Goals:   Short Term Goals:  3 weeks (progressing)  1.Report decreased    L shoulder    pain  <   / =  3  /10  to increase tolerance for ADLs MET  2. Increased L shoulder PROM flexion to 150 deg for required ROM for ADLs  3. Increased L shoulder AROM flexion to 90 deg without shoulder hike for increased ease with dressing. MET  4.   Increased L shoulder PROM abduction to 150 deg for increased ease with ADLs.  5. Increased L shoulder PROM ER at 90 deg abd to 70 deg for increased ROM for ADLs  5. Pt to tolerate HEP to improve ROM and independence with ADL's MET  6. Pt will report ability to sleep through the night without L shoulder pain. MET    Long Term Goals: 10 weeks (progressing)  1.Report decreased    L shoulder    pain  <   / =  1  /10  to increase tolerance for ADLs  2. Increased L shoulder AROM flexion to 140 deg for increased ease reaching overhead.   3. Increased L shoulder AROM abduction to 140 deg for increased ease with upper body dressing.  4.  Increased L shoulder IR to T10 for increased ease donning bras.  5. Pt to tolerate HEP to improve ROM and independence with ADL's  6. Pt will demonstrate increased L shoulder abduction strength to 4+/5 to 5/5 for increased ease with ADLs    Plan     Continue PT towards established goals.  Follow [facility] protocol for proximal humerus fx (non-operative).       Kacie Ferrell, PT

## 2020-08-27 ENCOUNTER — CLINICAL SUPPORT (OUTPATIENT)
Dept: REHABILITATION | Facility: HOSPITAL | Age: 70
End: 2020-08-27
Payer: MEDICARE

## 2020-08-27 DIAGNOSIS — R52 PAIN: ICD-10-CM

## 2020-08-27 DIAGNOSIS — M25.60 DECREASED RANGE OF MOTION: ICD-10-CM

## 2020-08-27 DIAGNOSIS — M62.81 MUSCLE WEAKNESS: ICD-10-CM

## 2020-08-27 PROCEDURE — 97110 THERAPEUTIC EXERCISES: CPT | Mod: PN

## 2020-08-27 NOTE — PROGRESS NOTES
Physical Therapy Treatment Note     Name: Yolie Brambila  Clinic Number: [id number]    Therapy Diagnosis:   Encounter Diagnoses   Name Primary?    Pain     Decreased range of motion     Muscle weakness      Physician: Ant Deng,*    Visit Date: 8/27/2020    Physician Orders: PT Eval and Treat,  Follow a protocol for proximal humerus fracture treated non operatively  Medical Diagnosis from Referral: Closed 3-part fracture of proximal humerus, left, initial encounter  Evaluation Date: 7/9/2020  Authorization Period Expiration: 7/10/2021  Plan of Care Expiration: 9/18/2020  Visit # / Visits authorized: 10/ 13    Time In: 9:40  Time Out: 10:15  Total Billable Time: 35 minutes    Precautions: anxiety and depression, L proximal humerus fx on 6/9/2020    Subjective     Pt reports: pt states she forgot and picked up her 2yo grandson with both hands on Monday and had pain to L shoulder that night.  She states the pain was relieved with ibuprofen.  She states she has not had pain since that night  She was somewhat compliant with home exercise program.  Response to previous treatment: did well  Functional change: pt able to shave under L arm this am without pain    Pain: 0/10   Location: left shoulder and upper arm    Objective     Fx occurred on 8/17/2020 - 8/21/2020: 10 weeks (Phase III of protocol)     Yolie received therapeutic exercises to develop strength, ROM and flexibility for 35 minutes including:  PROM of L shoulder in flexion, abduction and ER (pain free ROM)  AA sh ER at 90 deg sh abd with 1# dowel in supine to end range 10x10 sec  AA sh flex supine with dowel 10x10 sec  AROM sh flex in supine 2x10, 1#  Supine SA punch 2x10 2#  Dynamic shoulder stabilization x2 to fatigue  Supine PNF D1 YTB resisting both directions, D2 without resist  SL scap PNF (protraction/retraction)x10  SL scap depression 10x5 sec  SL sh flex 2x10, 1#  SL sh ER 2# 2 x 10  AA sh IR slides 1# dowel x10  Towel sh IR stretch 3x20  sec  Pulley flexion and abduction x 2 minutes ea (focus on eccentric control returning to neutral)    Not performed due to time:  Sh ext YTB 2x10 (mc for scap depression)  Rows YTB 2 x 10  Biceps curl 2x10 2#   Sh ext YTB 2x10  Sh flex wall slides x10    Yolie received cold pack for 8 minutes to L shoulder (seated with L UE supported on mat).    Home Exercises Provided and Patient Education Provided     Education provided:   - exercises in pain free range  - ice for decreased soreness    - only use very light weights at gym and avoid pain  Pt gave verbal understanding to all education provided    Written Home Exercises Provided: yes  Exercises were reviewed and Yolie was able to demonstrate them prior to the end of the session.  Yolie demonstrated good  understanding of the education provided.     See EMR under Patient Instructions for exercises provided 7/9/2020, 7/17/2020, 8/10/2020    Assessment   Pt was able to tolerate increased resistance for strengthening exercises without exacerbation of symptoms.  She continues to improve with increased L shoudler ROM.  L shoulder IR remains most restricted.  Pt will continue to benefit from progressive periscap/shoulder strength/stabilization for improved functional mobility.     Yolie is progressing well towards her goals.   Pt prognosis is Good.     Pt will continue to benefit from skilled outpatient physical therapy to address the deficits listed in the problem list box on initial evaluation, provide pt/family education and to maximize pt's level of independence in the home and community environment.     Pt's spiritual, cultural and educational needs considered and pt agreeable to plan of care and goals.     Anticipated barriers to physical therapy: none    Goals:   Short Term Goals:  3 weeks (progressing)  1.Report decreased    L shoulder    pain  <   / =  3  /10  to increase tolerance for ADLs MET  2. Increased L shoulder PROM flexion to 150 deg for required ROM for  ADLs  3. Increased L shoulder AROM flexion to 90 deg without shoulder hike for increased ease with dressing. MET  4.  Increased L shoulder PROM abduction to 150 deg for increased ease with ADLs.  5. Increased L shoulder PROM ER at 90 deg abd to 70 deg for increased ROM for ADLs  5. Pt to tolerate HEP to improve ROM and independence with ADL's MET  6. Pt will report ability to sleep through the night without L shoulder pain. MET    Long Term Goals: 10 weeks (progressing)  1.Report decreased    L shoulder    pain  <   / =  1  /10  to increase tolerance for ADLs  2. Increased L shoulder AROM flexion to 140 deg for increased ease reaching overhead.   3. Increased L shoulder AROM abduction to 140 deg for increased ease with upper body dressing.  4.  Increased L shoulder IR to T10 for increased ease donning bras.  5. Pt to tolerate HEP to improve ROM and independence with ADL's  6. Pt will demonstrate increased L shoulder abduction strength to 4+/5 to 5/5 for increased ease with ADLs    Plan     Continue PT towards established goals.  Follow [facility] protocol for proximal humerus fx (non-operative).       Kacie Ferrell, PT

## 2020-08-31 ENCOUNTER — PATIENT OUTREACH (OUTPATIENT)
Dept: ADMINISTRATIVE | Facility: HOSPITAL | Age: 70
End: 2020-08-31

## 2020-08-31 ENCOUNTER — CLINICAL SUPPORT (OUTPATIENT)
Dept: REHABILITATION | Facility: HOSPITAL | Age: 70
End: 2020-08-31
Payer: MEDICARE

## 2020-08-31 DIAGNOSIS — M62.81 MUSCLE WEAKNESS: ICD-10-CM

## 2020-08-31 DIAGNOSIS — R52 PAIN: ICD-10-CM

## 2020-08-31 DIAGNOSIS — Z12.31 ENCOUNTER FOR SCREENING MAMMOGRAM FOR BREAST CANCER: Primary | ICD-10-CM

## 2020-08-31 DIAGNOSIS — M25.60 DECREASED RANGE OF MOTION: ICD-10-CM

## 2020-08-31 PROCEDURE — 97110 THERAPEUTIC EXERCISES: CPT | Mod: PN

## 2020-08-31 NOTE — PROGRESS NOTES
Physical Therapy Treatment Note     Name: Yolie Brambila  Clinic Number: [id number]    Therapy Diagnosis:   Encounter Diagnoses   Name Primary?    Pain     Decreased range of motion     Muscle weakness      Physician: Ant Deng,*    Visit Date: 8/31/2020    Physician Orders: PT Eval and Treat,  Follow a protocol for proximal humerus fracture treated non operatively  Medical Diagnosis from Referral: Closed 3-part fracture of proximal humerus, left, initial encounter  Evaluation Date: 7/9/2020  Authorization Period Expiration: 7/10/2021  Plan of Care Expiration: 9/18/2020  Visit # / Visits authorized: 11/ 13    Time In: 10:45  Time Out: 11:40  Total Billable Time: 45 minutes    Precautions: anxiety and depression, L proximal humerus fx on 6/9/2020    Subjective     Pt reports: pt states her L arm is feeling stronger and she is not having any pain.  She was somewhat compliant with home exercise program.  Response to previous treatment: did well  Functional change: pt able to shave under L arm this am without pain    Pain: 0/10   Location: left shoulder and upper arm    Objective     Fx occurred on 8/31/2020 - 9/4/2020: 12  weeks (Phase III of protocol)     Yolie received therapeutic exercises to develop strength, ROM and flexibility for 45 minutes including:  PROM of L shoulder in flexion, abduction and ER (pain free ROM)  AA sh ER at 90 deg sh abd with 1# dowel in supine to end range 10x10 sec  AA sh flex supine with dowel 10x10 sec  AROM sh flex in supine 2x10, 1#  Supine SA punch 2x10 2#  Dynamic shoulder stabilization x2 to fatigue  Supine PNF D1 YTB resisting both directions, D2 without resist  SL scap PNF (protraction/retraction)x10  SL scap depression 10x5 sec  SL sh flex 2x10, 1#  SL sh ER 2# 2 x 10  SL sh abd 2x10  Sh ext YTB 2x10  Rows YTB 2 x 10  AA sh IR slides 1# dowel x10  Towel sh IR stretch 3x20 sec  Sh flex wall slides x10    Not performed due to time:  Sh ext YTB 2x10 (mc for scap  depression)  Biceps curl 2x10 2#   Pulley flexion and abduction x 2 minutes ea (focus on eccentric control returning to neutral)    Yolie received cold pack for 8 minutes to L shoulder (seated with L UE supported on mat).    Home Exercises Provided and Patient Education Provided     Education provided:   - exercises in pain free range  - ice for decreased soreness  - only use very light weights at gym and avoid pain  Pt gave verbal understanding to all education provided    Written Home Exercises Provided: yes  Exercises were reviewed and Yolie was able to demonstrate them prior to the end of the session.  Yolie demonstrated good  understanding of the education provided.     See EMR under Patient Instructions for exercises provided 7/9/2020, 7/17/2020, 8/10/2020    Assessment   Pt demonstrated improved L shoulder strength in all planes with tolerance for resistance for both PNF diagonals.  She continues to improved with increased overall L shoulder ROM, but continues to have some deficits at end ROM.  Pt will continue to benefit from progressive periscap/shoulder strength/stabilization for improved functional mobility.     Yolie is progressing well towards her goals.   Pt prognosis is Good.     Pt will continue to benefit from skilled outpatient physical therapy to address the deficits listed in the problem list box on initial evaluation, provide pt/family education and to maximize pt's level of independence in the home and community environment.     Pt's spiritual, cultural and educational needs considered and pt agreeable to plan of care and goals.     Anticipated barriers to physical therapy: none    Goals:   Short Term Goals:  3 weeks (progressing)  1.Report decreased    L shoulder    pain  <   / =  3  /10  to increase tolerance for ADLs MET  2. Increased L shoulder PROM flexion to 150 deg for required ROM for ADLs  3. Increased L shoulder AROM flexion to 90 deg without shoulder hike for increased ease with  dressing. MET  4.  Increased L shoulder PROM abduction to 150 deg for increased ease with ADLs.  5. Increased L shoulder PROM ER at 90 deg abd to 70 deg for increased ROM for ADLs  5. Pt to tolerate HEP to improve ROM and independence with ADL's MET  6. Pt will report ability to sleep through the night without L shoulder pain. MET    Long Term Goals: 10 weeks (progressing)  1.Report decreased    L shoulder    pain  <   / =  1  /10  to increase tolerance for ADLs  2. Increased L shoulder AROM flexion to 140 deg for increased ease reaching overhead.   3. Increased L shoulder AROM abduction to 140 deg for increased ease with upper body dressing.  4.  Increased L shoulder IR to T10 for increased ease donning bras.  5. Pt to tolerate HEP to improve ROM and independence with ADL's  6. Pt will demonstrate increased L shoulder abduction strength to 4+/5 to 5/5 for increased ease with ADLs    Plan     Continue PT towards established goals.  Follow [facility] protocol for proximal humerus fx (non-operative).       Kacie Ferrell, PT

## 2020-08-31 NOTE — PROGRESS NOTES
Health Maintenance Due   Topic Date Due    Shingles Vaccine (2 of 3) 2014    DEXA SCAN  2019    Mammogram  2020       Chart review completed 2020.  Care Everywhere updates requested and reviewed.  Immunizations reconciled. Media reports reviewed.  Duplicate HM overrides and  orders removed.  Overdue HM topic chart audit and/or requested.  Overdue lab testing linked to upcoming lab appointments if applies.

## 2020-09-02 NOTE — PROGRESS NOTES
Physical Therapy Treatment Note     Name: Yolie Brambila  Clinic Number: [id number]    Therapy Diagnosis:   Encounter Diagnoses   Name Primary?    Pain     Decreased range of motion     Muscle weakness      Physician: Ant Deng,*    Visit Date: 9/3/2020    Physician Orders: PT Eval and Treat,  Follow a protocol for proximal humerus fracture treated non operatively  Medical Diagnosis from Referral: Closed 3-part fracture of proximal humerus, left, initial encounter  Evaluation Date: 7/9/2020  Authorization Period Expiration: 7/10/2021  Plan of Care Expiration: 9/18/2020  Visit # / Visits authorized: 12/ 20    Time In: 8:45  Time Out: 9:40  Total Billable Time: 45 minutes    Precautions: anxiety and depression, L proximal humerus fx on 6/9/2020    Subjective     Pt reports: pt states her L arm is feeling stronger and she is not having any pain.  She was somewhat compliant with home exercise program.  Response to previous treatment: did well  Functional change: pt able to shave under L arm this am without pain    Pain: 0/10   Location: left shoulder and upper arm    Objective     Fx occurred on 8/31/2020 - 9/4/2020: 12  weeks (Phase III of protocol)     Will reassess next visit    Yolie received therapeutic exercises to develop strength, ROM and flexibility for 45 minutes including:  PROM of L shoulder in flexion, abduction and ER (pain free ROM)  AA sh ER at 90 deg sh abd with 1# dowel in supine to end range 10x10 sec  AA sh flex supine with dowel 10x10 sec  AROM sh flex in supine 2x10, 1#  Supine SA punch 2x10 3#  Dynamic shoulder stabilization x2 to fatigue  Supine PNF D1 and D2 YTB resisting both directions  SL scap depression 10x5 sec  SL sh flex 2x10, 1#  SL sh ER 2# 2 x 10  SL sh abd 2x10  Sh ext YTB 2x10  Rows YTB 2 x 10  AA sh IR slides 1# dowel x5  Towel sh IR stretch 3x20 sec  ABCs on wall with towel for scap stab     Not performed due to time:  Sh ext YTB 2x10 (mc for scap  depression)  Biceps curl 2x10 2#   Pulley flexion and abduction x 2 minutes ea (focus on eccentric control returning to neutral)  Sh flex wall slides x10    Yolie received cold pack for 8 minutes to L shoulder (seated with L UE supported on mat).    Home Exercises Provided and Patient Education Provided     Education provided:   - exercises in pain free range  - ice for decreased soreness  - only use very light weights at gym and avoid pain  Pt gave verbal understanding to all education provided    Written Home Exercises Provided: yes  Exercises were reviewed and Yolie was able to demonstrate them prior to the end of the session.  Yolie demonstrated good  understanding of the education provided.     See EMR under Patient Instructions for exercises provided 7/9/2020, 7/17/2020, 8/10/2020    Assessment   Pt continues to improved with increased L shoulder ROM and strength.  Some muscle fatigue noted with SL shoulder flexion with increased ROM.  Pt will continue to benefit from progressive periscap/shoulder strength/stabilization for improved functional mobility.     Yolie is progressing well towards her goals.   Pt prognosis is Good.     Pt will continue to benefit from skilled outpatient physical therapy to address the deficits listed in the problem list box on initial evaluation, provide pt/family education and to maximize pt's level of independence in the home and community environment.     Pt's spiritual, cultural and educational needs considered and pt agreeable to plan of care and goals.     Anticipated barriers to physical therapy: none    Goals:   Short Term Goals:  3 weeks (progressing)  1.Report decreased    L shoulder    pain  <   / =  3  /10  to increase tolerance for ADLs MET  2. Increased L shoulder PROM flexion to 150 deg for required ROM for ADLs  3. Increased L shoulder AROM flexion to 90 deg without shoulder hike for increased ease with dressing. MET  4.  Increased L shoulder PROM abduction to 150 deg  for increased ease with ADLs.  5. Increased L shoulder PROM ER at 90 deg abd to 70 deg for increased ROM for ADLs  5. Pt to tolerate HEP to improve ROM and independence with ADL's MET  6. Pt will report ability to sleep through the night without L shoulder pain. MET    Long Term Goals: 10 weeks (progressing)  1.Report decreased    L shoulder    pain  <   / =  1  /10  to increase tolerance for ADLs  2. Increased L shoulder AROM flexion to 140 deg for increased ease reaching overhead.   3. Increased L shoulder AROM abduction to 140 deg for increased ease with upper body dressing.  4.  Increased L shoulder IR to T10 for increased ease donning bras.  5. Pt to tolerate HEP to improve ROM and independence with ADL's  6. Pt will demonstrate increased L shoulder abduction strength to 4+/5 to 5/5 for increased ease with ADLs    Plan     Continue PT towards established goals.  Follow [facility] protocol for proximal humerus fx (non-operative).  Reassess next visit.      Kacie Ferrell, PT

## 2020-09-03 ENCOUNTER — CLINICAL SUPPORT (OUTPATIENT)
Dept: REHABILITATION | Facility: HOSPITAL | Age: 70
End: 2020-09-03
Payer: MEDICARE

## 2020-09-03 DIAGNOSIS — M25.60 DECREASED RANGE OF MOTION: ICD-10-CM

## 2020-09-03 DIAGNOSIS — M62.81 MUSCLE WEAKNESS: ICD-10-CM

## 2020-09-03 DIAGNOSIS — R52 PAIN: ICD-10-CM

## 2020-09-03 PROCEDURE — 97110 THERAPEUTIC EXERCISES: CPT | Mod: PN

## 2020-09-03 RX ORDER — CITALOPRAM 10 MG/1
TABLET ORAL
Qty: 90 TABLET | Refills: 3 | Status: SHIPPED | OUTPATIENT
Start: 2020-09-03 | End: 2021-10-13 | Stop reason: SDUPTHER

## 2020-09-08 ENCOUNTER — LAB VISIT (OUTPATIENT)
Dept: LAB | Facility: HOSPITAL | Age: 70
End: 2020-09-08
Attending: FAMILY MEDICINE
Payer: MEDICARE

## 2020-09-08 DIAGNOSIS — R73.01 IMPAIRED FASTING GLUCOSE: ICD-10-CM

## 2020-09-08 DIAGNOSIS — I10 ESSENTIAL HYPERTENSION: ICD-10-CM

## 2020-09-08 PROCEDURE — 80048 BASIC METABOLIC PNL TOTAL CA: CPT

## 2020-09-08 PROCEDURE — 36415 COLL VENOUS BLD VENIPUNCTURE: CPT | Mod: PN

## 2020-09-08 PROCEDURE — 83036 HEMOGLOBIN GLYCOSYLATED A1C: CPT

## 2020-09-09 LAB
ANION GAP SERPL CALC-SCNC: 12 MMOL/L (ref 8–16)
BUN SERPL-MCNC: 11 MG/DL (ref 8–23)
CALCIUM SERPL-MCNC: 9.3 MG/DL (ref 8.7–10.5)
CHLORIDE SERPL-SCNC: 102 MMOL/L (ref 95–110)
CO2 SERPL-SCNC: 23 MMOL/L (ref 23–29)
CREAT SERPL-MCNC: 0.8 MG/DL (ref 0.5–1.4)
EST. GFR  (AFRICAN AMERICAN): >60 ML/MIN/1.73 M^2
EST. GFR  (NON AFRICAN AMERICAN): >60 ML/MIN/1.73 M^2
ESTIMATED AVG GLUCOSE: 108 MG/DL (ref 68–131)
GLUCOSE SERPL-MCNC: 81 MG/DL (ref 70–110)
HBA1C MFR BLD HPLC: 5.4 % (ref 4–5.6)
POTASSIUM SERPL-SCNC: 4.2 MMOL/L (ref 3.5–5.1)
SODIUM SERPL-SCNC: 137 MMOL/L (ref 136–145)

## 2020-09-14 ENCOUNTER — OFFICE VISIT (OUTPATIENT)
Dept: FAMILY MEDICINE | Facility: CLINIC | Age: 70
End: 2020-09-14
Payer: MEDICARE

## 2020-09-14 VITALS
DIASTOLIC BLOOD PRESSURE: 60 MMHG | HEIGHT: 64 IN | TEMPERATURE: 97 F | WEIGHT: 136.13 LBS | BODY MASS INDEX: 23.24 KG/M2 | HEART RATE: 65 BPM | SYSTOLIC BLOOD PRESSURE: 138 MMHG

## 2020-09-14 DIAGNOSIS — G25.81 RESTLESS LEG: ICD-10-CM

## 2020-09-14 DIAGNOSIS — G47.33 OSA (OBSTRUCTIVE SLEEP APNEA): ICD-10-CM

## 2020-09-14 DIAGNOSIS — I10 ESSENTIAL HYPERTENSION: Primary | ICD-10-CM

## 2020-09-14 DIAGNOSIS — M54.16 LUMBAR RADICULOPATHY: ICD-10-CM

## 2020-09-14 PROCEDURE — 90662 IIV NO PRSV INCREASED AG IM: CPT | Mod: PBBFAC,PN

## 2020-09-14 PROCEDURE — 99999 PR PBB SHADOW E&M-EST. PATIENT-LVL IV: ICD-10-PCS | Mod: PBBFAC,,, | Performed by: FAMILY MEDICINE

## 2020-09-14 PROCEDURE — 99214 OFFICE O/P EST MOD 30 MIN: CPT | Mod: S$PBB,,, | Performed by: FAMILY MEDICINE

## 2020-09-14 PROCEDURE — 99214 PR OFFICE/OUTPT VISIT, EST, LEVL IV, 30-39 MIN: ICD-10-PCS | Mod: S$PBB,,, | Performed by: FAMILY MEDICINE

## 2020-09-14 PROCEDURE — 99214 OFFICE O/P EST MOD 30 MIN: CPT | Mod: PBBFAC,PN,25 | Performed by: FAMILY MEDICINE

## 2020-09-14 PROCEDURE — 99999 PR PBB SHADOW E&M-EST. PATIENT-LVL IV: CPT | Mod: PBBFAC,,, | Performed by: FAMILY MEDICINE

## 2020-09-14 NOTE — PROGRESS NOTES
"Subjective:       Patient ID: Yolie Brambila is a 69 y.o. female.    Chief Complaint: Hypertension (2 month follow up BP) and Results (recent lab result review )    Follow-up hypertension.  Losartan 50 mg was added.  Her blood pressure range is between 102 and 137.  Her restless leg syndrome is controlled with ropinirole 0.5 mg at night.  She takes Lyrica and amitriptyline to help with painful peripheral neuropathy.  She has a past history of obstructive sleep apnea but she says that was a borderline diagnosis.  She was unable to tolerate a CPAP and had a follow-up test which did not show continued CARYL.  Her anxiety is well controlled on citalopram.  I reviewed her recent lab work.  Her previous elevated glucose has normalized and her hemoglobin A1c was normal.    Hypertension  Pertinent negatives include no chest pain or shortness of breath.     Review of Systems   Constitutional: Negative for fatigue, fever and unexpected weight change.   Respiratory: Negative for cough and shortness of breath.    Cardiovascular: Negative for chest pain and leg swelling.   Musculoskeletal:        She has recovered from her fractured humerus and continues with physical therapy with good results.         Objective:     Blood pressure 138/60, pulse 65, temperature 97 °F (36.1 °C), temperature source Temporal, height 5' 4" (1.626 m), weight 61.7 kg (136 lb 2.1 oz).      Physical Exam  Constitutional:       General: She is not in acute distress.     Appearance: She is normal weight. She is not ill-appearing.   Neck:      Vascular: No carotid bruit.   Cardiovascular:      Rate and Rhythm: Normal rate and regular rhythm.      Pulses: Normal pulses.      Heart sounds: No murmur.   Pulmonary:      Effort: No respiratory distress.      Breath sounds: No wheezing or rales.   Musculoskeletal:      Right lower leg: No edema.      Left lower leg: No edema.   Lymphadenopathy:      Cervical: No cervical adenopathy.   Neurological:      Mental " Status: She is alert.         Assessment:       1. Essential hypertension    2. Restless leg    3. Lumbar radiculopathy    4. CARYL (obstructive sleep apnea)        Plan:       Continue present medication.  Flu shot today.

## 2020-09-22 ENCOUNTER — CLINICAL SUPPORT (OUTPATIENT)
Dept: REHABILITATION | Facility: HOSPITAL | Age: 70
End: 2020-09-22
Payer: MEDICARE

## 2020-09-22 DIAGNOSIS — M62.81 MUSCLE WEAKNESS: ICD-10-CM

## 2020-09-22 DIAGNOSIS — R52 PAIN: ICD-10-CM

## 2020-09-22 DIAGNOSIS — M25.60 DECREASED RANGE OF MOTION: ICD-10-CM

## 2020-09-22 PROCEDURE — 97110 THERAPEUTIC EXERCISES: CPT | Mod: PN

## 2020-09-22 NOTE — PLAN OF CARE
Updated Plan of Care and Physical Therapy Treatment Note     Name: Yolie Brambila  [facility] Number: [id number]    Therapy Diagnosis:   Encounter Diagnoses   Name Primary?    Pain     Decreased range of motion     Muscle weakness      Physician: Ant Deng,*    Visit Date: 9/22/2020    Physician Orders: PT Eval and Treat,  Follow a protocol for proximal humerus fracture treated non operatively  Medical Diagnosis from Referral: Closed 3-part fracture of proximal humerus, left, initial encounter  Evaluation Date: 7/9/2020  Authorization Period Expiration: 7/10/2021  Plan of Care Expiration: 10/23/2020 (reassessed on 9/22/2020)  Visit # / Visits authorized: 13/ 20    Time In: 9:15  Time Out: 10:10  Total Billable Time: 45 minutes    Precautions: anxiety and depression, L proximal humerus fx on 6/9/2020    Subjective     Pt reports: pt states her L arm is feeling stronger and she is not having any pain.  She states her L shoulder is still somewhat limited up overhead and behind back.  She states she has not been good about performing HEP consistently lately.  She was somewhat compliant with home exercise program.  Response to previous treatment: did well  Functional change: pt able to shave under L arm this am without pain    Pain: 0/10   Location: left shoulder and upper arm    Objective     Fx occurred on 6/9/2020 9/21/2020 - 9/25/2020: 15  weeks (Phase III of protocol)     Passive Range of Motion: (deg)  Shoulder Left   Flexion 140   ER at 0 45    abduction        90    Active Range of Motion: (deg)  Shoulder Left Right   Flexion 115 150   Abduction 119 164   ER at 0 50 70   ER at 90 60 70   IR T12 T7       Yolie received therapeutic exercises to develop strength, ROM and flexibility for 45 minutes including:  PROM of L shoulder in flexion, abduction and ER (pain free ROM)  AROM sh flex in supine x10, 1#  Supine SA punch 2x10 3#  SL scap depression 10x5 sec  SL sh flex 2x10, 2#  SL sh ER 2# 2 x 10  SL sh  abd 2x10  Sh ext 3# on cable pulleys 2x10  Rows  3# on cable pulleys 2 x 10  Sh flex wall slides 10x5 sec  AA sh IR slides 1# dowel x5  Towel sh IR stretch 3x20 sec    Not performed due to time:  ABCs on wall with towel for scap stab     Yolie received cold pack for 8 minutes to L shoulder (seated with L UE supported on mat).    Home Exercises Provided and Patient Education Provided     Education provided:   - exercises in pain free range  - ice for decreased soreness  - only use very light weights at gym and avoid pain  Pt gave verbal understanding to all education provided    Written Home Exercises Provided: yes  Exercises were reviewed and Yolie was able to demonstrate them prior to the end of the session.  Yolie demonstrated good  understanding of the education provided.     See EMR under Patient Instructions for exercises provided 7/9/2020, 7/17/2020, 8/10/2020    Assessment   Pt reassessed today.  She has not been to the clinic in over 2 weeks.  She has improved with increased L shoulder ROM, but still has limitations and end ROM for flexion, abduction and IR.  Shoulder abduction remains most limited.  She will continue to benefit from shoulder and periscap strengthening to prevent shoulder hiking with reaching above 90 deg.    Yolie is progressing well towards her goals.   Pt prognosis is Good.     Pt will continue to benefit from skilled outpatient physical therapy to address the deficits listed in the problem list box on initial evaluation, provide pt/family education and to maximize pt's level of independence in the home and community environment.     Pt's spiritual, cultural and educational needs considered and pt agreeable to plan of care and goals.     Anticipated barriers to physical therapy: none    Goals:   Short Term Goals:  3 weeks (progressing)  1.Report decreased    L shoulder    pain  <   / =  3  /10  to increase tolerance for ADLs MET  2. Increased L shoulder PROM flexion to 150 deg for  required ROM for ADLs  3. Increased L shoulder AROM flexion to 90 deg without shoulder hike for increased ease with dressing. MET  4.  Increased L shoulder PROM abduction to 150 deg for increased ease with ADLs.  5. Increased L shoulder PROM ER at 90 deg abd to 70 deg for increased ROM for ADLs  5. Pt to tolerate HEP to improve ROM and independence with ADL's MET  6. Pt will report ability to sleep through the night without L shoulder pain. MET    Long Term Goals: 10 weeks (progressing)  1.Report decreased    L shoulder    pain  <   / =  1 /10  to increase tolerance for ADLs (met)  2. Increased L shoulder AROM flexion to 140 deg for increased ease reaching overhead. (met for PROM, but not AROM)  3. Increased L shoulder AROM abduction to 140 deg for increased ease with upper body dressing. (progressing, not met)  4.  Increased L shoulder IR to T10 for increased ease donning bras. (progressing, not met)  5. Pt to tolerate HEP to improve ROM and independence with ADL's (met)  6. Pt will demonstrate increased L shoulder abduction strength to 4+/5 to 5/5 for increased ease with ADLs    Unmet goals remain appropriate within 4 weeks.    Plan     Plan of care Certification: 9/22/2020 to 10/23/2020.    Outpatient Physical Therapy 1 times weekly for 4 weeks to include the following interventions: Electrical Stimulation  , Manual Therapy, Moist Heat/ Ice, Patient Education, Self Care, Therapeutic Activites and Therapeutic Exercise.         Kacie Ferrell, PT

## 2020-09-29 ENCOUNTER — PATIENT MESSAGE (OUTPATIENT)
Dept: OTHER | Facility: OTHER | Age: 70
End: 2020-09-29

## 2020-10-01 ENCOUNTER — CLINICAL SUPPORT (OUTPATIENT)
Dept: REHABILITATION | Facility: HOSPITAL | Age: 70
End: 2020-10-01
Payer: MEDICARE

## 2020-10-01 DIAGNOSIS — R52 PAIN: ICD-10-CM

## 2020-10-01 DIAGNOSIS — M62.81 MUSCLE WEAKNESS: ICD-10-CM

## 2020-10-01 DIAGNOSIS — M25.60 DECREASED RANGE OF MOTION: ICD-10-CM

## 2020-10-01 PROCEDURE — 97110 THERAPEUTIC EXERCISES: CPT | Mod: PN

## 2020-10-01 NOTE — PROGRESS NOTES
Physical Therapy Treatment Note      Name: Yolie Brambila  Clinic Number: [id number]     Therapy Diagnosis:        Encounter Diagnoses   Name Primary?    Pain      Decreased range of motion      Muscle weakness        Physician: Ant Deng,*     Visit Date: 9/22/2020     Physician Orders: PT Eval and Treat,  Follow a protocol for proximal humerus fracture treated non operatively  Medical Diagnosis from Referral: Closed 3-part fracture of proximal humerus, left, initial encounter  Evaluation Date: 7/9/2020  Authorization Period Expiration: 7/10/2021  Plan of Care Expiration: 10/23/2020 (reassessed on 9/22/2020)  Visit # / Visits authorized: 14/ 20     Time In: 8:45  Time Out: 9:40  Total Billable Time: 45 minutes     Precautions: anxiety and depression, L proximal humerus fx on 6/9/2020     Subjective      Pt reports: pt states the only time she has some soreness to L shoulder is in the morning if she lays on L side, but it eases as she gets moving.  She was somewhat compliant with home exercise program.  Response to previous treatment: did well  Functional change: pt able to shave under L arm this am without pain     Pain: 0/10   Location: left shoulder and upper arm     Objective      Fx occurred on 6/9/2020 9/21/2020 - 9/25/2020: 15  weeks (Phase III of protocol)      Yolie received therapeutic exercises to develop strength, ROM and flexibility for 40 minutes including:  PROM of L shoulder in flexion, abduction and ER (pain free ROM)  AROM sh flex in supine x10, 1#  Supine SA punch 3x10 3#  SL sh flex 3x10, 2#  SL sh ER 2# 3 x 10  SL sh abd 2x10, 1#  Sh ext YTB 2x10  NP Rows  3# on cable pulleys 2 x 10  Prone scap depression with sh ext 2x10  NP Sh flex wall slides 10x5 sec  Standing sh flex to 90 deg x10  AA sh IR slides 1# dowel x5  Towel sh IR stretch 3x20 sec    Pt rec'd manual therapy x5 min to include:  GH jt mobs in all planes (grd II)     Not performed due to time:  ABCs on wall with towel  for scap stab      Yolie received cold pack for 10 minutes to L shoulder (seated with L UE supported on mat).     Home Exercises Provided and Patient Education Provided      Education provided:   - exercises in pain free range  - ice for decreased soreness  - only use very light weights at gym and avoid pain  Pt gave verbal understanding to all education provided     Written Home Exercises Provided: yes  Exercises were reviewed and Yolie was able to demonstrate them prior to the end of the session.  Yolie demonstrated good  understanding of the education provided.      See EMR under Patient Instructions for exercises provided 7/9/2020, 7/17/2020, 8/10/2020     Assessment   Pt demonstrating improved L shoulder ROM.  She is able to perform standing AROM L shoulder flexion without shoulder hike.  She had some difficulty with scap depression with standing sh ext, but able to demonstrate good form in prone.     Yolie is progressing well towards her goals.   Pt prognosis is Good.      Pt will continue to benefit from skilled outpatient physical therapy to address the deficits listed in the problem list box on initial evaluation, provide pt/family education and to maximize pt's level of independence in the home and community environment.      Pt's spiritual, cultural and educational needs considered and pt agreeable to plan of care and goals.     Anticipated barriers to physical therapy: none     Goals:   Short Term Goals:  3 weeks (progressing)  1.Report decreased    L shoulder    pain  <   / =  3  /10  to increase tolerance for ADLs MET  2. Increased L shoulder PROM flexion to 150 deg for required ROM for ADLs  3. Increased L shoulder AROM flexion to 90 deg without shoulder hike for increased ease with dressing. MET  4.  Increased L shoulder PROM abduction to 150 deg for increased ease with ADLs.  5. Increased L shoulder PROM ER at 90 deg abd to 70 deg for increased ROM for ADLs  5. Pt to tolerate HEP to improve ROM and  independence with ADL's MET  6. Pt will report ability to sleep through the night without L shoulder pain. MET     Long Term Goals: 10 weeks (progressing)  1.Report decreased    L shoulder    pain  <   / =  1  /10  to increase tolerance for ADLs (met)  2. Increased L shoulder AROM flexion to 140 deg for increased ease reaching overhead. (met for PROM, but not AROM)  3. Increased L shoulder AROM abduction to 140 deg for increased ease with upper body dressing. (progressing, not met)  4.  Increased L shoulder IR to T10 for increased ease donning bras. (progressing, not met)  5. Pt to tolerate HEP to improve ROM and independence with ADL's (met)  6. Pt will demonstrate increased L shoulder abduction strength to 4+/5 to 5/5 for increased ease with ADLs        Plan    Continue PT towards established goals.  Focus on lower trap strengthening for improved shoulder mechanics.

## 2020-10-05 ENCOUNTER — PATIENT MESSAGE (OUTPATIENT)
Dept: ADMINISTRATIVE | Facility: HOSPITAL | Age: 70
End: 2020-10-05

## 2020-10-07 ENCOUNTER — CLINICAL SUPPORT (OUTPATIENT)
Dept: REHABILITATION | Facility: HOSPITAL | Age: 70
End: 2020-10-07
Payer: MEDICARE

## 2020-10-07 DIAGNOSIS — M62.81 MUSCLE WEAKNESS: ICD-10-CM

## 2020-10-07 DIAGNOSIS — R52 PAIN: ICD-10-CM

## 2020-10-07 DIAGNOSIS — M25.60 DECREASED RANGE OF MOTION: ICD-10-CM

## 2020-10-07 PROCEDURE — 97110 THERAPEUTIC EXERCISES: CPT | Mod: PN

## 2020-10-07 NOTE — PROGRESS NOTES
Physical Therapy Treatment Note      Name: Yolie Brambila  Clinic Number: [id number]     Therapy Diagnosis:        Encounter Diagnoses   Name Primary?    Pain      Decreased range of motion      Muscle weakness        Physician: Ant Deng,*     Visit Date: 9/22/2020     Physician Orders: PT Eval and Treat,  Follow a protocol for proximal humerus fracture treated non operatively  Medical Diagnosis from Referral: Closed 3-part fracture of proximal humerus, left, initial encounter  Evaluation Date: 7/9/2020  Authorization Period Expiration: 7/10/2021  Plan of Care Expiration: 10/23/2020 (reassessed on 9/22/2020)  Visit # / Visits authorized: 15/ 20     Time In: 10:45  Time Out: 11:40  Total Billable Time: 45 minutes     Precautions: anxiety and depression, L proximal humerus fx on 6/9/2020     Subjective      Pt reports: pt states the only time she has some soreness to L shoulder is in the morning if she lays on L side, but it eases as she gets moving.  She was somewhat compliant with home exercise program.  Response to previous treatment: did well  Functional change: pt able to shave under L arm this am without pain     Pain: 0/10   Location: left shoulder and upper arm     Objective      Fx occurred on 6/9/2020 9/21/2020 - 9/25/2020: 15  weeks (Phase III of protocol)      Yolie received therapeutic exercises to develop strength, ROM and flexibility for 45 minutes including:  PROM of L shoulder in flexion, abduction and ER (pain free ROM)  AROM sh flex in supine x10, 1#  Supine SA punch 3x10 3#  SL sh flex 3x10, 2#  SL sh ER 2# 3 x 10  SL sh abd 3x10, 1#  Sh ext YTB 2x10  Prone scap depression with sh ext 2x10  Sh ext YTB 3x10 (mc to avoid sh hike)  Sh flex wall slides 10x5 sec  Standing sh flex to 90 deg 2x10, 1#  NP AA sh IR slides 1# dowel x5  NP Towel sh IR stretch 3x20 sec    Pt rec'd manual therapy x5 min to include:  GH jt mobs in all planes (grd II)     Not performed due to time:  ABCs on wall  with towel for scap stab      Yolie received cold pack for 10 minutes to L shoulder (seated with L UE supported on mat).     Home Exercises Provided and Patient Education Provided      Education provided:   - exercises in pain free range  - ice for decreased soreness  - only use very light weights at gym and avoid pain  Pt gave verbal understanding to all education provided     Written Home Exercises Provided: yes  Exercises were reviewed and Yolie was able to demonstrate them prior to the end of the session.  Yolie demonstrated good  understanding of the education provided.      See EMR under Patient Instructions for exercises provided 7/9/2020, 7/17/2020, 8/10/2020     Assessment   Pt continues to improved with increased L shoulder ROM.  She demonstrated good scap depression in prone, but has difficulty performing in standing without levator scap compensation.  She will continue to benefit from PT for improved shoulder ROM and scap stabilization.  Yolie is progressing well towards her goals.  Pt prognosis is Good.      Pt will continue to benefit from skilled outpatient physical therapy to address the deficits listed in the problem list box on initial evaluation, provide pt/family education and to maximize pt's level of independence in the home and community environment.      Pt's spiritual, cultural and educational needs considered and pt agreeable to plan of care and goals.     Anticipated barriers to physical therapy: none     Goals:   Short Term Goals:  3 weeks (progressing)  1.Report decreased    L shoulder    pain  <   / =  3  /10  to increase tolerance for ADLs MET  2. Increased L shoulder PROM flexion to 150 deg for required ROM for ADLs  3. Increased L shoulder AROM flexion to 90 deg without shoulder hike for increased ease with dressing. MET  4.  Increased L shoulder PROM abduction to 150 deg for increased ease with ADLs.  5. Increased L shoulder PROM ER at 90 deg abd to 70 deg for increased ROM for  ADLs  5. Pt to tolerate HEP to improve ROM and independence with ADL's MET  6. Pt will report ability to sleep through the night without L shoulder pain. MET     Long Term Goals: 10 weeks (progressing)  1.Report decreased    L shoulder    pain  <   / =  1  /10  to increase tolerance for ADLs (met)  2. Increased L shoulder AROM flexion to 140 deg for increased ease reaching overhead. (met for PROM, but not AROM)  3. Increased L shoulder AROM abduction to 140 deg for increased ease with upper body dressing. (progressing, not met)  4.  Increased L shoulder IR to T10 for increased ease donning bras. (progressing, not met)  5. Pt to tolerate HEP to improve ROM and independence with ADL's (met)  6. Pt will demonstrate increased L shoulder abduction strength to 4+/5 to 5/5 for increased ease with ADLs        Plan    Continue PT towards established goals.  Focus on lower trap strengthening for improved shoulder mechanics.

## 2020-10-15 ENCOUNTER — CLINICAL SUPPORT (OUTPATIENT)
Dept: REHABILITATION | Facility: HOSPITAL | Age: 70
End: 2020-10-15
Payer: MEDICARE

## 2020-10-15 DIAGNOSIS — M25.60 DECREASED RANGE OF MOTION: ICD-10-CM

## 2020-10-15 DIAGNOSIS — M62.81 MUSCLE WEAKNESS: ICD-10-CM

## 2020-10-15 DIAGNOSIS — R52 PAIN: ICD-10-CM

## 2020-10-15 PROCEDURE — 97110 THERAPEUTIC EXERCISES: CPT | Mod: PN

## 2020-10-15 NOTE — PROGRESS NOTES
Physical Therapy Treatment Note      Name: Yolie Brambila  Clinic Number: [id number]     Therapy Diagnosis:        Encounter Diagnoses   Name Primary?    Pain      Decreased range of motion      Muscle weakness        Physician: Ant Deng,*     Visit Date: 9/22/2020     Physician Orders: PT Eval and Treat,  Follow a protocol for proximal humerus fracture treated non operatively  Medical Diagnosis from Referral: Closed 3-part fracture of proximal humerus, left, initial encounter  Evaluation Date: 7/9/2020  Authorization Period Expiration: 7/10/2021  Plan of Care Expiration: 10/23/2020 (reassessed on 9/22/2020)  Visit # / Visits authorized: 16/ 20     Time In: 8:45  Time Out: 9:40  Total Billable Time: 45 minutes     Precautions: anxiety and depression, L proximal humerus fx on 6/9/2020     Subjective      Pt reports: pt states she continues to have difficulty hooking bras in the back.  She states she has not had pain since last visit.  She was somewhat compliant with home exercise program.  Response to previous treatment: did well  Functional change: pt able to dry her back with increased ease     Pain: 0/10   Location: left shoulder and upper arm     Objective      Fx occurred on 6/9/2020     Yolie received therapeutic exercises to develop strength, ROM and flexibility for 45 minutes including:  PROM of L shoulder in flexion, abduction and ER (pain free ROM)  AROM sh flex in supine x10, 2#  Supine SA punch 3x10 3#  SL sh flex 2x15, 2#  SL sh ER 3# 2 x 10  SL sh abd 2x10, 2#  Prone scap depression with sh ext 2x10  Standing scap depression 2x10, 5 sec hold  Rows YTB 2x10  Sh flex wall slides 10x5 sec  Standing sh flex to 90 deg 2x10, 1#  AA sh ext 2# dowel x10  Towel sh IR stretch 3x20 sec  Sleeper stretch 3x20 sec     Yolie received cold pack for 8 minutes to L shoulder (seated with L UE supported on mat).     Home Exercises Provided and Patient Education Provided      Education provided:   -  exercises in pain free range  - ice for decreased soreness  - only use very light weights at gym and avoid pain  Pt gave verbal understanding to all education provided     Written Home Exercises Provided: yes  Exercises were reviewed and Yolie was able to demonstrate them prior to the end of the session.  Yolie demonstrated good  understanding of the education provided.      See EMR under Patient Instructions for exercises provided 7/9/2020, 7/17/2020, 8/10/2020, 10/15/20     Assessment   Pt still has some limitation with L shoulder IR, but was able to tolerate sleeper stretch today.  She continues to compensate with L UT and levator scap for shoulder extension so performed just standing and prone scap depressions today.  Yolie is progressing well towards her goals.  Pt prognosis is Good.      Pt will continue to benefit from skilled outpatient physical therapy to address the deficits listed in the problem list box on initial evaluation, provide pt/family education and to maximize pt's level of independence in the home and community environment.      Pt's spiritual, cultural and educational needs considered and pt agreeable to plan of care and goals.     Anticipated barriers to physical therapy: none     Goals:   Short Term Goals:  3 weeks (progressing)  1.Report decreased    L shoulder    pain  <   / =  3  /10  to increase tolerance for ADLs MET  2. Increased L shoulder PROM flexion to 150 deg for required ROM for ADLs  3. Increased L shoulder AROM flexion to 90 deg without shoulder hike for increased ease with dressing. MET  4.  Increased L shoulder PROM abduction to 150 deg for increased ease with ADLs.  5. Increased L shoulder PROM ER at 90 deg abd to 70 deg for increased ROM for ADLs  5. Pt to tolerate HEP to improve ROM and independence with ADL's MET  6. Pt will report ability to sleep through the night without L shoulder pain. MET     Long Term Goals: 10 weeks (progressing)  1.Report decreased    L shoulder     pain  <   / =  1  /10  to increase tolerance for ADLs (met)  2. Increased L shoulder AROM flexion to 140 deg for increased ease reaching overhead. (met for PROM, but not AROM)  3. Increased L shoulder AROM abduction to 140 deg for increased ease with upper body dressing. (progressing, not met)  4.  Increased L shoulder IR to T10 for increased ease donning bras. (progressing, not met)  5. Pt to tolerate HEP to improve ROM and independence with ADL's (met)  6. Pt will demonstrate increased L shoulder abduction strength to 4+/5 to 5/5 for increased ease with ADLs        Plan   Will reassess next visit.

## 2020-10-22 ENCOUNTER — CLINICAL SUPPORT (OUTPATIENT)
Dept: REHABILITATION | Facility: HOSPITAL | Age: 70
End: 2020-10-22
Payer: MEDICARE

## 2020-10-22 DIAGNOSIS — R52 PAIN: ICD-10-CM

## 2020-10-22 DIAGNOSIS — M62.81 MUSCLE WEAKNESS: ICD-10-CM

## 2020-10-22 DIAGNOSIS — M25.60 DECREASED RANGE OF MOTION: ICD-10-CM

## 2020-10-22 PROCEDURE — 97110 THERAPEUTIC EXERCISES: CPT | Mod: PN

## 2020-10-22 NOTE — PLAN OF CARE
Physical Therapy Discharge Summary and Treatment Note      Name: Yolie Brambila  Clinic Number: [id number]     Therapy Diagnosis:        Encounter Diagnoses   Name Primary?    Pain      Decreased range of motion      Muscle weakness        Physician: Ant Deng,*     Visit Date: 9/22/2020     Physician Orders: PT Eval and Treat,  Follow a protocol for proximal humerus fracture treated non operatively  Medical Diagnosis from Referral: Closed 3-part fracture of proximal humerus, left, initial encounter  Evaluation Date: 7/9/2020  Authorization Period Expiration: 7/10/2021  Plan of Care Expiration: 10/23/2020 (reassessed on 9/22/2020)  Visit # / Visits authorized: 17/ 20     Time In: 8:45  Time Out: 9:40  Total Billable Time: 45 minutes    Cancels: 4  No shows: 0     Precautions: anxiety and depression, L proximal humerus fx on 6/9/2020     Subjective      Pt reports: pt states she is able to perform all ADLs, quilting, and gardening without pain to L shoulder.  Response to previous treatment: did well  Functional change: pt able to dry her back with increased ease     Pain: 0/10   Location: left shoulder and upper arm     Objective      Fx occurred on 6/9/2020    Passive Range of Motion: (deg)  Shoulder Left   Flexion 140   ER at 0 45    scaption        130      Active Range of Motion: (deg)  Shoulder Left Right   Flexion 115 150   Abduction 119 164   ER at 0 50 70   ER at 90 60 70   IR T10 T7       Right Left   Shoulder flexion: 5/5 5/5   Shoulder Abduction: 5/5 4+/5   Shoulder ER: 5/5 5/5   Shoulder IR: 5/5 5/5   Elbow Flexion: 5/5 5/5   Elbow Extension: 5/5 5/5         Yolie received therapeutic exercises to develop strength, ROM and flexibility for 45 minutes including:  PROM of L shoulder in flexion, abduction and ER (pain free ROM)              Sleeper stretch 3x20 sec  Supine SA punch 3x10 3#  SL sh flex 2x15, 2#  SL sh ER 2# 2 x 10  Standing scap depression 2x10, 5 sec hold  Sh flex wall slides  10x5 sec  Standing sh flex to 90 deg 2x10, 1#  AA sh abd with pulleys x2 min     Yolie received cold pack for 8 minutes to L shoulder (seated with L UE supported on mat).     Home Exercises Provided and Patient Education Provided      Education provided:   - exercises in pain free range  - ice for decreased soreness  - only use very light weights at gym and avoid pain  Pt gave verbal understanding to all education provided     Written Home Exercises Provided: yes  Exercises were reviewed and Yolie was able to demonstrate them prior to the end of the session.  Yolie demonstrated good  understanding of the education provided.      See EMR under Patient Instructions for exercises provided 7/9/2020, 7/17/2020, 8/10/2020, 10/15/20    CMS Impairment/Limitation/Restriction for FOTO Upper Arm Survey  Status Limitation G-Code CMS Severity Modifier  Intake 48% 52%  Predicted 66% 34% Goal Status+ CJ - At least 20 percent but less than 40 percent  10/1/2020 79% 21%  10/22/2020 79% 21% Current Status CJ - At least 20 percent but less than 40 percent     Assessment   Pt has met majority of her goals.  She has some decreased L shoulder ROM after humeral fx, but has full functional use of L UE for all ADLs.  She is safe for discharge to continue to self manage with HEP.     Pt's spiritual, cultural and educational needs considered and pt agreeable to plan of care and goals.     Anticipated barriers to physical therapy: none     Goals:   Short Term Goals:  3 weeks (progressing)  1.Report decreased    L shoulder    pain  <   / =  3  /10  to increase tolerance for ADLs MET  2. Increased L shoulder PROM flexion to 150 deg for required ROM for ADLs  3. Increased L shoulder AROM flexion to 90 deg without shoulder hike for increased ease with dressing. MET  4.  Increased L shoulder PROM abduction to 150 deg for increased ease with ADLs.  5. Increased L shoulder PROM ER at 90 deg abd to 70 deg for increased ROM for ADLs  5. Pt to tolerate  HEP to improve ROM and independence with ADL's MET  6. Pt will report ability to sleep through the night without L shoulder pain. MET     Long Term Goals: 10 weeks (progressing)  1.Report decreased    L shoulder    pain  <   / =  1  /10  to increase tolerance for ADLs (met)  2. Increased L shoulder AROM flexion to 140 deg for increased ease reaching overhead. (met for PROM, but not AROM)  3. Increased L shoulder AROM abduction to 140 deg for increased ease with upper body dressing. (progressing, not met)  4.  Increased L shoulder IR to T10 for increased ease donning bras. (met)  5. Pt to tolerate HEP to improve ROM and independence with ADL's (met)  6. Pt will demonstrate increased L shoulder abduction strength to 4+/5 to 5/5 for increased ease with ADLs (met)        Plan               Pt discharged from outpatient PT to continue to self manage with HEP.

## 2020-10-31 ENCOUNTER — EXTERNAL CHRONIC CARE MANAGEMENT (OUTPATIENT)
Dept: PRIMARY CARE CLINIC | Facility: CLINIC | Age: 70
End: 2020-10-31
Payer: MEDICARE

## 2020-10-31 PROCEDURE — 99490 PR CHRONIC CARE MGMT, 1ST 20 MIN: ICD-10-PCS | Mod: S$PBB,,, | Performed by: FAMILY MEDICINE

## 2020-10-31 PROCEDURE — 99490 CHRNC CARE MGMT STAFF 1ST 20: CPT | Mod: PBBFAC,PN | Performed by: FAMILY MEDICINE

## 2020-10-31 PROCEDURE — 99490 CHRNC CARE MGMT STAFF 1ST 20: CPT | Mod: S$PBB,,, | Performed by: FAMILY MEDICINE

## 2020-11-01 ENCOUNTER — PATIENT MESSAGE (OUTPATIENT)
Dept: FAMILY MEDICINE | Facility: CLINIC | Age: 70
End: 2020-11-01

## 2020-11-01 DIAGNOSIS — Z83.49 FAMILY HISTORY OF HEMOCHROMATOSIS: Primary | ICD-10-CM

## 2020-11-02 ENCOUNTER — PATIENT MESSAGE (OUTPATIENT)
Dept: FAMILY MEDICINE | Facility: CLINIC | Age: 70
End: 2020-11-02

## 2020-11-02 NOTE — TELEPHONE ENCOUNTER
I ordered lab work to see if she has excess iron storage as well as the gene abnormality associated with hemochromatosis

## 2020-11-04 ENCOUNTER — LAB VISIT (OUTPATIENT)
Dept: LAB | Facility: HOSPITAL | Age: 70
End: 2020-11-04
Attending: FAMILY MEDICINE
Payer: MEDICARE

## 2020-11-04 DIAGNOSIS — Z83.49 FAMILY HISTORY OF HEMOCHROMATOSIS: ICD-10-CM

## 2020-11-04 LAB
IRON SERPL-MCNC: 132 UG/DL (ref 30–160)
SATURATED IRON: 42 % (ref 20–50)
TOTAL IRON BINDING CAPACITY: 314 UG/DL (ref 250–450)
TRANSFERRIN SERPL-MCNC: 212 MG/DL (ref 200–375)

## 2020-11-04 PROCEDURE — 83540 ASSAY OF IRON: CPT

## 2020-11-04 PROCEDURE — 36415 COLL VENOUS BLD VENIPUNCTURE: CPT | Mod: PN

## 2020-11-12 LAB
GENETICIST REVIEW: NORMAL
HFE GENE MUT ANL BLD/T: NORMAL
HFE RELEASED BY: NORMAL
HFE RESULT SUMMARY: NORMAL
REF LAB TEST METHOD: NORMAL
SPECIMEN SOURCE: NORMAL
SPECIMEN,  HEMOCHROMATOSIS: NORMAL

## 2020-11-18 ENCOUNTER — PATIENT MESSAGE (OUTPATIENT)
Dept: FAMILY MEDICINE | Facility: CLINIC | Age: 70
End: 2020-11-18

## 2020-11-20 ENCOUNTER — CLINICAL SUPPORT (OUTPATIENT)
Dept: URGENT CARE | Facility: CLINIC | Age: 70
End: 2020-11-20
Payer: MEDICARE

## 2020-11-20 VITALS — OXYGEN SATURATION: 97 % | HEART RATE: 63 BPM | TEMPERATURE: 98 F

## 2020-11-20 DIAGNOSIS — Z20.822 CLOSE EXPOSURE TO COVID-19 VIRUS: Primary | ICD-10-CM

## 2020-11-20 LAB
CTP QC/QA: YES
SARS-COV-2 RDRP RESP QL NAA+PROBE: NEGATIVE

## 2020-11-20 PROCEDURE — U0002 COVID-19 LAB TEST NON-CDC: HCPCS | Mod: QW,S$GLB,, | Performed by: PHYSICIAN ASSISTANT

## 2020-11-20 PROCEDURE — U0002: ICD-10-PCS | Mod: QW,S$GLB,, | Performed by: PHYSICIAN ASSISTANT

## 2020-11-20 NOTE — PROGRESS NOTES
"Patient presents to clinic asymptomatic for Covid exposure testing.    CDC Testing and Quarantine Guidelines for patients with exposure to a known-positive COVID-19 person:    A "close exposure" is defined as anyone who has had an exposure (masked or unmasked) to a known COVID -19 positive person within 6 ft for longer than 15 minutes. If your exposure meets this definition you are required by CDC guidelines to quarantine for 14 days from time of exposure. CDC now states that a test can be performed for an asymptomatic patient (someone who does not have any symptoms) after a close exposure, and that a test should be done if you develop symptoms after a close exposure as described above.     If you meet the definition of a close exposure, it will not matter whether you are experiencing symptoms- quarantine for 14 days after close exposure is required by CDC guidelines regardless of test status- a negative test does not shorten the quarantine period.     If your exposure does not meet the above definition, you can return to your normal daily activities to include social distancing, wearing a mask and frequent handwashing.    NEGATIVE COVID TEST    You have tested negative for COVID-19 today.  If you did not have a close exposure (as defined below) you can return to your normal daily activities to include social distancing, wearing a mask and frequent handwashing.     A "close exposure" is defined as anyone who has had an exposure (masked or unmasked) to a known COVID -19 positive person within 6 ft for longer than 15 minutes. If your exposure meets this definition you are required by CDC guidelines to quarantine for 14 days from time of exposure. CDC now states that a test can be performed for an asymptomatic patient (someone who does not have any symptoms) after a close exposure, and that a test should be done if you develop symptoms after a close exposure as described above.    If you developed symptoms since the " exposure, and your test was negative today, you still have to quarantine for 14 days from the date of the exposure.    The 14-day quarantine begins from the day you were exposed, not the day of your test.  For example, if your exposure was on a Monday, and you waited until Friday of the same week to get tested and it was negative, your 14-day quarantine begins from that Monday, not the Friday you tested negative.    So, if you meet the definition of a close exposure, it will not matter whether you are experiencing symptoms-quarantine for 14 days after close exposure is required by CDC guidelines regardless of test status- a negative test does not shorten the quarantine period.

## 2020-11-30 ENCOUNTER — EXTERNAL CHRONIC CARE MANAGEMENT (OUTPATIENT)
Dept: PRIMARY CARE CLINIC | Facility: CLINIC | Age: 70
End: 2020-11-30
Payer: MEDICARE

## 2020-11-30 PROCEDURE — 99490 PR CHRONIC CARE MGMT, 1ST 20 MIN: ICD-10-PCS | Mod: S$PBB,,, | Performed by: FAMILY MEDICINE

## 2020-11-30 PROCEDURE — 99490 CHRNC CARE MGMT STAFF 1ST 20: CPT | Mod: PBBFAC,PN | Performed by: FAMILY MEDICINE

## 2020-11-30 PROCEDURE — 99490 CHRNC CARE MGMT STAFF 1ST 20: CPT | Mod: S$PBB,,, | Performed by: FAMILY MEDICINE

## 2020-12-07 ENCOUNTER — OFFICE VISIT (OUTPATIENT)
Dept: FAMILY MEDICINE | Facility: CLINIC | Age: 70
End: 2020-12-07
Payer: MEDICARE

## 2020-12-07 DIAGNOSIS — G25.81 RESTLESS LEG: ICD-10-CM

## 2020-12-07 DIAGNOSIS — Z83.49 FAMILY HISTORY OF HEMOCHROMATOSIS: Primary | ICD-10-CM

## 2020-12-07 DIAGNOSIS — I10 ESSENTIAL HYPERTENSION: ICD-10-CM

## 2020-12-07 DIAGNOSIS — G60.9 IDIOPATHIC PERIPHERAL NEUROPATHY: ICD-10-CM

## 2020-12-07 PROCEDURE — 99213 PR OFFICE/OUTPT VISIT, EST, LEVL III, 20-29 MIN: ICD-10-PCS | Mod: 95,,, | Performed by: FAMILY MEDICINE

## 2020-12-07 PROCEDURE — 99213 OFFICE O/P EST LOW 20 MIN: CPT | Mod: 95,,, | Performed by: FAMILY MEDICINE

## 2020-12-07 NOTE — PROGRESS NOTES
Subjective:       Patient ID: Yolie Brambila is a 70 y.o. female.    Chief Complaint:  Test results  She wanted to talk about her test results.  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  Her 2 sons have the disease and a nephew recently tested positive.  Her her brother's test is still pending.  Her iron saturation was 42% and her CBC was normal and there were no liver enzyme elevations.  She wants to know how all many other family member should be tested.  We decided to ask her son's hematologist that question.   her blood pressure has been well controlled around 128/80.  She takes Requip for restless leg syndrome and takes Lyrica for peripheral neuropathy which causes tingling and throbbing from her feet to her calves.  She also takes citalopram and amitriptyline.  No significant side effects.    Review of Systems   Constitutional: Negative for activity change and unexpected weight change.   HENT: Negative for hearing loss, rhinorrhea and trouble swallowing.    Eyes: Negative for discharge and visual disturbance.   Respiratory: Negative for chest tightness and wheezing.    Cardiovascular: Negative for chest pain and palpitations.   Gastrointestinal: Negative for blood in stool, constipation, diarrhea and vomiting.   Endocrine: Negative for polydipsia and polyuria.   Genitourinary: Negative for difficulty urinating, dysuria, hematuria and menstrual problem.   Musculoskeletal: Negative for arthralgias, joint swelling and neck pain.   Neurological: Negative for weakness and headaches.   Psychiatric/Behavioral: Negative for confusion and dysphoric mood.         Objective:     There were no vitals taken for this visit.      Physical Exam  Constitutional:       General: She is not in acute distress.  Pulmonary:      Effort: No respiratory distress.   Neurological:      Mental Status: She is alert.         Assessment:       1. Family history of hemochromatosis    2. Restless  leg    3. Essential hypertension    4. Idiopathic peripheral neuropathy        Plan:       Continue present medication.  I suggest repeating iron saturation in 6-12 months.  We may consider consulting Hematology if there are further questions.  She has a mammogram order pending.  That can wait till the new year.

## 2020-12-09 ENCOUNTER — CLINICAL SUPPORT (OUTPATIENT)
Dept: URGENT CARE | Facility: CLINIC | Age: 70
End: 2020-12-09
Payer: MEDICARE

## 2020-12-09 VITALS
OXYGEN SATURATION: 98 % | BODY MASS INDEX: 23.22 KG/M2 | TEMPERATURE: 97 F | WEIGHT: 136 LBS | HEIGHT: 64 IN | RESPIRATION RATE: 16 BRPM | HEART RATE: 71 BPM

## 2020-12-09 DIAGNOSIS — Z20.822 EXPOSURE TO COVID-19 VIRUS: Primary | ICD-10-CM

## 2020-12-09 LAB
CTP QC/QA: YES
SARS-COV-2 RDRP RESP QL NAA+PROBE: NEGATIVE

## 2020-12-09 PROCEDURE — U0002 COVID-19 LAB TEST NON-CDC: HCPCS | Mod: QW,S$GLB,, | Performed by: INTERNAL MEDICINE

## 2020-12-09 PROCEDURE — U0002: ICD-10-PCS | Mod: QW,S$GLB,, | Performed by: INTERNAL MEDICINE

## 2020-12-09 NOTE — PATIENT INSTRUCTIONS
"  NEGATIVE COVID TEST  -You have tested negative for COVID-19 today.  If you did not have a close exposure (as defined below) you can return to your normal daily activities to include social distancing, wearing a mask and frequent handwashing.  -A "close exposure" is defined as anyone who has had an exposure (masked or unmasked) to a known COVID -19 positive person within 6 ft for longer than 15 minutes. If your exposure meets this definition, you are required by CDC guidelines to quarantine for at least 7-10 days from time of exposure.  -The CDC states that a test can be performed for an asymptomatic patient (someone who does not have any symptoms) after a close exposure, and that a test should be done if you develop symptoms after a close exposure as described above.  Specifically, you can test at day 5 or later if asymptomatic in order to get released from quarantine on day 7 or later.  If you develop symptoms sooner, you should test when your symptoms start.  -If you developed symptoms since the exposure, and your test was negative today and less than 5 days from your exposure, you still have to quarantine for 7-10 days from the date of the exposure.  The 7-10 day quarantine begins from the day you were exposed, not the day of your test.  For example, if your exposure was on a Monday, and you waited until Friday of the same week to get tested and it was negative, your 7-10 day quarantine begins from that Monday, not the Friday you tested negative.  Please note, if you decide to test as an asymptomatic during your quarantine and you are positive, you will be restarting your quarantine and moving from a possible 10 day quarantine (if you do not test), to a 11 day or greater quarantine  "

## 2020-12-11 ENCOUNTER — PATIENT MESSAGE (OUTPATIENT)
Dept: OTHER | Facility: OTHER | Age: 70
End: 2020-12-11

## 2020-12-31 ENCOUNTER — EXTERNAL CHRONIC CARE MANAGEMENT (OUTPATIENT)
Dept: PRIMARY CARE CLINIC | Facility: CLINIC | Age: 70
End: 2020-12-31
Payer: MEDICARE

## 2020-12-31 ENCOUNTER — PATIENT MESSAGE (OUTPATIENT)
Dept: FAMILY MEDICINE | Facility: CLINIC | Age: 70
End: 2020-12-31

## 2020-12-31 PROCEDURE — 99490 CHRNC CARE MGMT STAFF 1ST 20: CPT | Mod: PBBFAC,PN | Performed by: FAMILY MEDICINE

## 2020-12-31 PROCEDURE — 99490 CHRNC CARE MGMT STAFF 1ST 20: CPT | Mod: S$PBB,,, | Performed by: FAMILY MEDICINE

## 2020-12-31 PROCEDURE — 99490 PR CHRONIC CARE MGMT, 1ST 20 MIN: ICD-10-PCS | Mod: S$PBB,,, | Performed by: FAMILY MEDICINE

## 2021-01-04 ENCOUNTER — PATIENT MESSAGE (OUTPATIENT)
Dept: ADMINISTRATIVE | Facility: HOSPITAL | Age: 71
End: 2021-01-04

## 2021-01-12 ENCOUNTER — HOSPITAL ENCOUNTER (OUTPATIENT)
Dept: RADIOLOGY | Facility: HOSPITAL | Age: 71
Discharge: HOME | End: 2021-01-12
Attending: FAMILY MEDICINE
Payer: MEDICARE

## 2021-01-12 DIAGNOSIS — Z12.31 ENCOUNTER FOR SCREENING MAMMOGRAM FOR BREAST CANCER: ICD-10-CM

## 2021-01-12 DIAGNOSIS — Z12.31 SCREENING MAMMOGRAM, ENCOUNTER FOR: ICD-10-CM

## 2021-01-12 PROCEDURE — 77063 MAMMO DIGITAL SCREENING BILAT WITH TOMO: ICD-10-PCS | Mod: 26,,, | Performed by: RADIOLOGY

## 2021-01-12 PROCEDURE — 77067 MAMMO DIGITAL SCREENING BILAT WITH TOMO: ICD-10-PCS | Mod: 26,,, | Performed by: RADIOLOGY

## 2021-01-12 PROCEDURE — 77063 BREAST TOMOSYNTHESIS BI: CPT | Mod: 26,,, | Performed by: RADIOLOGY

## 2021-01-12 PROCEDURE — 77067 SCR MAMMO BI INCL CAD: CPT | Mod: 26,,, | Performed by: RADIOLOGY

## 2021-01-12 PROCEDURE — 77067 SCR MAMMO BI INCL CAD: CPT | Mod: TC,PO

## 2021-01-14 ENCOUNTER — CLINICAL SUPPORT (OUTPATIENT)
Dept: URGENT CARE | Facility: CLINIC | Age: 71
End: 2021-01-14
Payer: MEDICARE

## 2021-01-14 VITALS — TEMPERATURE: 97 F | OXYGEN SATURATION: 97 % | HEART RATE: 71 BPM

## 2021-01-14 DIAGNOSIS — Z20.822 EXPOSURE TO COVID-19 VIRUS: Primary | ICD-10-CM

## 2021-01-14 LAB
CTP QC/QA: YES
SARS-COV-2 RDRP RESP QL NAA+PROBE: NEGATIVE

## 2021-01-14 PROCEDURE — U0002: ICD-10-PCS | Mod: QW,CR,S$GLB, | Performed by: FAMILY MEDICINE

## 2021-01-14 PROCEDURE — U0002 COVID-19 LAB TEST NON-CDC: HCPCS | Mod: QW,CR,S$GLB, | Performed by: FAMILY MEDICINE

## 2021-01-22 ENCOUNTER — IMMUNIZATION (OUTPATIENT)
Dept: PHARMACY | Facility: CLINIC | Age: 71
End: 2021-01-22
Payer: MEDICARE

## 2021-01-22 DIAGNOSIS — Z23 NEED FOR VACCINATION: Primary | ICD-10-CM

## 2021-01-31 ENCOUNTER — EXTERNAL CHRONIC CARE MANAGEMENT (OUTPATIENT)
Dept: PRIMARY CARE CLINIC | Facility: CLINIC | Age: 71
End: 2021-01-31
Payer: MEDICARE

## 2021-01-31 PROCEDURE — 99490 CHRNC CARE MGMT STAFF 1ST 20: CPT | Mod: S$PBB,,, | Performed by: FAMILY MEDICINE

## 2021-01-31 PROCEDURE — 99490 CHRNC CARE MGMT STAFF 1ST 20: CPT | Mod: PBBFAC,PN | Performed by: FAMILY MEDICINE

## 2021-01-31 PROCEDURE — 99490 PR CHRONIC CARE MGMT, 1ST 20 MIN: ICD-10-PCS | Mod: S$PBB,,, | Performed by: FAMILY MEDICINE

## 2021-02-19 ENCOUNTER — IMMUNIZATION (OUTPATIENT)
Dept: PHARMACY | Facility: CLINIC | Age: 71
End: 2021-02-19
Payer: MEDICARE

## 2021-02-19 DIAGNOSIS — Z23 NEED FOR VACCINATION: Primary | ICD-10-CM

## 2021-02-28 ENCOUNTER — PATIENT MESSAGE (OUTPATIENT)
Dept: FAMILY MEDICINE | Facility: CLINIC | Age: 71
End: 2021-02-28

## 2021-02-28 ENCOUNTER — EXTERNAL CHRONIC CARE MANAGEMENT (OUTPATIENT)
Dept: PRIMARY CARE CLINIC | Facility: CLINIC | Age: 71
End: 2021-02-28
Payer: MEDICARE

## 2021-02-28 PROCEDURE — 99490 CHRNC CARE MGMT STAFF 1ST 20: CPT | Mod: PBBFAC,PN | Performed by: FAMILY MEDICINE

## 2021-02-28 PROCEDURE — 99490 CHRNC CARE MGMT STAFF 1ST 20: CPT | Mod: S$PBB,,, | Performed by: FAMILY MEDICINE

## 2021-02-28 PROCEDURE — 99490 PR CHRONIC CARE MGMT, 1ST 20 MIN: ICD-10-PCS | Mod: S$PBB,,, | Performed by: FAMILY MEDICINE

## 2021-03-01 ENCOUNTER — PATIENT MESSAGE (OUTPATIENT)
Dept: FAMILY MEDICINE | Facility: CLINIC | Age: 71
End: 2021-03-01

## 2021-03-01 ENCOUNTER — HOSPITAL ENCOUNTER (OUTPATIENT)
Dept: RADIOLOGY | Facility: HOSPITAL | Age: 71
Discharge: HOME | End: 2021-03-01
Attending: FAMILY MEDICINE
Payer: MEDICARE

## 2021-03-01 DIAGNOSIS — Z78.0 POST-MENOPAUSAL: ICD-10-CM

## 2021-03-01 PROCEDURE — 77080 DXA BONE DENSITY AXIAL: CPT | Mod: 26,,, | Performed by: RADIOLOGY

## 2021-03-01 PROCEDURE — 77080 DXA BONE DENSITY AXIAL: CPT | Mod: TC,PO

## 2021-03-01 PROCEDURE — 77080 DEXA BONE DENSITY SPINE HIP: ICD-10-PCS | Mod: 26,,, | Performed by: RADIOLOGY

## 2021-03-02 ENCOUNTER — PATIENT MESSAGE (OUTPATIENT)
Dept: FAMILY MEDICINE | Facility: CLINIC | Age: 71
End: 2021-03-02

## 2021-03-02 RX ORDER — ALENDRONATE SODIUM 70 MG/1
70 TABLET ORAL
Qty: 13 TABLET | Refills: 3 | Status: SHIPPED | OUTPATIENT
Start: 2021-03-02 | End: 2021-09-22 | Stop reason: SDUPTHER

## 2021-03-03 ENCOUNTER — OFFICE VISIT (OUTPATIENT)
Dept: FAMILY MEDICINE | Facility: CLINIC | Age: 71
End: 2021-03-03
Payer: MEDICARE

## 2021-03-03 ENCOUNTER — TELEPHONE (OUTPATIENT)
Dept: FAMILY MEDICINE | Facility: CLINIC | Age: 71
End: 2021-03-03

## 2021-03-03 VITALS
TEMPERATURE: 98 F | HEIGHT: 64 IN | DIASTOLIC BLOOD PRESSURE: 70 MMHG | OXYGEN SATURATION: 96 % | HEART RATE: 64 BPM | BODY MASS INDEX: 23.41 KG/M2 | SYSTOLIC BLOOD PRESSURE: 138 MMHG | WEIGHT: 137.13 LBS

## 2021-03-03 DIAGNOSIS — Z01.818 PRE-OP EXAM: Primary | ICD-10-CM

## 2021-03-03 PROCEDURE — 99213 PR OFFICE/OUTPT VISIT, EST, LEVL III, 20-29 MIN: ICD-10-PCS | Mod: S$PBB,,, | Performed by: NURSE PRACTITIONER

## 2021-03-03 PROCEDURE — 99999 PR PBB SHADOW E&M-EST. PATIENT-LVL IV: CPT | Mod: PBBFAC,,, | Performed by: NURSE PRACTITIONER

## 2021-03-03 PROCEDURE — 99999 PR PBB SHADOW E&M-EST. PATIENT-LVL IV: ICD-10-PCS | Mod: PBBFAC,,, | Performed by: NURSE PRACTITIONER

## 2021-03-03 PROCEDURE — 99213 OFFICE O/P EST LOW 20 MIN: CPT | Mod: S$PBB,,, | Performed by: NURSE PRACTITIONER

## 2021-03-03 PROCEDURE — 99214 OFFICE O/P EST MOD 30 MIN: CPT | Mod: PBBFAC,PO | Performed by: NURSE PRACTITIONER

## 2021-03-31 ENCOUNTER — EXTERNAL CHRONIC CARE MANAGEMENT (OUTPATIENT)
Dept: PRIMARY CARE CLINIC | Facility: CLINIC | Age: 71
End: 2021-03-31
Payer: MEDICARE

## 2021-03-31 PROCEDURE — 99490 CHRNC CARE MGMT STAFF 1ST 20: CPT | Mod: PBBFAC,PN | Performed by: FAMILY MEDICINE

## 2021-03-31 PROCEDURE — 99490 CHRNC CARE MGMT STAFF 1ST 20: CPT | Mod: S$PBB,,, | Performed by: FAMILY MEDICINE

## 2021-03-31 PROCEDURE — 99490 PR CHRONIC CARE MGMT, 1ST 20 MIN: ICD-10-PCS | Mod: S$PBB,,, | Performed by: FAMILY MEDICINE

## 2021-04-15 RX ORDER — PREGABALIN 100 MG/1
CAPSULE ORAL
Qty: 60 CAPSULE | Refills: 2 | Status: SHIPPED | OUTPATIENT
Start: 2021-04-15 | End: 2021-07-18

## 2021-04-30 ENCOUNTER — EXTERNAL CHRONIC CARE MANAGEMENT (OUTPATIENT)
Dept: PRIMARY CARE CLINIC | Facility: CLINIC | Age: 71
End: 2021-04-30
Payer: MEDICARE

## 2021-04-30 PROCEDURE — 99490 PR CHRONIC CARE MGMT, 1ST 20 MIN: ICD-10-PCS | Mod: S$PBB,,, | Performed by: FAMILY MEDICINE

## 2021-04-30 PROCEDURE — 99490 CHRNC CARE MGMT STAFF 1ST 20: CPT | Mod: PBBFAC,PN | Performed by: FAMILY MEDICINE

## 2021-04-30 PROCEDURE — 99490 CHRNC CARE MGMT STAFF 1ST 20: CPT | Mod: S$PBB,,, | Performed by: FAMILY MEDICINE

## 2021-05-31 ENCOUNTER — EXTERNAL CHRONIC CARE MANAGEMENT (OUTPATIENT)
Dept: PRIMARY CARE CLINIC | Facility: CLINIC | Age: 71
End: 2021-05-31
Payer: MEDICARE

## 2021-05-31 PROCEDURE — 99490 CHRNC CARE MGMT STAFF 1ST 20: CPT | Mod: S$PBB,,, | Performed by: FAMILY MEDICINE

## 2021-05-31 PROCEDURE — 99490 PR CHRONIC CARE MGMT, 1ST 20 MIN: ICD-10-PCS | Mod: S$PBB,,, | Performed by: FAMILY MEDICINE

## 2021-05-31 PROCEDURE — 99490 CHRNC CARE MGMT STAFF 1ST 20: CPT | Mod: PBBFAC,PN | Performed by: FAMILY MEDICINE

## 2021-06-30 ENCOUNTER — EXTERNAL CHRONIC CARE MANAGEMENT (OUTPATIENT)
Dept: PRIMARY CARE CLINIC | Facility: CLINIC | Age: 71
End: 2021-06-30
Payer: MEDICARE

## 2021-06-30 PROCEDURE — 99490 CHRNC CARE MGMT STAFF 1ST 20: CPT | Mod: PBBFAC,PN | Performed by: FAMILY MEDICINE

## 2021-06-30 PROCEDURE — 99490 PR CHRONIC CARE MGMT, 1ST 20 MIN: ICD-10-PCS | Mod: S$PBB,,, | Performed by: FAMILY MEDICINE

## 2021-06-30 PROCEDURE — 99490 CHRNC CARE MGMT STAFF 1ST 20: CPT | Mod: S$PBB,,, | Performed by: FAMILY MEDICINE

## 2021-07-31 ENCOUNTER — EXTERNAL CHRONIC CARE MANAGEMENT (OUTPATIENT)
Dept: PRIMARY CARE CLINIC | Facility: CLINIC | Age: 71
End: 2021-07-31
Payer: MEDICARE

## 2021-07-31 PROCEDURE — 99490 CHRNC CARE MGMT STAFF 1ST 20: CPT | Mod: PBBFAC,PN | Performed by: FAMILY MEDICINE

## 2021-07-31 PROCEDURE — 99490 PR CHRONIC CARE MGMT, 1ST 20 MIN: ICD-10-PCS | Mod: S$PBB,,, | Performed by: FAMILY MEDICINE

## 2021-07-31 PROCEDURE — 99490 CHRNC CARE MGMT STAFF 1ST 20: CPT | Mod: S$PBB,,, | Performed by: FAMILY MEDICINE

## 2021-08-31 ENCOUNTER — EXTERNAL CHRONIC CARE MANAGEMENT (OUTPATIENT)
Dept: PRIMARY CARE CLINIC | Facility: CLINIC | Age: 71
End: 2021-08-31
Payer: MEDICARE

## 2021-08-31 PROCEDURE — 99490 CHRNC CARE MGMT STAFF 1ST 20: CPT | Mod: S$PBB,,, | Performed by: FAMILY MEDICINE

## 2021-08-31 PROCEDURE — 99490 CHRNC CARE MGMT STAFF 1ST 20: CPT | Mod: PBBFAC,PN | Performed by: FAMILY MEDICINE

## 2021-08-31 PROCEDURE — 99490 PR CHRONIC CARE MGMT, 1ST 20 MIN: ICD-10-PCS | Mod: S$PBB,,, | Performed by: FAMILY MEDICINE

## 2021-09-22 ENCOUNTER — OFFICE VISIT (OUTPATIENT)
Dept: FAMILY MEDICINE | Facility: CLINIC | Age: 71
End: 2021-09-22
Payer: MEDICARE

## 2021-09-22 VITALS
WEIGHT: 138.44 LBS | DIASTOLIC BLOOD PRESSURE: 60 MMHG | HEART RATE: 68 BPM | BODY MASS INDEX: 23.64 KG/M2 | HEIGHT: 64 IN | SYSTOLIC BLOOD PRESSURE: 134 MMHG

## 2021-09-22 DIAGNOSIS — R06.83 SNORING: ICD-10-CM

## 2021-09-22 DIAGNOSIS — M70.61 TROCHANTERIC BURSITIS OF BOTH HIPS: ICD-10-CM

## 2021-09-22 DIAGNOSIS — M70.62 TROCHANTERIC BURSITIS OF BOTH HIPS: ICD-10-CM

## 2021-09-22 DIAGNOSIS — G60.9 IDIOPATHIC PERIPHERAL NEUROPATHY: ICD-10-CM

## 2021-09-22 DIAGNOSIS — R79.9 ABNORMAL FINDING OF BLOOD CHEMISTRY, UNSPECIFIED: ICD-10-CM

## 2021-09-22 DIAGNOSIS — G25.81 RESTLESS LEG: ICD-10-CM

## 2021-09-22 DIAGNOSIS — J30.9 CHRONIC ALLERGIC RHINITIS: ICD-10-CM

## 2021-09-22 DIAGNOSIS — F33.42 RECURRENT MAJOR DEPRESSIVE DISORDER, IN FULL REMISSION: Primary | ICD-10-CM

## 2021-09-22 PROCEDURE — 99999 PR PBB SHADOW E&M-EST. PATIENT-LVL IV: ICD-10-PCS | Mod: PBBFAC,,, | Performed by: FAMILY MEDICINE

## 2021-09-22 PROCEDURE — 20610 DRAIN/INJ JOINT/BURSA W/O US: CPT | Mod: PBBFAC,PN | Performed by: FAMILY MEDICINE

## 2021-09-22 PROCEDURE — 20610 LARGE JOINT ASPIRATION/INJECTION: R GREATER TROCHANTERIC BURSA: ICD-10-PCS | Mod: S$PBB,RT,, | Performed by: FAMILY MEDICINE

## 2021-09-22 PROCEDURE — 99999 PR PBB SHADOW E&M-EST. PATIENT-LVL IV: CPT | Mod: PBBFAC,,, | Performed by: FAMILY MEDICINE

## 2021-09-22 PROCEDURE — 99214 PR OFFICE/OUTPT VISIT, EST, LEVL IV, 30-39 MIN: ICD-10-PCS | Mod: S$PBB,25,, | Performed by: FAMILY MEDICINE

## 2021-09-22 PROCEDURE — 99214 OFFICE O/P EST MOD 30 MIN: CPT | Mod: PBBFAC,PN,25 | Performed by: FAMILY MEDICINE

## 2021-09-22 PROCEDURE — 99214 OFFICE O/P EST MOD 30 MIN: CPT | Mod: S$PBB,25,, | Performed by: FAMILY MEDICINE

## 2021-09-22 RX ORDER — PANTOPRAZOLE SODIUM 40 MG/1
40 TABLET, DELAYED RELEASE ORAL DAILY
COMMUNITY
End: 2022-09-30

## 2021-09-22 RX ORDER — TRIAMCINOLONE ACETONIDE 40 MG/ML
40 INJECTION, SUSPENSION INTRA-ARTICULAR; INTRAMUSCULAR
Status: DISCONTINUED | OUTPATIENT
Start: 2021-09-22 | End: 2021-09-22 | Stop reason: HOSPADM

## 2021-09-22 RX ORDER — ALENDRONATE SODIUM 70 MG/1
70 TABLET ORAL
Qty: 13 TABLET | Refills: 3
Start: 2021-09-22 | End: 2022-02-06 | Stop reason: SDUPTHER

## 2021-09-22 RX ADMIN — TRIAMCINOLONE ACETONIDE 40 MG: 40 INJECTION, SUSPENSION INTRA-ARTICULAR; INTRAMUSCULAR at 08:09

## 2021-09-24 ENCOUNTER — LAB VISIT (OUTPATIENT)
Dept: LAB | Facility: HOSPITAL | Age: 71
End: 2021-09-24
Attending: FAMILY MEDICINE
Payer: MEDICARE

## 2021-09-24 DIAGNOSIS — G60.9 IDIOPATHIC PERIPHERAL NEUROPATHY: ICD-10-CM

## 2021-09-24 DIAGNOSIS — R79.9 ABNORMAL FINDING OF BLOOD CHEMISTRY, UNSPECIFIED: ICD-10-CM

## 2021-09-24 LAB
ALBUMIN SERPL BCP-MCNC: 4.2 G/DL (ref 3.5–5.2)
ALP SERPL-CCNC: 55 U/L (ref 55–135)
ALT SERPL W/O P-5'-P-CCNC: 15 U/L (ref 10–44)
ANION GAP SERPL CALC-SCNC: 10 MMOL/L (ref 8–16)
AST SERPL-CCNC: 20 U/L (ref 10–40)
BILIRUB SERPL-MCNC: 0.5 MG/DL (ref 0.1–1)
BUN SERPL-MCNC: 14 MG/DL (ref 8–23)
CALCIUM SERPL-MCNC: 9.6 MG/DL (ref 8.7–10.5)
CHLORIDE SERPL-SCNC: 101 MMOL/L (ref 95–110)
CHOLEST SERPL-MCNC: 190 MG/DL (ref 120–199)
CHOLEST/HDLC SERPL: 3.2 {RATIO} (ref 2–5)
CO2 SERPL-SCNC: 24 MMOL/L (ref 23–29)
CREAT SERPL-MCNC: 0.8 MG/DL (ref 0.5–1.4)
EST. GFR  (AFRICAN AMERICAN): >60 ML/MIN/1.73 M^2
EST. GFR  (NON AFRICAN AMERICAN): >60 ML/MIN/1.73 M^2
GLUCOSE SERPL-MCNC: 104 MG/DL (ref 70–110)
HDLC SERPL-MCNC: 59 MG/DL (ref 40–75)
HDLC SERPL: 31.1 % (ref 20–50)
LDLC SERPL CALC-MCNC: 110 MG/DL (ref 63–159)
NONHDLC SERPL-MCNC: 131 MG/DL
POTASSIUM SERPL-SCNC: 4.8 MMOL/L (ref 3.5–5.1)
PROT SERPL-MCNC: 7.1 G/DL (ref 6–8.4)
SODIUM SERPL-SCNC: 135 MMOL/L (ref 136–145)
TRIGL SERPL-MCNC: 105 MG/DL (ref 30–150)

## 2021-09-24 PROCEDURE — 80061 LIPID PANEL: CPT | Performed by: FAMILY MEDICINE

## 2021-09-24 PROCEDURE — 36415 COLL VENOUS BLD VENIPUNCTURE: CPT | Mod: PN | Performed by: FAMILY MEDICINE

## 2021-09-24 PROCEDURE — 80053 COMPREHEN METABOLIC PANEL: CPT | Performed by: FAMILY MEDICINE

## 2021-09-24 PROCEDURE — 85027 COMPLETE CBC AUTOMATED: CPT | Performed by: FAMILY MEDICINE

## 2021-09-25 LAB
ERYTHROCYTE [DISTWIDTH] IN BLOOD BY AUTOMATED COUNT: 12.5 % (ref 11.5–14.5)
HCT VFR BLD AUTO: 39.4 % (ref 37–48.5)
HGB BLD-MCNC: 12.6 G/DL (ref 12–16)
MCH RBC QN AUTO: 30.4 PG (ref 27–31)
MCHC RBC AUTO-ENTMCNC: 32 G/DL (ref 32–36)
MCV RBC AUTO: 95 FL (ref 82–98)
PLATELET # BLD AUTO: 330 K/UL (ref 150–450)
PMV BLD AUTO: 10.4 FL (ref 9.2–12.9)
RBC # BLD AUTO: 4.14 M/UL (ref 4–5.4)
WBC # BLD AUTO: 6.19 K/UL (ref 3.9–12.7)

## 2021-09-30 ENCOUNTER — EXTERNAL CHRONIC CARE MANAGEMENT (OUTPATIENT)
Dept: PRIMARY CARE CLINIC | Facility: CLINIC | Age: 71
End: 2021-09-30
Payer: MEDICARE

## 2021-09-30 ENCOUNTER — PATIENT MESSAGE (OUTPATIENT)
Dept: FAMILY MEDICINE | Facility: CLINIC | Age: 71
End: 2021-09-30

## 2021-09-30 PROCEDURE — 99490 CHRNC CARE MGMT STAFF 1ST 20: CPT | Mod: PBBFAC,PN | Performed by: FAMILY MEDICINE

## 2021-09-30 PROCEDURE — 99490 CHRNC CARE MGMT STAFF 1ST 20: CPT | Mod: S$PBB,,, | Performed by: FAMILY MEDICINE

## 2021-09-30 PROCEDURE — 99490 PR CHRONIC CARE MGMT, 1ST 20 MIN: ICD-10-PCS | Mod: S$PBB,,, | Performed by: FAMILY MEDICINE

## 2021-10-13 ENCOUNTER — IMMUNIZATION (OUTPATIENT)
Dept: FAMILY MEDICINE | Facility: CLINIC | Age: 71
End: 2021-10-13
Payer: MEDICARE

## 2021-10-13 PROCEDURE — G0008 ADMIN INFLUENZA VIRUS VAC: HCPCS | Mod: PBBFAC,PN

## 2021-10-13 PROCEDURE — 90694 VACC AIIV4 NO PRSRV 0.5ML IM: CPT | Mod: PBBFAC,PN

## 2021-10-31 ENCOUNTER — EXTERNAL CHRONIC CARE MANAGEMENT (OUTPATIENT)
Dept: PRIMARY CARE CLINIC | Facility: CLINIC | Age: 71
End: 2021-10-31
Payer: MEDICARE

## 2021-10-31 PROCEDURE — 99490 CHRNC CARE MGMT STAFF 1ST 20: CPT | Mod: PBBFAC,PN | Performed by: FAMILY MEDICINE

## 2021-10-31 PROCEDURE — 99490 CHRNC CARE MGMT STAFF 1ST 20: CPT | Mod: S$PBB,,, | Performed by: FAMILY MEDICINE

## 2021-10-31 PROCEDURE — 99490 PR CHRONIC CARE MGMT, 1ST 20 MIN: ICD-10-PCS | Mod: S$PBB,,, | Performed by: FAMILY MEDICINE

## 2021-11-26 ENCOUNTER — IMMUNIZATION (OUTPATIENT)
Dept: PHARMACY | Facility: CLINIC | Age: 71
End: 2021-11-26
Payer: MEDICARE

## 2021-11-26 DIAGNOSIS — Z23 NEED FOR VACCINATION: Primary | ICD-10-CM

## 2021-11-28 ENCOUNTER — PATIENT MESSAGE (OUTPATIENT)
Dept: FAMILY MEDICINE | Facility: CLINIC | Age: 71
End: 2021-11-28
Payer: MEDICARE

## 2021-11-29 ENCOUNTER — PATIENT MESSAGE (OUTPATIENT)
Dept: FAMILY MEDICINE | Facility: CLINIC | Age: 71
End: 2021-11-29
Payer: MEDICARE

## 2021-11-30 ENCOUNTER — EXTERNAL CHRONIC CARE MANAGEMENT (OUTPATIENT)
Dept: PRIMARY CARE CLINIC | Facility: CLINIC | Age: 71
End: 2021-11-30
Payer: MEDICARE

## 2021-11-30 PROCEDURE — 99490 CHRNC CARE MGMT STAFF 1ST 20: CPT | Mod: S$PBB,,, | Performed by: FAMILY MEDICINE

## 2021-11-30 PROCEDURE — 99490 PR CHRONIC CARE MGMT, 1ST 20 MIN: ICD-10-PCS | Mod: S$PBB,,, | Performed by: FAMILY MEDICINE

## 2021-11-30 PROCEDURE — 99490 CHRNC CARE MGMT STAFF 1ST 20: CPT | Mod: PBBFAC,PN | Performed by: FAMILY MEDICINE

## 2021-12-01 ENCOUNTER — PATIENT MESSAGE (OUTPATIENT)
Dept: FAMILY MEDICINE | Facility: CLINIC | Age: 71
End: 2021-12-01
Payer: MEDICARE

## 2021-12-05 ENCOUNTER — CLINICAL SUPPORT (OUTPATIENT)
Dept: URGENT CARE | Facility: CLINIC | Age: 71
End: 2021-12-05
Payer: MEDICARE

## 2021-12-05 DIAGNOSIS — Z20.822 ENCOUNTER FOR LABORATORY TESTING FOR COVID-19 VIRUS: Primary | ICD-10-CM

## 2021-12-05 LAB
CTP QC/QA: YES
SARS-COV-2 RDRP RESP QL NAA+PROBE: NEGATIVE

## 2021-12-05 PROCEDURE — 99211 PR OFFICE/OUTPT VISIT, EST, LEVL I: ICD-10-PCS | Mod: S$GLB,,, | Performed by: EMERGENCY MEDICINE

## 2021-12-05 PROCEDURE — U0002 COVID-19 LAB TEST NON-CDC: HCPCS | Mod: QW,CR,S$GLB, | Performed by: EMERGENCY MEDICINE

## 2021-12-05 PROCEDURE — U0002: ICD-10-PCS | Mod: QW,CR,S$GLB, | Performed by: EMERGENCY MEDICINE

## 2021-12-05 PROCEDURE — 99211 OFF/OP EST MAY X REQ PHY/QHP: CPT | Mod: S$GLB,,, | Performed by: EMERGENCY MEDICINE

## 2021-12-23 ENCOUNTER — PATIENT MESSAGE (OUTPATIENT)
Dept: ADMINISTRATIVE | Facility: OTHER | Age: 71
End: 2021-12-23
Payer: MEDICARE

## 2021-12-23 ENCOUNTER — OFFICE VISIT (OUTPATIENT)
Dept: URGENT CARE | Facility: CLINIC | Age: 71
End: 2021-12-23
Payer: MEDICARE

## 2021-12-23 VITALS
TEMPERATURE: 98 F | HEIGHT: 64 IN | RESPIRATION RATE: 16 BRPM | SYSTOLIC BLOOD PRESSURE: 170 MMHG | OXYGEN SATURATION: 98 % | DIASTOLIC BLOOD PRESSURE: 64 MMHG | HEART RATE: 60 BPM | BODY MASS INDEX: 23.56 KG/M2 | WEIGHT: 138 LBS

## 2021-12-23 DIAGNOSIS — J02.9 SORE THROAT: ICD-10-CM

## 2021-12-23 DIAGNOSIS — U07.1 COVID-19 VIRUS DETECTED: ICD-10-CM

## 2021-12-23 DIAGNOSIS — U07.1 COVID-19: Primary | ICD-10-CM

## 2021-12-23 LAB
CTP QC/QA: YES
SARS-COV-2 RDRP RESP QL NAA+PROBE: POSITIVE

## 2021-12-23 PROCEDURE — 99213 OFFICE O/P EST LOW 20 MIN: CPT | Mod: CR,S$GLB,, | Performed by: PHYSICIAN ASSISTANT

## 2021-12-23 PROCEDURE — 99213 PR OFFICE/OUTPT VISIT, EST, LEVL III, 20-29 MIN: ICD-10-PCS | Mod: CR,S$GLB,, | Performed by: PHYSICIAN ASSISTANT

## 2021-12-23 PROCEDURE — U0002: ICD-10-PCS | Mod: QW,CR,S$GLB, | Performed by: PHYSICIAN ASSISTANT

## 2021-12-23 PROCEDURE — U0002 COVID-19 LAB TEST NON-CDC: HCPCS | Mod: QW,CR,S$GLB, | Performed by: PHYSICIAN ASSISTANT

## 2021-12-23 RX ORDER — AMITRIPTYLINE HYDROCHLORIDE 10 MG/1
10 TABLET, FILM COATED ORAL NIGHTLY PRN
COMMUNITY
End: 2022-04-14

## 2021-12-24 ENCOUNTER — NURSE TRIAGE (OUTPATIENT)
Dept: ADMINISTRATIVE | Facility: CLINIC | Age: 71
End: 2021-12-24
Payer: MEDICARE

## 2021-12-31 ENCOUNTER — EXTERNAL CHRONIC CARE MANAGEMENT (OUTPATIENT)
Dept: PRIMARY CARE CLINIC | Facility: CLINIC | Age: 71
End: 2021-12-31
Payer: MEDICARE

## 2021-12-31 PROCEDURE — 99490 CHRNC CARE MGMT STAFF 1ST 20: CPT | Mod: S$PBB,,, | Performed by: FAMILY MEDICINE

## 2021-12-31 PROCEDURE — 99490 PR CHRONIC CARE MGMT, 1ST 20 MIN: ICD-10-PCS | Mod: S$PBB,,, | Performed by: FAMILY MEDICINE

## 2021-12-31 PROCEDURE — 99490 CHRNC CARE MGMT STAFF 1ST 20: CPT | Mod: PBBFAC,PN | Performed by: FAMILY MEDICINE

## 2022-01-24 ENCOUNTER — PATIENT MESSAGE (OUTPATIENT)
Dept: FAMILY MEDICINE | Facility: CLINIC | Age: 72
End: 2022-01-24
Payer: MEDICARE

## 2022-01-25 ENCOUNTER — PATIENT MESSAGE (OUTPATIENT)
Dept: FAMILY MEDICINE | Facility: CLINIC | Age: 72
End: 2022-01-25
Payer: MEDICARE

## 2022-01-25 RX ORDER — SCOLOPAMINE TRANSDERMAL SYSTEM 1 MG/1
1 PATCH, EXTENDED RELEASE TRANSDERMAL
Qty: 2 PATCH | Refills: 1 | Status: SHIPPED | OUTPATIENT
Start: 2022-01-25 | End: 2023-01-06

## 2022-01-31 ENCOUNTER — EXTERNAL CHRONIC CARE MANAGEMENT (OUTPATIENT)
Dept: PRIMARY CARE CLINIC | Facility: CLINIC | Age: 72
End: 2022-01-31
Payer: MEDICARE

## 2022-01-31 PROCEDURE — 99490 CHRNC CARE MGMT STAFF 1ST 20: CPT | Mod: PBBFAC,PN | Performed by: FAMILY MEDICINE

## 2022-01-31 PROCEDURE — 99490 PR CHRONIC CARE MGMT, 1ST 20 MIN: ICD-10-PCS | Mod: S$PBB,,, | Performed by: FAMILY MEDICINE

## 2022-01-31 PROCEDURE — 99490 CHRNC CARE MGMT STAFF 1ST 20: CPT | Mod: S$PBB,,, | Performed by: FAMILY MEDICINE

## 2022-02-28 ENCOUNTER — EXTERNAL CHRONIC CARE MANAGEMENT (OUTPATIENT)
Dept: PRIMARY CARE CLINIC | Facility: CLINIC | Age: 72
End: 2022-02-28
Payer: MEDICARE

## 2022-02-28 PROCEDURE — 99490 CHRNC CARE MGMT STAFF 1ST 20: CPT | Mod: PBBFAC,PN | Performed by: FAMILY MEDICINE

## 2022-02-28 PROCEDURE — 99490 PR CHRONIC CARE MGMT, 1ST 20 MIN: ICD-10-PCS | Mod: S$PBB,,, | Performed by: FAMILY MEDICINE

## 2022-02-28 PROCEDURE — 99490 CHRNC CARE MGMT STAFF 1ST 20: CPT | Mod: S$PBB,,, | Performed by: FAMILY MEDICINE

## 2022-03-14 ENCOUNTER — HOSPITAL ENCOUNTER (OUTPATIENT)
Dept: RADIOLOGY | Facility: HOSPITAL | Age: 72
Discharge: HOME | End: 2022-03-14
Attending: FAMILY MEDICINE
Payer: MEDICARE

## 2022-03-14 DIAGNOSIS — Z12.31 ENCOUNTER FOR SCREENING MAMMOGRAM FOR MALIGNANT NEOPLASM OF BREAST: ICD-10-CM

## 2022-03-14 PROCEDURE — 77067 SCR MAMMO BI INCL CAD: CPT | Mod: 26,,, | Performed by: RADIOLOGY

## 2022-03-14 PROCEDURE — 77067 MAMMO DIGITAL SCREENING BILAT WITH TOMO: ICD-10-PCS | Mod: 26,,, | Performed by: RADIOLOGY

## 2022-03-14 PROCEDURE — 77067 SCR MAMMO BI INCL CAD: CPT | Mod: TC,PO

## 2022-03-14 PROCEDURE — 77063 BREAST TOMOSYNTHESIS BI: CPT | Mod: TC,PO

## 2022-03-14 PROCEDURE — 77063 MAMMO DIGITAL SCREENING BILAT WITH TOMO: ICD-10-PCS | Mod: 26,,, | Performed by: RADIOLOGY

## 2022-03-14 PROCEDURE — 77063 BREAST TOMOSYNTHESIS BI: CPT | Mod: 26,,, | Performed by: RADIOLOGY

## 2022-03-31 ENCOUNTER — PATIENT MESSAGE (OUTPATIENT)
Dept: FAMILY MEDICINE | Facility: CLINIC | Age: 72
End: 2022-03-31
Payer: MEDICARE

## 2022-07-13 ENCOUNTER — OFFICE VISIT (OUTPATIENT)
Dept: PAIN MEDICINE | Facility: CLINIC | Age: 72
End: 2022-07-13
Payer: MEDICARE

## 2022-07-13 VITALS
HEIGHT: 64 IN | DIASTOLIC BLOOD PRESSURE: 84 MMHG | BODY MASS INDEX: 23.54 KG/M2 | SYSTOLIC BLOOD PRESSURE: 150 MMHG | WEIGHT: 137.88 LBS | HEART RATE: 77 BPM

## 2022-07-13 DIAGNOSIS — M54.16 LUMBAR RADICULOPATHY: Primary | ICD-10-CM

## 2022-07-13 DIAGNOSIS — M47.816 LUMBAR SPONDYLOSIS: ICD-10-CM

## 2022-07-13 DIAGNOSIS — G60.9 IDIOPATHIC PERIPHERAL NEUROPATHY: ICD-10-CM

## 2022-07-13 DIAGNOSIS — M54.9 DORSALGIA, UNSPECIFIED: ICD-10-CM

## 2022-07-13 PROCEDURE — 99999 PR PBB SHADOW E&M-EST. PATIENT-LVL III: CPT | Mod: PBBFAC,,, | Performed by: ANESTHESIOLOGY

## 2022-07-13 PROCEDURE — 99999 PR PBB SHADOW E&M-EST. PATIENT-LVL III: ICD-10-PCS | Mod: PBBFAC,,, | Performed by: ANESTHESIOLOGY

## 2022-07-13 PROCEDURE — 99214 PR OFFICE/OUTPT VISIT, EST, LEVL IV, 30-39 MIN: ICD-10-PCS | Mod: S$PBB,,, | Performed by: ANESTHESIOLOGY

## 2022-07-13 PROCEDURE — 99214 OFFICE O/P EST MOD 30 MIN: CPT | Mod: S$PBB,,, | Performed by: ANESTHESIOLOGY

## 2022-07-13 PROCEDURE — 99213 OFFICE O/P EST LOW 20 MIN: CPT | Mod: PBBFAC,PN | Performed by: ANESTHESIOLOGY

## 2022-07-13 NOTE — H&P (VIEW-ONLY)
This note was completed with dictation software and grammatical errors may exist.    CC:  Bilateral foot pain, back pain, leg pain    HPI:  The patient is a 71-year-old woman with a history of anxiety, depression, previous lumbar surgery who presents in referral from Dr. Adam for bilateral foot pain.  Patient returns in follow-up today for bilateral leg pain, back pain.  She states that she has had longstanding pain in her legs from her feet up to her knees, now she feels like it is throughout her entire legs bilaterally, attributes this to neuropathy.  However the pain has been in her back as well, worse in her legs and back with standing and walking, somewhat improved with sitting down.  She continues to have relief with Lyrica.  She states that the right leg is slightly worse than the left leg.  Pain is from her waist throughout her legs.  When she wakes up in the morning she is bent over, pain is worse with activities around the house.  She continues to take Lyrica, Elavil definitely helps, try to wean off but her pain was much worse.        Previous history:  The patient reports developing bilateral foot pain about 25 years ago with numbness and tingling, has been diagnosed with neuropathy but workup has not revealed any particular  etiology.  This has been progressing over the years and has gone from being located only in the feet and now extending proximally up to the knees bilaterally.  She describes it as throbbing, tingling with abnormal sensation, particularly worse with standing, walking, exercising.  She does get some relief with rest, heat and medications. She has been taking gabapentin for years, several years has been on 600 mg twice daily with about 40% relief.  She also has a history of low back and buttock pain, ended up having severe right leg pain and in 2001 underwent laminectomy, unclear what level but reports that her right back and leg pain resolved at that time.  However over the years  she has developed more bilateral buttock pain, thigh pain worse with standing and walking, describes this as dull.  She denies any weakness in her legs, denies any bowel or bladder incontinence.    Pain intervention history:  She has done physical therapy multiple times, chiropractic care without relief.  She has not tried Cymbalta.  She has been taking gabapentin 600 mg twice daily with about 40% relief, switched to Lyrica.  L5/S1 CHEIKH performed on 2019 with greater than 70% relief ongoing of her back and leg pain, approach from the right due to previous left hemilaminectomy.    Spine surgeries:    Antineuropathics:  Lyrica 100 twice daily  NSAIDs:  Naproxen  Physical therapy:  Antidepressants:  Elavil 10 mg, Celexa 10  Muscle relaxers:  Opioids:  Antiplatelets/Anticoagulants:      ROS:  She reports back pain only.  Balance of review of systems is negative.    Past Medical History:   Diagnosis Date    Anxiety     Arthritis     Chronic allergic rhinitis 2012    Depression     Essential hypertension 2020    Herniated lumbar intervertebral disc     L3    Migraine     painless with numbness       Past Surgical History:   Procedure Laterality Date    COLONOSCOPY      COLONOSCOPY N/A 3/2/2020    Procedure: COLONOSCOPY;  Surgeon: Flash Garnett MD;  Location: [facility] ENDO;  Service: Endoscopy;  Laterality: N/A;    EPIDURAL STEROID INJECTION INTO LUMBAR SPINE N/A 2019    Procedure: Injection-steroid-epidural-lumbar;  Surgeon: Patrick Goyal MD;  Location: [facility] OR;  Service: Pain Management;  Laterality: N/A;  L5/S1 from right side    NASAL SEPTUM SURGERY N/A 2017    SPINE SURGERY  2001    L4    TONSILLECTOMY         Social History     Socioeconomic History    Marital status:    Tobacco Use    Smoking status: Former Smoker     Quit date: 1973     Years since quittin.6    Smokeless tobacco: Never Used   Substance and Sexual Activity    Alcohol use: Yes      "Comment: 1 glass of wine per day    Drug use: No         Medications/Allergies: See med card    Vitals:    22 0804   BP: (!) 150/84   Pulse: 77   Weight: 62.5 kg (137 lb 14.4 oz)   Height: 5' 4" (1.626 m)   PainSc:   4   PainLoc: Foot         Physical exam:  Gen: A and O x3, pleasant, well-groomed  Skin: No rashes or obvious lesions  HEENT: PERRLA, no obvious deformities on ears or in canals. Trachea midline.  CVS: Regular rate and rhythm, normal palpable pulses.  Resp:No increased work of breathing, symmetrical chest rise.  Abdomen: Soft, NT/ND.  Musculoskeletal:  No antalgic gait.     Neuro:  Lower extremities: 5/5 strength bilaterally  Reflexes: Patellar 0+, Achilles 0+ bilaterally.  Sensory:  Intact and symmetrical to light touch and pinprick in L2-S1 dermatomes except for decreased sensation to light touch and pinprick from her knees distally bilaterally.    Lumbar spine:  Lumbar spine:  Range of motion is mildly reduced with forward flexion with slight increased pain, moderately decreased with extension with increased pain in her bilateral low back.   Magnus's test causes no increased pain on either side.    Supine straight leg raise is negative bilaterally.    Internal and external rotation of the hip causes no increased pain on either side.  Myofascial exam: No tenderness to palpation across lumbar paraspinous muscles.    Imagin17 Xray L-spine  Five views lumbar spine were obtained (AP, lateral, right oblique, left oblique and spot views). There is a upper lumbar dextroscoliosis and a mid lumbar levoscoliosis with superimposed multilevel degenerative disc and facet disease. There is loss of disc space height throughout the lumbar region there is lower lumbar degenerative facet disease.    4/15/14 BL Lower extremity EMG:   Mild sensorimotor predominantly axonal neuropathy.    19 MRI L-spine:  L5-S1: Spondylosis associated with disc space narrowing and mild marginal anterior spondylotic " osteophytes.  There is a disc protrusion and osteophyte complex in the left neural foramen that causes a moderate left foraminal stenosis.  There are postoperative changes from a left L5-S1 laminectomy without definite signs for recurrent disc herniations.  There is facet arthropathy.  L4-5: Disc space narrowing associated with marginal anterior spondylotic osteophytes.  There is a mild circumferential annular disc bulge.  Facet arthropathy noted bilaterally.  There is also at least a moderate right lateral recess stenosis.  L3-4: Disc degeneration with with the or disc space narrowing and marginal anterior spondylotic osteophyte.  There is a broad-based posterior osteophyte the and disc bulge complex with compression of the thecal sac greater on the right.  There is a moderate to severe right lateral recess stenosis (image 32 series 5002.  There is associated facet arthropathy.  Mild central canal spinal stenosis.  L2-3: Disc degeneration with disc space narrowing.  There is a broad-based circumferential annular disc bulge.  In the left neural foramen in addition a disc protrusion with moderate obscuration of the foraminal fat is noted.  In the far lateral aspect of the neural foramen (image 16 series 5 the disc protrusion contacts the exiting left L2 root.  Clinical correlation for a left L2 radiculopathy suggested.  L1-2: Disc degeneration with disc space narrowing and marginal anterior spondylotic osteophyte.  There is a broad-based left paracentral disc protrusion with compression of the thecal sac anterolaterally.  There is an extension of the disc protrusion in the left neural foramen associated with at least a moderate foraminal stenosis.  Compression of the exiting left L1 root far laterally in the foramen not excluded.  Right neural foramen is unremarkable.  T12-L1: No disc herniations or spinal stenosis or foraminal stenosis.    Assessment:    The patient is a 71-year-old woman with a history of anxiety,  depression, previous lumbar surgery who presents in referral from Dr. Adam for bilateral foot pain.     1. Lumbar radiculopathy     2. Lumbar spondylosis     3. Idiopathic peripheral neuropathy         Plan:  1.  We are going to get a new MRI of her lumbar spine, we discussed that she does have some slight canal narrowing, significant facet arthropathy and is difficult to tell if her pain is more facet related or stenosis related.  The new MRI will help elucidate this.  I am going to call her with the results and we discussed possibly trying a transforaminal injection versus medial branch block.  We will continue the Lyrica and Elavil since the seem to be helping.

## 2022-07-13 NOTE — PROGRESS NOTES
This note was completed with dictation software and grammatical errors may exist.    CC:  Bilateral foot pain, back pain, leg pain    HPI:  The patient is a 71-year-old woman with a history of anxiety, depression, previous lumbar surgery who presents in referral from Dr. Adam for bilateral foot pain.  Patient returns in follow-up today for bilateral leg pain, back pain.  She states that she has had longstanding pain in her legs from her feet up to her knees, now she feels like it is throughout her entire legs bilaterally, attributes this to neuropathy.  However the pain has been in her back as well, worse in her legs and back with standing and walking, somewhat improved with sitting down.  She continues to have relief with Lyrica.  She states that the right leg is slightly worse than the left leg.  Pain is from her waist throughout her legs.  When she wakes up in the morning she is bent over, pain is worse with activities around the house.  She continues to take Lyrica, Elavil definitely helps, try to wean off but her pain was much worse.        Previous history:  The patient reports developing bilateral foot pain about 25 years ago with numbness and tingling, has been diagnosed with neuropathy but workup has not revealed any particular  etiology.  This has been progressing over the years and has gone from being located only in the feet and now extending proximally up to the knees bilaterally.  She describes it as throbbing, tingling with abnormal sensation, particularly worse with standing, walking, exercising.  She does get some relief with rest, heat and medications. She has been taking gabapentin for years, several years has been on 600 mg twice daily with about 40% relief.  She also has a history of low back and buttock pain, ended up having severe right leg pain and in 2001 underwent laminectomy, unclear what level but reports that her right back and leg pain resolved at that time.  However over the years  she has developed more bilateral buttock pain, thigh pain worse with standing and walking, describes this as dull.  She denies any weakness in her legs, denies any bowel or bladder incontinence.    Pain intervention history:  She has done physical therapy multiple times, chiropractic care without relief.  She has not tried Cymbalta.  She has been taking gabapentin 600 mg twice daily with about 40% relief, switched to Lyrica.  L5/S1 CHEIKH performed on 2019 with greater than 70% relief ongoing of her back and leg pain, approach from the right due to previous left hemilaminectomy.    Spine surgeries:    Antineuropathics:  Lyrica 100 twice daily  NSAIDs:  Naproxen  Physical therapy:  Antidepressants:  Elavil 10 mg, Celexa 10  Muscle relaxers:  Opioids:  Antiplatelets/Anticoagulants:      ROS:  She reports back pain only.  Balance of review of systems is negative.    Past Medical History:   Diagnosis Date    Anxiety     Arthritis     Chronic allergic rhinitis 2012    Depression     Essential hypertension 2020    Herniated lumbar intervertebral disc     L3    Migraine     painless with numbness       Past Surgical History:   Procedure Laterality Date    COLONOSCOPY      COLONOSCOPY N/A 3/2/2020    Procedure: COLONOSCOPY;  Surgeon: Flash Garnett MD;  Location: [facility] ENDO;  Service: Endoscopy;  Laterality: N/A;    EPIDURAL STEROID INJECTION INTO LUMBAR SPINE N/A 2019    Procedure: Injection-steroid-epidural-lumbar;  Surgeon: Patrick Goyal MD;  Location: [facility] OR;  Service: Pain Management;  Laterality: N/A;  L5/S1 from right side    NASAL SEPTUM SURGERY N/A 2017    SPINE SURGERY  2001    L4    TONSILLECTOMY         Social History     Socioeconomic History    Marital status:    Tobacco Use    Smoking status: Former Smoker     Quit date: 1973     Years since quittin.6    Smokeless tobacco: Never Used   Substance and Sexual Activity    Alcohol use: Yes      "Comment: 1 glass of wine per day    Drug use: No         Medications/Allergies: See med card    Vitals:    22 0804   BP: (!) 150/84   Pulse: 77   Weight: 62.5 kg (137 lb 14.4 oz)   Height: 5' 4" (1.626 m)   PainSc:   4   PainLoc: Foot         Physical exam:  Gen: A and O x3, pleasant, well-groomed  Skin: No rashes or obvious lesions  HEENT: PERRLA, no obvious deformities on ears or in canals. Trachea midline.  CVS: Regular rate and rhythm, normal palpable pulses.  Resp:No increased work of breathing, symmetrical chest rise.  Abdomen: Soft, NT/ND.  Musculoskeletal:  No antalgic gait.     Neuro:  Lower extremities: 5/5 strength bilaterally  Reflexes: Patellar 0+, Achilles 0+ bilaterally.  Sensory:  Intact and symmetrical to light touch and pinprick in L2-S1 dermatomes except for decreased sensation to light touch and pinprick from her knees distally bilaterally.    Lumbar spine:  Lumbar spine:  Range of motion is mildly reduced with forward flexion with slight increased pain, moderately decreased with extension with increased pain in her bilateral low back.   Magnus's test causes no increased pain on either side.    Supine straight leg raise is negative bilaterally.    Internal and external rotation of the hip causes no increased pain on either side.  Myofascial exam: No tenderness to palpation across lumbar paraspinous muscles.    Imagin17 Xray L-spine  Five views lumbar spine were obtained (AP, lateral, right oblique, left oblique and spot views). There is a upper lumbar dextroscoliosis and a mid lumbar levoscoliosis with superimposed multilevel degenerative disc and facet disease. There is loss of disc space height throughout the lumbar region there is lower lumbar degenerative facet disease.    4/15/14 BL Lower extremity EMG:   Mild sensorimotor predominantly axonal neuropathy.    19 MRI L-spine:  L5-S1: Spondylosis associated with disc space narrowing and mild marginal anterior spondylotic " osteophytes.  There is a disc protrusion and osteophyte complex in the left neural foramen that causes a moderate left foraminal stenosis.  There are postoperative changes from a left L5-S1 laminectomy without definite signs for recurrent disc herniations.  There is facet arthropathy.  L4-5: Disc space narrowing associated with marginal anterior spondylotic osteophytes.  There is a mild circumferential annular disc bulge.  Facet arthropathy noted bilaterally.  There is also at least a moderate right lateral recess stenosis.  L3-4: Disc degeneration with with the or disc space narrowing and marginal anterior spondylotic osteophyte.  There is a broad-based posterior osteophyte the and disc bulge complex with compression of the thecal sac greater on the right.  There is a moderate to severe right lateral recess stenosis (image 32 series 5002.  There is associated facet arthropathy.  Mild central canal spinal stenosis.  L2-3: Disc degeneration with disc space narrowing.  There is a broad-based circumferential annular disc bulge.  In the left neural foramen in addition a disc protrusion with moderate obscuration of the foraminal fat is noted.  In the far lateral aspect of the neural foramen (image 16 series 5 the disc protrusion contacts the exiting left L2 root.  Clinical correlation for a left L2 radiculopathy suggested.  L1-2: Disc degeneration with disc space narrowing and marginal anterior spondylotic osteophyte.  There is a broad-based left paracentral disc protrusion with compression of the thecal sac anterolaterally.  There is an extension of the disc protrusion in the left neural foramen associated with at least a moderate foraminal stenosis.  Compression of the exiting left L1 root far laterally in the foramen not excluded.  Right neural foramen is unremarkable.  T12-L1: No disc herniations or spinal stenosis or foraminal stenosis.    Assessment:    The patient is a 71-year-old woman with a history of anxiety,  depression, previous lumbar surgery who presents in referral from Dr. Adam for bilateral foot pain.     1. Lumbar radiculopathy     2. Lumbar spondylosis     3. Idiopathic peripheral neuropathy         Plan:  1.  We are going to get a new MRI of her lumbar spine, we discussed that she does have some slight canal narrowing, significant facet arthropathy and is difficult to tell if her pain is more facet related or stenosis related.  The new MRI will help elucidate this.  I am going to call her with the results and we discussed possibly trying a transforaminal injection versus medial branch block.  We will continue the Lyrica and Elavil since the seem to be helping.

## 2022-07-14 ENCOUNTER — HOSPITAL ENCOUNTER (OUTPATIENT)
Dept: RADIOLOGY | Facility: HOSPITAL | Age: 72
Discharge: HOME | End: 2022-07-14
Attending: ANESTHESIOLOGY
Payer: MEDICARE

## 2022-07-14 DIAGNOSIS — M54.9 DORSALGIA, UNSPECIFIED: ICD-10-CM

## 2022-07-14 PROCEDURE — 72148 MRI LUMBAR SPINE W/O DYE: CPT | Mod: TC,PO

## 2022-07-14 PROCEDURE — 72148 MRI LUMBAR SPINE W/O DYE: CPT | Mod: 26,,, | Performed by: RADIOLOGY

## 2022-07-14 PROCEDURE — 72148 MRI LUMBAR SPINE WITHOUT CONTRAST: ICD-10-PCS | Mod: 26,,, | Performed by: RADIOLOGY

## 2022-07-18 ENCOUNTER — TELEPHONE (OUTPATIENT)
Dept: PAIN MEDICINE | Facility: CLINIC | Age: 72
End: 2022-07-18
Payer: MEDICARE

## 2022-07-18 DIAGNOSIS — M54.16 LUMBAR RADICULOPATHY: Primary | ICD-10-CM

## 2022-07-18 RX ORDER — ALPRAZOLAM 0.5 MG/1
1 TABLET, ORALLY DISINTEGRATING ORAL ONCE AS NEEDED
Status: CANCELLED | OUTPATIENT
Start: 2022-08-04 | End: 2033-12-30

## 2022-07-18 NOTE — TELEPHONE ENCOUNTER
----- Message from Patrick Goyal MD sent at 7/15/2022  5:43 PM CDT -----  Please let the patient know that I reviewed her lumbar spine MRI and she has significant narrowing where the nerves exit that enter into her legs which I think may be part of her leg pain and back pain.  She also has significant arthritis.  I think what we could try 1st would be an L5/S1 CHEIKH from the right side due to left hemilaminectomy.  No need to hold any meds

## 2022-07-18 NOTE — TELEPHONE ENCOUNTER
Call placed to Pt to advise per , he has reviewed her lumbar spine MRI and she has significant narrowing where the nerves exit that enter into her legs which he thinks may be part of her leg pain and back pain.      Pt also has significant arthritis. Dr. Goyal would like to 1st try  an L5/S1 CHEIKH from the right side due to left hemilaminectomy. No need to hold any meds.      Procedure scheduled for 8-4-22. F/U scheduled for 9-13-22.

## 2022-07-28 RX ORDER — PREGABALIN 100 MG/1
100 CAPSULE ORAL 2 TIMES DAILY
Qty: 60 CAPSULE | Refills: 2 | Status: CANCELLED | OUTPATIENT
Start: 2022-07-28

## 2022-07-29 NOTE — TELEPHONE ENCOUNTER
Refills have been requested for the following medications:         pregabalin (LYRICA) 100 MG capsule. Refill approved yesterday and sent to pharm by Dr. Goyal.

## 2022-08-04 ENCOUNTER — HOSPITAL ENCOUNTER (OUTPATIENT)
Dept: RADIOLOGY | Facility: HOSPITAL | Age: 72
Discharge: HOME | End: 2022-08-04
Attending: ANESTHESIOLOGY
Payer: MEDICARE

## 2022-08-04 ENCOUNTER — HOSPITAL ENCOUNTER (OUTPATIENT)
Facility: HOSPITAL | Age: 72
Discharge: HOME | End: 2022-08-04
Attending: ANESTHESIOLOGY | Admitting: ANESTHESIOLOGY
Payer: MEDICARE

## 2022-08-04 VITALS
OXYGEN SATURATION: 95 % | BODY MASS INDEX: 23.05 KG/M2 | HEIGHT: 64 IN | HEART RATE: 61 BPM | RESPIRATION RATE: 18 BRPM | TEMPERATURE: 98 F | SYSTOLIC BLOOD PRESSURE: 150 MMHG | DIASTOLIC BLOOD PRESSURE: 66 MMHG | WEIGHT: 135 LBS

## 2022-08-04 DIAGNOSIS — M54.50 LOWER BACK PAIN: ICD-10-CM

## 2022-08-04 DIAGNOSIS — M54.16 LUMBAR RADICULOPATHY: ICD-10-CM

## 2022-08-04 PROCEDURE — 63600175 PHARM REV CODE 636 W HCPCS: Mod: PO | Performed by: ANESTHESIOLOGY

## 2022-08-04 PROCEDURE — A4216 STERILE WATER/SALINE, 10 ML: HCPCS | Mod: PO | Performed by: ANESTHESIOLOGY

## 2022-08-04 PROCEDURE — 25000003 PHARM REV CODE 250: Mod: PO | Performed by: ANESTHESIOLOGY

## 2022-08-04 PROCEDURE — 76000 FLUOROSCOPY <1 HR PHYS/QHP: CPT | Mod: TC,PO

## 2022-08-04 PROCEDURE — 62323 PR INJ LUMBAR/SACRAL, W/IMAGING GUIDANCE: ICD-10-PCS | Mod: ,,, | Performed by: ANESTHESIOLOGY

## 2022-08-04 PROCEDURE — 25500020 PHARM REV CODE 255: Mod: PO | Performed by: ANESTHESIOLOGY

## 2022-08-04 PROCEDURE — 62323 NJX INTERLAMINAR LMBR/SAC: CPT | Mod: ,,, | Performed by: ANESTHESIOLOGY

## 2022-08-04 PROCEDURE — 62323 NJX INTERLAMINAR LMBR/SAC: CPT | Mod: PO | Performed by: ANESTHESIOLOGY

## 2022-08-04 RX ORDER — ALPRAZOLAM 0.5 MG/1
1 TABLET, ORALLY DISINTEGRATING ORAL ONCE AS NEEDED
Status: COMPLETED | OUTPATIENT
Start: 2022-08-04 | End: 2022-08-04

## 2022-08-04 RX ORDER — LIDOCAINE HYDROCHLORIDE 10 MG/ML
INJECTION, SOLUTION EPIDURAL; INFILTRATION; INTRACAUDAL; PERINEURAL
Status: DISCONTINUED | OUTPATIENT
Start: 2022-08-04 | End: 2022-08-04 | Stop reason: HOSPADM

## 2022-08-04 RX ORDER — METHYLPREDNISOLONE ACETATE 80 MG/ML
INJECTION, SUSPENSION INTRA-ARTICULAR; INTRALESIONAL; INTRAMUSCULAR; SOFT TISSUE
Status: DISCONTINUED | OUTPATIENT
Start: 2022-08-04 | End: 2022-08-04 | Stop reason: HOSPADM

## 2022-08-04 RX ORDER — SODIUM CHLORIDE 9 MG/ML
INJECTION, SOLUTION INTRAMUSCULAR; INTRAVENOUS; SUBCUTANEOUS
Status: DISCONTINUED | OUTPATIENT
Start: 2022-08-04 | End: 2022-08-04 | Stop reason: HOSPADM

## 2022-08-04 RX ADMIN — ALPRAZOLAM 1 MG: 0.5 TABLET, ORALLY DISINTEGRATING ORAL at 02:08

## 2022-08-04 NOTE — OP NOTE
PROCEDURE DATE: 8/4/2022    Lumbar Interlaminar Epidural Steroid Injection under Fluoroscopic Guidance, AP Approach.    Procedure:   Interlaminar epidural steroid injection at L5/S1 to right under fluoroscopic guidance.    Diagnosis: lUMBAR Radiculopathy    pOSTOP DIAGNOSIS: sAME    Physician: Patrick Goyal M.D.    Medications injected:80 mg methylprednisone with 4 ml of preservative free NaCl    Local anesthetic injected:    Lidocaine 1% 4 ml total    Sedation Medications: none    Estimated blood loss:  none    Complications:  none    Technique:  Time-out taken to identify patient and procedure prior to starting the procedure.  With the patient laying in a prone position, the area was prepped and draped in the usual sterile fashion using ChloraPrep and a fenestrated drape.  After determining the target level with an AP fluoroscopic view, local anesthetic was given using a 25-gauge 1.5 inch needle by raising a wheal and then infiltrating toward the interlaminar entry space.  A 3.inch 20-gauge Touhy needle was introduced under AP fluoroscopic guidance to the interlaminar space of L5/S1. Once the trajectory was established, the needle was visualized in the lateral view and advanced using loss of resistance technique. Once in the desired position, omnipaque contrast was injected to confirm placement and there was no vascular uptake nor intrathecal spread.  The medication was then injected slowly. The patient tolerated the procedure well.      The patient was monitored after the procedure.   They were given post-procedure and discharge instructions to follow at home.  The patient was discharged in a stable condition.      
English

## 2022-08-04 NOTE — DISCHARGE SUMMARY
[facility] - Surgery  Discharge Note  Short Stay    Procedure(s) (LRB):  Injection-steroid-epidural-lumbar, L5/S1  from the right side (N/A)    OUTCOME: Patient tolerated treatment/procedure well without complication and is now ready for discharge.    DISPOSITION: Home or Self Care    FINAL DIAGNOSIS:  Lumbar radiculopathy    FOLLOWUP: In clinic    DISCHARGE INSTRUCTIONS:    Discharge Procedure Orders   Diet Adult Regular     No dressing needed     Notify your health care provider if you experience any of the following:  temperature >100.4     Activity as tolerated

## 2022-08-04 NOTE — DISCHARGE INSTRUCTIONS

## 2022-09-13 ENCOUNTER — OFFICE VISIT (OUTPATIENT)
Dept: PAIN MEDICINE | Facility: CLINIC | Age: 72
End: 2022-09-13
Payer: MEDICARE

## 2022-09-13 VITALS
BODY MASS INDEX: 24.07 KG/M2 | OXYGEN SATURATION: 98 % | HEART RATE: 68 BPM | DIASTOLIC BLOOD PRESSURE: 66 MMHG | SYSTOLIC BLOOD PRESSURE: 147 MMHG | WEIGHT: 141 LBS | HEIGHT: 64 IN

## 2022-09-13 DIAGNOSIS — G60.9 IDIOPATHIC PERIPHERAL NEUROPATHY: ICD-10-CM

## 2022-09-13 DIAGNOSIS — M54.16 LUMBAR RADICULOPATHY: Primary | ICD-10-CM

## 2022-09-13 DIAGNOSIS — M47.816 LUMBAR SPONDYLOSIS: ICD-10-CM

## 2022-09-13 PROCEDURE — 99213 OFFICE O/P EST LOW 20 MIN: CPT | Mod: S$PBB,,, | Performed by: PHYSICIAN ASSISTANT

## 2022-09-13 PROCEDURE — 99213 PR OFFICE/OUTPT VISIT, EST, LEVL III, 20-29 MIN: ICD-10-PCS | Mod: S$PBB,,, | Performed by: PHYSICIAN ASSISTANT

## 2022-09-13 PROCEDURE — 99999 PR PBB SHADOW E&M-EST. PATIENT-LVL III: CPT | Mod: PBBFAC,,, | Performed by: PHYSICIAN ASSISTANT

## 2022-09-13 PROCEDURE — 99999 PR PBB SHADOW E&M-EST. PATIENT-LVL III: ICD-10-PCS | Mod: PBBFAC,,, | Performed by: PHYSICIAN ASSISTANT

## 2022-09-13 PROCEDURE — 99213 OFFICE O/P EST LOW 20 MIN: CPT | Mod: PBBFAC,PN | Performed by: PHYSICIAN ASSISTANT

## 2022-09-17 NOTE — PROGRESS NOTES
This note was completed with dictation software and grammatical errors may exist.    CC:  Bilateral foot pain, back pain, leg pain    HPI:  The patient is a 71-year-old woman with a history of anxiety, depression, previous lumbar surgery who presents in referral from Dr. Adam for bilateral foot pain.  She is status post L5/S1 interlaminar epidural steroid injection from the right side on 08/04/2022 with 50% relief of her back pain but 0% relief of her leg pain.  The patient is new to me.  She describes stiffness in her low back that is tolerable.  She reports that she continues to have her lower extremity neuropathy symptoms that is tolerable with Lyrica.  She does feel that she has some urinary interruption with the Lyrica but would like to continue to take this because it provides her with great benefit.  She has been exercising and doing Pilates as well as the elliptical machine.  She continues to have tingling in her legs bilaterally.  She denies weakness or incontinence.    Previous history:  The patient reports developing bilateral foot pain about 25 years ago with numbness and tingling, has been diagnosed with neuropathy but workup has not revealed any particular  etiology.  This has been progressing over the years and has gone from being located only in the feet and now extending proximally up to the knees bilaterally.  She describes it as throbbing, tingling with abnormal sensation, particularly worse with standing, walking, exercising.  She does get some relief with rest, heat and medications. She has been taking gabapentin for years, several years has been on 600 mg twice daily with about 40% relief.  She also has a history of low back and buttock pain, ended up having severe right leg pain and in 2001 underwent laminectomy, unclear what level but reports that her right back and leg pain resolved at that time.  However over the years she has developed more bilateral buttock pain, thigh pain worse with  standing and walking, describes this as dull.  She denies any weakness in her legs, denies any bowel or bladder incontinence.    Pain intervention history:  She has done physical therapy multiple times, chiropractic care without relief.  She has not tried Cymbalta.  She has been taking gabapentin 600 mg twice daily with about 40% relief, switched to Lyrica.  L5/S1 CHEIKH performed on 05/20/2019 with greater than 70% relief ongoing of her back and leg pain, approach from the right due to previous left hemilaminectomy. She is status post L5/S1 interlaminar epidural steroid injection from the right side on 08/04/2022 with 50% relief of her back pain but 0% relief of her leg pain.      Spine surgeries:  L5/S1 left hemilaminectomy    Antineuropathics:  Lyrica 100 twice daily  NSAIDs:  Naproxen  Physical therapy:  Antidepressants:  Elavil 10 mg, Celexa 10  Muscle relaxers:  Opioids:  Antiplatelets/Anticoagulants:      ROS:  She reports back pain only.  Balance of review of systems is negative.    Past Medical History:   Diagnosis Date    Anxiety     Arthritis     Chronic allergic rhinitis 11/19/2012    Depression     Essential hypertension 9/14/2020    Herniated lumbar intervertebral disc     L3    Migraine     painless with numbness       Past Surgical History:   Procedure Laterality Date    COLONOSCOPY      COLONOSCOPY N/A 3/2/2020    Procedure: COLONOSCOPY;  Surgeon: Flash Garnett MD;  Location: [facility];  Service: Endoscopy;  Laterality: N/A;    EPIDURAL STEROID INJECTION INTO LUMBAR SPINE N/A 5/20/2019    Procedure: Injection-steroid-epidural-lumbar;  Surgeon: Patrick Goyal MD;  Location: [facility] OR;  Service: Pain Management;  Laterality: N/A;  L5/S1 from right side    EPIDURAL STEROID INJECTION INTO LUMBAR SPINE N/A 8/4/2022    Procedure: Injection-steroid-epidural-lumbar, L5/S1  from the right side;  Surgeon: Patrick Goyal MD;  Location: [facility] OR;  Service: Pain Management;  Laterality: N/A;    NASAL  "SEPTUM SURGERY N/A 2017    SPINE SURGERY  2001    L4    TONSILLECTOMY         Social History     Socioeconomic History    Marital status:    Tobacco Use    Smoking status: Former     Types: Cigarettes     Quit date: 1973     Years since quittin.8    Smokeless tobacco: Never   Substance and Sexual Activity    Alcohol use: Yes     Comment: 1 glass of wine per day    Drug use: No         Medications/Allergies: See med card    Vitals:    22 1415   BP: (!) 147/66   Pulse: 68   SpO2: 98%   Weight: 64 kg (140 lb 15.8 oz)   Height: 5' 4" (1.626 m)   PainSc:   2   PainLoc: Back         Physical exam:  Gen: A and O x3, pleasant, well-groomed  Skin: No rashes or obvious lesions  HEENT: PERRLA, no obvious deformities on ears or in canals. Trachea midline.  CVS: Regular rate and rhythm, normal palpable pulses.  Resp:No increased work of breathing, symmetrical chest rise.  Abdomen: Soft, NT/ND.  Musculoskeletal:  No antalgic gait.     Neuro:  Lower extremities: 5/5 strength bilaterally  Reflexes: Patellar 0+, Achilles 0+ bilaterally.  Sensory:  Intact and symmetrical to light touch and pinprick in L2-S1 dermatomes except for decreased sensation to light touch and pinprick from her knees distally bilaterally.    Lumbar spine:  Lumbar spine:  Range of motion is mildly reduced with forward flexion with slight increased pain, moderately decreased with extension with increased pain in her bilateral low back.   Magnus's test causes no increased pain on either side.    Supine straight leg raise is negative bilaterally.    Internal and external rotation of the hip causes no increased pain on either side.  Myofascial exam: No tenderness to palpation across lumbar paraspinous muscles.    Imagin17 Xray L-spine  Five views lumbar spine were obtained (AP, lateral, right oblique, left oblique and spot views). There is a upper lumbar dextroscoliosis and a mid lumbar levoscoliosis with superimposed multilevel " degenerative disc and facet disease. There is loss of disc space height throughout the lumbar region there is lower lumbar degenerative facet disease.    4/15/14 BL Lower extremity EMG:   Mild sensorimotor predominantly axonal neuropathy.    4/17/19 MRI L-spine:  L5-S1: Spondylosis associated with disc space narrowing and mild marginal anterior spondylotic osteophytes.  There is a disc protrusion and osteophyte complex in the left neural foramen that causes a moderate left foraminal stenosis.  There are postoperative changes from a left L5-S1 laminectomy without definite signs for recurrent disc herniations.  There is facet arthropathy.  L4-5: Disc space narrowing associated with marginal anterior spondylotic osteophytes.  There is a mild circumferential annular disc bulge.  Facet arthropathy noted bilaterally.  There is also at least a moderate right lateral recess stenosis.  L3-4: Disc degeneration with with the or disc space narrowing and marginal anterior spondylotic osteophyte.  There is a broad-based posterior osteophyte the and disc bulge complex with compression of the thecal sac greater on the right.  There is a moderate to severe right lateral recess stenosis (image 32 series 5002.  There is associated facet arthropathy.  Mild central canal spinal stenosis.  L2-3: Disc degeneration with disc space narrowing.  There is a broad-based circumferential annular disc bulge.  In the left neural foramen in addition a disc protrusion with moderate obscuration of the foraminal fat is noted.  In the far lateral aspect of the neural foramen (image 16 series 5 the disc protrusion contacts the exiting left L2 root.  Clinical correlation for a left L2 radiculopathy suggested.  L1-2: Disc degeneration with disc space narrowing and marginal anterior spondylotic osteophyte.  There is a broad-based left paracentral disc protrusion with compression of the thecal sac anterolaterally.  There is an extension of the disc  protrusion in the left neural foramen associated with at least a moderate foraminal stenosis.  Compression of the exiting left L1 root far laterally in the foramen not excluded.  Right neural foramen is unremarkable.  T12-L1: No disc herniations or spinal stenosis or foraminal stenosis.    Assessment:    The patient is a 71-year-old woman with a history of anxiety, depression, previous lumbar surgery who presents in referral from Dr. Adam for bilateral foot pain.     1. Lumbar radiculopathy        2. Lumbar spondylosis        3. Idiopathic peripheral neuropathy            Plan:  1. The patient had sufficient relief of her back pain following the L5/S1 interlaminar epidural steroid injection from the right side.  This can be repeated in the future if necessary.  2. She will continue to take Lyrica for her bilateral leg and foot symptoms.    3. Follow-up in 3 months or sooner as needed.

## 2022-09-23 ENCOUNTER — PATIENT MESSAGE (OUTPATIENT)
Dept: PAIN MEDICINE | Facility: CLINIC | Age: 72
End: 2022-09-23
Payer: MEDICARE

## 2022-09-23 DIAGNOSIS — G43.909 MIGRAINE SYNDROME: Primary | ICD-10-CM

## 2022-09-27 ENCOUNTER — PATIENT MESSAGE (OUTPATIENT)
Dept: FAMILY MEDICINE | Facility: CLINIC | Age: 72
End: 2022-09-27
Payer: MEDICARE

## 2022-09-27 NOTE — TELEPHONE ENCOUNTER
Called patient to set new patient appointment but she was not able to do so at this time. She requested me to call back after lunch.

## 2022-09-28 ENCOUNTER — TELEPHONE (OUTPATIENT)
Dept: NEUROLOGY | Facility: CLINIC | Age: 72
End: 2022-09-28
Payer: MEDICARE

## 2022-09-28 DIAGNOSIS — I10 ESSENTIAL HYPERTENSION: ICD-10-CM

## 2022-09-30 ENCOUNTER — OFFICE VISIT (OUTPATIENT)
Dept: NEUROLOGY | Facility: CLINIC | Age: 72
End: 2022-09-30
Payer: MEDICARE

## 2022-09-30 ENCOUNTER — LAB VISIT (OUTPATIENT)
Dept: LAB | Facility: HOSPITAL | Age: 72
End: 2022-09-30
Payer: MEDICARE

## 2022-09-30 VITALS
RESPIRATION RATE: 17 BRPM | SYSTOLIC BLOOD PRESSURE: 167 MMHG | WEIGHT: 142.19 LBS | HEIGHT: 64 IN | HEART RATE: 62 BPM | BODY MASS INDEX: 24.28 KG/M2 | DIASTOLIC BLOOD PRESSURE: 66 MMHG

## 2022-09-30 DIAGNOSIS — G43.019 INTRACTABLE MIGRAINE WITHOUT AURA AND WITHOUT STATUS MIGRAINOSUS: Primary | ICD-10-CM

## 2022-09-30 DIAGNOSIS — R11.0 NAUSEA: ICD-10-CM

## 2022-09-30 DIAGNOSIS — R51.9 WORSENING HEADACHES: ICD-10-CM

## 2022-09-30 DIAGNOSIS — G43.909 MIGRAINE SYNDROME: ICD-10-CM

## 2022-09-30 DIAGNOSIS — R51.0 POSITIONAL HEADACHE: ICD-10-CM

## 2022-09-30 DIAGNOSIS — G43.019 INTRACTABLE MIGRAINE WITHOUT AURA AND WITHOUT STATUS MIGRAINOSUS: ICD-10-CM

## 2022-09-30 LAB
CREAT SERPL-MCNC: 0.8 MG/DL (ref 0.5–1.4)
EST. GFR  (NO RACE VARIABLE): >60 ML/MIN/1.73 M^2

## 2022-09-30 PROCEDURE — 99214 PR OFFICE/OUTPT VISIT, EST, LEVL IV, 30-39 MIN: ICD-10-PCS | Mod: S$PBB,,, | Performed by: NURSE PRACTITIONER

## 2022-09-30 PROCEDURE — 99999 PR PBB SHADOW E&M-EST. PATIENT-LVL V: ICD-10-PCS | Mod: PBBFAC,,, | Performed by: NURSE PRACTITIONER

## 2022-09-30 PROCEDURE — 82565 ASSAY OF CREATININE: CPT | Performed by: NURSE PRACTITIONER

## 2022-09-30 PROCEDURE — 36415 COLL VENOUS BLD VENIPUNCTURE: CPT | Mod: PO | Performed by: NURSE PRACTITIONER

## 2022-09-30 PROCEDURE — 99214 OFFICE O/P EST MOD 30 MIN: CPT | Mod: S$PBB,,, | Performed by: NURSE PRACTITIONER

## 2022-09-30 PROCEDURE — 99999 PR PBB SHADOW E&M-EST. PATIENT-LVL V: CPT | Mod: PBBFAC,,, | Performed by: NURSE PRACTITIONER

## 2022-09-30 PROCEDURE — 99215 OFFICE O/P EST HI 40 MIN: CPT | Mod: PBBFAC,PO | Performed by: NURSE PRACTITIONER

## 2022-09-30 RX ORDER — UBROGEPANT 100 MG/1
TABLET ORAL
Qty: 8 TABLET | Refills: 11 | Status: SHIPPED | OUTPATIENT
Start: 2022-09-30 | End: 2023-01-24

## 2022-09-30 RX ORDER — ONDANSETRON 4 MG/1
4 TABLET, ORALLY DISINTEGRATING ORAL EVERY 6 HOURS PRN
Qty: 30 TABLET | Refills: 11 | Status: SHIPPED | OUTPATIENT
Start: 2022-09-30 | End: 2024-01-09

## 2022-09-30 NOTE — PATIENT INSTRUCTIONS
Please call our clinic at [phone] or send a message on the [employer] portal if there are any changes to the plan described below, for example,if you are not contacted for the requested tests, referral(s) within one week, if you are unable to receive the medications prescribed, or if you feel you need to change the treatment course for any reason.     TESTING:  -- MRI brain    REFERRALS:  -- none at this time    PREVENTION (use daily regardless of headache):  -- start magnesium in ONE of the following preparations -               1. Magnesium oxide 800mg daily (the most common over the counter kind, may causes loose stools)              2. Magnesium citrate 400-500mg daily (harder to find, but more neutral on the bowels)              3. Magnesium glycinate 400mg daily (hardest to find, look online, but most bowel-neutral, best absorbed)     AS-NEEDED TREATMENT (use total no more than 10 days per month unless otherwise stated):  -- START Ubrelvy with next migraine. You can repeat two hours later if needed. With this medication do not drink grapefruit juice or eat grapefruit or some medications like ketoconazole, itraconazole, or antibiotics clarithromycin  -- START Zofran for nausea  -- Try FL41 glasses for light sensitivity

## 2022-09-30 NOTE — PROGRESS NOTES
"Date of service: 9/30/2022  Referring provider: Dr. Patrick Goyal    Subjective:      Chief complaint: Headache       Patient ID: Yolie Brambila is a 72 y.o. female with depression, HTN, peripheral neuropathy, lumbar radiculopathy, CARYL, RLS, migraines who presents for new patient evaluation of headache     History of Present Illness    ORIGINAL HEADACHE HISTORY - 9/30/22  Age at onset and course over time: intermittently since her 20's. Initially rare episodes once a year. In the past couple years, frequency and intensity increasing. Episodes now have vomiting and diarrhea. She did recently "almost fail" an eye test. She has had cataract surgery.   This year, she has had two severe attack, one in July and one in September. Rare to no headaches outside those attacks.    Aura - whole body feels off balance, has happened prior to several migraines     Family history of migraines - mom  Last eye exam - May 2022 but has upcoming appt     Location: forehead   Quality:  [] pressure [] tight [x] throbbing [] sharp [] stabbing   Severity: current 2, nausea and vomiting are worst symptoms   Duration: hours  Frequency: probably every 2 months   Headaches awaken at night?: yes   Worst time of day: evening   Associated with: [x] photophobia []  phonophobia [x] osmophobia [x] blurred vision  [] double vision [x] loss of appetite [x] nausea [x] vomiting [x] dizziness [x] vertigo  [] tinnitus [] irritability [] sinus pressure [] problems with concentration   [] neck tightness   Alleviated by:  [] sleep [x] darkness [] massage [] heat [] ice [x] medication  Exacerbated by:  [] fatigue [x] light [x] noise [x] smells [] coughing [] sneezing  [x] bending over [] ovulation [] menses [] alcohol [] change in weather []  stress  Ipsilateral autonomic: [] nasal congestion [] lacrimation [] ptosis [] injection [] edema [] foreign body sensation [] ear fullness   ICP:  [] transient visual obscurations  [x] tinnitus low pitched " bilateral   [x] positional headache - worse with standing  [] non-positional   Sleep habits: snoring, had sleep study in the past and CPAP recommended but had a repeat study that did not indicate sleep apnea   Caffeine intake: 2 cups  Gyn status (if female): menopausal   MIDAS: 9    Current acute treatment:  Ibuprofen - 2 days per week     Current prevention:  Requip  (Losartan)  Celexa  Amitriptyline  Lyrica    Previously tried/failed acute treatment:  Naproxen  Sumatriptan   Zofran     Previously tried/failed preventative treatment:  Gabapentin  Nortriptyline      Review of patient's allergies indicates:   Allergen Reactions    Penicillins Shortness Of Breath     Current Outpatient Medications   Medication Sig Dispense Refill    alendronate (FOSAMAX) 70 MG tablet TAKE ONE TABLET BY MOUTH EVERY 7 DAYS 12 tablet 3    amitriptyline (ELAVIL) 10 MG tablet TAKE 1 TABLET(10 MG) BY MOUTH EVERY EVENING 30 tablet 6    citalopram (CELEXA) 10 MG tablet TAKE 1 TABLET BY MOUTH EVERY DAY 90 tablet 3    cycloSPORINE (RESTASIS) 0.05 % ophthalmic emulsion Place 1 drop into both eyes 2 (two) times daily.      losartan (COZAAR) 50 MG tablet TAKE 1 TABLET(50 MG) BY MOUTH EVERY DAY 90 tablet 3    naproxen (NAPROSYN) 250 MG tablet Take 500 mg by mouth 2 (two) times daily with meals.      pregabalin (LYRICA) 100 MG capsule TAKE 1 CAPSULE(100 MG) BY MOUTH TWICE DAILY 60 capsule 2    rOPINIRole (REQUIP) 0.25 MG tablet TAKE 2 TABLETS BY MOUTH EVERY EVENING 180 tablet 3    scopolamine (TRANSDERM-SCOP) 1.3-1.5 mg (1 mg over 3 days) Place 1 patch onto the skin every 72 hours. 2 patch 1    triamcinolone (NASACORT) 55 mcg nasal inhaler 1 spray by Nasal route once daily.      estradiol (ESTRACE) 0.01 % (0.1 mg/gram) vaginal cream Place 1 g vaginally once daily. 30 g 11    ondansetron (ZOFRAN-ODT) 4 MG TbDL Take 1 tablet (4 mg total) by mouth every 6 (six) hours as needed (nausea). 30 tablet 11    ubrogepant (UBRELVY) 100 mg tablet Take 1 tablet  by mouth as needed for migraine. May repeat in 2 hours if needed. Max 2 tablets per day 8 tablet 11     No current facility-administered medications for this visit.       Past Medical History  Past Medical History:   Diagnosis Date    Anxiety     Arthritis     Chronic allergic rhinitis 2012    Depression     Essential hypertension 2020    Herniated lumbar intervertebral disc     L3    Migraine     painless with numbness       Past Surgical History  Past Surgical History:   Procedure Laterality Date    COLONOSCOPY      COLONOSCOPY N/A 3/2/2020    Procedure: COLONOSCOPY;  Surgeon: Flash Garnett MD;  Location: [facility];  Service: Endoscopy;  Laterality: N/A;    EPIDURAL STEROID INJECTION INTO LUMBAR SPINE N/A 2019    Procedure: Injection-steroid-epidural-lumbar;  Surgeon: Patrick Goyal MD;  Location: [facility] OR;  Service: Pain Management;  Laterality: N/A;  L5/S1 from right side    EPIDURAL STEROID INJECTION INTO LUMBAR SPINE N/A 2022    Procedure: Injection-steroid-epidural-lumbar, L5/S1  from the right side;  Surgeon: Patrick Goyal MD;  Location: [facility] OR;  Service: Pain Management;  Laterality: N/A;    NASAL SEPTUM SURGERY N/A 2017    SPINE SURGERY  2001    L4    TONSILLECTOMY         Family History  Family History   Problem Relation Age of Onset    Cancer Mother 78        colon cancer    Heart disease Mother 78        cabg    Diabetes Father     Heart disease Father 71        cabg    Diabetes Brother     Cancer Brother         CM L.    Heart disease Brother 50        Mi       Social History  Social History     Socioeconomic History    Marital status:    Tobacco Use    Smoking status: Former     Types: Cigarettes     Quit date: 1973     Years since quittin.8    Smokeless tobacco: Never   Substance and Sexual Activity    Alcohol use: Yes     Comment: 1 glass of wine per day    Drug use: No     Social Determinants of Health     Financial Resource Strain: Low Risk      Difficulty of Paying Living Expenses: Not hard at all   Food Insecurity: No Food Insecurity    Worried About Running Out of Food in the Last Year: Never true    Ran Out of Food in the Last Year: Never true   Transportation Needs: No Transportation Needs    Lack of Transportation (Medical): No    Lack of Transportation (Non-Medical): No   Physical Activity: Sufficiently Active    Days of Exercise per Week: 4 days    Minutes of Exercise per Session: 40 min   Stress: No Stress Concern Present    Feeling of Stress : Not at all   Social Connections: Unknown    Frequency of Communication with Friends and Family: More than three times a week    Frequency of Social Gatherings with Friends and Family: Twice a week    Active Member of Clubs or Organizations: No    Attends Club or Organization Meetings: Never    Marital Status:    Housing Stability: Low Risk     Unable to Pay for Housing in the Last Year: No    Number of Places Lived in the Last Year: 1    Unstable Housing in the Last Year: No        Review of Systems  14-point review of systems as follows:   No check kirk indicates NEGATIVE response   Constitutional: [] weight loss, [x] change to appetite   Eyes: [] change in vision, [] double vision   Ears, nose, mouth, throat: [] frequent nose bleeds, [x] ringing in the ears   Respiratory: [] cough, [] wheezing   Cardiovascular: [] chest pain, [] palpitations   Gastrointestinal: [] jaundice, [] nausea/vomiting   Genitourinary: [] incontinence, [] burning with urination   Hematologic/lymphatic: [] easy bruising/bleeding, [] night sweats   Neurological: [] numbness, [] weakness   Endocrine: [] fatigue, [] heat/cold intolerance   Allergy/Immunologic: [] fevers, [] chills   Musculoskeletal: [] muscle pain, [] joint pain   Psychiatric: [] thoughts of harming self/others, [] depression   Integumentary: [] rashes, [] sores that do not heal     Objective:        Vitals:    09/30/22 1409   BP: (!) 167/66   Pulse: 62    Resp: 17     Body mass index is 24.41 kg/m².    9/30/22  Constitutional: appears in no acute distress, well-developed, well-nourished     Eyes: normal conjunctiva, PERRLA    Ears, nose, mouth, throat: external appearance of ears and nose normal, hearing intact     Cardiovascular: regular rate and rhythm, no murmurs appreciated    Respiratory: unlabored respirations, breath sounds normal bilaterally    Gastrointestinal: no visible abdominal masses, no guarding, no visible hernia    Musculoskeletal: normal tone in all four extremities. No abnormal movements. No pronator drift. No orbit. Symmetric finger tapping. Normal station. Normal regular gait. Normal tandem gait.      Spine:   CERVICAL SPINE:  ROM: normal   MUSCLE SPASM: no   FACET LOADING: no   SPURLING: no  PINKY / JAGJIT tender: no     Psychiatric: normal judgment and insight. Oriented to person, place, and time.     Neurologic:   Cortical functions: recent and remote memory intact, normal attention span and concentration, speech fluent, adequate fund of knowledge   Cranial nerves: visual fields full, PERRLA, EOMI, symmetric facial strength, hearing intact, palate elevates symmetrically, shoulder shrug 5/5, tongue protrudes midline   Reflexes: 2+ in the upper and lower extremities, no Diamond  Sensation: intact to temperature throughout   Coordination: normal finger to nose, heel to shin, tandem gait     Data Review:     I have personally reviewed the referring provider's notes, labs, & imaging made available to me today.      RADIOLOGY STUDIES:  I have personally reviewed the pertinent images performed.       No results found for this or any previous visit.    Lab Results   Component Value Date     (L) 09/24/2021    K 4.8 09/24/2021     09/24/2021    CO2 24 09/24/2021    BUN 14 09/24/2021    CREATININE 0.8 09/24/2021     09/24/2021    HGBA1C 5.4 09/08/2020    AST 20 09/24/2021    ALT 15 09/24/2021    ALBUMIN 4.2 09/24/2021    PROT 7.1  09/24/2021    BILITOT 0.5 09/24/2021    CHOL 190 09/24/2021    HDL 59 09/24/2021    LDLCALC 110.0 09/24/2021    TRIG 105 09/24/2021       Lab Results   Component Value Date    WBC 6.19 09/24/2021    HGB 12.6 09/24/2021    HCT 39.4 09/24/2021    MCV 95 09/24/2021     09/24/2021       Lab Results   Component Value Date    TSH 1.306 04/28/2014           Assessment & Plan:       Problem List Items Addressed This Visit          Neuro    Intractable migraine without aura and without status migrainosus - Primary    Overview     Rare episodic migraines since her 20's. Headaches are typically moderate to severe in intensity, worsen with activity, pounding in quality and associated with sensitivity to light and smell. She previously had one episode per year but has had several intense migraines back to back along with a new positional component. This warrants imaging.      Episodes are still low episodic so will start magnesium for prevention. Cardiac contraindication to triptans. Start gepant.            Relevant Medications    ubrogepant (UBRELVY) 100 mg tablet    Other Relevant Orders    Creatinine, serum     Other Visit Diagnoses       Migraine syndrome        Worsening headaches        Relevant Orders    MRI Brain W WO Contrast    Positional headache        Relevant Orders    MRI Brain W WO Contrast    Nausea        Relevant Medications    ondansetron (ZOFRAN-ODT) 4 MG TbDL                Please call our clinic at [phone] or send a message on the [employer] portal if there are any changes to the plan described below, for example,if you are not contacted for the requested tests, referral(s) within one week, if you are unable to receive the medications prescribed, or if you feel you need to change the treatment course for any reason.     TESTING:  -- MRI brain    REFERRALS:  -- none at this time    PREVENTION (use daily regardless of headache):  -- start magnesium in ONE of the following preparations -                1. Magnesium oxide 800mg daily (the most common over the counter kind, may causes loose stools)              2. Magnesium citrate 400-500mg daily (harder to find, but more neutral on the bowels)              3. Magnesium glycinate 400mg daily (hardest to find, look online, but most bowel-neutral, best absorbed)     AS-NEEDED TREATMENT (use total no more than 10 days per month unless otherwise stated):  -- START Ubrelvy with next migraine. You can repeat two hours later if needed. With this medication do not drink grapefruit juice or eat grapefruit or some medications like ketoconazole, itraconazole, or antibiotics clarithromycin  -- START Zofran for nausea  -- Try FL41 glasses for light sensitivity     Follow up in about 2 months (around 11/30/2022) for follow up with ANNE.    Nancy Alonso NP

## 2022-10-03 ENCOUNTER — PATIENT OUTREACH (OUTPATIENT)
Dept: ADMINISTRATIVE | Facility: HOSPITAL | Age: 72
End: 2022-10-03
Payer: MEDICARE

## 2022-10-03 NOTE — PROGRESS NOTES
Health Maintenance Due   Topic Date Due    TETANUS VACCINE  11/03/2021    COVID-19 Vaccine (4 - Booster for Moderna series) 01/21/2022

## 2022-10-04 ENCOUNTER — OFFICE VISIT (OUTPATIENT)
Dept: FAMILY MEDICINE | Facility: CLINIC | Age: 72
End: 2022-10-04
Payer: MEDICARE

## 2022-10-04 VITALS
RESPIRATION RATE: 12 BRPM | DIASTOLIC BLOOD PRESSURE: 62 MMHG | BODY MASS INDEX: 24.18 KG/M2 | WEIGHT: 140.88 LBS | OXYGEN SATURATION: 97 % | HEART RATE: 68 BPM | SYSTOLIC BLOOD PRESSURE: 130 MMHG

## 2022-10-04 DIAGNOSIS — Z80.0 FHX: COLON CANCER: ICD-10-CM

## 2022-10-04 DIAGNOSIS — I10 ESSENTIAL HYPERTENSION: ICD-10-CM

## 2022-10-04 DIAGNOSIS — J30.9 CHRONIC ALLERGIC RHINITIS: ICD-10-CM

## 2022-10-04 DIAGNOSIS — G43.109 MIGRAINE EQUIVALENT SYNDROME: ICD-10-CM

## 2022-10-04 DIAGNOSIS — M54.16 LUMBAR RADICULOPATHY: ICD-10-CM

## 2022-10-04 DIAGNOSIS — G25.81 RESTLESS LEG: ICD-10-CM

## 2022-10-04 DIAGNOSIS — M81.0 SENILE OSTEOPOROSIS: ICD-10-CM

## 2022-10-04 DIAGNOSIS — G47.33 OSA (OBSTRUCTIVE SLEEP APNEA): ICD-10-CM

## 2022-10-04 DIAGNOSIS — F33.42 RECURRENT MAJOR DEPRESSIVE DISORDER, IN FULL REMISSION: ICD-10-CM

## 2022-10-04 DIAGNOSIS — Z79.899 DRUG THERAPY: ICD-10-CM

## 2022-10-04 DIAGNOSIS — Z23 IMMUNIZATION DUE: Primary | ICD-10-CM

## 2022-10-04 DIAGNOSIS — M47.816 LUMBAR SPONDYLOSIS: ICD-10-CM

## 2022-10-04 DIAGNOSIS — G60.9 IDIOPATHIC PERIPHERAL NEUROPATHY: ICD-10-CM

## 2022-10-04 PROCEDURE — G0008 ADMIN INFLUENZA VIRUS VAC: HCPCS | Mod: PBBFAC

## 2022-10-04 PROCEDURE — 99999 PR PBB SHADOW E&M-EST. PATIENT-LVL IV: CPT | Mod: PBBFAC,,, | Performed by: FAMILY MEDICINE

## 2022-10-04 PROCEDURE — 99214 OFFICE O/P EST MOD 30 MIN: CPT | Mod: PBBFAC,25,PN | Performed by: FAMILY MEDICINE

## 2022-10-04 PROCEDURE — 99214 PR OFFICE/OUTPT VISIT, EST, LEVL IV, 30-39 MIN: ICD-10-PCS | Mod: S$PBB,,, | Performed by: FAMILY MEDICINE

## 2022-10-04 PROCEDURE — 99999 PR PBB SHADOW E&M-EST. PATIENT-LVL IV: ICD-10-PCS | Mod: PBBFAC,,, | Performed by: FAMILY MEDICINE

## 2022-10-04 PROCEDURE — 99214 OFFICE O/P EST MOD 30 MIN: CPT | Mod: S$PBB,,, | Performed by: FAMILY MEDICINE

## 2022-10-04 NOTE — PROGRESS NOTES
THIS DOCUMENT WAS MADE IN PART WITH VOICE RECOGNITION SOFTWARE.  OCCASIONALLY THIS SOFTWARE WILL MISINTERPRET WORDS OR PHRASES.    Assessment and Plan:    1. Immunization due        2. CARYL (obstructive sleep apnea)        3. FHx: colon cancer        4. Idiopathic peripheral neuropathy        5. Migraine equivalent syndrome        6. Chronic allergic rhinitis        7. Lumbar spondylosis        8. Lumbar radiculopathy        9. Restless leg        10. Essential hypertension        11. Senile osteoporosis        12. Recurrent major depressive disorder, in full remission        13. Drug therapy  CBC Auto Differential    Lipid Panel    Comprehensive Metabolic Panel    Hemoglobin A1C    TSH    T4, Free    Vitamin D    Vitamin B12    Urinalysis, Reflex to Urine Culture Urine, Clean Catch          Continue workup with Neurology, Ophthalmology for headaches and visual changes respectively     Wellness labs as above    Influenza vaccine today, counseled on tetanus and COVID vaccine     Chronic conditions reviewed, appear stable    ______________________________________________________________________  Subjective:    Chief Complaint:  Chief Complaint   Patient presents with    hospitals Care        HPI:  Yolie PANIAGUA is a 72 y.o. year old     Upcoming MRI for new / more frequent migraines    Upcoming appt with ophtho for decreased visual acuity       Idiopathic peripheral neuropathy   Med- Lyrica 100 mg (effective)    History of migraine   Prophylaxis- Amitriptyline 10 mg   Abortive - Ubrevly  Neurology : Nancy Alonso NP  SOTELO frequency : used to be yearly, has had 2 in last year (severe) and more mild ones   MRI pending     Chronic back pain   Rx : Amitriptyline 10 mg, Lyrica 100 mg, Naproxen BID  Prev Tx : CHEIKH  Spine surgery in 2001 : laminectomy of L4   Pain mgt : MD Jay Jay     Restless leg syndrome   Rx-Requip 0.50 mg QHS (effective)     Chronic allergic rhinitis   Rx-Nasacort, Claritin   Septoplasty 2017    Essential  "hypertension   Rx-losartan 50 mg   No home check     Family history colon cancer (Mother dx @ approx 80)  Up-to-date on colon cancer screening   Asymptomatic  Follow-up recommended every 5 years   previous colonoscopy 03/02/2020    Atrophic vaginitis   Rx-Estrace vaginal cream    Obstructive sleep apnea  No CPAP   "Old diagnosis" / Repeat sleep study was mild CARYL, no need for CPAP   ++ Snoring    Nicotine dependence   In remission, quit in 1973    Senile osteoporosis  Previous DEXA bone scan 03/01/2021   Rx-alendronate weekly  No recent fractures     Anxiety, depression  med-Celexa 10 mg (effective)     Past Medical History:  Past Medical History:   Diagnosis Date    Anxiety     Arthritis     Chronic allergic rhinitis 11/19/2012    Depression     Essential hypertension 9/14/2020    Herniated lumbar intervertebral disc     L3    Migraine     painless with numbness       Past Surgical History:  Past Surgical History:   Procedure Laterality Date    COLONOSCOPY      COLONOSCOPY N/A 03/02/2020    Procedure: COLONOSCOPY;  Surgeon: Flash Garnett MD;  Location: [facility] ENDO;  Service: Endoscopy;  Laterality: N/A;    EPIDURAL STEROID INJECTION INTO LUMBAR SPINE N/A 05/20/2019    Procedure: Injection-steroid-epidural-lumbar;  Surgeon: Patrick Goyal MD;  Location: [facility] OR;  Service: Pain Management;  Laterality: N/A;  L5/S1 from right side    EPIDURAL STEROID INJECTION INTO LUMBAR SPINE N/A 08/04/2022    Procedure: Injection-steroid-epidural-lumbar, L5/S1  from the right side;  Surgeon: Patrick Goyal MD;  Location: [facility] OR;  Service: Pain Management;  Laterality: N/A;    EYE SURGERY  May, 2018    cataract surgery    NASAL SEPTUM SURGERY N/A 09/2017    SPINE SURGERY  2001    L4    TONSILLECTOMY         Family History:  Family History   Problem Relation Age of Onset    Cancer Mother         mother    Heart disease Mother         cabg    COPD Mother     Hypertension Mother     Vision loss Mother         macular " degeration    Diabetes Father     Heart disease Father         cabg    Hypertension Father     Diabetes Brother     Cancer Brother         brother    Heart disease Brother         Mi    Hypertension Brother     Diabetes Paternal Grandmother        Social History:  Social History     Socioeconomic History    Marital status:      Spouse name: Kraig    Number of children: 3   Tobacco Use    Smoking status: Former     Packs/day: 0.00     Years: 0.50     Pack years: 0.00     Types: Cigarettes     Quit date: 1973     Years since quittin.8    Smokeless tobacco: Never    Tobacco comments:     social smoker only   Substance and Sexual Activity    Alcohol use: Yes     Alcohol/week: 5.0 standard drinks     Types: 5 Glasses of wine per week     Comment: one glass of wine with dinner daily    Drug use: No    Sexual activity: Yes     Partners: Female     Birth control/protection: Post-menopausal   Social History Narrative    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)     Exercise : Most days per week : walking 4d/week + Pilates + cardio + resistance     Diet : pt suspects lactose intolerance      Social Determinants of Health     Financial Resource Strain: Low Risk     Difficulty of Paying Living Expenses: Not hard at all   Food Insecurity: No Food Insecurity    Worried About Running Out of Food in the Last Year: Never true    Ran Out of Food in the Last Year: Never true   Transportation Needs: No Transportation Needs    Lack of Transportation (Medical): No    Lack of Transportation (Non-Medical): No   Physical Activity: Sufficiently Active    Days of Exercise per Week: 4 days    Minutes of Exercise per Session: 40 min   Stress: No Stress Concern Present    Feeling of Stress : Not at all   Social Connections: Unknown    Frequency of Communication with Friends and Family: More than three times a week    Frequency of Social Gatherings with Friends and Family: Twice a week    Active Member of Clubs or  Organizations: No    Attends Club or Organization Meetings: Never    Marital Status:    Housing Stability: Low Risk     Unable to Pay for Housing in the Last Year: No    Number of Places Lived in the Last Year: 1    Unstable Housing in the Last Year: No       Medications:  Current Outpatient Medications on File Prior to Visit   Medication Sig Dispense Refill    alendronate (FOSAMAX) 70 MG tablet TAKE ONE TABLET BY MOUTH EVERY 7 DAYS 12 tablet 3    amitriptyline (ELAVIL) 10 MG tablet TAKE 1 TABLET(10 MG) BY MOUTH EVERY EVENING 30 tablet 6    citalopram (CELEXA) 10 MG tablet TAKE 1 TABLET BY MOUTH EVERY DAY 90 tablet 3    estradiol (ESTRACE) 0.01 % (0.1 mg/gram) vaginal cream Place 1 g vaginally once daily. 30 g 11    losartan (COZAAR) 50 MG tablet TAKE 1 TABLET(50 MG) BY MOUTH EVERY DAY 90 tablet 3    naproxen (NAPROSYN) 250 MG tablet Take 500 mg by mouth 2 (two) times daily with meals.      ondansetron (ZOFRAN-ODT) 4 MG TbDL Take 1 tablet (4 mg total) by mouth every 6 (six) hours as needed (nausea). 30 tablet 11    pregabalin (LYRICA) 100 MG capsule TAKE 1 CAPSULE(100 MG) BY MOUTH TWICE DAILY 60 capsule 2    rOPINIRole (REQUIP) 0.25 MG tablet TAKE 2 TABLETS BY MOUTH EVERY EVENING 180 tablet 3    scopolamine (TRANSDERM-SCOP) 1.3-1.5 mg (1 mg over 3 days) Place 1 patch onto the skin every 72 hours. 2 patch 1    triamcinolone (NASACORT) 55 mcg nasal inhaler 1 spray by Nasal route once daily.      ubrogepant (UBRELVY) 100 mg tablet Take 1 tablet by mouth as needed for migraine. May repeat in 2 hours if needed. Max 2 tablets per day 8 tablet 11    [DISCONTINUED] cycloSPORINE (RESTASIS) 0.05 % ophthalmic emulsion Place 1 drop into both eyes 2 (two) times daily.       No current facility-administered medications on file prior to visit.       Allergies:  Penicillins    Immunizations:  Immunization History   Administered Date(s) Administered    COVID-19 MRNA, LN-S PF (MODERNA HALF 0.25 ML DOSE) 11/26/2021    COVID-19,  MRNA, LN-S, PF (MODERNA FULL 0.5 ML DOSE) 01/22/2021, 02/19/2021, 04/04/2022    H1N1 Swine Flu Vaccine 12/30/2009    Influenza (FLUAD) - Quadrivalent - Adjuvanted - PF *Preferred* (65+) 10/13/2021    Influenza - High Dose - PF (65 years and older) 10/08/2013, 11/11/2014, 10/26/2015, 10/05/2017, 09/20/2018, 09/24/2019, 09/14/2020    Influenza - Quadrivalent - PF *Preferred* (6 months and older) 10/24/2007, 10/06/2009, 10/25/2010, 11/03/2011, 11/19/2012    Influenza A (H1N1) 2009 Monovalent - IM 12/30/2009    Influenza Split 11/19/2012    Pneumococcal Conjugate - 13 Valent 01/18/2016    Pneumococcal Polysaccharide - 23 Valent 10/08/2013    Tdap 11/03/2011    Zoster 01/22/2014    Zoster Recombinant 05/02/2022, 07/12/2022       Review of Systems:  Review of Systems   All other systems reviewed and are negative.    Objective:    Vitals:  Vitals:    10/04/22 0933   BP: 130/62   Pulse: 68   Resp: 12   SpO2: 97%   Weight: 63.9 kg (140 lb 14 oz)   PainSc: 0-No pain       Physical Exam  Vitals reviewed.   Constitutional:       General: She is not in acute distress.  HENT:      Head: Normocephalic and atraumatic.   Eyes:      Pupils: Pupils are equal, round, and reactive to light.   Cardiovascular:      Rate and Rhythm: Normal rate and regular rhythm.      Heart sounds: No murmur heard.    No friction rub.   Pulmonary:      Effort: Pulmonary effort is normal.      Breath sounds: Normal breath sounds.   Abdominal:      General: Bowel sounds are normal. There is no distension.      Palpations: Abdomen is soft.      Tenderness: There is no abdominal tenderness.   Musculoskeletal:      Cervical back: Neck supple.   Skin:     General: Skin is warm and dry.      Findings: No rash.   Psychiatric:         Behavior: Behavior normal.           Td Montana MD  Family Medicine

## 2022-10-06 ENCOUNTER — LAB VISIT (OUTPATIENT)
Dept: LAB | Facility: HOSPITAL | Age: 72
End: 2022-10-06
Attending: FAMILY MEDICINE
Payer: MEDICARE

## 2022-10-06 ENCOUNTER — PATIENT MESSAGE (OUTPATIENT)
Dept: NEUROLOGY | Facility: CLINIC | Age: 72
End: 2022-10-06
Payer: MEDICARE

## 2022-10-06 DIAGNOSIS — Z79.899 DRUG THERAPY: ICD-10-CM

## 2022-10-06 LAB
25(OH)D3+25(OH)D2 SERPL-MCNC: 14 NG/ML (ref 30–96)
ALBUMIN SERPL BCP-MCNC: 4.1 G/DL (ref 3.5–5.2)
ALP SERPL-CCNC: 64 U/L (ref 55–135)
ALT SERPL W/O P-5'-P-CCNC: 15 U/L (ref 10–44)
ANION GAP SERPL CALC-SCNC: 13 MMOL/L (ref 8–16)
AST SERPL-CCNC: 23 U/L (ref 10–40)
BASOPHILS # BLD AUTO: 0.06 K/UL (ref 0–0.2)
BASOPHILS NFR BLD: 1.3 % (ref 0–1.9)
BILIRUB SERPL-MCNC: 0.4 MG/DL (ref 0.1–1)
BUN SERPL-MCNC: 12 MG/DL (ref 8–23)
CALCIUM SERPL-MCNC: 8.7 MG/DL (ref 8.7–10.5)
CHLORIDE SERPL-SCNC: 102 MMOL/L (ref 95–110)
CHOLEST SERPL-MCNC: 198 MG/DL (ref 120–199)
CHOLEST/HDLC SERPL: 3.7 {RATIO} (ref 2–5)
CO2 SERPL-SCNC: 22 MMOL/L (ref 23–29)
CREAT SERPL-MCNC: 0.8 MG/DL (ref 0.5–1.4)
DIFFERENTIAL METHOD: NORMAL
EOSINOPHIL # BLD AUTO: 0.2 K/UL (ref 0–0.5)
EOSINOPHIL NFR BLD: 4.3 % (ref 0–8)
ERYTHROCYTE [DISTWIDTH] IN BLOOD BY AUTOMATED COUNT: 12.5 % (ref 11.5–14.5)
EST. GFR  (NO RACE VARIABLE): >60 ML/MIN/1.73 M^2
ESTIMATED AVG GLUCOSE: 114 MG/DL (ref 68–131)
GLUCOSE SERPL-MCNC: 90 MG/DL (ref 70–110)
HBA1C MFR BLD: 5.6 % (ref 4–5.6)
HCT VFR BLD AUTO: 38.9 % (ref 37–48.5)
HDLC SERPL-MCNC: 54 MG/DL (ref 40–75)
HDLC SERPL: 27.3 % (ref 20–50)
HGB BLD-MCNC: 12.8 G/DL (ref 12–16)
IMM GRANULOCYTES # BLD AUTO: 0.01 K/UL (ref 0–0.04)
IMM GRANULOCYTES NFR BLD AUTO: 0.2 % (ref 0–0.5)
LDLC SERPL CALC-MCNC: 118.4 MG/DL (ref 63–159)
LYMPHOCYTES # BLD AUTO: 1.2 K/UL (ref 1–4.8)
LYMPHOCYTES NFR BLD: 26.2 % (ref 18–48)
MCH RBC QN AUTO: 30.8 PG (ref 27–31)
MCHC RBC AUTO-ENTMCNC: 32.9 G/DL (ref 32–36)
MCV RBC AUTO: 94 FL (ref 82–98)
MONOCYTES # BLD AUTO: 0.4 K/UL (ref 0.3–1)
MONOCYTES NFR BLD: 9.2 % (ref 4–15)
NEUTROPHILS # BLD AUTO: 2.6 K/UL (ref 1.8–7.7)
NEUTROPHILS NFR BLD: 58.8 % (ref 38–73)
NONHDLC SERPL-MCNC: 144 MG/DL
NRBC BLD-RTO: 0 /100 WBC
PLATELET # BLD AUTO: 277 K/UL (ref 150–450)
PMV BLD AUTO: 10.1 FL (ref 9.2–12.9)
POTASSIUM SERPL-SCNC: 4.4 MMOL/L (ref 3.5–5.1)
PROT SERPL-MCNC: 6.9 G/DL (ref 6–8.4)
RBC # BLD AUTO: 4.15 M/UL (ref 4–5.4)
SODIUM SERPL-SCNC: 137 MMOL/L (ref 136–145)
T4 FREE SERPL-MCNC: 0.87 NG/DL (ref 0.71–1.51)
TRIGL SERPL-MCNC: 128 MG/DL (ref 30–150)
TSH SERPL DL<=0.005 MIU/L-ACNC: 1.2 UIU/ML (ref 0.4–4)
VIT B12 SERPL-MCNC: <148 PG/ML (ref 210–950)
WBC # BLD AUTO: 4.47 K/UL (ref 3.9–12.7)

## 2022-10-06 PROCEDURE — 83036 HEMOGLOBIN GLYCOSYLATED A1C: CPT | Performed by: FAMILY MEDICINE

## 2022-10-06 PROCEDURE — 82306 VITAMIN D 25 HYDROXY: CPT | Performed by: FAMILY MEDICINE

## 2022-10-06 PROCEDURE — 84439 ASSAY OF FREE THYROXINE: CPT | Performed by: FAMILY MEDICINE

## 2022-10-06 PROCEDURE — 36415 COLL VENOUS BLD VENIPUNCTURE: CPT | Mod: PN | Performed by: FAMILY MEDICINE

## 2022-10-06 PROCEDURE — 84443 ASSAY THYROID STIM HORMONE: CPT | Performed by: FAMILY MEDICINE

## 2022-10-06 PROCEDURE — 80061 LIPID PANEL: CPT | Performed by: FAMILY MEDICINE

## 2022-10-06 PROCEDURE — 80053 COMPREHEN METABOLIC PANEL: CPT | Performed by: FAMILY MEDICINE

## 2022-10-06 PROCEDURE — 85025 COMPLETE CBC W/AUTO DIFF WBC: CPT | Performed by: FAMILY MEDICINE

## 2022-10-06 PROCEDURE — 82607 VITAMIN B-12: CPT | Performed by: FAMILY MEDICINE

## 2022-10-07 ENCOUNTER — IMMUNIZATION (OUTPATIENT)
Dept: PHARMACY | Facility: CLINIC | Age: 72
End: 2022-10-07
Payer: MEDICARE

## 2022-10-07 DIAGNOSIS — Z23 NEED FOR VACCINATION: Primary | ICD-10-CM

## 2022-10-07 DIAGNOSIS — E53.8 B12 DEFICIENCY: Primary | ICD-10-CM

## 2022-10-07 DIAGNOSIS — E55.9 VITAMIN D DEFICIENCY: ICD-10-CM

## 2022-10-07 RX ORDER — ERGOCALCIFEROL 1.25 MG/1
50000 CAPSULE ORAL
Qty: 12 CAPSULE | Refills: 4 | Status: SHIPPED | OUTPATIENT
Start: 2022-10-07 | End: 2024-01-02

## 2022-10-07 RX ORDER — CYANOCOBALAMIN 1000 UG/ML
1000 INJECTION, SOLUTION INTRAMUSCULAR; SUBCUTANEOUS
Status: SHIPPED | OUTPATIENT
Start: 2022-10-07 | End: 2022-12-02

## 2022-10-13 ENCOUNTER — PATIENT MESSAGE (OUTPATIENT)
Dept: NEUROLOGY | Facility: CLINIC | Age: 72
End: 2022-10-13
Payer: MEDICARE

## 2022-10-15 ENCOUNTER — PATIENT MESSAGE (OUTPATIENT)
Dept: FAMILY MEDICINE | Facility: CLINIC | Age: 72
End: 2022-10-15
Payer: MEDICARE

## 2022-10-21 ENCOUNTER — CLINICAL SUPPORT (OUTPATIENT)
Dept: FAMILY MEDICINE | Facility: CLINIC | Age: 72
End: 2022-10-21
Payer: MEDICARE

## 2022-10-21 DIAGNOSIS — E53.8 B12 DEFICIENCY: Primary | ICD-10-CM

## 2022-10-21 PROCEDURE — 96372 THER/PROPH/DIAG INJ SC/IM: CPT | Mod: PBBFAC,PN

## 2022-10-21 RX ADMIN — CYANOCOBALAMIN 1000 MCG: 1000 INJECTION, SOLUTION INTRAMUSCULAR at 02:10

## 2022-10-21 NOTE — PROGRESS NOTES
Confirmed pt name and . Verified medication with Nara HERNANDEZ CMA.Cyanocobalamin 1,000 mcg IM per MD orders to R glut. Pt tolerated well. Scheduled for next 2 week nurse visits for injections

## 2022-10-25 ENCOUNTER — PATIENT MESSAGE (OUTPATIENT)
Dept: FAMILY MEDICINE | Facility: CLINIC | Age: 72
End: 2022-10-25
Payer: MEDICARE

## 2022-10-25 ENCOUNTER — HOSPITAL ENCOUNTER (OUTPATIENT)
Dept: RADIOLOGY | Facility: HOSPITAL | Age: 72
Discharge: HOME | End: 2022-10-25
Attending: NURSE PRACTITIONER
Payer: MEDICARE

## 2022-10-25 DIAGNOSIS — R51.9 WORSENING HEADACHES: ICD-10-CM

## 2022-10-25 DIAGNOSIS — R51.0 POSITIONAL HEADACHE: ICD-10-CM

## 2022-10-25 PROCEDURE — 70553 MRI BRAIN STEM W/O & W/DYE: CPT | Mod: 26,,, | Performed by: RADIOLOGY

## 2022-10-25 PROCEDURE — 70553 MRI BRAIN W WO CONTRAST: ICD-10-PCS | Mod: 26,,, | Performed by: RADIOLOGY

## 2022-10-25 PROCEDURE — A9585 GADOBUTROL INJECTION: HCPCS | Mod: PO | Performed by: NURSE PRACTITIONER

## 2022-10-25 PROCEDURE — 25500020 PHARM REV CODE 255: Mod: PO | Performed by: NURSE PRACTITIONER

## 2022-10-25 PROCEDURE — 70553 MRI BRAIN STEM W/O & W/DYE: CPT | Mod: TC,PO

## 2022-10-25 RX ADMIN — GADOBUTROL 6 ML: 604.72 INJECTION INTRAVENOUS at 01:10

## 2022-11-02 ENCOUNTER — PATIENT MESSAGE (OUTPATIENT)
Dept: FAMILY MEDICINE | Facility: CLINIC | Age: 72
End: 2022-11-02
Payer: MEDICARE

## 2022-11-04 ENCOUNTER — CLINICAL SUPPORT (OUTPATIENT)
Dept: FAMILY MEDICINE | Facility: CLINIC | Age: 72
End: 2022-11-04
Payer: MEDICARE

## 2022-11-04 ENCOUNTER — PATIENT MESSAGE (OUTPATIENT)
Dept: FAMILY MEDICINE | Facility: CLINIC | Age: 72
End: 2022-11-04

## 2022-11-04 DIAGNOSIS — E53.8 B12 DEFICIENCY: Primary | ICD-10-CM

## 2022-11-04 PROCEDURE — 96372 THER/PROPH/DIAG INJ SC/IM: CPT | Mod: PBBFAC,PN

## 2022-11-04 RX ADMIN — CYANOCOBALAMIN 1000 MCG: 1000 INJECTION, SOLUTION INTRAMUSCULAR at 09:11

## 2022-11-11 ENCOUNTER — CLINICAL SUPPORT (OUTPATIENT)
Dept: FAMILY MEDICINE | Facility: CLINIC | Age: 72
End: 2022-11-11
Payer: MEDICARE

## 2022-11-11 DIAGNOSIS — E53.8 B12 DEFICIENCY: Primary | ICD-10-CM

## 2022-11-11 PROCEDURE — 96372 THER/PROPH/DIAG INJ SC/IM: CPT | Mod: PBBFAC,PN

## 2022-11-11 RX ADMIN — CYANOCOBALAMIN 1000 MCG: 1000 INJECTION, SOLUTION INTRAMUSCULAR at 10:11

## 2022-11-18 ENCOUNTER — CLINICAL SUPPORT (OUTPATIENT)
Dept: FAMILY MEDICINE | Facility: CLINIC | Age: 72
End: 2022-11-18
Payer: MEDICARE

## 2022-11-18 PROCEDURE — 96372 THER/PROPH/DIAG INJ SC/IM: CPT | Mod: PBBFAC,PN

## 2022-11-18 RX ADMIN — CYANOCOBALAMIN 1000 MCG: 1000 INJECTION, SOLUTION INTRAMUSCULAR at 09:11

## 2022-11-30 ENCOUNTER — OFFICE VISIT (OUTPATIENT)
Dept: NEUROLOGY | Facility: CLINIC | Age: 72
End: 2022-11-30
Payer: MEDICARE

## 2022-11-30 VITALS
HEIGHT: 64 IN | HEART RATE: 64 BPM | BODY MASS INDEX: 23.9 KG/M2 | SYSTOLIC BLOOD PRESSURE: 136 MMHG | RESPIRATION RATE: 17 BRPM | DIASTOLIC BLOOD PRESSURE: 75 MMHG | WEIGHT: 140 LBS

## 2022-11-30 DIAGNOSIS — G43.019 INTRACTABLE MIGRAINE WITHOUT AURA AND WITHOUT STATUS MIGRAINOSUS: Primary | ICD-10-CM

## 2022-11-30 PROCEDURE — 99999 PR PBB SHADOW E&M-EST. PATIENT-LVL IV: CPT | Mod: PBBFAC,,, | Performed by: NURSE PRACTITIONER

## 2022-11-30 PROCEDURE — 99999 PR PBB SHADOW E&M-EST. PATIENT-LVL IV: ICD-10-PCS | Mod: PBBFAC,,, | Performed by: NURSE PRACTITIONER

## 2022-11-30 PROCEDURE — 99213 OFFICE O/P EST LOW 20 MIN: CPT | Mod: S$PBB,,, | Performed by: NURSE PRACTITIONER

## 2022-11-30 PROCEDURE — 99214 OFFICE O/P EST MOD 30 MIN: CPT | Mod: PBBFAC,PO | Performed by: NURSE PRACTITIONER

## 2022-11-30 PROCEDURE — 99213 PR OFFICE/OUTPT VISIT, EST, LEVL III, 20-29 MIN: ICD-10-PCS | Mod: S$PBB,,, | Performed by: NURSE PRACTITIONER

## 2022-11-30 NOTE — PATIENT INSTRUCTIONS
Please call our clinic at [phone] or send a message on the [employer] portal if there are any changes to the plan described below, for example,if you are not contacted for the requested tests, referral(s) within one week, if you are unable to receive the medications prescribed, or if you feel you need to change the treatment course for any reason.     TESTING:  -- none    REFERRALS:  -- none at this time but we can always try vestibular rehab if you'd like     PREVENTION (use daily regardless of headache):  -- continue magnesium in ONE of the following preparations -               1. Magnesium oxide 800mg daily (the most common over the counter kind, may causes loose stools)              2. Magnesium citrate 400-500mg daily (harder to find, but more neutral on the bowels)              3. Magnesium glycinate 400mg daily (hardest to find, look online, but most bowel-neutral, best absorbed)     AS-NEEDED TREATMENT (use total no more than 10 days per month unless otherwise stated):  -- continue Ubrelvy with next migraine. You can repeat two hours later if needed. With this medication do not drink grapefruit juice or eat grapefruit or some medications like ketoconazole, itraconazole, or antibiotics clarithromycin. GIVE THIS ONE MORE MIGRAINE. IF NOT EFFECTIVE, MESSAGE ME AND WE WILL SWITCH TO NURTEC  -- continue Zofran for nausea  -- FL41 glasses for light sensitivity

## 2022-11-30 NOTE — ASSESSMENT & PLAN NOTE
Doing better with magnesium for prevention. Two migraines in two months. Ubrelvy was not effective the one time she tried it. Recommend giving Ubrelvy one more fair trial but encouraged her to repeat dose two hours later if migraine not aborted. If not effective again, then will change to Nurtec. Dizzy episodes sound more like BPPV than vestibular migraine. Associated with abrupt position changes on the two most recent occasions. Offered vestibular rehab but she will hold off for now.

## 2022-11-30 NOTE — PROGRESS NOTES
"Date of service: 11/30/2022  Referring provider: No ref. provider found    Subjective:      Chief complaint: Headache       Patient ID: Yolie Brambila is a 72 y.o. female with depression, HTN, peripheral neuropathy, lumbar radiculopathy, CARYL, RLS, migraines who presents for follow up of headache     History of Present Illness    INTERVAL HISTORY 11/30/22    Last visit was two months ago and at that time I ordered a brain MRI for worsening and positional headaches. This was within normal limits. Added Ubrelvy for acute attacks.     Today she reports she is a little better. She has less frequent attacks. Headaches are holocephalic. Current pain 7 with range 4-7. She has had two migraines within the past 2 months. She has taken Ubrelvy but does not find it effective. She also reports two episodes of vertigo. She states she has a hard time distinguishing between migraine and vertigo. She takes meclizine for vertigo which is helpful. She has also gotten Rx glasses since next visit which has helped. Otherwise information below is reviewed and verified with no changes made     ORIGINAL HEADACHE HISTORY - 9/30/22  Age at onset and course over time: intermittently since her 20's. Initially rare episodes once a year. In the past couple years, frequency and intensity increasing. Episodes now have vomiting and diarrhea. She did recently "almost fail" an eye test. She has had cataract surgery.   This year, she has had two severe attack, one in July and one in September. Rare to no headaches outside those attacks.    Aura - whole body feels off balance, has happened prior to several migraines     Family history of migraines - mom  Last eye exam - May 2022 but has upcoming appt     Location: forehead   Quality:  [] pressure [] tight [x] throbbing [] sharp [] stabbing   Severity: current 2, nausea and vomiting are worst symptoms   Duration: hours  Frequency: probably every 2 months   Headaches awaken at night?: yes   Worst time " of day: evening   Associated with: [x] photophobia []  phonophobia [x] osmophobia [x] blurred vision  [] double vision [x] loss of appetite [x] nausea [x] vomiting [x] dizziness [x] vertigo  [] tinnitus [] irritability [] sinus pressure [] problems with concentration   [] neck tightness   Alleviated by:  [] sleep [x] darkness [] massage [] heat [] ice [x] medication  Exacerbated by:  [] fatigue [x] light [x] noise [x] smells [] coughing [] sneezing  [x] bending over [] ovulation [] menses [] alcohol [] change in weather []  stress  Ipsilateral autonomic: [] nasal congestion [] lacrimation [] ptosis [] injection [] edema [] foreign body sensation [] ear fullness   ICP:  [] transient visual obscurations  [x] tinnitus low pitched bilateral   [x] positional headache - worse with standing  [] non-positional   Sleep habits: snoring, had sleep study in the past and CPAP recommended but had a repeat study that did not indicate sleep apnea   Caffeine intake: 2 cups  Gyn status (if female): menopausal   MIDAS: 9    Current acute treatment:  Ibuprofen - 2 days per week   Ubrelvy  Zofran    Current prevention:  Requip  (Losartan)  Celexa  Amitriptyline  Lyrica  Magnesium    Previously tried/failed acute treatment:  Naproxen  Sumatriptan   Zofran     Previously tried/failed preventative treatment:  Gabapentin  Nortriptyline      Review of patient's allergies indicates:   Allergen Reactions    Penicillins Shortness Of Breath     Current Outpatient Medications   Medication Sig Dispense Refill    alendronate (FOSAMAX) 70 MG tablet TAKE ONE TABLET BY MOUTH EVERY 7 DAYS 12 tablet 3    amitriptyline (ELAVIL) 10 MG tablet TAKE 1 TABLET(10 MG) BY MOUTH EVERY EVENING 30 tablet 6    citalopram (CELEXA) 10 MG tablet TAKE 1 TABLET BY MOUTH EVERY DAY 90 tablet 3    ergocalciferol (ERGOCALCIFEROL) 50,000 unit Cap Take 1 capsule (50,000 Units total) by mouth every 7 days. 12 capsule 4    losartan (COZAAR) 50 MG tablet TAKE 1 TABLET(50 MG) BY  MOUTH EVERY DAY 90 tablet 3    naproxen (NAPROSYN) 250 MG tablet Take 500 mg by mouth 2 (two) times daily with meals.      ondansetron (ZOFRAN-ODT) 4 MG TbDL Take 1 tablet (4 mg total) by mouth every 6 (six) hours as needed (nausea). 30 tablet 11    pregabalin (LYRICA) 100 MG capsule TAKE 1 CAPSULE(100 MG) BY MOUTH TWICE DAILY 60 capsule 2    rOPINIRole (REQUIP) 0.25 MG tablet TAKE 2 TABLETS BY MOUTH EVERY EVENING 180 tablet 3    scopolamine (TRANSDERM-SCOP) 1.3-1.5 mg (1 mg over 3 days) Place 1 patch onto the skin every 72 hours. 2 patch 1    triamcinolone (NASACORT) 55 mcg nasal inhaler 1 spray by Nasal route once daily.      ubrogepant (UBRELVY) 100 mg tablet Take 1 tablet by mouth as needed for migraine. May repeat in 2 hours if needed. Max 2 tablets per day 8 tablet 11    estradiol (ESTRACE) 0.01 % (0.1 mg/gram) vaginal cream Place 1 g vaginally once daily. 30 g 11     Current Facility-Administered Medications   Medication Dose Route Frequency Provider Last Rate Last Admin    cyanocobalamin injection 1,000 mcg  1,000 mcg Intramuscular Q7 Days Td Montana MD   1,000 mcg at 11/18/22 0928       Past Medical History  Past Medical History:   Diagnosis Date    Anxiety     Arthritis     Chronic allergic rhinitis 11/19/2012    Depression     Essential hypertension 9/14/2020    Herniated lumbar intervertebral disc     L3    Migraine     painless with numbness       Past Surgical History  Past Surgical History:   Procedure Laterality Date    COLONOSCOPY      COLONOSCOPY N/A 03/02/2020    Procedure: COLONOSCOPY;  Surgeon: Flash Garnett MD;  Location: [facility] ENDO;  Service: Endoscopy;  Laterality: N/A;    EPIDURAL STEROID INJECTION INTO LUMBAR SPINE N/A 05/20/2019    Procedure: Injection-steroid-epidural-lumbar;  Surgeon: Patrick Goyal MD;  Location: [facility] OR;  Service: Pain Management;  Laterality: N/A;  L5/S1 from right side    EPIDURAL STEROID INJECTION INTO LUMBAR SPINE N/A 08/04/2022    Procedure:  Injection-steroid-epidural-lumbar, L5/S1  from the right side;  Surgeon: Patrick Goyal MD;  Location: [facility] OR;  Service: Pain Management;  Laterality: N/A;    EYE SURGERY  May, 2018    cataract surgery    NASAL SEPTUM SURGERY N/A 2017    SPINE SURGERY  2001    L4    TONSILLECTOMY         Family History  Family History   Problem Relation Age of Onset    Cancer Mother         mother    Heart disease Mother         cabg    COPD Mother     Hypertension Mother     Vision loss Mother         macular degeration    Diabetes Father     Heart disease Father         cabg    Hypertension Father     Diabetes Brother     Cancer Brother         brother    Heart disease Brother         Mi    Hypertension Brother     Diabetes Paternal Grandmother        Social History  Social History     Socioeconomic History    Marital status:      Spouse name: Kraig    Number of children: 3   Tobacco Use    Smoking status: Former     Packs/day: 0.00     Years: 0.50     Pack years: 0.00     Types: Cigarettes     Quit date: 1973     Years since quittin.0    Smokeless tobacco: Never    Tobacco comments:     social smoker only   Substance and Sexual Activity    Alcohol use: Yes     Alcohol/week: 5.0 standard drinks     Types: 5 Glasses of wine per week     Comment: one glass of wine with dinner daily    Drug use: No    Sexual activity: Yes     Partners: Female     Birth control/protection: Post-menopausal   Social History Narrative    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)     Exercise : Most days per week : walking 4d/week + Pilates + cardio + resistance     Diet : pt suspects lactose intolerance      Social Determinants of Health     Financial Resource Strain: Low Risk     Difficulty of Paying Living Expenses: Not hard at all   Food Insecurity: No Food Insecurity    Worried About Running Out of Food in the Last Year: Never true    Ran Out of Food in the Last Year: Never true   Transportation Needs: No Transportation  Needs    Lack of Transportation (Medical): No    Lack of Transportation (Non-Medical): No   Physical Activity: Sufficiently Active    Days of Exercise per Week: 4 days    Minutes of Exercise per Session: 40 min   Stress: No Stress Concern Present    Feeling of Stress : Not at all   Social Connections: Unknown    Frequency of Communication with Friends and Family: More than three times a week    Frequency of Social Gatherings with Friends and Family: Twice a week    Active Member of Clubs or Organizations: No    Attends Club or Organization Meetings: Never    Marital Status:    Housing Stability: Low Risk     Unable to Pay for Housing in the Last Year: No    Number of Places Lived in the Last Year: 1    Unstable Housing in the Last Year: No        Review of Systems  14-point review of systems as follows:   No check kirk indicates NEGATIVE response   Constitutional: [] weight loss, [] change to appetite   Eyes: [] change in vision, [] double vision   Ears, nose, mouth, throat: [] frequent nose bleeds, [] ringing in the ears   Respiratory: [] cough, [] wheezing   Cardiovascular: [] chest pain, [] palpitations   Gastrointestinal: [] jaundice, [] nausea/vomiting   Genitourinary: [] incontinence, [] burning with urination   Hematologic/lymphatic: [] easy bruising/bleeding, [] night sweats   Neurological: [] numbness, [] weakness   Endocrine: [] fatigue, [] heat/cold intolerance   Allergy/Immunologic: [] fevers, [] chills   Musculoskeletal: [] muscle pain, [] joint pain   Psychiatric: [] thoughts of harming self/others, [] depression   Integumentary: [] rashes, [] sores that do not heal     Objective:        Vitals:    11/30/22 0939   BP: 136/75   Pulse: 64   Resp: 17       Body mass index is 24.03 kg/m².    11/30/22  Constitutional:   She appears well-developed and well-nourished. She is well groomed     Neurological Exam:  General: well-developed, well-nourished, no distress  Mental status: Awake and alert  Speech  language: No dysarthria or aphasia on conversation  Cranial nerves: Face symmetric  Motor: Moves all extremities well  Coordination: No ataxia. No tremor.    9/30/22  Constitutional: appears in no acute distress, well-developed, well-nourished     Eyes: normal conjunctiva, PERRLA    Ears, nose, mouth, throat: external appearance of ears and nose normal, hearing intact     Cardiovascular: regular rate and rhythm, no murmurs appreciated    Respiratory: unlabored respirations, breath sounds normal bilaterally    Gastrointestinal: no visible abdominal masses, no guarding, no visible hernia    Musculoskeletal: normal tone in all four extremities. No abnormal movements. No pronator drift. No orbit. Symmetric finger tapping. Normal station. Normal regular gait. Normal tandem gait.      Spine:   CERVICAL SPINE:  ROM: normal   MUSCLE SPASM: no   FACET LOADING: no   SPURLING: no  PINKY / JAGJIT tender: no     Psychiatric: normal judgment and insight. Oriented to person, place, and time.     Neurologic:   Cortical functions: recent and remote memory intact, normal attention span and concentration, speech fluent, adequate fund of knowledge   Cranial nerves: visual fields full, PERRLA, EOMI, symmetric facial strength, hearing intact, palate elevates symmetrically, shoulder shrug 5/5, tongue protrudes midline   Reflexes: 2+ in the upper and lower extremities, no Diamond  Sensation: intact to temperature throughout   Coordination: normal finger to nose, heel to shin, tandem gait     Data Review:     I have personally reviewed the referring provider's notes, labs, & imaging made available to me today.      RADIOLOGY STUDIES:  I have personally reviewed the pertinent images performed.       No results found for this or any previous visit.    Lab Results   Component Value Date     10/06/2022    K 4.4 10/06/2022     10/06/2022    CO2 22 (L) 10/06/2022    BUN 12 10/06/2022    CREATININE 0.8 10/06/2022    GLU 90 10/06/2022     HGBA1C 5.6 10/06/2022    AST 23 10/06/2022    ALT 15 10/06/2022    ALBUMIN 4.1 10/06/2022    PROT 6.9 10/06/2022    BILITOT 0.4 10/06/2022    CHOL 198 10/06/2022    HDL 54 10/06/2022    LDLCALC 118.4 10/06/2022    TRIG 128 10/06/2022       Lab Results   Component Value Date    WBC 4.47 10/06/2022    HGB 12.8 10/06/2022    HCT 38.9 10/06/2022    MCV 94 10/06/2022     10/06/2022       Lab Results   Component Value Date    TSH 1.196 10/06/2022           Assessment & Plan:       Problem List Items Addressed This Visit          Neuro    Intractable migraine without aura and without status migrainosus - Primary    Overview     Rare episodic migraines since her 20's. Headaches are typically moderate to severe in intensity, worsen with activity, pounding in quality and associated with sensitivity to light and smell. She previously had one episode per year but has had several intense migraines back to back along with a new positional component. This warrants imaging - negative for secondary cause.      Episodes are still low episodic so will start magnesium for prevention. Cardiac contraindication to triptans. Start gepant.            Current Assessment & Plan     Doing better with magnesium for prevention. Two migraines in two months. Ubrelvy was not effective the one time she tried it. Recommend giving Ubrelvy one more fair trial but encouraged her to repeat dose two hours later if migraine not aborted. If not effective again, then will change to Nurtec. Dizzy episodes sound more like BPPV than vestibular migraine. Associated with abrupt position changes on the two most recent occasions. Offered vestibular rehab but she will hold off for now.                   Please call our clinic at [phone] or send a message on the [facility] portal if there are any changes to the plan described below, for example,if you are not contacted for the requested tests, referral(s) within one week, if you are unable to receive the  medications prescribed, or if you feel you need to change the treatment course for any reason.     TESTING:  -- none    REFERRALS:  -- none at this time but we can always try vestibular rehab if you'd like     PREVENTION (use daily regardless of headache):  -- continue magnesium in ONE of the following preparations -               1. Magnesium oxide 800mg daily (the most common over the counter kind, may causes loose stools)              2. Magnesium citrate 400-500mg daily (harder to find, but more neutral on the bowels)              3. Magnesium glycinate 400mg daily (hardest to find, look online, but most bowel-neutral, best absorbed)     AS-NEEDED TREATMENT (use total no more than 10 days per month unless otherwise stated):  -- continue Ubrelvy with next migraine. You can repeat two hours later if needed. With this medication do not drink grapefruit juice or eat grapefruit or some medications like ketoconazole, itraconazole, or antibiotics clarithromycin. GIVE THIS ONE MORE MIGRAINE. IF NOT EFFECTIVE, MESSAGE ME AND WE WILL SWITCH TO nURTEC  -- continue Zofran for nausea  -- FL41 glasses for light sensitivity     Follow up in about 4 months (around 3/30/2023) for follow up with MKD.    Face to Face time with patient: 15  20 minutes of total time spent on the encounter, which includes face to face time and non-face to face time on day of visit preparing to see the patient (eg, review of tests), Obtaining and/or reviewing separately obtained history, Documenting clinical information in the electronic or other health record, Independently interpreting results (not separately reported) and communicating results to the patient/family/caregiver, or Care coordination (not separately reported).     Nancy Alonso NP

## 2023-01-03 ENCOUNTER — OFFICE VISIT (OUTPATIENT)
Dept: PAIN MEDICINE | Facility: CLINIC | Age: 73
End: 2023-01-03
Payer: MEDICARE

## 2023-01-03 ENCOUNTER — PATIENT MESSAGE (OUTPATIENT)
Dept: FAMILY MEDICINE | Facility: CLINIC | Age: 73
End: 2023-01-03
Payer: MEDICARE

## 2023-01-03 VITALS
HEART RATE: 65 BPM | HEIGHT: 64 IN | SYSTOLIC BLOOD PRESSURE: 134 MMHG | OXYGEN SATURATION: 97 % | BODY MASS INDEX: 23.93 KG/M2 | WEIGHT: 140.19 LBS | DIASTOLIC BLOOD PRESSURE: 59 MMHG

## 2023-01-03 DIAGNOSIS — M51.36 DDD (DEGENERATIVE DISC DISEASE), LUMBAR: ICD-10-CM

## 2023-01-03 DIAGNOSIS — M70.61 GREATER TROCHANTERIC BURSITIS OF RIGHT HIP: ICD-10-CM

## 2023-01-03 DIAGNOSIS — G60.9 IDIOPATHIC PERIPHERAL NEUROPATHY: Primary | ICD-10-CM

## 2023-01-03 PROCEDURE — 99999 PR PBB SHADOW E&M-EST. PATIENT-LVL IV: CPT | Mod: PBBFAC,,, | Performed by: PHYSICIAN ASSISTANT

## 2023-01-03 PROCEDURE — 99214 OFFICE O/P EST MOD 30 MIN: CPT | Mod: PBBFAC,PN | Performed by: PHYSICIAN ASSISTANT

## 2023-01-03 PROCEDURE — 99999 PR PBB SHADOW E&M-EST. PATIENT-LVL IV: ICD-10-PCS | Mod: PBBFAC,,, | Performed by: PHYSICIAN ASSISTANT

## 2023-01-03 PROCEDURE — 99214 OFFICE O/P EST MOD 30 MIN: CPT | Mod: S$PBB,,, | Performed by: PHYSICIAN ASSISTANT

## 2023-01-03 PROCEDURE — 99214 PR OFFICE/OUTPT VISIT, EST, LEVL IV, 30-39 MIN: ICD-10-PCS | Mod: S$PBB,,, | Performed by: PHYSICIAN ASSISTANT

## 2023-01-03 RX ORDER — OMEPRAZOLE 20 MG/1
20 CAPSULE, DELAYED RELEASE ORAL 2 TIMES DAILY
COMMUNITY
Start: 2022-11-28 | End: 2024-01-25

## 2023-01-03 RX ORDER — PREGABALIN 100 MG/1
100 CAPSULE ORAL 2 TIMES DAILY
Qty: 60 CAPSULE | Refills: 2 | Status: SHIPPED | OUTPATIENT
Start: 2023-01-03 | End: 2023-04-03 | Stop reason: SDUPTHER

## 2023-01-05 ENCOUNTER — CLINICAL SUPPORT (OUTPATIENT)
Dept: FAMILY MEDICINE | Facility: CLINIC | Age: 73
End: 2023-01-05
Payer: MEDICARE

## 2023-01-05 ENCOUNTER — PATIENT MESSAGE (OUTPATIENT)
Dept: FAMILY MEDICINE | Facility: CLINIC | Age: 73
End: 2023-01-05

## 2023-01-05 ENCOUNTER — TELEPHONE (OUTPATIENT)
Dept: FAMILY MEDICINE | Facility: CLINIC | Age: 73
End: 2023-01-05

## 2023-01-05 DIAGNOSIS — E53.8 B12 DEFICIENCY: Primary | ICD-10-CM

## 2023-01-05 PROCEDURE — 96372 THER/PROPH/DIAG INJ SC/IM: CPT | Mod: PBBFAC,PN

## 2023-01-05 RX ORDER — CYANOCOBALAMIN 1000 UG/ML
1000 INJECTION, SOLUTION INTRAMUSCULAR; SUBCUTANEOUS
Status: SHIPPED | OUTPATIENT
Start: 2023-01-05 | End: 2023-06-22

## 2023-01-05 RX ADMIN — CYANOCOBALAMIN 1000 MCG: 1000 INJECTION, SOLUTION INTRAMUSCULAR at 04:01

## 2023-01-05 NOTE — TELEPHONE ENCOUNTER
Pt in clinic for first monthly dose of B12. Completed weekly injections X8 weeks beginning of December.   Med administered. New order pended if appropriate. Please advise if/ when pt will need repeat labs. Lab order pended as well.

## 2023-01-06 NOTE — PROGRESS NOTES
This note was completed with dictation software and grammatical errors may exist.    CC:  Bilateral foot pain, back pain, leg pain    HPI:  The patient is a 72-year-old woman with a history of anxiety, depression, previous lumbar surgery who presents in referral from Dr. Adam for bilateral foot pain.  She returns in follow-up today with bilateral leg pain.  She describes it as constant tingling and denies any changes to this.  She states that her back is still doing well following the injection several months ago.  She states that she has occasional right hip pain due to greater trochanteric bursitis.  This is currently not bothering her but she would like to contact us for an injection in the future if necessary.  She has been exercising, recently has been doing [employer] balance classes.  She denies having any weakness or numbness.    Previous history:  The patient reports developing bilateral foot pain about 25 years ago with numbness and tingling, has been diagnosed with neuropathy but workup has not revealed any particular  etiology.  This has been progressing over the years and has gone from being located only in the feet and now extending proximally up to the knees bilaterally.  She describes it as throbbing, tingling with abnormal sensation, particularly worse with standing, walking, exercising.  She does get some relief with rest, heat and medications. She has been taking gabapentin for years, several years has been on 600 mg twice daily with about 40% relief.  She also has a history of low back and buttock pain, ended up having severe right leg pain and in 2001 underwent laminectomy, unclear what level but reports that her right back and leg pain resolved at that time.  However over the years she has developed more bilateral buttock pain, thigh pain worse with standing and walking, describes this as dull.  She denies any weakness in her legs, denies any bowel or bladder incontinence.    Pain intervention  history:  She has done physical therapy multiple times, chiropractic care without relief.  She has not tried Cymbalta.  She has been taking gabapentin 600 mg twice daily with about 40% relief, switched to Lyrica.  L5/S1 CHEIKH performed on 05/20/2019 with greater than 70% relief ongoing of her back and leg pain, approach from the right due to previous left hemilaminectomy. She is status post L5/S1 interlaminar epidural steroid injection from the right side on 08/04/2022 with 50% relief of her back pain but 0% relief of her leg pain.      Spine surgeries:  L5/S1 left hemilaminectomy    Antineuropathics:  Lyrica 100 twice daily  NSAIDs:  Naproxen  Physical therapy:  Antidepressants:  Elavil 10 mg, Celexa 10  Muscle relaxers:  Opioids:  Antiplatelets/Anticoagulants:    ROS:  She reports back pain only.  Balance of review of systems is negative.    Past Medical History:   Diagnosis Date    Anxiety     Arthritis     Chronic allergic rhinitis 11/19/2012    Depression     Essential hypertension 9/14/2020    Herniated lumbar intervertebral disc     L3    Migraine     painless with numbness       Past Surgical History:   Procedure Laterality Date    COLONOSCOPY      COLONOSCOPY N/A 03/02/2020    Procedure: COLONOSCOPY;  Surgeon: Flash Garnett MD;  Location: [facility] ENDO;  Service: Endoscopy;  Laterality: N/A;    EPIDURAL STEROID INJECTION INTO LUMBAR SPINE N/A 05/20/2019    Procedure: Injection-steroid-epidural-lumbar;  Surgeon: Patrick Goyal MD;  Location: [facility] OR;  Service: Pain Management;  Laterality: N/A;  L5/S1 from right side    EPIDURAL STEROID INJECTION INTO LUMBAR SPINE N/A 08/04/2022    Procedure: Injection-steroid-epidural-lumbar, L5/S1  from the right side;  Surgeon: Patrick Goyal MD;  Location: [facility] OR;  Service: Pain Management;  Laterality: N/A;    EYE SURGERY  May, 2018    cataract surgery    NASAL SEPTUM SURGERY N/A 09/2017    SPINE SURGERY  2001    L4    TONSILLECTOMY         Social History      Socioeconomic History    Marital status:      Spouse name: Kraig    Number of children: 3   Tobacco Use    Smoking status: Former     Packs/day: 0.00     Years: 0.50     Pack years: 0.00     Types: Cigarettes     Quit date: 1973     Years since quittin.1    Smokeless tobacco: Never    Tobacco comments:     social smoker only   Substance and Sexual Activity    Alcohol use: Yes     Alcohol/week: 5.0 standard drinks     Types: 5 Glasses of wine per week     Comment: one glass of wine with dinner daily    Drug use: No    Sexual activity: Yes     Partners: Female     Birth control/protection: Post-menopausal   Social History Narrative    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)     Exercise : Most days per week : walking 4d/week + Pilates + cardio + resistance     Diet : pt suspects lactose intolerance      Social Determinants of Health     Financial Resource Strain: Low Risk     Difficulty of Paying Living Expenses: Not hard at all   Food Insecurity: No Food Insecurity    Worried About Running Out of Food in the Last Year: Never true    Ran Out of Food in the Last Year: Never true   Transportation Needs: No Transportation Needs    Lack of Transportation (Medical): No    Lack of Transportation (Non-Medical): No   Physical Activity: Sufficiently Active    Days of Exercise per Week: 4 days    Minutes of Exercise per Session: 40 min   Stress: No Stress Concern Present    Feeling of Stress : Not at all   Social Connections: Unknown    Frequency of Communication with Friends and Family: More than three times a week    Frequency of Social Gatherings with Friends and Family: Twice a week    Active Member of Clubs or Organizations: No    Attends Club or Organization Meetings: Never    Marital Status:    Housing Stability: Low Risk     Unable to Pay for Housing in the Last Year: No    Number of Places Lived in the Last Year: 1    Unstable Housing in the Last Year: No  "        Medications/Allergies: See med card    Vitals:    23 1003   BP: (!) 134/59   Pulse: 65   SpO2: 97%   Weight: 63.6 kg (140 lb 3.4 oz)   Height: 5' 4" (1.626 m)   PainSc: 0-No pain         Physical exam:  Gen: A and O x3, pleasant, well-groomed  Skin: No rashes or obvious lesions  HEENT: PERRLA, no obvious deformities on ears or in canals. Trachea midline.  CVS: Regular rate and rhythm, normal palpable pulses.  Resp:No increased work of breathing, symmetrical chest rise.  Abdomen: Soft, NT/ND.  Musculoskeletal:  No antalgic gait.     Neuro:  Lower extremities: 5/5 strength bilaterally  Reflexes: Patellar 0+, Achilles 0+ bilaterally.  Sensory:  Intact and symmetrical to light touch and pinprick in L2-S1 dermatomes except for decreased sensation to light touch and pinprick from her knees distally bilaterally.    Lumbar spine:  Lumbar spine:  Range of motion is mildly reduced with forward flexion with slight increased pain, moderately decreased with extension with increased pain in her bilateral low back.   Magnus's test causes no increased pain on either side.    Supine straight leg raise is negative bilaterally.    Internal and external rotation of the hip causes no increased pain on either side.  Myofascial exam: No tenderness to palpation across lumbar paraspinous muscles.    Imagin17 Xray L-spine  Five views lumbar spine were obtained (AP, lateral, right oblique, left oblique and spot views). There is a upper lumbar dextroscoliosis and a mid lumbar levoscoliosis with superimposed multilevel degenerative disc and facet disease. There is loss of disc space height throughout the lumbar region there is lower lumbar degenerative facet disease.    4/15/14 BL Lower extremity EMG:   Mild sensorimotor predominantly axonal neuropathy.    19 MRI L-spine:  L5-S1: Spondylosis associated with disc space narrowing and mild marginal anterior spondylotic osteophytes.  There is a disc protrusion and " osteophyte complex in the left neural foramen that causes a moderate left foraminal stenosis.  There are postoperative changes from a left L5-S1 laminectomy without definite signs for recurrent disc herniations.  There is facet arthropathy.  L4-5: Disc space narrowing associated with marginal anterior spondylotic osteophytes.  There is a mild circumferential annular disc bulge.  Facet arthropathy noted bilaterally.  There is also at least a moderate right lateral recess stenosis.  L3-4: Disc degeneration with with the or disc space narrowing and marginal anterior spondylotic osteophyte.  There is a broad-based posterior osteophyte the and disc bulge complex with compression of the thecal sac greater on the right.  There is a moderate to severe right lateral recess stenosis (image 32 series 5002.  There is associated facet arthropathy.  Mild central canal spinal stenosis.  L2-3: Disc degeneration with disc space narrowing.  There is a broad-based circumferential annular disc bulge.  In the left neural foramen in addition a disc protrusion with moderate obscuration of the foraminal fat is noted.  In the far lateral aspect of the neural foramen (image 16 series 5 the disc protrusion contacts the exiting left L2 root.  Clinical correlation for a left L2 radiculopathy suggested.  L1-2: Disc degeneration with disc space narrowing and marginal anterior spondylotic osteophyte.  There is a broad-based left paracentral disc protrusion with compression of the thecal sac anterolaterally.  There is an extension of the disc protrusion in the left neural foramen associated with at least a moderate foraminal stenosis.  Compression of the exiting left L1 root far laterally in the foramen not excluded.  Right neural foramen is unremarkable.  T12-L1: No disc herniations or spinal stenosis or foraminal stenosis.    Assessment:    The patient is a 72-year-old woman with a history of anxiety, depression, previous lumbar surgery who  presents in referral from Dr. Adam for bilateral foot pain.     1. Idiopathic peripheral neuropathy        2. DDD (degenerative disc disease), lumbar        3. Greater trochanteric bursitis of right hip            Plan:  1. Dr. Goyal has refilled Lyrica 100 mg twice daily.  This adequately controls her lower extremity symptoms.  2. If her back pain returns we can repeat a lumbar epidural steroid injection.    3. She can contact us in the future for a right greater trochanteric bursa injection in the future if necessary.    4. Follow-up in 3 months or sooner as needed.

## 2023-01-19 ENCOUNTER — LAB VISIT (OUTPATIENT)
Dept: LAB | Facility: HOSPITAL | Age: 73
End: 2023-01-19
Attending: FAMILY MEDICINE
Payer: MEDICARE

## 2023-01-19 DIAGNOSIS — E53.8 B12 DEFICIENCY: ICD-10-CM

## 2023-01-19 LAB — VIT B12 SERPL-MCNC: 445 PG/ML (ref 210–950)

## 2023-01-19 PROCEDURE — 82607 VITAMIN B-12: CPT | Performed by: FAMILY MEDICINE

## 2023-01-19 PROCEDURE — 36415 COLL VENOUS BLD VENIPUNCTURE: CPT | Mod: PN | Performed by: FAMILY MEDICINE

## 2023-01-20 ENCOUNTER — PATIENT MESSAGE (OUTPATIENT)
Dept: FAMILY MEDICINE | Facility: CLINIC | Age: 73
End: 2023-01-20
Payer: MEDICARE

## 2023-01-23 ENCOUNTER — PATIENT MESSAGE (OUTPATIENT)
Dept: NEUROLOGY | Facility: CLINIC | Age: 73
End: 2023-01-23
Payer: MEDICARE

## 2023-01-23 DIAGNOSIS — G43.019 INTRACTABLE MIGRAINE WITHOUT AURA AND WITHOUT STATUS MIGRAINOSUS: Primary | ICD-10-CM

## 2023-01-24 ENCOUNTER — PATIENT MESSAGE (OUTPATIENT)
Dept: NEUROLOGY | Facility: CLINIC | Age: 73
End: 2023-01-24
Payer: MEDICARE

## 2023-01-24 RX ORDER — RIMEGEPANT SULFATE 75 MG/75MG
75 TABLET, ORALLY DISINTEGRATING ORAL DAILY PRN
Qty: 8 TABLET | Refills: 11 | Status: SHIPPED | OUTPATIENT
Start: 2023-01-24

## 2023-01-29 ENCOUNTER — PATIENT MESSAGE (OUTPATIENT)
Dept: FAMILY MEDICINE | Facility: CLINIC | Age: 73
End: 2023-01-29
Payer: MEDICARE

## 2023-01-30 ENCOUNTER — TELEPHONE (OUTPATIENT)
Dept: FAMILY MEDICINE | Facility: CLINIC | Age: 73
End: 2023-01-30
Payer: MEDICARE

## 2023-01-30 DIAGNOSIS — N95.2 ATROPHIC VAGINITIS: Primary | ICD-10-CM

## 2023-01-30 RX ORDER — ESTRADIOL 0.1 MG/G
1 CREAM VAGINAL DAILY
Qty: 30 G | Refills: 11 | Status: SHIPPED | OUTPATIENT
Start: 2023-01-30 | End: 2023-01-30 | Stop reason: SDUPTHER

## 2023-01-30 RX ORDER — ESTRADIOL 0.1 MG/G
1 CREAM VAGINAL
Qty: 8 G | Refills: 11 | Status: SHIPPED | OUTPATIENT
Start: 2023-01-30 | End: 2023-08-03 | Stop reason: HOSPADM

## 2023-01-30 NOTE — TELEPHONE ENCOUNTER
Spoke w/ Nicky. She states estrace is usually 2-3 times/wk and wants to confirm that you would like pt taking this once daily. If changing sig, please send new rx.

## 2023-01-30 NOTE — TELEPHONE ENCOUNTER
----- Message from Millicent Cummings sent at 1/30/2023 10:07 AM CST -----  Regarding: PATIENT CALL BACK  Name of Who is Calling: LEONA CAMPOS [[id number]] Crystal from [facility]      What is the request in detail: Pharmacist is requesting a call back to clarify the directions for prescription estradioL (ESTRACE) 0.01 % (0.1 mg/gram) vaginal cream. Please advise!        Can the clinic reply by [facility]: NO        What Number to Call Back if not in [facility]: [phone] Crystal from [facility]

## 2023-01-31 ENCOUNTER — PATIENT MESSAGE (OUTPATIENT)
Dept: UROLOGY | Facility: CLINIC | Age: 73
End: 2023-01-31
Payer: MEDICARE

## 2023-02-28 ENCOUNTER — TELEPHONE (OUTPATIENT)
Dept: FAMILY MEDICINE | Facility: CLINIC | Age: 73
End: 2023-02-28
Payer: MEDICARE

## 2023-02-28 ENCOUNTER — PATIENT MESSAGE (OUTPATIENT)
Dept: FAMILY MEDICINE | Facility: CLINIC | Age: 73
End: 2023-02-28
Payer: MEDICARE

## 2023-02-28 NOTE — TELEPHONE ENCOUNTER
----- Message from Emma Guallpa sent at 2/28/2023  9:03 AM CST -----  Contact: Pt [phone]  Pt needs to schedule her monthly B-12 injection.      Please call and advise

## 2023-02-28 NOTE — TELEPHONE ENCOUNTER
----- Message from Kyle Vivar sent at 2/28/2023  1:03 PM CST -----  Who Called: patient          Who Left Message for Patient: Yaya          Does the patient know what this is regarding?: B-12 Injection, Pt would like to come in 3/3/23 in the morning          Best Call Back Number: [phone]           Additional Information:

## 2023-03-06 ENCOUNTER — CLINICAL SUPPORT (OUTPATIENT)
Dept: FAMILY MEDICINE | Facility: CLINIC | Age: 73
End: 2023-03-06
Payer: MEDICARE

## 2023-03-06 PROCEDURE — 96372 THER/PROPH/DIAG INJ SC/IM: CPT | Mod: PBBFAC,PN

## 2023-03-06 RX ADMIN — CYANOCOBALAMIN 1000 MCG: 1000 INJECTION, SOLUTION INTRAMUSCULAR at 09:03

## 2023-04-03 ENCOUNTER — OFFICE VISIT (OUTPATIENT)
Dept: PAIN MEDICINE | Facility: CLINIC | Age: 73
End: 2023-04-03
Payer: MEDICARE

## 2023-04-03 VITALS
WEIGHT: 135.06 LBS | HEART RATE: 65 BPM | BODY MASS INDEX: 23.06 KG/M2 | HEIGHT: 64 IN | DIASTOLIC BLOOD PRESSURE: 62 MMHG | SYSTOLIC BLOOD PRESSURE: 138 MMHG

## 2023-04-03 DIAGNOSIS — M51.36 DDD (DEGENERATIVE DISC DISEASE), LUMBAR: ICD-10-CM

## 2023-04-03 DIAGNOSIS — M70.61 GREATER TROCHANTERIC BURSITIS OF RIGHT HIP: ICD-10-CM

## 2023-04-03 DIAGNOSIS — G60.9 IDIOPATHIC PERIPHERAL NEUROPATHY: Primary | ICD-10-CM

## 2023-04-03 PROCEDURE — 99999 PR PBB SHADOW E&M-EST. PATIENT-LVL IV: CPT | Mod: PBBFAC,,, | Performed by: PHYSICIAN ASSISTANT

## 2023-04-03 PROCEDURE — 99214 OFFICE O/P EST MOD 30 MIN: CPT | Mod: S$PBB,,, | Performed by: PHYSICIAN ASSISTANT

## 2023-04-03 PROCEDURE — 99214 PR OFFICE/OUTPT VISIT, EST, LEVL IV, 30-39 MIN: ICD-10-PCS | Mod: S$PBB,,, | Performed by: PHYSICIAN ASSISTANT

## 2023-04-03 PROCEDURE — 99999 PR PBB SHADOW E&M-EST. PATIENT-LVL IV: ICD-10-PCS | Mod: PBBFAC,,, | Performed by: PHYSICIAN ASSISTANT

## 2023-04-03 PROCEDURE — 99214 OFFICE O/P EST MOD 30 MIN: CPT | Mod: PBBFAC,PN | Performed by: PHYSICIAN ASSISTANT

## 2023-04-03 RX ORDER — KETOROLAC TROMETHAMINE 5 MG/ML
SOLUTION OPHTHALMIC
COMMUNITY
Start: 2023-03-02 | End: 2023-05-18

## 2023-04-03 RX ORDER — PREGABALIN 100 MG/1
100 CAPSULE ORAL 2 TIMES DAILY
Qty: 60 CAPSULE | Refills: 2 | Status: SHIPPED | OUTPATIENT
Start: 2023-04-03 | End: 2023-08-02

## 2023-04-03 RX ORDER — PREDNISOLONE ACETATE 10 MG/ML
SUSPENSION/ DROPS OPHTHALMIC
COMMUNITY
Start: 2023-03-02 | End: 2023-07-13

## 2023-04-03 RX ORDER — CYCLOSPORINE 0.5 MG/ML
1 EMULSION OPHTHALMIC 2 TIMES DAILY
COMMUNITY
Start: 2023-01-16

## 2023-04-03 RX ORDER — OFLOXACIN 3 MG/ML
1 SOLUTION/ DROPS OPHTHALMIC 4 TIMES DAILY
COMMUNITY
Start: 2023-03-02 | End: 2023-05-18

## 2023-04-03 NOTE — TELEPHONE ENCOUNTER
Seen in clinic.  Please send prescription to her pharmacy.    I have reviewed the [place] Board of Pharmacy website and there are no abberancies.

## 2023-04-08 NOTE — PROGRESS NOTES
This note was completed with dictation software and grammatical errors may exist.    CC:  Bilateral foot pain, back pain, leg pain    HPI:  The patient is a 72-year-old woman with a history of anxiety, depression, previous lumbar surgery who presents in referral from Dr. Adam for bilateral foot pain.  She returns in follow-up today with bilateral lower leg and foot pain.  This is tolerable with taking Lyrica.  She states that her low back pain is also tolerable but she does have some increased pain with vacuuming, making her bed and sweeping.  She has been doing Pilates twice a week and exercising regularly.  She does feel that her right greater trochanteric bursitis pain has improved with exercise.  She denies any new weakness or new numbness.    Previous history:  The patient reports developing bilateral foot pain about 25 years ago with numbness and tingling, has been diagnosed with neuropathy but workup has not revealed any particular  etiology.  This has been progressing over the years and has gone from being located only in the feet and now extending proximally up to the knees bilaterally.  She describes it as throbbing, tingling with abnormal sensation, particularly worse with standing, walking, exercising.  She does get some relief with rest, heat and medications. She has been taking gabapentin for years, several years has been on 600 mg twice daily with about 40% relief.  She also has a history of low back and buttock pain, ended up having severe right leg pain and in 2001 underwent laminectomy, unclear what level but reports that her right back and leg pain resolved at that time.  However over the years she has developed more bilateral buttock pain, thigh pain worse with standing and walking, describes this as dull.  She denies any weakness in her legs, denies any bowel or bladder incontinence.    Pain intervention history:  She has done physical therapy multiple times, chiropractic care without  relief.  She has not tried Cymbalta.  She has been taking gabapentin 600 mg twice daily with about 40% relief, switched to Lyrica.  L5/S1 CHEIKH performed on 05/20/2019 with greater than 70% relief ongoing of her back and leg pain, approach from the right due to previous left hemilaminectomy. She is status post L5/S1 interlaminar epidural steroid injection from the right side on 08/04/2022 with 50% relief of her back pain but 0% relief of her leg pain.      Spine surgeries:  L5/S1 left hemilaminectomy    Antineuropathics:  Lyrica 100 twice daily  NSAIDs:  Naproxen  Physical therapy:  Antidepressants:  Elavil 10 mg, Celexa 10  Muscle relaxers:  Opioids:  Antiplatelets/Anticoagulants:    ROS:  She reports back pain only.  Balance of review of systems is negative.    Past Medical History:   Diagnosis Date    Anxiety     Arthritis     Chronic allergic rhinitis 11/19/2012    Depression     Essential hypertension 9/14/2020    Herniated lumbar intervertebral disc     L3    Migraine     painless with numbness       Past Surgical History:   Procedure Laterality Date    COLONOSCOPY      COLONOSCOPY N/A 03/02/2020    Procedure: COLONOSCOPY;  Surgeon: Flash Garnett MD;  Location: [facility] ENDO;  Service: Endoscopy;  Laterality: N/A;    EPIDURAL STEROID INJECTION INTO LUMBAR SPINE N/A 05/20/2019    Procedure: Injection-steroid-epidural-lumbar;  Surgeon: Patrick Goyal MD;  Location: [facility] OR;  Service: Pain Management;  Laterality: N/A;  L5/S1 from right side    EPIDURAL STEROID INJECTION INTO LUMBAR SPINE N/A 08/04/2022    Procedure: Injection-steroid-epidural-lumbar, L5/S1  from the right side;  Surgeon: Patrick Goyal MD;  Location: [facility] OR;  Service: Pain Management;  Laterality: N/A;    EYE SURGERY  May, 2018    cataract surgery    NASAL SEPTUM SURGERY N/A 09/2017    SPINE SURGERY  2001    L4    TONSILLECTOMY         Social History     Socioeconomic History    Marital status:      Spouse name: Kraig Chang  of children: 3   Tobacco Use    Smoking status: Former     Packs/day: 0.00     Years: 0.50     Pack years: 0.00     Types: Cigarettes     Quit date: 1973     Years since quittin.3    Smokeless tobacco: Never    Tobacco comments:     social smoker only   Substance and Sexual Activity    Alcohol use: Yes     Alcohol/week: 5.0 standard drinks     Types: 5 Glasses of wine per week     Comment: one glass of wine with dinner daily    Drug use: No    Sexual activity: Yes     Partners: Female     Birth control/protection: Post-menopausal   Social History Narrative    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)     Exercise : Most days per week : walking 4d/week + Pilates + cardio + resistance     Diet : pt suspects lactose intolerance      Social Determinants of Health     Financial Resource Strain: Low Risk     Difficulty of Paying Living Expenses: Not hard at all   Food Insecurity: No Food Insecurity    Worried About Running Out of Food in the Last Year: Never true    Ran Out of Food in the Last Year: Never true   Transportation Needs: No Transportation Needs    Lack of Transportation (Medical): No    Lack of Transportation (Non-Medical): No   Physical Activity: Sufficiently Active    Days of Exercise per Week: 4 days    Minutes of Exercise per Session: 40 min   Stress: No Stress Concern Present    Feeling of Stress : Not at all   Social Connections: Unknown    Frequency of Communication with Friends and Family: More than three times a week    Frequency of Social Gatherings with Friends and Family: Twice a week    Active Member of Clubs or Organizations: No    Attends Club or Organization Meetings: Never    Marital Status:    Housing Stability: Low Risk     Unable to Pay for Housing in the Last Year: No    Number of Places Lived in the Last Year: 1    Unstable Housing in the Last Year: No         Medications/Allergies: See med card    Vitals:    23 1045   BP: 138/62   Pulse: 65   Weight: 61.3 kg  "(135 lb 0.5 oz)   Height: 5' 4" (1.626 m)   PainSc:   2         Physical exam:  Gen: A and O x3, pleasant, well-groomed  Skin: No rashes or obvious lesions  HEENT: PERRLA, no obvious deformities on ears or in canals. Trachea midline.  CVS: Regular rate and rhythm, normal palpable pulses.  Resp:No increased work of breathing, symmetrical chest rise.  Abdomen: Soft, NT/ND.  Musculoskeletal:  No antalgic gait.     Neuro:  Lower extremities: 5/5 strength bilaterally  Reflexes: Patellar 0+, Achilles 0+ bilaterally.  Sensory:  Intact and symmetrical to light touch and pinprick in L2-S1 dermatomes except for decreased sensation to light touch and pinprick from her knees distally bilaterally.    Lumbar spine:  Lumbar spine:  Range of motion is mildly reduced with forward flexion with slight increased pain, moderately decreased with extension with increased pain in her bilateral low back.   Magnus's test causes no increased pain on either side.    Supine straight leg raise is negative bilaterally.    Internal and external rotation of the hip causes no increased pain on either side.  Myofascial exam: No tenderness to palpation across lumbar paraspinous muscles.    Imagin17 Xray L-spine  Five views lumbar spine were obtained (AP, lateral, right oblique, left oblique and spot views). There is a upper lumbar dextroscoliosis and a mid lumbar levoscoliosis with superimposed multilevel degenerative disc and facet disease. There is loss of disc space height throughout the lumbar region there is lower lumbar degenerative facet disease.    4/15/14 BL Lower extremity EMG:   Mild sensorimotor predominantly axonal neuropathy.    19 MRI L-spine:  L5-S1: Spondylosis associated with disc space narrowing and mild marginal anterior spondylotic osteophytes.  There is a disc protrusion and osteophyte complex in the left neural foramen that causes a moderate left foraminal stenosis.  There are postoperative changes from a left " L5-S1 laminectomy without definite signs for recurrent disc herniations.  There is facet arthropathy.  L4-5: Disc space narrowing associated with marginal anterior spondylotic osteophytes.  There is a mild circumferential annular disc bulge.  Facet arthropathy noted bilaterally.  There is also at least a moderate right lateral recess stenosis.  L3-4: Disc degeneration with with the or disc space narrowing and marginal anterior spondylotic osteophyte.  There is a broad-based posterior osteophyte the and disc bulge complex with compression of the thecal sac greater on the right.  There is a moderate to severe right lateral recess stenosis (image 32 series 5002.  There is associated facet arthropathy.  Mild central canal spinal stenosis.  L2-3: Disc degeneration with disc space narrowing.  There is a broad-based circumferential annular disc bulge.  In the left neural foramen in addition a disc protrusion with moderate obscuration of the foraminal fat is noted.  In the far lateral aspect of the neural foramen (image 16 series 5 the disc protrusion contacts the exiting left L2 root.  Clinical correlation for a left L2 radiculopathy suggested.  L1-2: Disc degeneration with disc space narrowing and marginal anterior spondylotic osteophyte.  There is a broad-based left paracentral disc protrusion with compression of the thecal sac anterolaterally.  There is an extension of the disc protrusion in the left neural foramen associated with at least a moderate foraminal stenosis.  Compression of the exiting left L1 root far laterally in the foramen not excluded.  Right neural foramen is unremarkable.  T12-L1: No disc herniations or spinal stenosis or foraminal stenosis.    Assessment:    The patient is a 72-year-old woman with a history of anxiety, depression, previous lumbar surgery who presents in referral from Dr. Adam for bilateral foot pain.     1. Idiopathic peripheral neuropathy        2. DDD (degenerative disc  disease), lumbar        3. Greater trochanteric bursitis of right hip            Plan:  1. Dr. Goyal has refilled Lyrica 100 mg twice daily.  This adequately controls her lower extremity symptoms. I have reviewed the [place] Board of Pharmacy website and there are no abberancies.    2. If her back pain worsens we can repeat a lumbar epidural steroid injection.    3. She will continue her home exercise program which has been helping with her low back and greater trochanteric bursa pain.  4. Follow-up in 3 months or sooner as needed.

## 2023-04-17 ENCOUNTER — PATIENT MESSAGE (OUTPATIENT)
Dept: FAMILY MEDICINE | Facility: CLINIC | Age: 73
End: 2023-04-17
Payer: MEDICARE

## 2023-04-17 RX ORDER — ALENDRONATE SODIUM 70 MG/1
70 TABLET ORAL
Qty: 12 TABLET | Refills: 3 | Status: SHIPPED | OUTPATIENT
Start: 2023-04-17 | End: 2024-03-11

## 2023-05-02 ENCOUNTER — HOSPITAL ENCOUNTER (OUTPATIENT)
Dept: RADIOLOGY | Facility: HOSPITAL | Age: 73
Discharge: HOME | End: 2023-05-02
Attending: FAMILY MEDICINE
Payer: MEDICARE

## 2023-05-02 DIAGNOSIS — Z12.31 ENCOUNTER FOR SCREENING MAMMOGRAM FOR MALIGNANT NEOPLASM OF BREAST: ICD-10-CM

## 2023-05-02 PROCEDURE — 77067 SCR MAMMO BI INCL CAD: CPT | Mod: TC,PO

## 2023-05-02 PROCEDURE — 77067 SCR MAMMO BI INCL CAD: CPT | Mod: 26,,, | Performed by: RADIOLOGY

## 2023-05-02 PROCEDURE — 77063 BREAST TOMOSYNTHESIS BI: CPT | Mod: 26,,, | Performed by: RADIOLOGY

## 2023-05-02 PROCEDURE — 77067 MAMMO DIGITAL SCREENING BILAT WITH TOMO: ICD-10-PCS | Mod: 26,,, | Performed by: RADIOLOGY

## 2023-05-02 PROCEDURE — 77063 MAMMO DIGITAL SCREENING BILAT WITH TOMO: ICD-10-PCS | Mod: 26,,, | Performed by: RADIOLOGY

## 2023-05-05 ENCOUNTER — CLINICAL SUPPORT (OUTPATIENT)
Dept: FAMILY MEDICINE | Facility: CLINIC | Age: 73
End: 2023-05-05
Payer: MEDICARE

## 2023-05-05 DIAGNOSIS — E53.8 B12 DEFICIENCY: Primary | ICD-10-CM

## 2023-05-05 PROCEDURE — 96372 THER/PROPH/DIAG INJ SC/IM: CPT | Mod: PBBFAC,PN

## 2023-05-05 RX ADMIN — CYANOCOBALAMIN 1000 MCG: 1000 INJECTION, SOLUTION INTRAMUSCULAR at 09:05

## 2023-05-05 NOTE — PROGRESS NOTES
Patient presents to the office for a B-12 Injection.   B-12 injection was administered into the Left Glute and verified by Jacquelyn SOSA Lpn.  She tolerated the injection well with no complaints.

## 2023-05-07 ENCOUNTER — OFFICE VISIT (OUTPATIENT)
Dept: URGENT CARE | Facility: CLINIC | Age: 73
End: 2023-05-07
Payer: MEDICARE

## 2023-05-07 VITALS
HEART RATE: 65 BPM | RESPIRATION RATE: 18 BRPM | DIASTOLIC BLOOD PRESSURE: 72 MMHG | TEMPERATURE: 98 F | BODY MASS INDEX: 23.05 KG/M2 | OXYGEN SATURATION: 97 % | WEIGHT: 135 LBS | HEIGHT: 64 IN | SYSTOLIC BLOOD PRESSURE: 155 MMHG

## 2023-05-07 DIAGNOSIS — J06.9 VIRAL URI WITH COUGH: Primary | ICD-10-CM

## 2023-05-07 DIAGNOSIS — R05.9 COUGH, UNSPECIFIED TYPE: ICD-10-CM

## 2023-05-07 LAB
CTP QC/QA: YES
CTP QC/QA: YES
MOLECULAR STREP A: NEGATIVE
SARS-COV-2 AG RESP QL IA.RAPID: NEGATIVE

## 2023-05-07 PROCEDURE — 99214 PR OFFICE/OUTPT VISIT, EST, LEVL IV, 30-39 MIN: ICD-10-PCS | Mod: CR,S$GLB,, | Performed by: PHYSICIAN ASSISTANT

## 2023-05-07 PROCEDURE — 87811 SARS CORONAVIRUS 2 ANTIGEN POCT, MANUAL READ: ICD-10-PCS | Mod: QW,CR,S$GLB, | Performed by: PHYSICIAN ASSISTANT

## 2023-05-07 PROCEDURE — 87651 STREP A DNA AMP PROBE: CPT | Mod: QW,S$GLB,, | Performed by: PHYSICIAN ASSISTANT

## 2023-05-07 PROCEDURE — 87811 SARS-COV-2 COVID19 W/OPTIC: CPT | Mod: QW,CR,S$GLB, | Performed by: PHYSICIAN ASSISTANT

## 2023-05-07 PROCEDURE — 99214 OFFICE O/P EST MOD 30 MIN: CPT | Mod: CR,S$GLB,, | Performed by: PHYSICIAN ASSISTANT

## 2023-05-07 PROCEDURE — 71046 XR CHEST PA AND LATERAL: ICD-10-PCS | Mod: S$GLB,,, | Performed by: RADIOLOGY

## 2023-05-07 PROCEDURE — 71046 X-RAY EXAM CHEST 2 VIEWS: CPT | Mod: S$GLB,,, | Performed by: RADIOLOGY

## 2023-05-07 PROCEDURE — 87651 POCT STREP A MOLECULAR: ICD-10-PCS | Mod: QW,S$GLB,, | Performed by: PHYSICIAN ASSISTANT

## 2023-05-07 RX ORDER — PREDNISONE 10 MG/1
TABLET ORAL
Qty: 11 TABLET | Refills: 0 | Status: SHIPPED | OUTPATIENT
Start: 2023-05-07 | End: 2023-05-18

## 2023-05-07 RX ORDER — BENZONATATE 200 MG/1
200 CAPSULE ORAL 3 TIMES DAILY PRN
Qty: 30 CAPSULE | Refills: 0 | Status: SHIPPED | OUTPATIENT
Start: 2023-05-07 | End: 2023-05-18

## 2023-05-07 NOTE — PROGRESS NOTES
"Subjective:      Patient ID: Yolie Brambila is a 72 y.o. female.    Vitals:  height is 5' 4" (1.626 m) and weight is 61.2 kg (135 lb). Her oral temperature is 98.1 °F (36.7 °C). Her blood pressure is 155/72 (abnormal) and her pulse is 65. Her respiration is 18 and oxygen saturation is 97%.     Chief Complaint: Sore Throat and Cough    Pt coming in, started Thursday afternoon (3 days ago) with cough , congestion in chest, mild sore throat since Friday , voise is hoarse. Daqyuil and nyquil mild helped. Zyrtek.     Cough  This is a new problem. The current episode started in the past 7 days. The problem has been gradually worsening. The problem occurs constantly. The cough is Non-productive. Associated symptoms include headaches and a sore throat. Pertinent negatives include no chills, ear pain, fever or shortness of breath. Nothing aggravates the symptoms. Risk factors:  graduation on thursday, poss exposure. Treatments tried: zytek and dayquil and nyquil. The treatment provided mild relief. Her past medical history is significant for environmental allergies. There is no history of asthma.     Constitution: Negative for chills and fever.   HENT:  Positive for sore throat. Negative for ear pain.    Respiratory:  Positive for cough. Negative for shortness of breath.    Allergic/Immunologic: Positive for environmental allergies.   Neurological:  Positive for headaches.    Objective:     Physical Exam   Constitutional: She does not appear ill. No distress.   HENT:   Head: Normocephalic and atraumatic.   Ears:   Right Ear: Tympanic membrane, external ear and ear canal normal.   Left Ear: Tympanic membrane, external ear and ear canal normal.   Nose: Right sinus exhibits no maxillary sinus tenderness and no frontal sinus tenderness. Left sinus exhibits no maxillary sinus tenderness and no frontal sinus tenderness.   Mouth/Throat: Mucous membranes are moist. No oropharyngeal exudate or posterior oropharyngeal " erythema. Oropharynx is clear.   Eyes: Conjunctivae are normal. Right eye exhibits no discharge. Left eye exhibits no discharge. Extraocular movement intact   Cardiovascular: Normal rate, regular rhythm and normal heart sounds.   No murmur heard.  Pulmonary/Chest: Effort normal and breath sounds normal. She has no wheezes. She has no rhonchi. She has no rales.   Abdominal: Normal appearance.   Musculoskeletal: Normal range of motion.         General: Normal range of motion.   Neurological: no focal deficit. She is alert.   Skin: Skin is warm, dry and not pale. jaundice  Psychiatric: Mood, judgment and thought content normal.   Nursing note and vitals reviewed.    Assessment:     1. Viral URI with cough    2. Cough, unspecified type        Plan:       Viral URI with cough    Cough, unspecified type  -     SARS Coronavirus 2 Antigen, POCT Manual Read  -     POCT Strep A, Molecular    Results for orders placed or performed in visit on 05/07/23   SARS Coronavirus 2 Antigen, POCT Manual Read   Result Value Ref Range    SARS Coronavirus 2 Antigen Negative Negative     Acceptable Yes    POCT Strep A, Molecular   Result Value Ref Range    Molecular Strep A, POC Negative Negative     Acceptable Yes       -     X-Ray Chest PA And Lateral; Future; Expected date: 05/07/2023    X-Ray Chest PA And Lateral    Result Date: 5/7/2023  EXAMINATION: XR CHEST PA AND LATERAL CLINICAL HISTORY: Cough, unspecified TECHNIQUE: PA and lateral views of the chest were performed. COMPARISON: 04/28/2014 FINDINGS: The lungs are clear, with normal appearance of pulmonary vasculature and no pleural effusion or pneumothorax. The cardiac silhouette is normal in size. The hilar and mediastinal contours are unremarkable. Bones are intact.     No acute abnormality. Electronically signed by: Nancy Petty Date:    05/07/2023 Time:    11:45    Other orders  -     predniSONE (DELTASONE) 10 MG tablet; Take 40mg for 1  days, take 30mg for 1 days, take 20mg for 1 days, take 10mg for 2 days  Dispense: 11 tablet; Refill: 0  -     benzonatate (TESSALON) 200 MG capsule; Take 1 capsule (200 mg total) by mouth 3 (three) times daily as needed for Cough.  Dispense: 30 capsule; Refill: 0      Viral Upper Respiratory Infection Discharge Instructions, Adult   About this topic   You have an upper respiratory infection or URI. A URI can affect your nose, throat, ears, and sinuses. A virus is the cause of almost all URIs and antibiotics will not help you feel better more quickly. The common cold is an example of a viral URI.  URIs are easy to spread from person to person, most often through coughing or sneezing. A URI will almost always get better in a week or two without any treatment.         What care is needed at home?   Ask your doctor what you need to do when you go home. Make sure you ask questions if you do not understand what the doctor says.  If you smoke, try to quit. Your doctor or nurse can help.  Drink lots of fluids like water, juice, or broth. This will help replace any fluids lost if you have a runny nose or fever. Warm tea or soup can help soothe a sore throat.  If the air in your home feels dry, use a cool mist humidifier. This can help a stuffy nose and make it easier to breathe.  You can also use saline nose drops to relieve stuffiness.  If you decide to take over-the-counter cough or cold medicines, follow the directions on the label carefully. Be sure you do not take more than 1 medicine that contains acetaminophen. Also, if you have a heart problem or high blood pressure, check with your doctor before you take any of these medicines.  Wash your hands often. Cough or sneeze into a tissue or your elbow instead of your hands. This will help keep others healthy.  What follow-up care is needed?   Your doctor may ask you to make visits to the office to check on your progress. Be sure to keep these visits.  What drugs may be  needed?   The doctor may order drugs to:  Open up the tubes of your lungs  Treat viral infection  Relieve or stop coughing  Help with pain from a sore throat  Relieve runny and stuffy nose  Provide oxygen  Will physical activity be limited?   You need to rest for a few days to let your body recover from the infection.  What changes to diet are needed?   Eat soft foods like soup if swallowing is too painful.  What problems could happen?   Asthma attack  Sinus infections  Lung problems like pneumonia and bronchitis  Severe fluid loss. This is dehydration.  What can be done to prevent this health problem?   Wash your hands often with soap and water for at least 20 seconds, especially after coughing or sneezing. Alcohol-based hand sanitizers also work to kill the virus.  If you are sick, cover your mouth and nose with tissue when you cough or sneeze. You can also cough into your elbow. Throw away tissues in the trash and wash your hands after touching used tissues.  Do not get too close (kissing, hugging) to people who are sick.  Do not share towels or hankies with anyone who is sick. Clean commonly handled things like door handles, remotes, toys, and phones. Wipe them with a disinfectant.  Stay away from crowded places.  Cover your nose and mouth when you sneeze or cough.  Take vitamin C to help build up your body's ability to fight disease.  Get a flu shot each year.  When do I need to call the doctor?   You have trouble breathing when talking or sitting still.  You have a fever of 100.4°F (38°C) or higher for several days, chills, a very bad sore throat, or ear or sinus pain.  You develop a new fever after several days of feeling the same or improving.  You develop chest pain when you cough.  You have a cough that lasts more than 10 days.  You cough up blood, or the color of the mucus you cough up changes.  Teach Back: Helping You Understand   The Teach Back Method helps you understand the information we are giving  you. After you talk with the staff, tell them in your own words what you learned. This helps to make sure the staff has described each thing clearly. It also helps to explain things that may have been confusing. Before going home, make sure you can do these:  I can tell you about my condition.  I can tell you what may help ease my signs.  I can tell you what I will do if I have a fever, chills, breathing very fast, or trouble breathing.  Where can I learn more?   American Lung Association  https://www.lung.org/blog/can-you-exercise-with-a-cold   American Lung Association  https://www.lung.org/lung-health-diseases/lung-disease-lookup/influenza/facts-about-the-common-cold   NHS Choices  https://www.nhs.uk/conditions/respiratory-tract-infection/   UpToDate  https://www.[employer]/contents/the-common-cold-in-adults-beyond-the-basics   Last Reviewed Date   2021-06-08  Consumer Information Use and Disclaimer   This information is not specific medical advice and does not replace information you receive from your health care provider. This is only a brief summary of general information. It does NOT include all information about conditions, illnesses, injuries, tests, procedures, treatments, therapies, discharge instructions or life-style choices that may apply to you. You must talk with your health care provider for complete information about your health and treatment options. This information should not be used to decide whether or not to accept your health care providers advice, instructions or recommendations. Only your health care provider has the knowledge and training to provide advice that is right for you.  Copyright   Copyright © 2021 UpToDate, Inc. and its affiliates and/or licensors. All rights reserved.

## 2023-05-17 ENCOUNTER — PATIENT MESSAGE (OUTPATIENT)
Dept: FAMILY MEDICINE | Facility: CLINIC | Age: 73
End: 2023-05-17
Payer: MEDICARE

## 2023-05-18 ENCOUNTER — OFFICE VISIT (OUTPATIENT)
Dept: FAMILY MEDICINE | Facility: CLINIC | Age: 73
End: 2023-05-18
Payer: MEDICARE

## 2023-05-18 VITALS
RESPIRATION RATE: 16 BRPM | WEIGHT: 135.5 LBS | SYSTOLIC BLOOD PRESSURE: 124 MMHG | TEMPERATURE: 98 F | BODY MASS INDEX: 23.25 KG/M2 | DIASTOLIC BLOOD PRESSURE: 82 MMHG | HEART RATE: 75 BPM | OXYGEN SATURATION: 97 %

## 2023-05-18 DIAGNOSIS — R05.9 COUGH, UNSPECIFIED TYPE: ICD-10-CM

## 2023-05-18 DIAGNOSIS — J32.9 BACTERIAL SINUSITIS: Primary | ICD-10-CM

## 2023-05-18 DIAGNOSIS — B96.89 BACTERIAL SINUSITIS: Primary | ICD-10-CM

## 2023-05-18 PROCEDURE — 99999 PR PBB SHADOW E&M-EST. PATIENT-LVL IV: CPT | Mod: PBBFAC,,,

## 2023-05-18 PROCEDURE — 99214 PR OFFICE/OUTPT VISIT, EST, LEVL IV, 30-39 MIN: ICD-10-PCS | Mod: S$PBB,,,

## 2023-05-18 PROCEDURE — 99214 OFFICE O/P EST MOD 30 MIN: CPT | Mod: PBBFAC,PN

## 2023-05-18 PROCEDURE — 99999 PR PBB SHADOW E&M-EST. PATIENT-LVL IV: ICD-10-PCS | Mod: PBBFAC,,,

## 2023-05-18 PROCEDURE — 99214 OFFICE O/P EST MOD 30 MIN: CPT | Mod: S$PBB,,,

## 2023-05-18 RX ORDER — DOXYCYCLINE HYCLATE 100 MG
100 TABLET ORAL 2 TIMES DAILY
Qty: 14 TABLET | Refills: 0 | Status: SHIPPED | OUTPATIENT
Start: 2023-05-18 | End: 2023-05-25

## 2023-05-18 NOTE — PROGRESS NOTES
"[facility]  [address]  [place] [address]    Subjective    Chief Complaint:   Chief Complaint   Patient presents with    Cough     2 weeks       History of Present Illness:     Yolie Brambila is a(n) 72 y.o. female with past medical history as noted below who presents to the clinic today for persistent cough and sinus pain.    She has been seen at  x 2 for cough and sinus pain.  Chest x-ray without signs of pneumonia.  Reports persistent dry cough and sinus pain. No chest pain, fever, or purulent sputum production. No SOB. She has taken steroids, benzonatate, and cough syrup (thinks promethazine DM) without relief. Symptoms have been present for 1-2 weeks. She reports a deep cough and fatigue. No sore throat, ear pain, +/- postnasal drip. She takes Zyrtec and nasacort daily.      Idiopathic peripheral neuropathy   Med- Lyrica 100 mg (effective)    History of migraine   Prophylaxis- Amitriptyline 10 mg   Abortive - Ubrevly  Neurology : Nancy Alonso NP  SOTELO frequency : used to be yearly, has had 2 in last year (severe) and more mild ones   MRI pending      Chronic back pain   Rx : Amitriptyline 10 mg, Lyrica 100 mg, Naproxen BID  Prev Tx : CHEIKH  Spine surgery in 2001 : laminectomy of L4   Pain mgt : MD Jay Jay     Restless leg syndrome   Rx-Requip 0.50 mg QHS (effective)     Chronic allergic rhinitis   Rx-Nasacort, Claritin   Septoplasty 2017    Essential hypertension   Rx-losartan 50 mg   No home check     Family history colon cancer (Mother dx @ approx 80)  Up-to-date on colon cancer screening   Asymptomatic  Follow-up recommended every 5 years   previous colonoscopy 03/02/2020     Atrophic vaginitis   Rx-Estrace vaginal cream    Obstructive sleep apnea  No CPAP   "Old diagnosis" / Repeat sleep study was mild CARYL, no need for CPAP   ++ Snoring     Nicotine dependence   In remission, quit in 1973     Senile osteoporosis  Previous DEXA bone scan 03/01/2021 "   Rx-alendronate weekly  No recent fractures      Anxiety, depression  med-Celexa 10 mg (effective)       Problem List:   Patient Active Problem List   Diagnosis    Chronic allergic rhinitis    Depression    FHx: colon cancer    CARYL (obstructive sleep apnea)    Migraine equivalent syndrome    Idiopathic peripheral neuropathy    Deviated septum    Hypertrophy of nasal turbinates    Lumbar spondylosis    Lumbar radiculopathy    Restless leg    Pain    Decreased range of motion    Muscle weakness    Essential hypertension    Trochanteric bursitis of both hips    Snoring    Intractable migraine without aura and without status migrainosus    Senile osteoporosis    Vitamin D deficiency    B12 deficiency       Current Outpatient Medications:   Current Outpatient Medications   Medication Instructions    alendronate (FOSAMAX) 70 mg, Oral, Every 7 days    amitriptyline (ELAVIL) 10 MG tablet TAKE 1 TABLET(10 MG) BY MOUTH EVERY EVENING    citalopram (CELEXA) 10 MG tablet TAKE 1 TABLET BY MOUTH EVERY DAY    ergocalciferol (ERGOCALCIFEROL) 50,000 Units, Oral, Every 7 days    estradioL (ESTRACE) 1 g, Vaginal, Twice weekly    losartan (COZAAR) 50 MG tablet TAKE 1 TABLET(50 MG) BY MOUTH EVERY DAY    naproxen (NAPROSYN) 500 mg, Oral, 2 times daily with meals    NURTEC 75 mg, Oral, Daily PRN, Place ODT tablet on the tongue; alternatively the ODT tablet may be placed under the tongue    omeprazole (PRILOSEC) 20 mg, Oral, 2 times daily    ondansetron (ZOFRAN-ODT) 4 mg, Oral, Every 6 hours PRN    prednisoLONE acetate (PRED FORTE) 1 % DrpS Both Eyes    pregabalin (LYRICA) 100 mg, Oral, 2 times daily    RESTASIS 0.05 % ophthalmic emulsion 1 drop, Both Eyes, 2 times daily    rOPINIRole (REQUIP) 0.25 MG tablet TAKE 2 TABLETS BY MOUTH EVERY EVENING    triamcinolone (NASACORT) 55 mcg nasal inhaler 1 spray, Nasal, Daily       Surgical History:   Past Surgical History:   Procedure Laterality Date    COLONOSCOPY      COLONOSCOPY N/A 03/02/2020     Procedure: COLONOSCOPY;  Surgeon: Flash Garnett MD;  Location: [facility] ENDO;  Service: Endoscopy;  Laterality: N/A;    EPIDURAL STEROID INJECTION INTO LUMBAR SPINE N/A 05/20/2019    Procedure: Injection-steroid-epidural-lumbar;  Surgeon: Patrick Goyal MD;  Location: [facility] OR;  Service: Pain Management;  Laterality: N/A;  L5/S1 from right side    EPIDURAL STEROID INJECTION INTO LUMBAR SPINE N/A 08/04/2022    Procedure: Injection-steroid-epidural-lumbar, L5/S1  from the right side;  Surgeon: Patrick Goyal MD;  Location: [facility] OR;  Service: Pain Management;  Laterality: N/A;    EYE SURGERY  May, 2018    cataract surgery    NASAL SEPTUM SURGERY N/A 09/2017    SPINE SURGERY  2001    L4    TONSILLECTOMY         Family History:   Family History   Problem Relation Age of Onset    Cancer Mother         mother    Heart disease Mother         cabg    COPD Mother     Hypertension Mother     Vision loss Mother         macular degeration    Diabetes Father     Heart disease Father         cabg    Hypertension Father     Diabetes Brother     Cancer Brother         brother    Heart disease Brother         Mi    Hypertension Brother     Diabetes Paternal Grandmother        Allergies:   Review of patient's allergies indicates:   Allergen Reactions    Penicillins Shortness Of Breath       Tobacco Status:   Tobacco Use: Medium Risk    Smoking Tobacco Use: Former    Smokeless Tobacco Use: Never    Passive Exposure: Not on file       Sexual Activity:   Social History     Substance and Sexual Activity   Sexual Activity Yes    Partners: Female    Birth control/protection: Post-menopausal       Alcohol Use:   Social History     Substance and Sexual Activity   Alcohol Use Yes    Alcohol/week: 5.0 standard drinks    Types: 5 Glasses of wine per week    Comment: one glass of wine with dinner daily         Objective       Vitals:    05/18/23 0919   BP: 124/82   Pulse: 75   Resp: 16   Temp: 98.3 °F (36.8 °C)   SpO2: 97%   Weight:  61.4 kg (135 lb 7.6 oz)       Review of Systems   Constitutional:  Positive for malaise/fatigue. Negative for chills and fever.   HENT:  Positive for congestion and sinus pain. Negative for ear discharge, ear pain and sore throat.    Respiratory:  Positive for cough. Negative for sputum production, shortness of breath and wheezing.    Cardiovascular:  Negative for chest pain.     Physical Exam  Constitutional:       General: She is not in acute distress.     Appearance: Normal appearance.   HENT:      Head: Normocephalic and atraumatic.      Right Ear: Tympanic membrane, ear canal and external ear normal.      Left Ear: Tympanic membrane, ear canal and external ear normal.      Nose: Congestion present. No rhinorrhea.      Right Sinus: Maxillary sinus tenderness present. No frontal sinus tenderness.      Left Sinus: Maxillary sinus tenderness present. No frontal sinus tenderness.      Mouth/Throat:      Mouth: Mucous membranes are moist.      Pharynx: Oropharynx is clear.   Eyes:      Conjunctiva/sclera: Conjunctivae normal.      Pupils: Pupils are equal, round, and reactive to light.   Cardiovascular:      Rate and Rhythm: Normal rate and regular rhythm.      Heart sounds: Normal heart sounds. No murmur heard.  Pulmonary:      Effort: Pulmonary effort is normal. No respiratory distress.      Breath sounds: Normal breath sounds. No wheezing, rhonchi or rales.   Musculoskeletal:      Cervical back: Normal range of motion.   Lymphadenopathy:      Cervical: No cervical adenopathy.   Skin:     General: Skin is warm.   Neurological:      Mental Status: She is alert and oriented to person, place, and time.   Psychiatric:         Behavior: Behavior normal.         Assessment and Plan:    1. Bacterial sinusitis  -     doxycycline (VIBRA-TABS) 100 MG tablet; Take 1 tablet (100 mg total) by mouth 2 (two) times daily. for 7 days  Dispense: 14 tablet; Refill: 0    2. Cough, unspecified type        Visit summary:    Yolie PANIAGUA  K[place] presented today for persistent cough and sinus pain.    Symptoms not relieved by OTCs, IM or PO steroids, or benzonatate and promethazine-DM. Prescribed doxycycline mg b.i.d. x7 days for bacterial sinusitis.  Instructed to remain upright for 30 minutes after taking medication, wear sun protection     She declines need for inhaler or nebulizer treatment.  Previous cough suppressants ineffective.  I recommend Mucinex DM     Patient was instructed to report to ER if symptoms become severe.    Follow up:  With persistent, new or worsening symptoms      Elizabeth Arcos PA-C        This note was created partially with voice dictation software and is prone to errors. This note has been reviewed by me but some errors are inevitable.

## 2023-05-31 ENCOUNTER — PATIENT MESSAGE (OUTPATIENT)
Dept: FAMILY MEDICINE | Facility: CLINIC | Age: 73
End: 2023-05-31
Payer: MEDICARE

## 2023-06-02 ENCOUNTER — CLINICAL SUPPORT (OUTPATIENT)
Dept: FAMILY MEDICINE | Facility: CLINIC | Age: 73
End: 2023-06-02
Payer: MEDICARE

## 2023-06-02 DIAGNOSIS — E53.8 B12 DEFICIENCY: Primary | ICD-10-CM

## 2023-06-05 RX ORDER — AMITRIPTYLINE HYDROCHLORIDE 10 MG/1
TABLET, FILM COATED ORAL
Qty: 30 TABLET | Refills: 6 | Status: SHIPPED | OUTPATIENT
Start: 2023-06-05 | End: 2024-01-02

## 2023-06-07 ENCOUNTER — TELEPHONE (OUTPATIENT)
Dept: FAMILY MEDICINE | Facility: CLINIC | Age: 73
End: 2023-06-07
Payer: MEDICARE

## 2023-06-07 DIAGNOSIS — T75.3XXA MOTION SICKNESS, INITIAL ENCOUNTER: Primary | ICD-10-CM

## 2023-06-07 NOTE — TELEPHONE ENCOUNTER
Pt is going on a trip and is requesting 2 motion sickness patches. Pt is going on this trip Sunday.    Last office visit 5/18/23

## 2023-06-08 RX ORDER — SCOLOPAMINE TRANSDERMAL SYSTEM 1 MG/1
1 PATCH, EXTENDED RELEASE TRANSDERMAL
Qty: 4 PATCH | Refills: 0 | Status: SHIPPED | OUTPATIENT
Start: 2023-06-08 | End: 2023-07-13

## 2023-06-26 ENCOUNTER — PATIENT MESSAGE (OUTPATIENT)
Dept: FAMILY MEDICINE | Facility: CLINIC | Age: 73
End: 2023-06-26
Payer: MEDICARE

## 2023-06-26 DIAGNOSIS — F43.21 ADJUSTMENT DISORDER WITH DEPRESSED MOOD: Primary | ICD-10-CM

## 2023-06-28 ENCOUNTER — PATIENT MESSAGE (OUTPATIENT)
Dept: PSYCHIATRY | Facility: CLINIC | Age: 73
End: 2023-06-28
Payer: MEDICARE

## 2023-06-28 ENCOUNTER — PATIENT OUTREACH (OUTPATIENT)
Dept: PSYCHIATRY | Facility: CLINIC | Age: 73
End: 2023-06-28
Payer: MEDICARE

## 2023-06-30 ENCOUNTER — CLINICAL SUPPORT (OUTPATIENT)
Dept: FAMILY MEDICINE | Facility: CLINIC | Age: 73
End: 2023-06-30
Payer: MEDICARE

## 2023-06-30 ENCOUNTER — TELEPHONE (OUTPATIENT)
Dept: FAMILY MEDICINE | Facility: CLINIC | Age: 73
End: 2023-06-30

## 2023-06-30 DIAGNOSIS — E53.8 B12 DEFICIENCY: Primary | ICD-10-CM

## 2023-06-30 PROCEDURE — 99999PBSHW PR PBB SHADOW TECHNICAL ONLY FILED TO HB: Mod: PBBFAC,,,

## 2023-06-30 PROCEDURE — 96372 THER/PROPH/DIAG INJ SC/IM: CPT | Mod: PBBFAC,PN

## 2023-06-30 RX ORDER — CYANOCOBALAMIN 1000 UG/ML
1000 INJECTION, SOLUTION INTRAMUSCULAR; SUBCUTANEOUS
Status: COMPLETED | OUTPATIENT
Start: 2023-06-30 | End: 2023-06-30

## 2023-06-30 RX ORDER — CYANOCOBALAMIN 1000 UG/ML
1000 INJECTION, SOLUTION INTRAMUSCULAR; SUBCUTANEOUS
Status: CANCELLED | OUTPATIENT
Start: 2023-06-30 | End: 2023-07-28

## 2023-06-30 RX ORDER — CYANOCOBALAMIN 1000 UG/ML
1000 INJECTION, SOLUTION INTRAMUSCULAR; SUBCUTANEOUS
Status: CANCELLED | OUTPATIENT
Start: 2023-06-30 | End: 2023-07-30

## 2023-06-30 RX ADMIN — CYANOCOBALAMIN 1000 MCG: 1000 INJECTION, SOLUTION INTRAMUSCULAR at 09:06

## 2023-07-06 ENCOUNTER — OFFICE VISIT (OUTPATIENT)
Dept: PAIN MEDICINE | Facility: CLINIC | Age: 73
End: 2023-07-06
Payer: MEDICARE

## 2023-07-06 VITALS
HEART RATE: 62 BPM | DIASTOLIC BLOOD PRESSURE: 59 MMHG | WEIGHT: 135.06 LBS | BODY MASS INDEX: 23.06 KG/M2 | HEIGHT: 64 IN | SYSTOLIC BLOOD PRESSURE: 128 MMHG

## 2023-07-06 DIAGNOSIS — M51.36 DDD (DEGENERATIVE DISC DISEASE), LUMBAR: ICD-10-CM

## 2023-07-06 DIAGNOSIS — M54.16 LUMBAR RADICULOPATHY: Primary | ICD-10-CM

## 2023-07-06 PROCEDURE — 99214 OFFICE O/P EST MOD 30 MIN: CPT | Mod: S$PBB,,, | Performed by: PHYSICIAN ASSISTANT

## 2023-07-06 PROCEDURE — 99999 PR PBB SHADOW E&M-EST. PATIENT-LVL V: CPT | Mod: PBBFAC,,, | Performed by: PHYSICIAN ASSISTANT

## 2023-07-06 PROCEDURE — 99214 PR OFFICE/OUTPT VISIT, EST, LEVL IV, 30-39 MIN: ICD-10-PCS | Mod: S$PBB,,, | Performed by: PHYSICIAN ASSISTANT

## 2023-07-06 PROCEDURE — 99999 PR PBB SHADOW E&M-EST. PATIENT-LVL V: ICD-10-PCS | Mod: PBBFAC,,, | Performed by: PHYSICIAN ASSISTANT

## 2023-07-06 PROCEDURE — 99215 OFFICE O/P EST HI 40 MIN: CPT | Mod: PBBFAC,PN | Performed by: PHYSICIAN ASSISTANT

## 2023-07-06 RX ORDER — ALPRAZOLAM 0.5 MG/1
1 TABLET, ORALLY DISINTEGRATING ORAL ONCE AS NEEDED
Status: CANCELLED | OUTPATIENT
Start: 2023-07-06 | End: 2034-12-02

## 2023-07-13 NOTE — H&P (VIEW-ONLY)
This note was completed with dictation software and grammatical errors may exist.    CC:  Bilateral foot pain, back pain, leg pain    HPI:  The patient is a 72-year-old woman with a history of anxiety, depression, previous lumbar surgery who presents in referral from Dr. Adam for bilateral foot pain.  She returns in follow-up today with low back, leg and foot pain.  Over the past couple of weeks her low back pain was getting worse she describes pain across the low back that was radiating into her right buttock and posterior thigh.  This severely worsened with vacuuming last weekend and is worse with bending over and reaching.  She has had some increasing tingling in her legs.  Her foot pain has been tolerable.  She denies weakness.    Previous history:  The patient reports developing bilateral foot pain about 25 years ago with numbness and tingling, has been diagnosed with neuropathy but workup has not revealed any particular  etiology.  This has been progressing over the years and has gone from being located only in the feet and now extending proximally up to the knees bilaterally.  She describes it as throbbing, tingling with abnormal sensation, particularly worse with standing, walking, exercising.  She does get some relief with rest, heat and medications. She has been taking gabapentin for years, several years has been on 600 mg twice daily with about 40% relief.  She also has a history of low back and buttock pain, ended up having severe right leg pain and in 2001 underwent laminectomy, unclear what level but reports that her right back and leg pain resolved at that time.  However over the years she has developed more bilateral buttock pain, thigh pain worse with standing and walking, describes this as dull.  She denies any weakness in her legs, denies any bowel or bladder incontinence.    Pain intervention history:  She has done physical therapy multiple times, chiropractic care without relief.  She has  not tried Cymbalta.  She has been taking gabapentin 600 mg twice daily with about 40% relief, switched to Lyrica.  L5/S1 CHEIKH performed on 05/20/2019 with greater than 70% relief ongoing of her back and leg pain, approach from the right due to previous left hemilaminectomy. She is status post L5/S1 interlaminar epidural steroid injection from the right side on 08/04/2022 with 50% relief of her back pain but 0% relief of her leg pain.      Spine surgeries:  L5/S1 left hemilaminectomy    Antineuropathics:  Lyrica 100 twice daily  NSAIDs:  Naproxen  Physical therapy:  Antidepressants:  Elavil 10 mg, Celexa 10  Muscle relaxers:  Opioids:  Antiplatelets/Anticoagulants:    ROS:  She reports back pain only.  Balance of review of systems is negative.    Past Medical History:   Diagnosis Date    Anxiety     Arthritis     Chronic allergic rhinitis 11/19/2012    Depression     Essential hypertension 9/14/2020    Herniated lumbar intervertebral disc     L3    Migraine     painless with numbness       Past Surgical History:   Procedure Laterality Date    COLONOSCOPY      COLONOSCOPY N/A 03/02/2020    Procedure: COLONOSCOPY;  Surgeon: Flash Garnett MD;  Location: [facility] ENDO;  Service: Endoscopy;  Laterality: N/A;    EPIDURAL STEROID INJECTION INTO LUMBAR SPINE N/A 05/20/2019    Procedure: Injection-steroid-epidural-lumbar;  Surgeon: Patrick Goyal MD;  Location: [facility] OR;  Service: Pain Management;  Laterality: N/A;  L5/S1 from right side    EPIDURAL STEROID INJECTION INTO LUMBAR SPINE N/A 08/04/2022    Procedure: Injection-steroid-epidural-lumbar, L5/S1  from the right side;  Surgeon: Patrick Goyal MD;  Location: [facility] OR;  Service: Pain Management;  Laterality: N/A;    EYE SURGERY  May, 2018    cataract surgery    NASAL SEPTUM SURGERY N/A 09/2017    SPINE SURGERY  2001    L4    TONSILLECTOMY         Social History     Socioeconomic History    Marital status:      Spouse name: Kraig    Number of children: 3    Tobacco Use    Smoking status: Former     Packs/day: 0.00     Years: 0.50     Pack years: 0.00     Types: Cigarettes     Quit date: 1973     Years since quittin.6    Smokeless tobacco: Never    Tobacco comments:     social smoker only   Substance and Sexual Activity    Alcohol use: Yes     Alcohol/week: 5.0 standard drinks     Types: 5 Glasses of wine per week     Comment: one glass of wine with dinner daily    Drug use: No    Sexual activity: Yes     Partners: Female     Birth control/protection: Post-menopausal   Social History Narrative    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)     Exercise : Most days per week : walking 4d/week + Pilates + cardio + resistance     Diet : pt suspects lactose intolerance      Social Determinants of Health     Financial Resource Strain: Low Risk     Difficulty of Paying Living Expenses: Not hard at all   Food Insecurity: No Food Insecurity    Worried About Running Out of Food in the Last Year: Never true    Ran Out of Food in the Last Year: Never true   Transportation Needs: No Transportation Needs    Lack of Transportation (Medical): No    Lack of Transportation (Non-Medical): No   Physical Activity: Sufficiently Active    Days of Exercise per Week: 3 days    Minutes of Exercise per Session: 50 min   Stress: No Stress Concern Present    Feeling of Stress : Not at all   Social Connections: Unknown    Frequency of Communication with Friends and Family: More than three times a week    Frequency of Social Gatherings with Friends and Family: Twice a week    Active Member of Clubs or Organizations: No    Attends Club or Organization Meetings: Patient refused    Marital Status:    Housing Stability: Low Risk     Unable to Pay for Housing in the Last Year: No    Number of Places Lived in the Last Year: 1    Unstable Housing in the Last Year: No         Medications/Allergies: See med card    Vitals:    23 1134   BP: (!) 128/59   Pulse: 62   Weight: 61.3 kg  "(135 lb 0.5 oz)   Height: 5' 4" (1.626 m)   PainSc: 0-No pain   PainLoc: Back         Physical exam:  Gen: A and O x3, pleasant, well-groomed  Skin: No rashes or obvious lesions  HEENT: PERRLA, no obvious deformities on ears or in canals. Trachea midline.  CVS: Regular rate and rhythm, normal palpable pulses.  Resp:No increased work of breathing, symmetrical chest rise.  Abdomen: Soft, NT/ND.  Musculoskeletal:  No antalgic gait.     Neuro:  Lower extremities: 5/5 strength bilaterally  Reflexes: Patellar 0+, Achilles 0+ bilaterally.  Sensory:  Intact and symmetrical to light touch and pinprick in L2-S1 dermatomes except for decreased sensation to light touch and pinprick from her knees distally bilaterally.    Lumbar spine:  Lumbar spine:  Range of motion is mildly reduced with forward flexion with slight increased pain, moderately decreased with extension with increased pain in her bilateral low back.   Magnus's test causes no increased pain on either side.    Supine straight leg raise is negative bilaterally.    Internal and external rotation of the hip causes no increased pain on either side.  Myofascial exam: No tenderness to palpation across lumbar paraspinous muscles.    Imagin17 Xray L-spine  Five views lumbar spine were obtained (AP, lateral, right oblique, left oblique and spot views). There is a upper lumbar dextroscoliosis and a mid lumbar levoscoliosis with superimposed multilevel degenerative disc and facet disease. There is loss of disc space height throughout the lumbar region there is lower lumbar degenerative facet disease.    4/15/14 BL Lower extremity EMG:   Mild sensorimotor predominantly axonal neuropathy.    19 MRI L-spine:  L5-S1: Spondylosis associated with disc space narrowing and mild marginal anterior spondylotic osteophytes.  There is a disc protrusion and osteophyte complex in the left neural foramen that causes a moderate left foraminal stenosis.  There are postoperative " changes from a left L5-S1 laminectomy without definite signs for recurrent disc herniations.  There is facet arthropathy.  L4-5: Disc space narrowing associated with marginal anterior spondylotic osteophytes.  There is a mild circumferential annular disc bulge.  Facet arthropathy noted bilaterally.  There is also at least a moderate right lateral recess stenosis.  L3-4: Disc degeneration with with the or disc space narrowing and marginal anterior spondylotic osteophyte.  There is a broad-based posterior osteophyte the and disc bulge complex with compression of the thecal sac greater on the right.  There is a moderate to severe right lateral recess stenosis (image 32 series 5002.  There is associated facet arthropathy.  Mild central canal spinal stenosis.  L2-3: Disc degeneration with disc space narrowing.  There is a broad-based circumferential annular disc bulge.  In the left neural foramen in addition a disc protrusion with moderate obscuration of the foraminal fat is noted.  In the far lateral aspect of the neural foramen (image 16 series 5 the disc protrusion contacts the exiting left L2 root.  Clinical correlation for a left L2 radiculopathy suggested.  L1-2: Disc degeneration with disc space narrowing and marginal anterior spondylotic osteophyte.  There is a broad-based left paracentral disc protrusion with compression of the thecal sac anterolaterally.  There is an extension of the disc protrusion in the left neural foramen associated with at least a moderate foraminal stenosis.  Compression of the exiting left L1 root far laterally in the foramen not excluded.  Right neural foramen is unremarkable.  T12-L1: No disc herniations or spinal stenosis or foraminal stenosis.    Assessment:    The patient is a 72-year-old woman with a history of anxiety, depression, previous lumbar surgery who presents in referral from Dr. Adam for bilateral foot pain.     1. Lumbar radiculopathy  Place in Outpatient    Vital  signs    Verify informed consent    Notify physician     Notify physician     Notify physician (specify)    Diet NPO    alprazolam ODT dissolvable tablet 1 mg    Case Request Operating Room: Injection-steroid-epidural-lumbar L5/S1 from the right      2. DDD (degenerative disc disease), lumbar            Plan:  1. For the patient's increasing low back and leg pain, I will schedule her to repeat and L5/S1 interlaminar epidural steroid injection from the right side.  She has done well with this in the past.  2. She will continue Lyrica and if necessary we can increase this in the future.  3. Follow-up in 4 weeks postprocedure sooner as needed.

## 2023-07-13 NOTE — PROGRESS NOTES
This note was completed with dictation software and grammatical errors may exist.    CC:  Bilateral foot pain, back pain, leg pain    HPI:  The patient is a 72-year-old woman with a history of anxiety, depression, previous lumbar surgery who presents in referral from Dr. Adam for bilateral foot pain.  She returns in follow-up today with low back, leg and foot pain.  Over the past couple of weeks her low back pain was getting worse she describes pain across the low back that was radiating into her right buttock and posterior thigh.  This severely worsened with vacuuming last weekend and is worse with bending over and reaching.  She has had some increasing tingling in her legs.  Her foot pain has been tolerable.  She denies weakness.    Previous history:  The patient reports developing bilateral foot pain about 25 years ago with numbness and tingling, has been diagnosed with neuropathy but workup has not revealed any particular  etiology.  This has been progressing over the years and has gone from being located only in the feet and now extending proximally up to the knees bilaterally.  She describes it as throbbing, tingling with abnormal sensation, particularly worse with standing, walking, exercising.  She does get some relief with rest, heat and medications. She has been taking gabapentin for years, several years has been on 600 mg twice daily with about 40% relief.  She also has a history of low back and buttock pain, ended up having severe right leg pain and in 2001 underwent laminectomy, unclear what level but reports that her right back and leg pain resolved at that time.  However over the years she has developed more bilateral buttock pain, thigh pain worse with standing and walking, describes this as dull.  She denies any weakness in her legs, denies any bowel or bladder incontinence.    Pain intervention history:  She has done physical therapy multiple times, chiropractic care without relief.  She has  not tried Cymbalta.  She has been taking gabapentin 600 mg twice daily with about 40% relief, switched to Lyrica.  L5/S1 CHEIKH performed on 05/20/2019 with greater than 70% relief ongoing of her back and leg pain, approach from the right due to previous left hemilaminectomy. She is status post L5/S1 interlaminar epidural steroid injection from the right side on 08/04/2022 with 50% relief of her back pain but 0% relief of her leg pain.      Spine surgeries:  L5/S1 left hemilaminectomy    Antineuropathics:  Lyrica 100 twice daily  NSAIDs:  Naproxen  Physical therapy:  Antidepressants:  Elavil 10 mg, Celexa 10  Muscle relaxers:  Opioids:  Antiplatelets/Anticoagulants:    ROS:  She reports back pain only.  Balance of review of systems is negative.    Past Medical History:   Diagnosis Date    Anxiety     Arthritis     Chronic allergic rhinitis 11/19/2012    Depression     Essential hypertension 9/14/2020    Herniated lumbar intervertebral disc     L3    Migraine     painless with numbness       Past Surgical History:   Procedure Laterality Date    COLONOSCOPY      COLONOSCOPY N/A 03/02/2020    Procedure: COLONOSCOPY;  Surgeon: Flash Garnett MD;  Location: [facility] ENDO;  Service: Endoscopy;  Laterality: N/A;    EPIDURAL STEROID INJECTION INTO LUMBAR SPINE N/A 05/20/2019    Procedure: Injection-steroid-epidural-lumbar;  Surgeon: Patrick Goyal MD;  Location: [facility] OR;  Service: Pain Management;  Laterality: N/A;  L5/S1 from right side    EPIDURAL STEROID INJECTION INTO LUMBAR SPINE N/A 08/04/2022    Procedure: Injection-steroid-epidural-lumbar, L5/S1  from the right side;  Surgeon: Patrick Goyal MD;  Location: [facility] OR;  Service: Pain Management;  Laterality: N/A;    EYE SURGERY  May, 2018    cataract surgery    NASAL SEPTUM SURGERY N/A 09/2017    SPINE SURGERY  2001    L4    TONSILLECTOMY         Social History     Socioeconomic History    Marital status:      Spouse name: Kraig    Number of children: 3    Tobacco Use    Smoking status: Former     Packs/day: 0.00     Years: 0.50     Pack years: 0.00     Types: Cigarettes     Quit date: 1973     Years since quittin.6    Smokeless tobacco: Never    Tobacco comments:     social smoker only   Substance and Sexual Activity    Alcohol use: Yes     Alcohol/week: 5.0 standard drinks     Types: 5 Glasses of wine per week     Comment: one glass of wine with dinner daily    Drug use: No    Sexual activity: Yes     Partners: Female     Birth control/protection: Post-menopausal   Social History Narrative    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)     Exercise : Most days per week : walking 4d/week + Pilates + cardio + resistance     Diet : pt suspects lactose intolerance      Social Determinants of Health     Financial Resource Strain: Low Risk     Difficulty of Paying Living Expenses: Not hard at all   Food Insecurity: No Food Insecurity    Worried About Running Out of Food in the Last Year: Never true    Ran Out of Food in the Last Year: Never true   Transportation Needs: No Transportation Needs    Lack of Transportation (Medical): No    Lack of Transportation (Non-Medical): No   Physical Activity: Sufficiently Active    Days of Exercise per Week: 3 days    Minutes of Exercise per Session: 50 min   Stress: No Stress Concern Present    Feeling of Stress : Not at all   Social Connections: Unknown    Frequency of Communication with Friends and Family: More than three times a week    Frequency of Social Gatherings with Friends and Family: Twice a week    Active Member of Clubs or Organizations: No    Attends Club or Organization Meetings: Patient refused    Marital Status:    Housing Stability: Low Risk     Unable to Pay for Housing in the Last Year: No    Number of Places Lived in the Last Year: 1    Unstable Housing in the Last Year: No         Medications/Allergies: See med card    Vitals:    23 1134   BP: (!) 128/59   Pulse: 62   Weight: 61.3 kg  "(135 lb 0.5 oz)   Height: 5' 4" (1.626 m)   PainSc: 0-No pain   PainLoc: Back         Physical exam:  Gen: A and O x3, pleasant, well-groomed  Skin: No rashes or obvious lesions  HEENT: PERRLA, no obvious deformities on ears or in canals. Trachea midline.  CVS: Regular rate and rhythm, normal palpable pulses.  Resp:No increased work of breathing, symmetrical chest rise.  Abdomen: Soft, NT/ND.  Musculoskeletal:  No antalgic gait.     Neuro:  Lower extremities: 5/5 strength bilaterally  Reflexes: Patellar 0+, Achilles 0+ bilaterally.  Sensory:  Intact and symmetrical to light touch and pinprick in L2-S1 dermatomes except for decreased sensation to light touch and pinprick from her knees distally bilaterally.    Lumbar spine:  Lumbar spine:  Range of motion is mildly reduced with forward flexion with slight increased pain, moderately decreased with extension with increased pain in her bilateral low back.   Magnus's test causes no increased pain on either side.    Supine straight leg raise is negative bilaterally.    Internal and external rotation of the hip causes no increased pain on either side.  Myofascial exam: No tenderness to palpation across lumbar paraspinous muscles.    Imagin17 Xray L-spine  Five views lumbar spine were obtained (AP, lateral, right oblique, left oblique and spot views). There is a upper lumbar dextroscoliosis and a mid lumbar levoscoliosis with superimposed multilevel degenerative disc and facet disease. There is loss of disc space height throughout the lumbar region there is lower lumbar degenerative facet disease.    4/15/14 BL Lower extremity EMG:   Mild sensorimotor predominantly axonal neuropathy.    19 MRI L-spine:  L5-S1: Spondylosis associated with disc space narrowing and mild marginal anterior spondylotic osteophytes.  There is a disc protrusion and osteophyte complex in the left neural foramen that causes a moderate left foraminal stenosis.  There are postoperative " changes from a left L5-S1 laminectomy without definite signs for recurrent disc herniations.  There is facet arthropathy.  L4-5: Disc space narrowing associated with marginal anterior spondylotic osteophytes.  There is a mild circumferential annular disc bulge.  Facet arthropathy noted bilaterally.  There is also at least a moderate right lateral recess stenosis.  L3-4: Disc degeneration with with the or disc space narrowing and marginal anterior spondylotic osteophyte.  There is a broad-based posterior osteophyte the and disc bulge complex with compression of the thecal sac greater on the right.  There is a moderate to severe right lateral recess stenosis (image 32 series 5002.  There is associated facet arthropathy.  Mild central canal spinal stenosis.  L2-3: Disc degeneration with disc space narrowing.  There is a broad-based circumferential annular disc bulge.  In the left neural foramen in addition a disc protrusion with moderate obscuration of the foraminal fat is noted.  In the far lateral aspect of the neural foramen (image 16 series 5 the disc protrusion contacts the exiting left L2 root.  Clinical correlation for a left L2 radiculopathy suggested.  L1-2: Disc degeneration with disc space narrowing and marginal anterior spondylotic osteophyte.  There is a broad-based left paracentral disc protrusion with compression of the thecal sac anterolaterally.  There is an extension of the disc protrusion in the left neural foramen associated with at least a moderate foraminal stenosis.  Compression of the exiting left L1 root far laterally in the foramen not excluded.  Right neural foramen is unremarkable.  T12-L1: No disc herniations or spinal stenosis or foraminal stenosis.    Assessment:    The patient is a 72-year-old woman with a history of anxiety, depression, previous lumbar surgery who presents in referral from Dr. Adam for bilateral foot pain.     1. Lumbar radiculopathy  Place in Outpatient    Vital  signs    Verify informed consent    Notify physician     Notify physician     Notify physician (specify)    Diet NPO    alprazolam ODT dissolvable tablet 1 mg    Case Request Operating Room: Injection-steroid-epidural-lumbar L5/S1 from the right      2. DDD (degenerative disc disease), lumbar            Plan:  1. For the patient's increasing low back and leg pain, I will schedule her to repeat and L5/S1 interlaminar epidural steroid injection from the right side.  She has done well with this in the past.  2. She will continue Lyrica and if necessary we can increase this in the future.  3. Follow-up in 4 weeks postprocedure sooner as needed.

## 2023-07-31 ENCOUNTER — TELEPHONE (OUTPATIENT)
Dept: FAMILY MEDICINE | Facility: CLINIC | Age: 73
End: 2023-07-31
Payer: MEDICARE

## 2023-07-31 ENCOUNTER — CLINICAL SUPPORT (OUTPATIENT)
Dept: FAMILY MEDICINE | Facility: CLINIC | Age: 73
End: 2023-07-31
Payer: MEDICARE

## 2023-07-31 DIAGNOSIS — E53.8 B12 DEFICIENCY: Primary | ICD-10-CM

## 2023-07-31 PROCEDURE — 99499 UNLISTED E&M SERVICE: CPT | Mod: S$PBB,,,

## 2023-07-31 PROCEDURE — 99999PBSHW PR PBB SHADOW TECHNICAL ONLY FILED TO HB: ICD-10-PCS | Mod: PBBFAC,,,

## 2023-07-31 PROCEDURE — 96372 THER/PROPH/DIAG INJ SC/IM: CPT | Mod: PBBFAC,PN

## 2023-07-31 PROCEDURE — 99999PBSHW PR PBB SHADOW TECHNICAL ONLY FILED TO HB: Mod: PBBFAC,,,

## 2023-07-31 PROCEDURE — 99499 NO LOS: ICD-10-PCS | Mod: S$PBB,,,

## 2023-07-31 RX ORDER — CYANOCOBALAMIN 1000 UG/ML
1000 INJECTION, SOLUTION INTRAMUSCULAR; SUBCUTANEOUS
Status: COMPLETED | OUTPATIENT
Start: 2023-07-31 | End: 2023-12-28

## 2023-07-31 RX ADMIN — CYANOCOBALAMIN 1000 MCG: 1000 INJECTION, SOLUTION INTRAMUSCULAR at 10:07

## 2023-07-31 NOTE — PROGRESS NOTES
Pt here for cyanocabalamin injection(see MAR). Confirmed name and . Cyanocabalamin 1000mcg given L glut. Pt tolerated well. Confirmed next dose and appt.

## 2023-07-31 NOTE — TELEPHONE ENCOUNTER
Pt here for B12 injection. Previous order  and was for 1000mcg Q28d. Please review, if OK, please sign pended order.

## 2023-08-02 ENCOUNTER — PATIENT MESSAGE (OUTPATIENT)
Dept: PSYCHIATRY | Facility: CLINIC | Age: 73
End: 2023-08-02
Payer: MEDICARE

## 2023-08-02 RX ORDER — PREGABALIN 100 MG/1
100 CAPSULE ORAL 2 TIMES DAILY
Qty: 60 CAPSULE | Refills: 2 | Status: SHIPPED | OUTPATIENT
Start: 2023-08-02 | End: 2023-11-09 | Stop reason: SDUPTHER

## 2023-08-03 ENCOUNTER — HOSPITAL ENCOUNTER (OUTPATIENT)
Facility: HOSPITAL | Age: 73
Discharge: HOME | End: 2023-08-03
Attending: ANESTHESIOLOGY | Admitting: ANESTHESIOLOGY
Payer: MEDICARE

## 2023-08-03 ENCOUNTER — HOSPITAL ENCOUNTER (OUTPATIENT)
Dept: RADIOLOGY | Facility: HOSPITAL | Age: 73
Discharge: HOME | End: 2023-08-03
Attending: ANESTHESIOLOGY | Admitting: ANESTHESIOLOGY
Payer: MEDICARE

## 2023-08-03 VITALS
SYSTOLIC BLOOD PRESSURE: 180 MMHG | HEIGHT: 64 IN | BODY MASS INDEX: 23.05 KG/M2 | WEIGHT: 135 LBS | OXYGEN SATURATION: 98 % | RESPIRATION RATE: 16 BRPM | TEMPERATURE: 98 F | HEART RATE: 62 BPM | DIASTOLIC BLOOD PRESSURE: 74 MMHG

## 2023-08-03 DIAGNOSIS — M54.16 LUMBAR RADICULOPATHY: ICD-10-CM

## 2023-08-03 DIAGNOSIS — M54.50 LOWER BACK PAIN: ICD-10-CM

## 2023-08-03 PROCEDURE — 25000003 PHARM REV CODE 250: Mod: PO | Performed by: ANESTHESIOLOGY

## 2023-08-03 PROCEDURE — 62323 NJX INTERLAMINAR LMBR/SAC: CPT | Mod: PO | Performed by: ANESTHESIOLOGY

## 2023-08-03 PROCEDURE — 63600175 PHARM REV CODE 636 W HCPCS: Mod: PO | Performed by: ANESTHESIOLOGY

## 2023-08-03 PROCEDURE — 62323 PR INJ LUMBAR/SACRAL, W/IMAGING GUIDANCE: ICD-10-PCS | Mod: ,,, | Performed by: ANESTHESIOLOGY

## 2023-08-03 PROCEDURE — 25500020 PHARM REV CODE 255: Mod: PO | Performed by: ANESTHESIOLOGY

## 2023-08-03 PROCEDURE — 62323 NJX INTERLAMINAR LMBR/SAC: CPT | Mod: ,,, | Performed by: ANESTHESIOLOGY

## 2023-08-03 PROCEDURE — 76000 FLUOROSCOPY <1 HR PHYS/QHP: CPT | Mod: TC,PO

## 2023-08-03 RX ORDER — METHYLPREDNISOLONE ACETATE 80 MG/ML
INJECTION, SUSPENSION INTRA-ARTICULAR; INTRALESIONAL; INTRAMUSCULAR; SOFT TISSUE
Status: DISCONTINUED | OUTPATIENT
Start: 2023-08-03 | End: 2023-08-03 | Stop reason: HOSPADM

## 2023-08-03 RX ORDER — LIDOCAINE HYDROCHLORIDE 10 MG/ML
INJECTION, SOLUTION EPIDURAL; INFILTRATION; INTRACAUDAL; PERINEURAL
Status: DISCONTINUED | OUTPATIENT
Start: 2023-08-03 | End: 2023-08-03 | Stop reason: HOSPADM

## 2023-08-03 RX ORDER — ALPRAZOLAM 0.5 MG/1
1 TABLET, ORALLY DISINTEGRATING ORAL ONCE AS NEEDED
Status: COMPLETED | OUTPATIENT
Start: 2023-08-03 | End: 2023-08-03

## 2023-08-03 RX ADMIN — ALPRAZOLAM 1 MG: 0.5 TABLET, ORALLY DISINTEGRATING ORAL at 01:08

## 2023-08-03 NOTE — OP NOTE

## 2023-08-03 NOTE — DISCHARGE SUMMARY
[facility] - Surgery  Discharge Note  Short Stay    Procedure(s) (LRB):  Injection-steroid-epidural-lumbar L5/S1 from the right (N/A)      OUTCOME: Patient tolerated treatment/procedure well without complication and is now ready for discharge.    DISPOSITION: Home or Self Care    FINAL DIAGNOSIS:  Lumbar radiculopathy    FOLLOWUP: In clinic    DISCHARGE INSTRUCTIONS:    Discharge Procedure Orders   Diet Adult Regular     No dressing needed     Notify your health care provider if you experience any of the following:  temperature >100.4     Activity as tolerated

## 2023-08-08 ENCOUNTER — PATIENT MESSAGE (OUTPATIENT)
Dept: PSYCHIATRY | Facility: CLINIC | Age: 73
End: 2023-08-08
Payer: MEDICARE

## 2023-08-08 ENCOUNTER — OFFICE VISIT (OUTPATIENT)
Dept: PSYCHIATRY | Facility: CLINIC | Age: 73
End: 2023-08-08
Payer: MEDICARE

## 2023-08-08 DIAGNOSIS — F43.21 ADJUSTMENT DISORDER WITH DEPRESSED MOOD: ICD-10-CM

## 2023-08-08 PROCEDURE — 99211 OFF/OP EST MAY X REQ PHY/QHP: CPT | Mod: PBBFAC,PO

## 2023-08-08 PROCEDURE — 90791 PR PSYCHIATRIC DIAGNOSTIC EVALUATION: ICD-10-PCS | Mod: ,,,

## 2023-08-08 PROCEDURE — 99999 PR PBB SHADOW E&M-EST. PATIENT-LVL I: ICD-10-PCS | Mod: PBBFAC,,,

## 2023-08-08 PROCEDURE — 99999 PR PBB SHADOW E&M-EST. PATIENT-LVL I: CPT | Mod: PBBFAC,,,

## 2023-08-08 PROCEDURE — 90791 PSYCH DIAGNOSTIC EVALUATION: CPT | Mod: ,,,

## 2023-08-08 NOTE — Clinical Note
Can we schedule a 2-3 week follow up, in person. Patient would like communication through [facility].
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
## 2023-08-08 NOTE — TELEPHONE ENCOUNTER
Spoke with pt and scheduled her follow up appointment for 8/31/23 at 3:30 pm with Dr Bee. Pt verbalized understanding.

## 2023-08-31 ENCOUNTER — OFFICE VISIT (OUTPATIENT)
Dept: PSYCHIATRY | Facility: CLINIC | Age: 73
End: 2023-08-31
Payer: MEDICARE

## 2023-08-31 ENCOUNTER — OFFICE VISIT (OUTPATIENT)
Dept: PAIN MEDICINE | Facility: CLINIC | Age: 73
End: 2023-08-31
Payer: MEDICARE

## 2023-08-31 VITALS
SYSTOLIC BLOOD PRESSURE: 149 MMHG | WEIGHT: 135.81 LBS | DIASTOLIC BLOOD PRESSURE: 65 MMHG | BODY MASS INDEX: 23.18 KG/M2 | HEART RATE: 60 BPM | HEIGHT: 64 IN

## 2023-08-31 DIAGNOSIS — M47.816 LUMBAR SPONDYLOSIS: ICD-10-CM

## 2023-08-31 DIAGNOSIS — M54.9 DORSALGIA, UNSPECIFIED: ICD-10-CM

## 2023-08-31 DIAGNOSIS — M54.16 LUMBAR RADICULOPATHY: Primary | ICD-10-CM

## 2023-08-31 DIAGNOSIS — M70.61 GREATER TROCHANTERIC BURSITIS OF RIGHT HIP: ICD-10-CM

## 2023-08-31 DIAGNOSIS — F43.21 ADJUSTMENT DISORDER WITH DEPRESSED MOOD: Primary | ICD-10-CM

## 2023-08-31 DIAGNOSIS — M51.36 DDD (DEGENERATIVE DISC DISEASE), LUMBAR: ICD-10-CM

## 2023-08-31 PROCEDURE — 90834 PSYTX W PT 45 MINUTES: CPT | Mod: ,,,

## 2023-08-31 PROCEDURE — 99999 PR PBB SHADOW E&M-EST. PATIENT-LVL III: CPT | Mod: PBBFAC,,,

## 2023-08-31 PROCEDURE — 90834 PR PSYCHOTHERAPY W/PATIENT, 45 MIN: ICD-10-PCS | Mod: ,,,

## 2023-08-31 PROCEDURE — 99213 OFFICE O/P EST LOW 20 MIN: CPT | Mod: S$PBB,,,

## 2023-08-31 PROCEDURE — 99999 PR PBB SHADOW E&M-EST. PATIENT-LVL III: ICD-10-PCS | Mod: PBBFAC,,,

## 2023-08-31 PROCEDURE — 99213 PR OFFICE/OUTPT VISIT, EST, LEVL III, 20-29 MIN: ICD-10-PCS | Mod: S$PBB,,,

## 2023-08-31 PROCEDURE — 99213 OFFICE O/P EST LOW 20 MIN: CPT | Mod: PBBFAC,PN

## 2023-08-31 NOTE — PROGRESS NOTES
This note was completed with dictation software and grammatical errors may exist.    CC:  Bilateral foot pain, back pain, leg pain    HPI:  The patient is a 72-year-old woman with a history of anxiety, depression, previous lumbar surgery who presents in referral from Dr. Adam for bilateral foot pain. She is status post L5/S1 CHEIKH from the right on 08/03/2023 with 100% relief.  Today she is reporting some remaining pain over right hip but it is intermittent and not terribly problematic for her.  She has had right greater trochanteric bursa injections in the past with some relief however this time it is not severe.  She denies any new numbness, weakness or any new changes to her bowel bladder function.      Previous history:  The patient reports developing bilateral foot pain about 25 years ago with numbness and tingling, has been diagnosed with neuropathy but workup has not revealed any particular  etiology.  This has been progressing over the years and has gone from being located only in the feet and now extending proximally up to the knees bilaterally.  She describes it as throbbing, tingling with abnormal sensation, particularly worse with standing, walking, exercising.  She does get some relief with rest, heat and medications. She has been taking gabapentin for years, several years has been on 600 mg twice daily with about 40% relief.  She also has a history of low back and buttock pain, ended up having severe right leg pain and in 2001 underwent laminectomy, unclear what level but reports that her right back and leg pain resolved at that time.  However over the years she has developed more bilateral buttock pain, thigh pain worse with standing and walking, describes this as dull.  She denies any weakness in her legs, denies any bowel or bladder incontinence.    Pain intervention history:  She has done physical therapy multiple times, chiropractic care without relief.  She has not tried Cymbalta.  She has been  taking gabapentin 600 mg twice daily with about 40% relief, switched to Lyrica.  L5/S1 CHEIKH performed on 05/20/2019 with greater than 70% relief ongoing of her back and leg pain, approach from the right due to previous left hemilaminectomy. She is status post L5/S1 interlaminar epidural steroid injection from the right side on 08/04/2022 with 50% relief of her back pain but 0% relief of her leg pain.   She is status post L5/S1 CHEIKH from the right on 08/03/2023 with 100% relief.    Spine surgeries:  L5/S1 left hemilaminectomy    Antineuropathics:  Lyrica 100 twice daily  NSAIDs:  Naproxen  Physical therapy:  Antidepressants:  Elavil 10 mg, Celexa 10  Muscle relaxers:  Opioids:  Antiplatelets/Anticoagulants:    ROS:  She reports back pain only.  Balance of review of systems is negative.    Past Medical History:   Diagnosis Date    Anxiety     Arthritis     Chronic allergic rhinitis 11/19/2012    Depression     Essential hypertension 9/14/2020    Herniated lumbar intervertebral disc     L3    Migraine     painless with numbness       Past Surgical History:   Procedure Laterality Date    COLONOSCOPY      COLONOSCOPY N/A 03/02/2020    Procedure: COLONOSCOPY;  Surgeon: Flash Garnett MD;  Location: [facility] ENDO;  Service: Endoscopy;  Laterality: N/A;    EPIDURAL STEROID INJECTION INTO LUMBAR SPINE N/A 05/20/2019    Procedure: Injection-steroid-epidural-lumbar;  Surgeon: Patrick Goyal MD;  Location: [facility] OR;  Service: Pain Management;  Laterality: N/A;  L5/S1 from right side    EPIDURAL STEROID INJECTION INTO LUMBAR SPINE N/A 08/04/2022    Procedure: Injection-steroid-epidural-lumbar, L5/S1  from the right side;  Surgeon: Patrick Goyal MD;  Location: [facility] OR;  Service: Pain Management;  Laterality: N/A;    EPIDURAL STEROID INJECTION INTO LUMBAR SPINE N/A 8/3/2023    Procedure: Injection-steroid-epidural-lumbar L5/S1 from the right;  Surgeon: Patrick Goyal MD;  Location: [facility] OR;  Service: Pain  Management;  Laterality: N/A;    EYE SURGERY  May, 2018    cataract surgery    NASAL SEPTUM SURGERY N/A 2017    SPINE SURGERY  2001    L4    TONSILLECTOMY         Social History     Socioeconomic History    Marital status:      Spouse name: Kraig    Number of children: 3   Tobacco Use    Smoking status: Former     Current packs/day: 0.00     Types: Cigarettes     Quit date: 1973     Years since quittin.2    Smokeless tobacco: Never    Tobacco comments:     social smoker only   Substance and Sexual Activity    Alcohol use: Yes     Alcohol/week: 5.0 standard drinks of alcohol     Types: 5 Glasses of wine per week     Comment: one glass of wine with dinner daily    Drug use: No    Sexual activity: Yes     Partners: Female     Birth control/protection: Post-menopausal   Social History Narrative    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)     Exercise : Most days per week : walking 4d/week + Pilates + cardio + resistance     Diet : pt suspects lactose intolerance      Social Determinants of Health     Financial Resource Strain: Low Risk  (2023)    Overall Financial Resource Strain (CARDIA)     Difficulty of Paying Living Expenses: Not hard at all   Food Insecurity: No Food Insecurity (2023)    Hunger Vital Sign     Worried About Running Out of Food in the Last Year: Never true     Ran Out of Food in the Last Year: Never true   Transportation Needs: No Transportation Needs (2023)    PRAPARE - Transportation     Lack of Transportation (Medical): No     Lack of Transportation (Non-Medical): No   Physical Activity: Sufficiently Active (2023)    Exercise Vital Sign     Days of Exercise per Week: 3 days     Minutes of Exercise per Session: 50 min   Stress: No Stress Concern Present (2023)    [employer] [employer] [employer] - Occupational Stress Questionnaire     Feeling of Stress : Not at all   Recent Concern: Stress - Stress Concern Present (2023)    [place]  "[place] [employer] - Occupational Stress Questionnaire     Feeling of Stress : To some extent   Social Connections: Unknown (8/28/2023)    Social Connection and Isolation Panel [NHANES]     Frequency of Communication with Friends and Family: More than three times a week     Frequency of Social Gatherings with Friends and Family: Once a week     Active Member of Clubs or Organizations: No     Attends Club or Organization Meetings: Never     Marital Status:    Housing Stability: Low Risk  (8/28/2023)    Housing Stability Vital Sign     Unable to Pay for Housing in the Last Year: No     Number of Places Lived in the Last Year: 1     Unstable Housing in the Last Year: No         Medications/Allergies: See med card    Vitals:    08/31/23 1132   BP: (!) 149/65   Pulse: 60   Weight: 61.6 kg (135 lb 12.9 oz)   Height: 5' 4" (1.626 m)   PainSc: 0-No pain   PainLoc: Hip         Physical exam:  Gen: A and O x3, pleasant, well-groomed  Skin: No rashes or obvious lesions  HEENT: PERRLA, no obvious deformities on ears or in canals. Trachea midline.  CVS: Regular rate and rhythm, normal palpable pulses.  Resp:No increased work of breathing, symmetrical chest rise.  Abdomen: Soft, NT/ND.  Musculoskeletal:  No antalgic gait.     Neuro:  Lower extremities: 5/5 strength bilaterally  Reflexes: Patellar 0+, Achilles 0+ bilaterally.  Sensory:  Intact and symmetrical to light touch and pinprick in L2-S1 dermatomes except for decreased sensation to light touch and pinprick from her knees distally bilaterally.    Lumbar spine:  Lumbar spine:  Range of motion is mildly reduced with forward flexion with slight increased pain, moderately decreased with extension with increased pain in her bilateral low back.   Magnus's test causes no increased pain on either side.    Supine straight leg raise is negative bilaterally.    Internal and external rotation of the hip causes no increased pain on either side.  Myofascial exam: No " tenderness to palpation across lumbar paraspinous muscles.    Imagin17 Xray L-spine  Five views lumbar spine were obtained (AP, lateral, right oblique, left oblique and spot views). There is a upper lumbar dextroscoliosis and a mid lumbar levoscoliosis with superimposed multilevel degenerative disc and facet disease. There is loss of disc space height throughout the lumbar region there is lower lumbar degenerative facet disease.    4/15/14 BL Lower extremity EMG:   Mild sensorimotor predominantly axonal neuropathy.    19 MRI L-spine:  L5-S1: Spondylosis associated with disc space narrowing and mild marginal anterior spondylotic osteophytes.  There is a disc protrusion and osteophyte complex in the left neural foramen that causes a moderate left foraminal stenosis.  There are postoperative changes from a left L5-S1 laminectomy without definite signs for recurrent disc herniations.  There is facet arthropathy.  L4-5: Disc space narrowing associated with marginal anterior spondylotic osteophytes.  There is a mild circumferential annular disc bulge.  Facet arthropathy noted bilaterally.  There is also at least a moderate right lateral recess stenosis.  L3-4: Disc degeneration with with the or disc space narrowing and marginal anterior spondylotic osteophyte.  There is a broad-based posterior osteophyte the and disc bulge complex with compression of the thecal sac greater on the right.  There is a moderate to severe right lateral recess stenosis (image 32 series 5002.  There is associated facet arthropathy.  Mild central canal spinal stenosis.  L2-3: Disc degeneration with disc space narrowing.  There is a broad-based circumferential annular disc bulge.  In the left neural foramen in addition a disc protrusion with moderate obscuration of the foraminal fat is noted.  In the far lateral aspect of the neural foramen (image 16 series 5 the disc protrusion contacts the exiting left L2 root.  Clinical  correlation for a left L2 radiculopathy suggested.  L1-2: Disc degeneration with disc space narrowing and marginal anterior spondylotic osteophyte.  There is a broad-based left paracentral disc protrusion with compression of the thecal sac anterolaterally.  There is an extension of the disc protrusion in the left neural foramen associated with at least a moderate foraminal stenosis.  Compression of the exiting left L1 root far laterally in the foramen not excluded.  Right neural foramen is unremarkable.  T12-L1: No disc herniations or spinal stenosis or foraminal stenosis.    Assessment:    The patient is a 72-year-old woman with a history of anxiety, depression, previous lumbar surgery who presents in referral from Dr. Adam for bilateral foot pain.     1. Lumbar radiculopathy        2. DDD (degenerative disc disease), lumbar        3. Greater trochanteric bursitis of right hip        4. Lumbar spondylosis        5. Dorsalgia, unspecified              Plan:  1. She responded very well to the repeat epidural steroid injection.  She is satisfied with relief.  2. Her remaining right hip pain I believe is likely greater trochanteric bursitis.  She is had injections in the past with good relief however it is not terribly bothersome for at this time.  Recommended continuing with her normal exercises and some targeted exercises for this bursitis.  If pain persists can consider greater trochanteric injection.  3. Follow up as needed.  Continue with Lyrica.
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
## 2023-09-06 ENCOUNTER — TELEPHONE (OUTPATIENT)
Dept: FAMILY MEDICINE | Facility: CLINIC | Age: 73
End: 2023-09-06
Payer: MEDICARE

## 2023-09-06 NOTE — TELEPHONE ENCOUNTER
----- Message from Gypsy Ray sent at 9/6/2023 10:33 AM CDT -----  Contact: Self  Type:  Sooner Appointment Request    Caller is requesting a sooner appointment.  Caller declined first available appointment listed below.  Caller will not accept being placed on the waitlist and is requesting a message be sent to doctor.    Name of Caller:  Pt  When is the first available appointment?  NA    Best Call Back Number:  [phone]    Additional Information:  Is behind on B12 injection and needs to get it scheduled.

## 2023-09-07 ENCOUNTER — IMMUNIZATION (OUTPATIENT)
Dept: FAMILY MEDICINE | Facility: CLINIC | Age: 73
End: 2023-09-07
Payer: MEDICARE

## 2023-09-07 ENCOUNTER — CLINICAL SUPPORT (OUTPATIENT)
Dept: FAMILY MEDICINE | Facility: CLINIC | Age: 73
End: 2023-09-07
Payer: MEDICARE

## 2023-09-07 DIAGNOSIS — E53.8 B12 DEFICIENCY: Primary | ICD-10-CM

## 2023-09-07 PROCEDURE — 99999PBSHW FLU VACCINE - QUADRIVALENT - ADJUVANTED: ICD-10-PCS | Mod: PBBFAC,,,

## 2023-09-07 PROCEDURE — 99999PBSHW FLU VACCINE - QUADRIVALENT - ADJUVANTED: Mod: PBBFAC,,,

## 2023-09-07 PROCEDURE — 99999PBSHW PR PBB SHADOW TECHNICAL ONLY FILED TO HB: Mod: PBBFAC,,,

## 2023-09-07 PROCEDURE — 99499 UNLISTED E&M SERVICE: CPT | Mod: S$PBB,,, | Performed by: FAMILY MEDICINE

## 2023-09-07 PROCEDURE — 99999PBSHW PR PBB SHADOW TECHNICAL ONLY FILED TO HB: ICD-10-PCS | Mod: PBBFAC,,,

## 2023-09-07 PROCEDURE — 96372 THER/PROPH/DIAG INJ SC/IM: CPT | Mod: PBBFAC,PN

## 2023-09-07 PROCEDURE — G0008 ADMIN INFLUENZA VIRUS VAC: HCPCS | Mod: PBBFAC,PN

## 2023-09-07 PROCEDURE — 99499 NO LOS: ICD-10-PCS | Mod: S$PBB,,, | Performed by: FAMILY MEDICINE

## 2023-09-07 RX ADMIN — CYANOCOBALAMIN 1000 MCG: 1000 INJECTION, SOLUTION INTRAMUSCULAR at 09:09

## 2023-09-07 NOTE — PROGRESS NOTES
Patient present to the clinic today for a High Dose Flu Vaccine.  Vaccine administered into the Left Deltoid per patient request.  Patient tolerated well with no complaints.

## 2023-10-05 ENCOUNTER — CLINICAL SUPPORT (OUTPATIENT)
Dept: FAMILY MEDICINE | Facility: CLINIC | Age: 73
End: 2023-10-05
Payer: MEDICARE

## 2023-10-05 DIAGNOSIS — E53.8 VITAMIN B 12 DEFICIENCY: Primary | ICD-10-CM

## 2023-10-05 PROCEDURE — 96372 THER/PROPH/DIAG INJ SC/IM: CPT | Mod: PBBFAC,PN

## 2023-10-05 PROCEDURE — 99999PBSHW PR PBB SHADOW TECHNICAL ONLY FILED TO HB: Mod: PBBFAC,,,

## 2023-10-05 PROCEDURE — 99999PBSHW PR PBB SHADOW TECHNICAL ONLY FILED TO HB: ICD-10-PCS | Mod: PBBFAC,,,

## 2023-10-05 RX ADMIN — CYANOCOBALAMIN 1000 MCG: 1000 INJECTION, SOLUTION INTRAMUSCULAR at 09:10

## 2023-10-31 ENCOUNTER — PATIENT MESSAGE (OUTPATIENT)
Dept: PAIN MEDICINE | Facility: CLINIC | Age: 73
End: 2023-10-31
Payer: MEDICARE

## 2023-10-31 RX ORDER — METHYLPREDNISOLONE 4 MG/1
TABLET ORAL
Qty: 21 EACH | Refills: 0 | Status: SHIPPED | OUTPATIENT
Start: 2023-10-31 | End: 2023-11-28

## 2023-10-31 NOTE — TELEPHONE ENCOUNTER
Please let the patient know I sent a Medrol Dosepak to her pharmacy and schedule a follow-up with Carlo or myself.

## 2023-11-02 ENCOUNTER — CLINICAL SUPPORT (OUTPATIENT)
Dept: FAMILY MEDICINE | Facility: CLINIC | Age: 73
End: 2023-11-02
Payer: MEDICARE

## 2023-11-02 DIAGNOSIS — E53.8 B12 DEFICIENCY: Primary | ICD-10-CM

## 2023-11-02 PROCEDURE — 96372 THER/PROPH/DIAG INJ SC/IM: CPT | Mod: PBBFAC,PN

## 2023-11-02 PROCEDURE — 99999PBSHW PR PBB SHADOW TECHNICAL ONLY FILED TO HB: ICD-10-PCS | Mod: PBBFAC,,,

## 2023-11-02 PROCEDURE — 99999PBSHW PR PBB SHADOW TECHNICAL ONLY FILED TO HB: Mod: PBBFAC,,,

## 2023-11-02 RX ADMIN — CYANOCOBALAMIN 1000 MCG: 1000 INJECTION, SOLUTION INTRAMUSCULAR at 09:11

## 2023-11-02 NOTE — PROGRESS NOTES
Patient present to the clinic today for her monthly Vitamin B12 injection.   Injection administered into the Right Glute per patient request.  Patient tolerated well with no complaints.   Nxt apt reminder provided to pt at time of visit.

## 2023-11-09 RX ORDER — PREGABALIN 100 MG/1
100 CAPSULE ORAL 2 TIMES DAILY
Qty: 60 CAPSULE | Refills: 2 | Status: SHIPPED | OUTPATIENT
Start: 2023-11-09 | End: 2024-02-05

## 2023-11-09 NOTE — TELEPHONE ENCOUNTER
----- Message from Susy Xiong sent at 11/8/2023  3:47 PM CST -----  Regarding: RX Refill Request  Contact: patient at [phone]  Type:  RX Refill Request    Who Called:  patient at [phone]    Preferred Pharmacy with phone number:    [id number] [address] [place] [address]  [address] [facility] [address]  Phone: [phone] Fax: [phone]    Additional Information:  Patient has been out of medication for several days and is needing a refill Please call and advise. Thank you    pregabalin (LYRICA) 100 MG capsule 60 capsule 2 8/2/2023 -   Sig - Route: TAKE 1 CAPSULE(100 MG) BY MOUTH TWICE DAILY - Oral   Sent to pharmacy as: pregabalin (LYRICA) 100 MG capsule   E-Prescribing Status: Receipt confirmed by pharmacy (8/2/2023 10:27 AM CDT)          [facility] Pregnancy And Lactation Text: This medication is Pregnancy Category C. It is unknown if this medication is excreted in breast milk.

## 2023-11-13 RX ORDER — ROPINIROLE 0.25 MG/1
TABLET, FILM COATED ORAL
Qty: 180 TABLET | Refills: 3 | Status: SHIPPED | OUTPATIENT
Start: 2023-11-13

## 2023-11-14 RX ORDER — LOSARTAN POTASSIUM 50 MG/1
TABLET ORAL
Qty: 90 TABLET | Refills: 1 | Status: SHIPPED | OUTPATIENT
Start: 2023-11-14

## 2023-11-14 NOTE — TELEPHONE ENCOUNTER
Refill Routing Note   Medication(s) are not appropriate for processing by [facility] for the following reason(s):        Required labs outdated  Required vitals abnormal    [facility] action(s):  Defer     Requires labs : Yes      Medication Therapy Plan: BP 8/31/23 (!) 149/65      Appointments  past 12m or future 3m with PCP    Date Provider   Last Visit   10/4/2022 Td Montana MD   Next Visit   11/28/2023 Td Montana MD   ED visits in past 90 days: 0        Note composed:11:15 AM 11/14/2023

## 2023-11-14 NOTE — TELEPHONE ENCOUNTER
Care Due:                  Date            Visit Type   Department     Provider  --------------------------------------------------------------------------------                                ESTABLISHED   [facility]  Last Visit: 10-      PATIENT      MEDICINE       Td CABRERA                              FOLLOWUP/OF  [facility]  Next Visit: 11-      FICE VISIT   MEDICINE       Td Montana                                                            Last  Test          Frequency    Reason                     Performed    Due Date  --------------------------------------------------------------------------------    CMP.........  12 months..  alendronate, losartan....  10-   10-    Mg Level....  12 months..  alendronate..............  Not Found    Overdue    Phosphate...  12 months..  alendronate..............  Not Found    Overdue    Vitamin D...  12 months..  alendronate..............  10-   10-    [facility] Embedded Care Due Messages. Reference number: [id number].   11/14/2023 10:00:03 AM CST

## 2023-11-18 ENCOUNTER — PATIENT MESSAGE (OUTPATIENT)
Dept: FAMILY MEDICINE | Facility: CLINIC | Age: 73
End: 2023-11-18
Payer: MEDICARE

## 2023-11-19 NOTE — TELEPHONE ENCOUNTER
No care due was identified.  [facility] Embedded Care Due Messages. Reference number: [id number].   11/18/2023 10:02:15 PM CST

## 2023-11-20 RX ORDER — CITALOPRAM 10 MG/1
10 TABLET ORAL DAILY
Qty: 90 TABLET | Refills: 3 | Status: SHIPPED | OUTPATIENT
Start: 2023-11-20

## 2023-11-28 ENCOUNTER — OFFICE VISIT (OUTPATIENT)
Dept: FAMILY MEDICINE | Facility: CLINIC | Age: 73
End: 2023-11-28
Payer: MEDICARE

## 2023-11-28 VITALS
OXYGEN SATURATION: 99 % | WEIGHT: 136.81 LBS | DIASTOLIC BLOOD PRESSURE: 72 MMHG | SYSTOLIC BLOOD PRESSURE: 130 MMHG | HEIGHT: 64 IN | BODY MASS INDEX: 23.36 KG/M2

## 2023-11-28 DIAGNOSIS — Z79.899 DRUG THERAPY: ICD-10-CM

## 2023-11-28 DIAGNOSIS — D69.2 OTHER NONTHROMBOCYTOPENIC PURPURA: Primary | ICD-10-CM

## 2023-11-28 DIAGNOSIS — F33.42 RECURRENT MAJOR DEPRESSIVE DISORDER, IN FULL REMISSION: ICD-10-CM

## 2023-11-28 DIAGNOSIS — M70.62 GREATER TROCHANTERIC BURSITIS OF LEFT HIP: ICD-10-CM

## 2023-11-28 DIAGNOSIS — Z23 IMMUNIZATION DUE: ICD-10-CM

## 2023-11-28 PROCEDURE — 99999 PR PBB SHADOW E&M-EST. PATIENT-LVL III: ICD-10-PCS | Mod: PBBFAC,,, | Performed by: FAMILY MEDICINE

## 2023-11-28 PROCEDURE — 20610 DRAIN/INJ JOINT/BURSA W/O US: CPT | Mod: PBBFAC,PN | Performed by: FAMILY MEDICINE

## 2023-11-28 PROCEDURE — 99214 OFFICE O/P EST MOD 30 MIN: CPT | Mod: S$PBB,,, | Performed by: FAMILY MEDICINE

## 2023-11-28 PROCEDURE — 99999PBSHW PR PBB SHADOW TECHNICAL ONLY FILED TO HB: Mod: PBBFAC,,,

## 2023-11-28 PROCEDURE — 99999 PR PBB SHADOW E&M-EST. PATIENT-LVL III: CPT | Mod: PBBFAC,,, | Performed by: FAMILY MEDICINE

## 2023-11-28 PROCEDURE — 99999PBSHW PR PBB SHADOW TECHNICAL ONLY FILED TO HB: ICD-10-PCS | Mod: PBBFAC,,,

## 2023-11-28 PROCEDURE — 99213 OFFICE O/P EST LOW 20 MIN: CPT | Mod: PBBFAC,PN | Performed by: FAMILY MEDICINE

## 2023-11-28 PROCEDURE — 99214 PR OFFICE/OUTPT VISIT, EST, LEVL IV, 30-39 MIN: ICD-10-PCS | Mod: S$PBB,,, | Performed by: FAMILY MEDICINE

## 2023-11-28 RX ORDER — TRIAMCINOLONE ACETONIDE 40 MG/ML
40 INJECTION, SUSPENSION INTRA-ARTICULAR; INTRAMUSCULAR
Status: COMPLETED | OUTPATIENT
Start: 2023-11-28 | End: 2023-11-28

## 2023-11-28 RX ORDER — BETAMETHASONE SODIUM PHOSPHATE AND BETAMETHASONE ACETATE 3; 3 MG/ML; MG/ML
3 INJECTION, SUSPENSION INTRA-ARTICULAR; INTRALESIONAL; INTRAMUSCULAR; SOFT TISSUE ONCE
Status: DISCONTINUED | OUTPATIENT
Start: 2023-11-28 | End: 2023-11-28

## 2023-11-28 RX ADMIN — CYANOCOBALAMIN 1000 MCG: 1000 INJECTION, SOLUTION INTRAMUSCULAR at 11:11

## 2023-11-28 RX ADMIN — TRIAMCINOLONE ACETONIDE 40 MG: 400 INJECTION, SUSPENSION INTRA-ARTICULAR; INTRAMUSCULAR at 09:11

## 2023-11-28 NOTE — PROCEDURES
Large Joint Aspiration/Injection: L greater trochanteric bursa    Date/Time: 11/28/2023 8:40 AM    Performed by: Td Montana MD  Authorized by: Td Montana MD    Consent Done?:  Yes (Verbal)  Indications:  Arthritis and pain    Local anesthesia used?: Yes    Local anesthetic:  Lidocaine 2% with epinephrine  Anesthetic total (ml):  2    Approach:  Lateral  Location:  Hip  Site:  L greater trochanteric bursa  Patient tolerance:  Patient tolerated the procedure well with no immediate complications

## 2023-11-28 NOTE — PROGRESS NOTES
THIS DOCUMENT WAS MADE IN PART WITH VOICE RECOGNITION SOFTWARE.  OCCASIONALLY THIS SOFTWARE WILL MISINTERPRET WORDS OR PHRASES.    Assessment and Plan:    1. Other nonthrombocytopenic purpura        2. Recurrent major depressive disorder, in full remission        3. Immunization due        4. Drug therapy  CBC Auto Differential    Comprehensive Metabolic Panel    Lipid Panel    Hemoglobin A1C    TSH    T4, Free    Urinalysis, Reflex to Urine Culture Urine, Clean Catch    Urinalysis Microscopic      5. Greater trochanteric bursitis of left hip  Large Joint Aspiration/Injection: L greater trochanteric bursa    triamcinolone acetonide injection 40 mg    DISCONTINUED: betamethasone acetate-betamethasone sodium phosphate injection 3 mg          PLAN    Wellness labs as above     Chronic conditions stable     New diagnosis greater trochanteric bursitis  Procedure-trochanteric bursa injection, see procedure note   Gave home exercise program   If no improvement x1 week refer to physical therapy    Recommended tetanus vaccine      ______________________________________________________________________  Subjective:    Chief Complaint:  Chief Complaint   Patient presents with    Annual Exam     Check up         HPI:  Yolie PANIAGUA is a 73 y.o. year old     Patient presents today for annual wellness exam     Left hip discomfort   Pain / gives out when walking   Duration : 2 weeks   Inciting event : unknown         Idiopathic peripheral neuropathy   Med- Lyrica 100 mg (effective)    History of migraine   Prophylaxis- Amitriptyline 10 mg   Abortive - Ubrevly  Neurology : Nancy Alonso NP  SOTELO frequency : used to be yearly, has had 2 in last year (severe) and more mild ones   MRI unremarkable       Chronic back pain   Rx : Amitriptyline 10 mg, Lyrica 100 mg, Naproxen BID  Prev Tx : CHEIKH 8/23  Spine surgery in 2001 : laminectomy of L4   Pain mgt : MD Jay Jay     Restless leg syndrome   Rx-Requip 0.50 mg QHS (effective)     Chronic  "allergic rhinitis   Rx-Nasacort, Claritin   Septoplasty 2017    Essential hypertension   Rx-losartan 50 mg   No home check     Family history colon cancer (Mother dx @ approx 80)  Up-to-date on colon cancer screening   Asymptomatic  Follow-up recommended every 5 years   previous colonoscopy 03/02/2020     Atrophic vaginitis   Rx-Estrace vaginal cream (effective)    Obstructive sleep apnea  No CPAP   "Old diagnosis" / Repeat sleep study was mild CARYL, no need for CPAP   ++ Snoring, denies fatigue      Nicotine dependence   In remission, quit in 1973     Senile osteoporosis  Previous DEXA bone scan 03/01/2021   Rx-alendronate weekly  No recent fractures      Anxiety, depression  med-Celexa 10 mg (effective)     Past Medical History:  Past Medical History:   Diagnosis Date    Anxiety     Arthritis     Chronic allergic rhinitis 11/19/2012    Depression     Essential hypertension 9/14/2020    Herniated lumbar intervertebral disc     L3    Migraine     painless with numbness       Past Surgical History:  Past Surgical History:   Procedure Laterality Date    COLONOSCOPY      COLONOSCOPY N/A 03/02/2020    Procedure: COLONOSCOPY;  Surgeon: Flash Garnett MD;  Location: [facility] ENDO;  Service: Endoscopy;  Laterality: N/A;    EPIDURAL STEROID INJECTION INTO LUMBAR SPINE N/A 05/20/2019    Procedure: Injection-steroid-epidural-lumbar;  Surgeon: Patrick Goyal MD;  Location: [facility] OR;  Service: Pain Management;  Laterality: N/A;  L5/S1 from right side    EPIDURAL STEROID INJECTION INTO LUMBAR SPINE N/A 08/04/2022    Procedure: Injection-steroid-epidural-lumbar, L5/S1  from the right side;  Surgeon: Patrick Goyal MD;  Location: [facility] OR;  Service: Pain Management;  Laterality: N/A;    EPIDURAL STEROID INJECTION INTO LUMBAR SPINE N/A 8/3/2023    Procedure: Injection-steroid-epidural-lumbar L5/S1 from the right;  Surgeon: Patrick Goyal MD;  Location: [facility] OR;  Service: Pain Management;  Laterality: N/A;    EYE " SURGERY  May, 2018    cataract surgery    NASAL SEPTUM SURGERY N/A 2017    SPINE SURGERY  2001    L4    TONSILLECTOMY         Family History:  Family History   Problem Relation Age of Onset    Cancer Mother 78        mother    Heart disease Mother 78        cabg    COPD Mother     Hypertension Mother     Vision loss Mother         macular degeration    Diabetes Father     Heart disease Father 71        cabg    Hypertension Father     Diabetes Brother     Cancer Brother         brother    Heart disease Brother 50        [place]    Hypertension Brother     Diabetes Paternal Grandmother        Social History:  Social History     Socioeconomic History    Marital status:      Spouse name: Kraig    Number of children: 3   Tobacco Use    Smoking status: Former     Current packs/day: 0.00     Types: Cigarettes     Quit date: 1973     Years since quittin.5    Smokeless tobacco: Never    Tobacco comments:     social smoker only   Substance and Sexual Activity    Alcohol use: Yes     Alcohol/week: 5.0 standard drinks of alcohol     Types: 5 Glasses of wine per week     Comment: one glass of wine with dinner daily    Drug use: No    Sexual activity: Yes     Partners: Female     Birth control/protection: Post-menopausal   Social History Narrative    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)     Exercise : Most days per week : walking 4d/week + Pilates + cardio + resistance     Diet : pt suspects lactose intolerance      Social Determinants of Health     Financial Resource Strain: Low Risk  (2023)    Overall Financial Resource Strain (CARDIA)     Difficulty of Paying Living Expenses: Not hard at all   Food Insecurity: No Food Insecurity (2023)    Hunger Vital Sign     Worried About Running Out of Food in the Last Year: Never true     Ran Out of Food in the Last Year: Never true   Transportation Needs: No Transportation Needs (2023)    PRAPARE - Transportation     Lack of Transportation  (Medical): No     Lack of Transportation (Non-Medical): No   Recent Concern: Transportation Needs - Unmet Transportation Needs (11/3/2023)    PRAPARE - Transportation     Lack of Transportation (Medical): Yes     Lack of Transportation (Non-Medical): Yes   Physical Activity: Insufficiently Active (11/21/2023)    Exercise Vital Sign     Days of Exercise per Week: 2 days     Minutes of Exercise per Session: 50 min   Stress: No Stress Concern Present (11/21/2023)    [employer] [employer] - Occupational Stress Questionnaire     Feeling of Stress : Not at all   Social Connections: Unknown (11/21/2023)    Social Connection and Isolation Panel [NHANES]     Frequency of Communication with Friends and Family: Three times a week     Frequency of Social Gatherings with Friends and Family: Once a week     Active Member of Clubs or Organizations: No     Attends Club or Organization Meetings: Never     Marital Status:    Housing Stability: Low Risk  (11/21/2023)    Housing Stability Vital Sign     Unable to Pay for Housing in the Last Year: No     Number of Places Lived in the Last Year: 1     Unstable Housing in the Last Year: No       Medications:  Current Outpatient Medications on File Prior to Visit   Medication Sig Dispense Refill    alendronate (FOSAMAX) 70 MG tablet Take 1 tablet (70 mg total) by mouth every 7 days. 12 tablet 3    amitriptyline (ELAVIL) 10 MG tablet TAKE 1 TABLET(10 MG) BY MOUTH EVERY EVENING 30 tablet 6    citalopram (CELEXA) 10 MG tablet Take 1 tablet (10 mg total) by mouth once daily. 90 tablet 3    ergocalciferol (ERGOCALCIFEROL) 50,000 unit Cap Take 1 capsule (50,000 Units total) by mouth every 7 days. 12 capsule 4    losartan (COZAAR) 50 MG tablet TAKE 1 TABLET(50 MG) BY MOUTH EVERY DAY 90 tablet 1    naproxen (NAPROSYN) 250 MG tablet Take 500 mg by mouth 2 (two) times daily with meals.      omeprazole (PRILOSEC) 20 MG capsule Take 20 mg by mouth 2 (two) times daily.       ondansetron (ZOFRAN-ODT) 4 MG TbDL Take 1 tablet (4 mg total) by mouth every 6 (six) hours as needed (nausea). 30 tablet 11    pregabalin (LYRICA) 100 MG capsule Take 1 capsule (100 mg total) by mouth 2 (two) times daily. 60 capsule 2    RESTASIS 0.05 % ophthalmic emulsion Place 1 drop into both eyes 2 (two) times daily.      rimegepant (NURTEC) 75 mg odt Take 1 tablet (75 mg total) by mouth daily as needed. Place ODT tablet on the tongue; alternatively the ODT tablet may be placed under the tongue 8 tablet 11    rOPINIRole (REQUIP) 0.25 MG tablet TAKE 2 TABLETS BY MOUTH EVERY EVENING 180 tablet 3    triamcinolone (NASACORT) 55 mcg nasal inhaler 1 spray by Nasal route once daily.       Current Facility-Administered Medications on File Prior to Visit   Medication Dose Route Frequency Provider Last Rate Last Admin    cyanocobalamin injection 1,000 mcg  1,000 mcg Intramuscular Q28 Days Td Montana MD   1,000 mcg at 11/02/23 0924       Allergies:  Penicillins    Immunizations:  Immunization History   Administered Date(s) Administered    COVID-19 MRNA, LN-S PF (MODERNA HALF 0.25 ML DOSE) 11/26/2021    COVID-19, MRNA, LN-S, PF (MODERNA FULL 0.5 ML DOSE) 01/22/2021, 02/19/2021, 04/04/2022    COVID-19, mRNA, LNP-S, bivalent booster, PF (Moderna Omicron)12 + YEARS 10/07/2022    H1N1 Swine Flu Vaccine 12/30/2009    Influenza (FLUAD) - Quadrivalent - Adjuvanted - PF *Preferred* (65+) 10/13/2021, 10/04/2022, 09/07/2023    Influenza - High Dose - PF (65 years and older) 10/08/2013, 11/11/2014, 10/26/2015, 10/05/2017, 09/20/2018, 09/24/2019, 09/14/2020    Influenza - Quadrivalent - PF *Preferred* (6 months and older) 10/24/2007, 10/06/2009, 10/25/2010, 11/03/2011, 11/19/2012    Influenza A (H1N1) 2009 Monovalent - IM 12/30/2009    Influenza Split 11/19/2012    Pneumococcal Conjugate - 13 Valent 01/18/2016    Pneumococcal Polysaccharide - 23 Valent 10/08/2013    Tdap 11/03/2011    Zoster 01/22/2014    Zoster Recombinant  "05/02/2022, 07/12/2022       Review of Systems:  Review of Systems   Constitutional:  Negative for activity change and unexpected weight change.   HENT:  Negative for hearing loss, rhinorrhea and trouble swallowing.    Eyes:  Negative for discharge and visual disturbance.   Respiratory:  Negative for chest tightness and wheezing.    Cardiovascular:  Negative for chest pain and palpitations.   Gastrointestinal:  Negative for blood in stool, constipation, diarrhea and vomiting.   Endocrine: Negative for polydipsia and polyuria.   Genitourinary:  Negative for difficulty urinating, dysuria, hematuria and menstrual problem.   Musculoskeletal:  Negative for arthralgias, joint swelling and neck pain.   Neurological:  Negative for weakness and headaches.   Psychiatric/Behavioral:  Negative for confusion and dysphoric mood.        Objective:    Vitals:  Vitals:    11/28/23 0840   BP: 130/72   SpO2: 99%   Weight: 62.1 kg (136 lb 12.7 oz)   Height: 5' 4" (1.626 m)   PainSc: 0-No pain       Physical Exam  Constitutional:       General: She is not in acute distress.  HENT:      Head: Normocephalic and atraumatic.   Eyes:      Pupils: Pupils are equal, round, and reactive to light.   Cardiovascular:      Rate and Rhythm: Normal rate and regular rhythm.      Heart sounds: No murmur heard.     No friction rub.   Pulmonary:      Effort: Pulmonary effort is normal.      Breath sounds: Normal breath sounds.   Abdominal:      General: Bowel sounds are normal. There is no distension.      Palpations: Abdomen is soft.      Tenderness: There is no abdominal tenderness.   Musculoskeletal:      Cervical back: Neck supple.      Comments: Lateral hip tender to palpation over greater trochanter, good total hip range of motion, no clicking appreciated.   Skin:     General: Skin is warm and dry.      Findings: No rash.   Psychiatric:         Behavior: Behavior normal.             Td Montana MD  Family Medicine      "

## 2023-12-12 ENCOUNTER — TELEPHONE (OUTPATIENT)
Dept: FAMILY MEDICINE | Facility: CLINIC | Age: 73
End: 2023-12-12
Payer: MEDICARE

## 2023-12-12 NOTE — TELEPHONE ENCOUNTER
----- Message from Emma Guallpa sent at 12/12/2023 10:22 AM CST -----  Contact: Pt  [phone]  Appointment Request From: Yolie Brambila    With Provider: Td Montana MD [[facility]]    Preferred Date Range: 12/28/2023 - 12/28/2023    Preferred Times: Monday Morning    Reason for visit: B12 injection    Comments:  Regular monthly injection

## 2023-12-13 ENCOUNTER — OFFICE VISIT (OUTPATIENT)
Dept: PAIN MEDICINE | Facility: CLINIC | Age: 73
End: 2023-12-13
Payer: MEDICARE

## 2023-12-13 VITALS
DIASTOLIC BLOOD PRESSURE: 58 MMHG | BODY MASS INDEX: 22.94 KG/M2 | WEIGHT: 134.38 LBS | HEIGHT: 64 IN | HEART RATE: 72 BPM | SYSTOLIC BLOOD PRESSURE: 136 MMHG

## 2023-12-13 DIAGNOSIS — M51.36 DDD (DEGENERATIVE DISC DISEASE), LUMBAR: ICD-10-CM

## 2023-12-13 DIAGNOSIS — M54.16 LUMBAR RADICULOPATHY: Primary | ICD-10-CM

## 2023-12-13 PROCEDURE — 99213 PR OFFICE/OUTPT VISIT, EST, LEVL III, 20-29 MIN: ICD-10-PCS | Mod: S$PBB,,,

## 2023-12-13 PROCEDURE — 99212 OFFICE O/P EST SF 10 MIN: CPT | Mod: PBBFAC,PO

## 2023-12-13 PROCEDURE — 99999 PR PBB SHADOW E&M-EST. PATIENT-LVL II: ICD-10-PCS | Mod: PBBFAC,,,

## 2023-12-13 PROCEDURE — 99999 PR PBB SHADOW E&M-EST. PATIENT-LVL II: CPT | Mod: PBBFAC,,,

## 2023-12-13 PROCEDURE — 99213 OFFICE O/P EST LOW 20 MIN: CPT | Mod: S$PBB,,,

## 2023-12-13 RX ORDER — METHOCARBAMOL 500 MG/1
500 TABLET, FILM COATED ORAL 4 TIMES DAILY
Qty: 40 TABLET | Refills: 0 | Status: SHIPPED | OUTPATIENT
Start: 2023-12-13 | End: 2024-02-14 | Stop reason: SDUPTHER

## 2023-12-13 NOTE — PROGRESS NOTES
This note was completed with dictation software and grammatical errors may exist.    CC:  Bilateral foot pain, back pain, leg pain    HPI:  The patient is a 72-year-old woman with a history of anxiety, depression, previous lumbar surgery who presents in referral from Dr. Adam for bilateral foot pain.  She returns for follow-up with some worsening left-sided lower back pain with radiation down left leg stopping just past her knee, 4/10, waxing and waning, worsened with certain activities such as driving.  She was recently prescribed a Medrol Dosepak which helped with her pain in addition to a intramuscular steroid injection which also helped with her pain.  She continues to participate in Pilates without relief.  She denies any numbness, weakness or any changes to her bowel bladder function.      Previous history:  The patient reports developing bilateral foot pain about 25 years ago with numbness and tingling, has been diagnosed with neuropathy but workup has not revealed any particular  etiology.  This has been progressing over the years and has gone from being located only in the feet and now extending proximally up to the knees bilaterally.  She describes it as throbbing, tingling with abnormal sensation, particularly worse with standing, walking, exercising.  She does get some relief with rest, heat and medications. She has been taking gabapentin for years, several years has been on 600 mg twice daily with about 40% relief.  She also has a history of low back and buttock pain, ended up having severe right leg pain and in 2001 underwent laminectomy, unclear what level but reports that her right back and leg pain resolved at that time.  However over the years she has developed more bilateral buttock pain, thigh pain worse with standing and walking, describes this as dull.  She denies any weakness in her legs, denies any bowel or bladder incontinence.    Pain intervention history:  She has done physical therapy  multiple times, chiropractic care without relief.  She has not tried Cymbalta.  She has been taking gabapentin 600 mg twice daily with about 40% relief, switched to Lyrica.  L5/S1 CHEIKH performed on 05/20/2019 with greater than 70% relief ongoing of her back and leg pain, approach from the right due to previous left hemilaminectomy. She is status post L5/S1 interlaminar epidural steroid injection from the right side on 08/04/2022 with 50% relief of her back pain but 0% relief of her leg pain.   She is status post L5/S1 CHEIKH from the right on 08/03/2023 with 100% relief.    Spine surgeries:  L5/S1 left hemilaminectomy    Antineuropathics:  Lyrica 100 twice daily  NSAIDs:  Naproxen  Physical therapy:  Antidepressants:  Elavil 10 mg, Celexa 10  Muscle relaxers:  Opioids:  Antiplatelets/Anticoagulants:    ROS:  She reports back pain only.  Balance of review of systems is negative.    Past Medical History:   Diagnosis Date    Anxiety     Arthritis     Chronic allergic rhinitis 11/19/2012    Depression     Essential hypertension 9/14/2020    Herniated lumbar intervertebral disc     L3    Migraine     painless with numbness       Past Surgical History:   Procedure Laterality Date    COLONOSCOPY      COLONOSCOPY N/A 03/02/2020    Procedure: COLONOSCOPY;  Surgeon: Flash Garnett MD;  Location: [facility];  Service: Endoscopy;  Laterality: N/A;    EPIDURAL STEROID INJECTION INTO LUMBAR SPINE N/A 05/20/2019    Procedure: Injection-steroid-epidural-lumbar;  Surgeon: Patrick Goyal MD;  Location: [facility] OR;  Service: Pain Management;  Laterality: N/A;  L5/S1 from right side    EPIDURAL STEROID INJECTION INTO LUMBAR SPINE N/A 08/04/2022    Procedure: Injection-steroid-epidural-lumbar, L5/S1  from the right side;  Surgeon: Patrick Goyal MD;  Location: [facility] OR;  Service: Pain Management;  Laterality: N/A;    EPIDURAL STEROID INJECTION INTO LUMBAR SPINE N/A 8/3/2023    Procedure: Injection-steroid-epidural-lumbar L5/S1  from the right;  Surgeon: Patrick Goyal MD;  Location: [facility] OR;  Service: Pain Management;  Laterality: N/A;    EYE SURGERY  May, 2018    cataract surgery    NASAL SEPTUM SURGERY N/A 2017    SPINE SURGERY  2001    L4    TONSILLECTOMY         Social History     Socioeconomic History    Marital status:      Spouse name: Kraig    Number of children: 3   Tobacco Use    Smoking status: Former     Current packs/day: 0.00     Types: Cigarettes     Quit date: 1973     Years since quittin.5    Smokeless tobacco: Never    Tobacco comments:     social smoker only   Substance and Sexual Activity    Alcohol use: Yes     Alcohol/week: 5.0 standard drinks of alcohol     Types: 5 Glasses of wine per week     Comment: one glass of wine with dinner daily    Drug use: No    Sexual activity: Yes     Partners: Female     Birth control/protection: Post-menopausal   Social History Narrative    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)     Exercise : Most days per week : walking 4d/week + Pilates + cardio + resistance     Diet : pt suspects lactose intolerance      Social Determinants of Health     Financial Resource Strain: Low Risk  (2023)    Overall Financial Resource Strain (CARDIA)     Difficulty of Paying Living Expenses: Not hard at all   Food Insecurity: No Food Insecurity (2023)    Hunger Vital Sign     Worried About Running Out of Food in the Last Year: Never true     Ran Out of Food in the Last Year: Never true   Transportation Needs: No Transportation Needs (2023)    PRAPARE - Transportation     Lack of Transportation (Medical): No     Lack of Transportation (Non-Medical): No   Recent Concern: Transportation Needs - Unmet Transportation Needs (11/3/2023)    PRAPARE - Transportation     Lack of Transportation (Medical): Yes     Lack of Transportation (Non-Medical): Yes   Physical Activity: Insufficiently Active (2023)    Exercise Vital Sign     Days of Exercise per Week: 2  "days     Minutes of Exercise per Session: 50 min   Stress: No Stress Concern Present (11/21/2023)    [employer] - Occupational Stress Questionnaire     Feeling of Stress : Not at all   Social Connections: Unknown (11/21/2023)    Social Connection and Isolation Panel [NHANES]     Frequency of Communication with Friends and Family: Three times a week     Frequency of Social Gatherings with Friends and Family: Once a week     Active Member of Clubs or Organizations: No     Attends Club or Organization Meetings: Never     Marital Status:    Housing Stability: Low Risk  (11/21/2023)    Housing Stability Vital Sign     Unable to Pay for Housing in the Last Year: No     Number of Places Lived in the Last Year: 1     Unstable Housing in the Last Year: No         Medications/Allergies: See med card    Vitals:    12/13/23 0935   BP: (!) 136/58   Pulse: 72   Weight: 61 kg (134 lb 5.9 oz)   Height: 5' 4" (1.626 m)   PainSc:   4   PainLoc: Back         Physical exam:  Gen: A and O x3, pleasant, well-groomed  Skin: No rashes or obvious lesions  HEENT: PERRLA, no obvious deformities on ears or in canals. Trachea midline.  CVS: Regular rate and rhythm, normal palpable pulses.  Resp:No increased work of breathing, symmetrical chest rise.  Abdomen: Soft, NT/ND.  Musculoskeletal:  No antalgic gait.     Neuro:  Lower extremities: 5/5 strength bilaterally  Reflexes: Patellar 0+, Achilles 0+ bilaterally.  Sensory:  Intact and symmetrical to light touch and pinprick in L2-S1 dermatomes except for decreased sensation to light touch and pinprick from her knees distally bilaterally.    Lumbar spine:  Lumbar spine:  Range of motion is mildly reduced with forward flexion with slight increased pain, moderately decreased with extension with increased pain in her bilateral low back.   Magnus's test causes no increased pain on either side.    Supine straight leg raise is negative bilaterally.    Internal and " external rotation of the hip causes no increased pain on either side.  Myofascial exam: No tenderness to palpation across lumbar paraspinous muscles.  No severe tenderness to palpation over left greater trochanteric bursa    Imagin17 Xray L-spine  Five views lumbar spine were obtained (AP, lateral, right oblique, left oblique and spot views). There is a upper lumbar dextroscoliosis and a mid lumbar levoscoliosis with superimposed multilevel degenerative disc and facet disease. There is loss of disc space height throughout the lumbar region there is lower lumbar degenerative facet disease.    4/15/14 BL Lower extremity EMG:   Mild sensorimotor predominantly axonal neuropathy.    19 MRI L-spine:  L5-S1: Spondylosis associated with disc space narrowing and mild marginal anterior spondylotic osteophytes.  There is a disc protrusion and osteophyte complex in the left neural foramen that causes a moderate left foraminal stenosis.  There are postoperative changes from a left L5-S1 laminectomy without definite signs for recurrent disc herniations.  There is facet arthropathy.  L4-5: Disc space narrowing associated with marginal anterior spondylotic osteophytes.  There is a mild circumferential annular disc bulge.  Facet arthropathy noted bilaterally.  There is also at least a moderate right lateral recess stenosis.  L3-4: Disc degeneration with with the or disc space narrowing and marginal anterior spondylotic osteophyte.  There is a broad-based posterior osteophyte the and disc bulge complex with compression of the thecal sac greater on the right.  There is a moderate to severe right lateral recess stenosis (image 32 series 5002.  There is associated facet arthropathy.  Mild central canal spinal stenosis.  L2-3: Disc degeneration with disc space narrowing.  There is a broad-based circumferential annular disc bulge.  In the left neural foramen in addition a disc protrusion with moderate obscuration of the  foraminal fat is noted.  In the far lateral aspect of the neural foramen (image 16 series 5 the disc protrusion contacts the exiting left L2 root.  Clinical correlation for a left L2 radiculopathy suggested.  L1-2: Disc degeneration with disc space narrowing and marginal anterior spondylotic osteophyte.  There is a broad-based left paracentral disc protrusion with compression of the thecal sac anterolaterally.  There is an extension of the disc protrusion in the left neural foramen associated with at least a moderate foraminal stenosis.  Compression of the exiting left L1 root far laterally in the foramen not excluded.  Right neural foramen is unremarkable.  T12-L1: No disc herniations or spinal stenosis or foraminal stenosis.    Assessment:    The patient is a 72-year-old woman with a history of anxiety, depression, previous lumbar surgery who presents in referral from Dr. Adam for bilateral foot pain.     1. Lumbar radiculopathy  methocarbamoL (ROBAXIN) 500 MG Tab      2. DDD (degenerative disc disease), lumbar                Plan:  I believe she is having some continued lumbar radicular pain however at this time it is tolerable and seems to wax and wane.  She has had multiple steroids over the past couple months and at this time I think his best to maximize conservative therapy.  She is agreeable to this plan.  Discussed continuing with her at-home PT directed exercises and Pilates.  Prescription for Robaxin provided today for any myofascial component.  Follow up as needed.  If left-sided radicular pain persists can consider repeat lumbar epidural.

## 2023-12-28 ENCOUNTER — CLINICAL SUPPORT (OUTPATIENT)
Dept: FAMILY MEDICINE | Facility: CLINIC | Age: 73
End: 2023-12-28
Payer: MEDICARE

## 2023-12-28 DIAGNOSIS — E53.8 B12 DEFICIENCY: Primary | ICD-10-CM

## 2023-12-28 PROCEDURE — 99999PBSHW PR PBB SHADOW TECHNICAL ONLY FILED TO HB: ICD-10-PCS | Mod: PBBFAC,,,

## 2023-12-28 PROCEDURE — 96372 THER/PROPH/DIAG INJ SC/IM: CPT | Mod: PBBFAC,PN

## 2023-12-28 PROCEDURE — 99999PBSHW PR PBB SHADOW TECHNICAL ONLY FILED TO HB: Mod: PBBFAC,,,

## 2023-12-28 RX ADMIN — CYANOCOBALAMIN 1000 MCG: 1000 INJECTION, SOLUTION INTRAMUSCULAR at 09:12

## 2023-12-28 NOTE — PROGRESS NOTES
Patient present to the clinic today for her monthly Vitamin B12 injection.  Injection administered into the right upper glute per patient request.  Patient tolerated well with no complaints.

## 2023-12-31 DIAGNOSIS — E55.9 VITAMIN D DEFICIENCY: ICD-10-CM

## 2023-12-31 RX ORDER — AMITRIPTYLINE HYDROCHLORIDE 10 MG/1
10 TABLET, FILM COATED ORAL NIGHTLY
Qty: 30 TABLET | Refills: 6 | Status: CANCELLED | OUTPATIENT
Start: 2023-12-31

## 2024-01-02 ENCOUNTER — TELEPHONE (OUTPATIENT)
Dept: PAIN MEDICINE | Facility: CLINIC | Age: 74
End: 2024-01-02
Payer: MEDICARE

## 2024-01-02 RX ORDER — ERGOCALCIFEROL 1.25 MG/1
50000 CAPSULE ORAL
Qty: 12 CAPSULE | Refills: 4 | Status: SHIPPED | OUTPATIENT
Start: 2024-01-02

## 2024-01-02 RX ORDER — AMITRIPTYLINE HYDROCHLORIDE 10 MG/1
TABLET, FILM COATED ORAL
Qty: 30 TABLET | Refills: 6 | Status: SHIPPED | OUTPATIENT
Start: 2024-01-02

## 2024-01-02 NOTE — TELEPHONE ENCOUNTER
Refill Routing Note   Medication(s) are not appropriate for processing by [facility] for the following reason(s):        Outside of protocol    [facility] action(s):  Route               Appointments  past 12m or future 3m with PCP    Date Provider   Last Visit   11/28/2023 Td Montana MD   Next Visit   Visit date not found Td Montana MD   ED visits in past 90 days: 0        Note composed:9:37 PM 01/01/2024

## 2024-01-09 DIAGNOSIS — R11.0 NAUSEA: ICD-10-CM

## 2024-01-09 RX ORDER — ONDANSETRON 4 MG/1
TABLET, ORALLY DISINTEGRATING ORAL
Qty: 30 TABLET | Refills: 11 | Status: SHIPPED | OUTPATIENT
Start: 2024-01-09

## 2024-01-11 DIAGNOSIS — Z00.00 ENCOUNTER FOR MEDICARE ANNUAL WELLNESS EXAM: ICD-10-CM

## 2024-01-16 DIAGNOSIS — F43.21 ADJUSTMENT DISORDER WITH DEPRESSED MOOD: Primary | ICD-10-CM

## 2024-01-17 ENCOUNTER — PATIENT MESSAGE (OUTPATIENT)
Dept: PSYCHIATRY | Facility: CLINIC | Age: 74
End: 2024-01-17
Payer: MEDICARE

## 2024-01-25 ENCOUNTER — CLINICAL SUPPORT (OUTPATIENT)
Dept: FAMILY MEDICINE | Facility: CLINIC | Age: 74
End: 2024-01-25
Payer: MEDICARE

## 2024-01-25 DIAGNOSIS — E53.8 B12 DEFICIENCY: Primary | ICD-10-CM

## 2024-01-25 PROCEDURE — 96372 THER/PROPH/DIAG INJ SC/IM: CPT | Mod: PBBFAC,PN

## 2024-01-25 PROCEDURE — 99999PBSHW PR PBB SHADOW TECHNICAL ONLY FILED TO HB: Mod: PBBFAC,,,

## 2024-01-25 RX ADMIN — CYANOCOBALAMIN 1000 MCG: 1000 INJECTION, SOLUTION INTRAMUSCULAR at 09:01

## 2024-01-26 RX ORDER — CYANOCOBALAMIN 1000 UG/ML
1000 INJECTION, SOLUTION INTRAMUSCULAR; SUBCUTANEOUS
Status: SHIPPED | OUTPATIENT
Start: 2024-01-26 | End: 2024-11-29

## 2024-01-30 ENCOUNTER — PATIENT MESSAGE (OUTPATIENT)
Dept: FAMILY MEDICINE | Facility: CLINIC | Age: 74
End: 2024-01-30
Payer: MEDICARE

## 2024-02-05 ENCOUNTER — OFFICE VISIT (OUTPATIENT)
Dept: PAIN MEDICINE | Facility: CLINIC | Age: 74
End: 2024-02-05
Payer: MEDICARE

## 2024-02-05 ENCOUNTER — HOSPITAL ENCOUNTER (OUTPATIENT)
Dept: RADIOLOGY | Facility: HOSPITAL | Age: 74
Discharge: HOME | End: 2024-02-05
Attending: PHYSICIAN ASSISTANT
Payer: MEDICARE

## 2024-02-05 ENCOUNTER — TELEPHONE (OUTPATIENT)
Dept: PAIN MEDICINE | Facility: CLINIC | Age: 74
End: 2024-02-05

## 2024-02-05 VITALS
DIASTOLIC BLOOD PRESSURE: 61 MMHG | WEIGHT: 140.13 LBS | SYSTOLIC BLOOD PRESSURE: 142 MMHG | HEIGHT: 64 IN | HEART RATE: 65 BPM | BODY MASS INDEX: 23.92 KG/M2

## 2024-02-05 DIAGNOSIS — M25.552 LEFT HIP PAIN: ICD-10-CM

## 2024-02-05 DIAGNOSIS — M51.36 DDD (DEGENERATIVE DISC DISEASE), LUMBAR: ICD-10-CM

## 2024-02-05 DIAGNOSIS — M47.816 LUMBAR SPONDYLOSIS: ICD-10-CM

## 2024-02-05 DIAGNOSIS — M54.16 LUMBAR RADICULOPATHY: ICD-10-CM

## 2024-02-05 DIAGNOSIS — M25.552 LEFT HIP PAIN: Primary | ICD-10-CM

## 2024-02-05 PROCEDURE — 99999 PR PBB SHADOW E&M-EST. PATIENT-LVL IV: CPT | Mod: PBBFAC,,, | Performed by: PHYSICIAN ASSISTANT

## 2024-02-05 PROCEDURE — 99214 OFFICE O/P EST MOD 30 MIN: CPT | Mod: PBBFAC,PO | Performed by: PHYSICIAN ASSISTANT

## 2024-02-05 PROCEDURE — 99214 OFFICE O/P EST MOD 30 MIN: CPT | Mod: S$PBB,,, | Performed by: PHYSICIAN ASSISTANT

## 2024-02-05 PROCEDURE — 73521 X-RAY EXAM HIPS BI 2 VIEWS: CPT | Mod: 26,,, | Performed by: RADIOLOGY

## 2024-02-05 PROCEDURE — 73521 X-RAY EXAM HIPS BI 2 VIEWS: CPT | Mod: TC,PO

## 2024-02-05 RX ORDER — PREGABALIN 100 MG/1
100 CAPSULE ORAL 2 TIMES DAILY
Qty: 60 CAPSULE | Refills: 2 | Status: SHIPPED | OUTPATIENT
Start: 2024-02-05 | End: 2024-05-16

## 2024-02-05 NOTE — TELEPHONE ENCOUNTER
Please let the patient know that I reviewed her hip x-rays and although the joint space is well maintained, she does have some arthritic changes.  I suggest scheduling a left hip joint injection.  Offer to have this set up with interventional Radiology, usually they can do this sooner than Dr. Goyal.

## 2024-02-05 NOTE — PROGRESS NOTES
This note was completed with dictation software and grammatical errors may exist.    CC:  Bilateral foot pain, back pain, leg pain    HPI:  The patient is a 73-year-old woman with a history of anxiety, depression, previous lumbar surgery who presents in referral from Dr. Adam for bilateral foot pain.  She returns in follow-up today complaining of left hip pain.  She points to the left lateral hip, left groin and left anterior thigh.  She does have some pain in the left lateral buttock as well.  She also complains of pain across the low back.  Her left hip pain worsened when she was carrying a sewing machine down stairs.  The pain is much worse with weight-bearing.  In regards to her low back pain, this is worse 1st thing in the mornings and also with bending such as trying to make the bed.  She does report some weakness in her left leg.  She denies any changes in numbness.    Previous history:  The patient reports developing bilateral foot pain about 25 years ago with numbness and tingling, has been diagnosed with neuropathy but workup has not revealed any particular  etiology.  This has been progressing over the years and has gone from being located only in the feet and now extending proximally up to the knees bilaterally.  She describes it as throbbing, tingling with abnormal sensation, particularly worse with standing, walking, exercising.  She does get some relief with rest, heat and medications. She has been taking gabapentin for years, several years has been on 600 mg twice daily with about 40% relief.  She also has a history of low back and buttock pain, ended up having severe right leg pain and in 2001 underwent laminectomy, unclear what level but reports that her right back and leg pain resolved at that time.  However over the years she has developed more bilateral buttock pain, thigh pain worse with standing and walking, describes this as dull.  She denies any weakness in her legs, denies any bowel or  bladder incontinence.    Pain intervention history:  She has done physical therapy multiple times, chiropractic care without relief.  She has not tried Cymbalta.  She has been taking gabapentin 600 mg twice daily with about 40% relief, switched to Lyrica.  L5/S1 CHEIKH performed on 05/20/2019 with greater than 70% relief ongoing of her back and leg pain, approach from the right due to previous left hemilaminectomy. She is status post L5/S1 interlaminar epidural steroid injection from the right side on 08/04/2022 with 50% relief of her back pain but 0% relief of her leg pain.   She is status post L5/S1 CHEIKH from the right on 08/03/2023 with 100% relief.    Spine surgeries:  L5/S1 left hemilaminectomy    Antineuropathics:  Lyrica 100 twice daily  NSAIDs:  Naproxen  Physical therapy:  Completed physical therapy, does Pilates twice a week.  Antidepressants:  Elavil 10 mg, Celexa 10  Muscle relaxers:  Opioids:  Antiplatelets/Anticoagulants:    ROS:  She reports back pain only.  Balance of review of systems is negative.    Past Medical History:   Diagnosis Date    Anxiety     Arthritis     Chronic allergic rhinitis 11/19/2012    Depression     Essential hypertension 9/14/2020    Herniated lumbar intervertebral disc     L3    Migraine     painless with numbness       Past Surgical History:   Procedure Laterality Date    COLONOSCOPY      COLONOSCOPY N/A 03/02/2020    Procedure: COLONOSCOPY;  Surgeon: Flash Garnett MD;  Location: [facility];  Service: Endoscopy;  Laterality: N/A;    EPIDURAL STEROID INJECTION INTO LUMBAR SPINE N/A 05/20/2019    Procedure: Injection-steroid-epidural-lumbar;  Surgeon: Patrick Goyal MD;  Location: [facility] OR;  Service: Pain Management;  Laterality: N/A;  L5/S1 from right side    EPIDURAL STEROID INJECTION INTO LUMBAR SPINE N/A 08/04/2022    Procedure: Injection-steroid-epidural-lumbar, L5/S1  from the right side;  Surgeon: Patrick Goyal MD;  Location: [facility] OR;  Service: Pain  Management;  Laterality: N/A;    EPIDURAL STEROID INJECTION INTO LUMBAR SPINE N/A 8/3/2023    Procedure: Injection-steroid-epidural-lumbar L5/S1 from the right;  Surgeon: Patrick Goyal MD;  Location: [facility] OR;  Service: Pain Management;  Laterality: N/A;    EYE SURGERY  May, 2018    cataract surgery    NASAL SEPTUM SURGERY N/A 2017    SPINE SURGERY  2001    L4    TONSILLECTOMY         Social History     Socioeconomic History    Marital status:      Spouse name: Kraig    Number of children: 3   Tobacco Use    Smoking status: Former     Current packs/day: 0.00     Types: Cigarettes     Quit date: 1973     Years since quittin.7    Smokeless tobacco: Never    Tobacco comments:     social smoker only   Substance and Sexual Activity    Alcohol use: Yes     Alcohol/week: 5.0 standard drinks of alcohol     Types: 5 Glasses of wine per week     Comment: one glass of wine with dinner daily    Drug use: No    Sexual activity: Yes     Partners: Female     Birth control/protection: Post-menopausal   Social History Narrative    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)     Exercise : Most days per week : walking 4d/week + Pilates + cardio + resistance     Diet : pt suspects lactose intolerance      Social Determinants of Health     Financial Resource Strain: Low Risk  (2023)    Overall Financial Resource Strain (CARDIA)     Difficulty of Paying Living Expenses: Not hard at all   Food Insecurity: No Food Insecurity (2023)    Hunger Vital Sign     Worried About Running Out of Food in the Last Year: Never true     Ran Out of Food in the Last Year: Never true   Transportation Needs: No Transportation Needs (2023)    PRAPARE - Transportation     Lack of Transportation (Medical): No     Lack of Transportation (Non-Medical): No   Recent Concern: Transportation Needs - Unmet Transportation Needs (11/3/2023)    PRAPARE - Transportation     Lack of Transportation (Medical): Yes     Lack of  "Transportation (Non-Medical): Yes   Physical Activity: Insufficiently Active (11/21/2023)    Exercise Vital Sign     Days of Exercise per Week: 2 days     Minutes of Exercise per Session: 50 min   Stress: No Stress Concern Present (11/21/2023)    [employer] - Occupational Stress Questionnaire     Feeling of Stress : Not at all   Social Connections: Unknown (11/21/2023)    Social Connection and Isolation Panel [NHANES]     Frequency of Communication with Friends and Family: Three times a week     Frequency of Social Gatherings with Friends and Family: Once a week     Active Member of Clubs or Organizations: No     Attends Club or Organization Meetings: Never     Marital Status:    Housing Stability: Low Risk  (11/21/2023)    Housing Stability Vital Sign     Unable to Pay for Housing in the Last Year: No     Number of Places Lived in the Last Year: 1     Unstable Housing in the Last Year: No         Medications/Allergies: See med card    Vitals:    02/05/24 1040   BP: (!) 142/61   Pulse: 65   Weight: 63.6 kg (140 lb 1.6 oz)   Height: 5' 4" (1.626 m)   PainSc:   7   PainLoc: Back         2/5/2024    10:36 AM 12/13/2023     9:36 AM 8/31/2023    11:30 AM   Last 3 PDI Scores   Pain Disability Index (PDI) 25 11 14         Physical exam:  Gen: A and O x3, pleasant, well-groomed  Skin: No rashes or obvious lesions  HEENT: PERRLA, no obvious deformities on ears or in canals. Trachea midline.  CVS: Regular rate and rhythm, normal palpable pulses.  Resp:No increased work of breathing, symmetrical chest rise.  Abdomen: Soft, NT/ND.  Musculoskeletal:  No antalgic gait.     Neuro:  Lower extremities: 5/5 strength bilaterally  Reflexes: Patellar 0+, Achilles 0+ bilaterally.  Sensory:  Intact and symmetrical to light touch and pinprick in L2-S1 dermatomes except for decreased sensation to light touch and pinprick from her knees distally bilaterally.    Lumbar spine:  Lumbar spine:  Range of " motion is mildly reduced with forward flexion with slight increased pain, moderately decreased with extension with increased pain in her bilateral low back.   Magnus's test causes no increased pain on either side.    Supine straight leg raise is negative bilaterally.    Internal and external rotation of the hip causes Increased left groin, lateral hip and anterior thigh pain.  Myofascial exam: No tenderness to palpation across lumbar paraspinous muscles.  No tenderness to palpation over left greater trochanteric bursa    Imagin17 Xray L-spine  Five views lumbar spine were obtained (AP, lateral, right oblique, left oblique and spot views). There is a upper lumbar dextroscoliosis and a mid lumbar levoscoliosis with superimposed multilevel degenerative disc and facet disease. There is loss of disc space height throughout the lumbar region there is lower lumbar degenerative facet disease.    4/15/14 BL Lower extremity EMG:   Mild sensorimotor predominantly axonal neuropathy.    19 MRI L-spine:  L5-S1: Spondylosis associated with disc space narrowing and mild marginal anterior spondylotic osteophytes.  There is a disc protrusion and osteophyte complex in the left neural foramen that causes a moderate left foraminal stenosis.  There are postoperative changes from a left L5-S1 laminectomy without definite signs for recurrent disc herniations.  There is facet arthropathy.  L4-5: Disc space narrowing associated with marginal anterior spondylotic osteophytes.  There is a mild circumferential annular disc bulge.  Facet arthropathy noted bilaterally.  There is also at least a moderate right lateral recess stenosis.  L3-4: Disc degeneration with with the or disc space narrowing and marginal anterior spondylotic osteophyte.  There is a broad-based posterior osteophyte the and disc bulge complex with compression of the thecal sac greater on the right.  There is a moderate to severe right lateral recess stenosis  (image 32 series 5002.  There is associated facet arthropathy.  Mild central canal spinal stenosis.  L2-3: Disc degeneration with disc space narrowing.  There is a broad-based circumferential annular disc bulge.  In the left neural foramen in addition a disc protrusion with moderate obscuration of the foraminal fat is noted.  In the far lateral aspect of the neural foramen (image 16 series 5 the disc protrusion contacts the exiting left L2 root.  Clinical correlation for a left L2 radiculopathy suggested.  L1-2: Disc degeneration with disc space narrowing and marginal anterior spondylotic osteophyte.  There is a broad-based left paracentral disc protrusion with compression of the thecal sac anterolaterally.  There is an extension of the disc protrusion in the left neural foramen associated with at least a moderate foraminal stenosis.  Compression of the exiting left L1 root far laterally in the foramen not excluded.  Right neural foramen is unremarkable.  T12-L1: No disc herniations or spinal stenosis or foraminal stenosis.    Assessment:    The patient is a 73-year-old woman with a history of anxiety, depression, previous lumbar surgery who presents in referral from Dr. Adam for bilateral foot pain.     1. Left hip pain  X-Ray Hips Bilateral 2 View Incl AP Pelvis      2. DDD (degenerative disc disease), lumbar        3. Lumbar radiculopathy        4. Lumbar spondylosis                Plan:  1. We discussed that she is likely experiencing symptoms originating from the lumbar spine and possibly left hip joint.  I have ordered new hip x-rays today and reviewed these.  She does have well-preserved joint space but there are some arthritic changes.  We will offer a left hip joint injection with interventional Radiology.  If she does not have relief with this we can consider repeating a lumbar epidural steroid injection.  We also discussed her low back pain and she may benefit from diagnostic medial branch nerve  blocks and radiofrequency ablation in the future.    2. Follow-up in 4 weeks postprocedure sooner as needed.

## 2024-02-11 ENCOUNTER — PATIENT MESSAGE (OUTPATIENT)
Dept: PAIN MEDICINE | Facility: CLINIC | Age: 74
End: 2024-02-11
Payer: MEDICARE

## 2024-02-11 DIAGNOSIS — M54.16 LUMBAR RADICULOPATHY: ICD-10-CM

## 2024-02-14 RX ORDER — METHOCARBAMOL 500 MG/1
500 TABLET, FILM COATED ORAL 4 TIMES DAILY
Qty: 40 TABLET | Refills: 0 | Status: SHIPPED | OUTPATIENT
Start: 2024-02-14 | End: 2024-03-14 | Stop reason: SDUPTHER

## 2024-02-23 ENCOUNTER — HOSPITAL ENCOUNTER (OUTPATIENT)
Dept: RADIOLOGY | Facility: HOSPITAL | Age: 74
Discharge: HOME | End: 2024-02-23
Attending: PHYSICIAN ASSISTANT
Payer: MEDICARE

## 2024-02-23 DIAGNOSIS — M25.552 LEFT HIP PAIN: ICD-10-CM

## 2024-02-23 PROCEDURE — 77002 NEEDLE LOCALIZATION BY XRAY: CPT | Mod: 26,,, | Performed by: RADIOLOGY

## 2024-02-23 PROCEDURE — 25500020 PHARM REV CODE 255: Mod: PO | Performed by: PHYSICIAN ASSISTANT

## 2024-02-23 PROCEDURE — 77002 NEEDLE LOCALIZATION BY XRAY: CPT | Mod: TC,PO

## 2024-02-23 PROCEDURE — 63600175 PHARM REV CODE 636 W HCPCS: Mod: JZ,JG,PO | Performed by: PHYSICIAN ASSISTANT

## 2024-02-23 PROCEDURE — 20610 DRAIN/INJ JOINT/BURSA W/O US: CPT | Mod: LT,,, | Performed by: RADIOLOGY

## 2024-02-23 RX ORDER — BUPIVACAINE HYDROCHLORIDE 2.5 MG/ML
30 INJECTION, SOLUTION EPIDURAL; INFILTRATION; INTRACAUDAL ONCE
Status: COMPLETED | OUTPATIENT
Start: 2024-02-23 | End: 2024-02-23

## 2024-02-23 RX ORDER — TRIAMCINOLONE ACETONIDE 40 MG/ML
40 INJECTION, SUSPENSION INTRA-ARTICULAR; INTRAMUSCULAR ONCE
Status: COMPLETED | OUTPATIENT
Start: 2024-02-23 | End: 2024-02-23

## 2024-02-23 RX ADMIN — IOHEXOL 10 ML: 240 INJECTION, SOLUTION INTRATHECAL; INTRAVASCULAR; INTRAVENOUS; ORAL at 10:02

## 2024-02-23 RX ADMIN — BUPIVACAINE HYDROCHLORIDE 75 MG: 2.5 INJECTION, SOLUTION EPIDURAL; INFILTRATION; INTRACAUDAL at 10:02

## 2024-02-23 RX ADMIN — TRIAMCINOLONE ACETONIDE 40 MG: 40 INJECTION, SUSPENSION INTRA-ARTICULAR; INTRAMUSCULAR at 10:02

## 2024-02-27 ENCOUNTER — OFFICE VISIT (OUTPATIENT)
Dept: NEUROLOGY | Facility: CLINIC | Age: 74
End: 2024-02-27
Payer: MEDICARE

## 2024-02-27 VITALS
HEIGHT: 64 IN | HEART RATE: 60 BPM | BODY MASS INDEX: 23.78 KG/M2 | TEMPERATURE: 98 F | DIASTOLIC BLOOD PRESSURE: 53 MMHG | RESPIRATION RATE: 17 BRPM | WEIGHT: 139.31 LBS | SYSTOLIC BLOOD PRESSURE: 155 MMHG

## 2024-02-27 DIAGNOSIS — G43.019 INTRACTABLE MIGRAINE WITHOUT AURA AND WITHOUT STATUS MIGRAINOSUS: Primary | ICD-10-CM

## 2024-02-27 PROCEDURE — 99214 OFFICE O/P EST MOD 30 MIN: CPT | Mod: PBBFAC,PO | Performed by: NURSE PRACTITIONER

## 2024-02-27 PROCEDURE — 99213 OFFICE O/P EST LOW 20 MIN: CPT | Mod: S$PBB,,, | Performed by: NURSE PRACTITIONER

## 2024-02-27 PROCEDURE — 99999 PR PBB SHADOW E&M-EST. PATIENT-LVL IV: CPT | Mod: PBBFAC,,, | Performed by: NURSE PRACTITIONER

## 2024-02-27 RX ORDER — BUTALBITAL, ACETAMINOPHEN AND CAFFEINE 50; 325; 40 MG/1; MG/1; MG/1
TABLET ORAL
Qty: 10 TABLET | Refills: 0 | Status: SHIPPED | OUTPATIENT
Start: 2024-02-27

## 2024-02-27 NOTE — PROGRESS NOTES
"Date of service: 2/27/2024  Referring provider: No ref. provider found    Subjective:      Chief complaint: Headache         Patient ID: Yolie Brambila is a 73 y.o. female with depression, HTN, peripheral neuropathy, lumbar radiculopathy, CARYL, RLS, migraines who presents for follow up of headache     History of Present Illness    INTERVAL HISTORY 2/27/24    Last visit was a little over a year ago and at that time she was doing better.    Today she reports she is the same.  Current pain 0 with range 0-10. She has once migraien every 2-3 months. She takes Nurtec and Zofran. However she does report in November and December she has 2 severe migraines. One caused so much vomiting she lost 5 pounds.Otherwise information below is reviewed and verified with no changes made     INTERVAL HISTORY 11/30/22    Last visit was two months ago and at that time I ordered a brain MRI for worsening and positional headaches. This was within normal limits. Added Ubrelvy for acute attacks.     Today she reports she is a little better. She has less frequent attacks. Headaches are holocephalic. Current pain 7 with range 4-7. She has had two migraines within the past 2 months. She has taken Ubrelvy but does not find it effective. She also reports two episodes of vertigo. She states she has a hard time distinguishing between migraine and vertigo. She takes meclizine for vertigo which is helpful. She has also gotten Rx glasses since next visit which has helped. Otherwise information below is reviewed and verified with no changes made     ORIGINAL HEADACHE HISTORY - 9/30/22  Age at onset and course over time: intermittently since her 20's. Initially rare episodes once a year. In the past couple years, frequency and intensity increasing. Episodes now have vomiting and diarrhea. She did recently "almost fail" an eye test. She has had cataract surgery.   This year, she has had two severe attack, one in July and one in September. Rare to no " headaches outside those attacks.    Aura - whole body feels off balance, has happened prior to several migraines     Family history of migraines - mom  Last eye exam - May 2022 but has upcoming appt     Location: forehead   Quality:  [] pressure [] tight [x] throbbing [] sharp [] stabbing   Severity: current 2, nausea and vomiting are worst symptoms   Duration: hours  Frequency: probably every 2 months   Headaches awaken at night?: yes   Worst time of day: evening   Associated with: [x] photophobia []  phonophobia [x] osmophobia [x] blurred vision  [] double vision [x] loss of appetite [x] nausea [x] vomiting [x] dizziness [x] vertigo  [] tinnitus [] irritability [] sinus pressure [] problems with concentration   [] neck tightness   Alleviated by:  [] sleep [x] darkness [] massage [] heat [] ice [x] medication  Exacerbated by:  [] fatigue [x] light [x] noise [x] smells [] coughing [] sneezing  [x] bending over [] ovulation [] menses [] alcohol [] change in weather []  stress  Ipsilateral autonomic: [] nasal congestion [] lacrimation [] ptosis [] injection [] edema [] foreign body sensation [] ear fullness   ICP:  [] transient visual obscurations  [x] tinnitus low pitched bilateral   [x] positional headache - worse with standing  [] non-positional   Sleep habits: snoring, had sleep study in the past and CPAP recommended but had a repeat study that did not indicate sleep apnea   Caffeine intake: 2 cups  Gyn status (if female): menopausal   MIDAS: 9    Current acute treatment:  Ibuprofen - 2 days per week   Nurtec   Zofran    Current prevention:  Requip  (Losartan)  Celexa  Amitriptyline  Lyrica  Magnesium    Previously tried/failed acute treatment:  Naproxen  Sumatriptan   Zofran   Ubrelvy    Previously tried/failed preventative treatment:  Gabapentin  Nortriptyline      Review of patient's allergies indicates:   Allergen Reactions    Penicillins Shortness Of Breath     Current Outpatient Medications   Medication Sig  Dispense Refill    alendronate (FOSAMAX) 70 MG tablet Take 1 tablet (70 mg total) by mouth every 7 days. 12 tablet 3    amitriptyline (ELAVIL) 10 MG tablet TAKE 1 TABLET(10 MG) BY MOUTH EVERY EVENING 30 tablet 6    citalopram (CELEXA) 10 MG tablet Take 1 tablet (10 mg total) by mouth once daily. 90 tablet 3    ergocalciferol (ERGOCALCIFEROL) 50,000 unit Cap TAKE 1 CAPSULE BY MOUTH EVERY 7 DAYS 12 capsule 4    losartan (COZAAR) 50 MG tablet TAKE 1 TABLET(50 MG) BY MOUTH EVERY DAY 90 tablet 1    naproxen (NAPROSYN) 250 MG tablet Take 500 mg by mouth 2 (two) times daily with meals.      ondansetron (ZOFRAN-ODT) 4 MG TbDL DISSOLVE 1 TABLET(4 MG) ON THE TONGUE EVERY 6 HOURS AS NEEDED FOR NAUSEA 30 tablet 11    pregabalin (LYRICA) 100 MG capsule TAKE 1 CAPSULE(100 MG) BY MOUTH TWICE DAILY 60 capsule 2    RESTASIS 0.05 % ophthalmic emulsion Place 1 drop into both eyes 2 (two) times daily.      rimegepant (NURTEC) 75 mg odt Take 1 tablet (75 mg total) by mouth daily as needed. Place ODT tablet on the tongue; alternatively the ODT tablet may be placed under the tongue 8 tablet 11    rOPINIRole (REQUIP) 0.25 MG tablet TAKE 2 TABLETS BY MOUTH EVERY EVENING 180 tablet 3    triamcinolone (NASACORT) 55 mcg nasal inhaler 1 spray by Nasal route once daily.      butalbital-acetaminophen-caffeine -40 mg (FIORICET, ESGIC) -40 mg per tablet 1 tab PO PRN once daily for migraine. No more than 10 tab per month. 10 tablet 0     Current Facility-Administered Medications   Medication Dose Route Frequency Provider Last Rate Last Admin    cyanocobalamin injection 1,000 mcg  1,000 mcg Intramuscular Once Td Montana MD        cyanocobalamin injection 1,000 mcg  1,000 mcg Intramuscular Q28 Days Td Montana MD   1,000 mcg at 01/25/24 0925       Past Medical History  Past Medical History:   Diagnosis Date    Anxiety     Arthritis     Chronic allergic rhinitis 11/19/2012    Depression     Essential hypertension 9/14/2020     Herniated lumbar intervertebral disc     L3    Migraine     painless with numbness       Past Surgical History  Past Surgical History:   Procedure Laterality Date    COLONOSCOPY      COLONOSCOPY N/A 2020    Procedure: COLONOSCOPY;  Surgeon: Flash Garnett MD;  Location: [facility] ENDO;  Service: Endoscopy;  Laterality: N/A;    EPIDURAL STEROID INJECTION INTO LUMBAR SPINE N/A 2019    Procedure: Injection-steroid-epidural-lumbar;  Surgeon: Patrick Goyal MD;  Location: [facility] OR;  Service: Pain Management;  Laterality: N/A;  L5/S1 from right side    EPIDURAL STEROID INJECTION INTO LUMBAR SPINE N/A 2022    Procedure: Injection-steroid-epidural-lumbar, L5/S1  from the right side;  Surgeon: Patrick Goyal MD;  Location: [facility] OR;  Service: Pain Management;  Laterality: N/A;    EPIDURAL STEROID INJECTION INTO LUMBAR SPINE N/A 8/3/2023    Procedure: Injection-steroid-epidural-lumbar L5/S1 from the right;  Surgeon: Patrick Goyal MD;  Location: [facility] OR;  Service: Pain Management;  Laterality: N/A;    EYE SURGERY  May, 2018    cataract surgery    NASAL SEPTUM SURGERY N/A 2017    SPINE SURGERY  2001    L4    TONSILLECTOMY         Family History  Family History   Problem Relation Age of Onset    Cancer Mother 78        mother    Heart disease Mother 78        cabg    COPD Mother     Hypertension Mother     Vision loss Mother         macular degeration    Diabetes Father     Heart disease Father 71        cabg    Hypertension Father     Diabetes Brother     Cancer Brother         brother    Heart disease Brother 50        [place]    Hypertension Brother     Diabetes Paternal Grandmother        Social History  Social History     Socioeconomic History    Marital status:      Spouse name: Kraig    Number of children: 3   Tobacco Use    Smoking status: Former     Current packs/day: 0.00     Types: Cigarettes     Quit date: 1973     Years since quittin.7    Smokeless tobacco: Never     Tobacco comments:     social smoker only   Substance and Sexual Activity    Alcohol use: Yes     Alcohol/week: 5.0 standard drinks of alcohol     Types: 5 Glasses of wine per week     Comment: one glass of wine with dinner daily    Drug use: No    Sexual activity: Yes     Partners: Female     Birth control/protection: Post-menopausal   Social History Narrative    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)     Exercise : Most days per week : walking 4d/week + Pilates + cardio + resistance     Diet : pt suspects lactose intolerance      Social Determinants of Health     Financial Resource Strain: Low Risk  (11/21/2023)    Overall Financial Resource Strain (CARDIA)     Difficulty of Paying Living Expenses: Not hard at all   Food Insecurity: No Food Insecurity (11/21/2023)    Hunger Vital Sign     Worried About Running Out of Food in the Last Year: Never true     Ran Out of Food in the Last Year: Never true   Transportation Needs: No Transportation Needs (11/21/2023)    PRAPARE - Transportation     Lack of Transportation (Medical): No     Lack of Transportation (Non-Medical): No   Recent Concern: Transportation Needs - Unmet Transportation Needs (11/3/2023)    PRAPARE - Transportation     Lack of Transportation (Medical): Yes     Lack of Transportation (Non-Medical): Yes   Physical Activity: Insufficiently Active (11/21/2023)    Exercise Vital Sign     Days of Exercise per Week: 2 days     Minutes of Exercise per Session: 50 min   Stress: No Stress Concern Present (11/21/2023)    [employer] [place] [employer] - Occupational Stress Questionnaire     Feeling of Stress : Not at all   Social Connections: Unknown (11/21/2023)    Social Connection and Isolation Panel [NHANES]     Frequency of Communication with Friends and Family: Three times a week     Frequency of Social Gatherings with Friends and Family: Once a week     Active Member of Clubs or Organizations: No     Attends Club or Organization Meetings:  Never     Marital Status:    Housing Stability: Low Risk  (11/21/2023)    Housing Stability Vital Sign     Unable to Pay for Housing in the Last Year: No     Number of Places Lived in the Last Year: 1     Unstable Housing in the Last Year: No        Objective:        Vitals:    02/27/24 0854   BP: (!) 155/53   Pulse: 60   Resp: 17   Temp: 97.8 °F (36.6 °C)         Body mass index is 23.92 kg/m².    2/27/24  Constitutional:   She appears well-developed and well-nourished. She is well groomed     Neurological Exam:  General: well-developed, well-nourished, no distress  Mental status: Awake and alert  Speech language: No dysarthria or aphasia on conversation  Cranial nerves: Face symmetric  Motor: Moves all extremities well  Coordination: No ataxia. No tremor.    Data Review:     I have personally reviewed the referring provider's notes, labs, & imaging made available to me today.      RADIOLOGY STUDIES:  I have personally reviewed the pertinent images performed.       No results found for this or any previous visit.    Lab Results   Component Value Date     (L) 11/28/2023    K 5.1 11/28/2023     11/28/2023    CO2 24 11/28/2023    BUN 11 11/28/2023    CREATININE 0.8 11/28/2023    GLU 89 11/28/2023    HGBA1C 5.6 11/28/2023    AST 21 11/28/2023    ALT 15 11/28/2023    ALBUMIN 3.8 11/28/2023    PROT 6.6 11/28/2023    BILITOT 0.6 11/28/2023    CHOL 199 11/28/2023    HDL 52 11/28/2023    LDLCALC 116.0 11/28/2023    TRIG 155 (H) 11/28/2023       Lab Results   Component Value Date    WBC 3.77 (L) 11/28/2023    HGB 12.5 11/28/2023    HCT 37.0 11/28/2023    MCV 91 11/28/2023     11/28/2023       Lab Results   Component Value Date    TSH 1.140 11/28/2023           Assessment & Plan:       Problem List Items Addressed This Visit          Neuro    Intractable migraine without aura and without status migrainosus - Primary    Overview     Rare episodic migraines since her 20's. Headaches are typically  moderate to severe in intensity, worsen with activity, pounding in quality and associated with sensitivity to light and smell. She previously had one episode per year but has had several intense migraines back to back along with a new positional component. This warrants imaging - negative for secondary cause.      Episodes are still low episodic so will start magnesium for prevention. Cardiac contraindication to triptans. Start gepant.            Current Assessment & Plan     Remains with low frequency episodic. Typical frequency is once every 2-3 months and Nurtec is very effective. Encouraged regular use of magnesium.          Relevant Medications    butalbital-acetaminophen-caffeine -40 mg (FIORICET, ESGIC) -40 mg per tablet               Please call our clinic at [phone] or send a message on the [employer] portal if there are any changes to the plan described below, for example,if you are not contacted for the requested tests, referral(s) within one week, if you are unable to receive the medications prescribed, or if you feel you need to change the treatment course for any reason.     TESTING:  -- none    REFERRALS:  -- none     PREVENTION (use daily regardless of headache):  -- continue magnesium in ONE of the following preparations -               1. Magnesium oxide 800mg daily (the most common over the counter kind, may causes loose stools)              2. Magnesium citrate 400-500mg daily (harder to find, but more neutral on the bowels)              3. Magnesium glycinate 400mg daily (hardest to find, look online, but most bowel-neutral, best absorbed)     AS-NEEDED TREATMENT (use total no more than 10 days per month unless otherwise stated):  -- continue Nurtec  -- continue Zofran for nausea  -- FL41 glasses for light sensitivity     Follow up in about 1 year (around 2/27/2025).      Nancy Alonso, NP

## 2024-02-27 NOTE — ASSESSMENT & PLAN NOTE
Remains with low frequency episodic. Typical frequency is once every 2-3 months and Nurtec is very effective. Encouraged regular use of magnesium.

## 2024-02-27 NOTE — PATIENT INSTRUCTIONS
Please call our clinic at [phone] or send a message on the [employer] portal if there are any changes to the plan described below, for example,if you are not contacted for the requested tests, referral(s) within one week, if you are unable to receive the medications prescribed, or if you feel you need to change the treatment course for any reason.     TESTING:  -- none    REFERRALS:  -- none     PREVENTION (use daily regardless of headache):  -- continue magnesium in ONE of the following preparations -               1. Magnesium oxide 800mg daily (the most common over the counter kind, may causes loose stools)              2. Magnesium citrate 400-500mg daily (harder to find, but more neutral on the bowels)              3. Magnesium glycinate 400mg daily (hardest to find, look online, but most bowel-neutral, best absorbed)     AS-NEEDED TREATMENT (use total no more than 10 days per month unless otherwise stated):  -- continue Nurtec  -- continue Zofran for nausea  -- FL41 glasses for light sensitivity

## 2024-02-28 ENCOUNTER — CLINICAL SUPPORT (OUTPATIENT)
Dept: FAMILY MEDICINE | Facility: CLINIC | Age: 74
End: 2024-02-28
Payer: MEDICARE

## 2024-02-28 DIAGNOSIS — E53.8 B12 DEFICIENCY: Primary | ICD-10-CM

## 2024-02-28 PROCEDURE — 96372 THER/PROPH/DIAG INJ SC/IM: CPT | Mod: PBBFAC,PN

## 2024-02-28 PROCEDURE — 99999PBSHW PR PBB SHADOW TECHNICAL ONLY FILED TO HB: Mod: PBBFAC,,,

## 2024-02-28 RX ADMIN — CYANOCOBALAMIN 1000 MCG: 1000 INJECTION, SOLUTION INTRAMUSCULAR at 09:02

## 2024-03-10 NOTE — TELEPHONE ENCOUNTER
Care Due:                  Date            Visit Type   Department     Provider  --------------------------------------------------------------------------------                                MYCHART                              FOLLOWUP/OF  [facility] FAMILY  Last Visit: 11-      FICE VISIT   MEDICINE       Td Montana  Next Visit: None Scheduled  None         None Found                                                            Last  Test          Frequency    Reason                     Performed    Due Date  --------------------------------------------------------------------------------    Mg Level....  12 months..  alendronate..............  Not Found    Overdue    Phosphate...  12 months..  alendronate..............  Not Found    Overdue    Vitamin D...  12 months..  alendronate,               10-   10-                             ergocalciferol...........    [employer] Embedded Care Due Messages. Reference number: [id number].   3/10/2024 7:54:53 AM CDT

## 2024-03-11 RX ORDER — ALENDRONATE SODIUM 70 MG/1
70 TABLET ORAL
Qty: 12 TABLET | Refills: 3 | Status: SHIPPED | OUTPATIENT
Start: 2024-03-11

## 2024-03-11 NOTE — TELEPHONE ENCOUNTER
Refill Routing Note   Medication(s) are not appropriate for processing by [facility] for the following reason(s):        Outside of protocol    [facility] action(s):  Route     Requires labs : Yes             Appointments  past 12m or future 3m with PCP    Date Provider   Last Visit   11/28/2023 Td Montana MD   Next Visit   Visit date not found Td Montana MD   ED visits in past 90 days: 0        Note composed:10:50 PM 03/10/2024

## 2024-03-14 ENCOUNTER — TELEPHONE (OUTPATIENT)
Dept: PAIN MEDICINE | Facility: CLINIC | Age: 74
End: 2024-03-14
Payer: MEDICARE

## 2024-03-14 ENCOUNTER — OFFICE VISIT (OUTPATIENT)
Dept: PAIN MEDICINE | Facility: CLINIC | Age: 74
End: 2024-03-14
Payer: MEDICARE

## 2024-03-14 VITALS
SYSTOLIC BLOOD PRESSURE: 193 MMHG | HEIGHT: 64 IN | HEART RATE: 57 BPM | WEIGHT: 138.44 LBS | BODY MASS INDEX: 23.64 KG/M2 | DIASTOLIC BLOOD PRESSURE: 79 MMHG

## 2024-03-14 DIAGNOSIS — M54.16 LUMBAR RADICULOPATHY: ICD-10-CM

## 2024-03-14 DIAGNOSIS — M51.36 DDD (DEGENERATIVE DISC DISEASE), LUMBAR: ICD-10-CM

## 2024-03-14 DIAGNOSIS — M25.552 LEFT HIP PAIN: ICD-10-CM

## 2024-03-14 DIAGNOSIS — M47.816 LUMBAR SPONDYLOSIS: ICD-10-CM

## 2024-03-14 DIAGNOSIS — M54.16 LUMBAR RADICULOPATHY: Primary | ICD-10-CM

## 2024-03-14 PROCEDURE — 99999 PR PBB SHADOW E&M-EST. PATIENT-LVL III: CPT | Mod: PBBFAC,,, | Performed by: PHYSICIAN ASSISTANT

## 2024-03-14 PROCEDURE — 99213 OFFICE O/P EST LOW 20 MIN: CPT | Mod: PBBFAC,PO | Performed by: PHYSICIAN ASSISTANT

## 2024-03-14 PROCEDURE — 99213 OFFICE O/P EST LOW 20 MIN: CPT | Mod: S$PBB,,, | Performed by: PHYSICIAN ASSISTANT

## 2024-03-14 RX ORDER — METHOCARBAMOL 500 MG/1
500 TABLET, FILM COATED ORAL 4 TIMES DAILY PRN
Qty: 120 TABLET | Refills: 0 | Status: SHIPPED | OUTPATIENT
Start: 2024-03-14 | End: 2024-03-14 | Stop reason: SDUPTHER

## 2024-03-14 RX ORDER — METHOCARBAMOL 500 MG/1
500 TABLET, FILM COATED ORAL 4 TIMES DAILY PRN
Qty: 120 TABLET | Refills: 0 | Status: SHIPPED | OUTPATIENT
Start: 2024-03-14

## 2024-03-14 NOTE — TELEPHONE ENCOUNTER
----- Message from Gwendolyn Root sent at 3/14/2024  3:52 PM CDT -----  Contact: pt  Type: Needs Medical Advice         Who Called: pt  Best Call Back Number:[phone]    Additional Information: Requesting a call back regarding Pt is asking for the office to call her.  The rx for methocarbamoL (ROBAXIN) 500 MG Tab is not covered by her insurance. Pharm told pt that they sent a request for an alternate medication. Pt is asking if the office responded to pharm as she is wanting to get the rx today.       [id number] [address] [place] [address] AT [facility]  [address] [facility] [address]  Phone: [phone] Fax: [phone]       Please Advise- Thank you

## 2024-03-15 ENCOUNTER — OFFICE VISIT (OUTPATIENT)
Dept: URGENT CARE | Facility: CLINIC | Age: 74
End: 2024-03-15
Payer: MEDICARE

## 2024-03-15 VITALS
HEART RATE: 71 BPM | BODY MASS INDEX: 23.56 KG/M2 | OXYGEN SATURATION: 98 % | RESPIRATION RATE: 18 BRPM | WEIGHT: 138 LBS | HEIGHT: 64 IN | SYSTOLIC BLOOD PRESSURE: 156 MMHG | DIASTOLIC BLOOD PRESSURE: 75 MMHG | TEMPERATURE: 98 F

## 2024-03-15 DIAGNOSIS — R11.0 NAUSEA: ICD-10-CM

## 2024-03-15 DIAGNOSIS — M54.9 BACK PAIN, UNSPECIFIED BACK LOCATION, UNSPECIFIED BACK PAIN LATERALITY, UNSPECIFIED CHRONICITY: Primary | ICD-10-CM

## 2024-03-15 LAB
BILIRUB UR QL STRIP: POSITIVE
COLOR UR: YELLOW
GLUCOSE UR QL STRIP: NEGATIVE
KETONES UR QL STRIP: POSITIVE
LEUKOCYTE ESTERASE UR QL STRIP: NEGATIVE
PH, POC UA: 6.5 (ref 5–8)
POC BLOOD, URINE: NEGATIVE
POC NITRATES, URINE: NEGATIVE
PROT UR QL STRIP: POSITIVE
SP GR UR STRIP: 1.01 (ref 1–1.03)
UROBILINOGEN UR STRIP-ACNC: NORMAL (ref 0.1–1.1)

## 2024-03-15 PROCEDURE — 99213 OFFICE O/P EST LOW 20 MIN: CPT | Mod: 25,S$GLB,, | Performed by: EMERGENCY MEDICINE

## 2024-03-15 PROCEDURE — 96372 THER/PROPH/DIAG INJ SC/IM: CPT | Mod: S$GLB,,, | Performed by: EMERGENCY MEDICINE

## 2024-03-15 PROCEDURE — 81003 URINALYSIS AUTO W/O SCOPE: CPT | Mod: QW,S$GLB,, | Performed by: EMERGENCY MEDICINE

## 2024-03-15 RX ORDER — CYCLOBENZAPRINE HCL 5 MG
5 TABLET ORAL 3 TIMES DAILY PRN
Qty: 20 TABLET | Refills: 0 | Status: SHIPPED | OUTPATIENT
Start: 2024-03-15 | End: 2024-03-22 | Stop reason: ALTCHOICE

## 2024-03-15 RX ORDER — KETOROLAC TROMETHAMINE 30 MG/ML
30 INJECTION, SOLUTION INTRAMUSCULAR; INTRAVENOUS
Status: COMPLETED | OUTPATIENT
Start: 2024-03-15 | End: 2024-03-15

## 2024-03-15 RX ORDER — ONDANSETRON 4 MG/1
4 TABLET, ORALLY DISINTEGRATING ORAL
Status: COMPLETED | OUTPATIENT
Start: 2024-03-15 | End: 2024-03-15

## 2024-03-15 RX ADMIN — KETOROLAC TROMETHAMINE 30 MG: 30 INJECTION, SOLUTION INTRAMUSCULAR; INTRAVENOUS at 10:03

## 2024-03-15 RX ADMIN — ONDANSETRON 4 MG: 4 TABLET, ORALLY DISINTEGRATING ORAL at 10:03

## 2024-03-15 NOTE — PATIENT INSTRUCTIONS
Increase oral hydration with Gatorade, Powerade, Pedialyte.     Zofran as needed.     Should your pain worsen, or you have uncontrolled vomiting, fever, or new concerns, go to hospital ER.

## 2024-03-15 NOTE — PROGRESS NOTES
"Subjective:      Patient ID: Yolie Brambila is a 73 y.o. female.    Vitals:  height is 5' 4" (1.626 m) and weight is 62.6 kg (138 lb). Her oral temperature is 98.3 °F (36.8 °C). Her blood pressure is 156/75 (abnormal) and her pulse is 71. Her respiration is 18 and oxygen saturation is 98%.     Chief Complaint: Back Pain    Severe right low back pain and vomiting began 3/13/24. 10/10 on pain scale. She states sharp continuous in right lower back, extending to her hip and rear waist area.  Patient has been having ongoing issues with her low back.  She has been back and forth with her specialists.  She even saw them yesterday.  They had initially prescribed methocarbamol that was not approved by her insurance so they were trying to either do a PA or prescribe a new medication but that has not been finalized yet.  She says the pain is now worsening and is primarily in her right buttock wrapping around to her right hip and lower abdomen.  She reports urinary frequency since last night which she blames on 1 of her medications causing "urinary interruption ".  She reports associated nausea and vomiting.  Denies dysuria, foul-smelling urine or cloudy urine.  She denies rash in the area of pain.    Back Pain  This is a new problem. The current episode started in the past 7 days. The problem occurs constantly. The problem has been gradually worsening since onset. The quality of the pain is described as stabbing, shooting, aching, burning and cramping. Radiates to: Hip and rear area. The pain is at a severity of 10/10. The pain is severe. The pain is The same all the time. The symptoms are aggravated by twisting, bending and position. Stiffness is present All day. Pertinent negatives include no abdominal pain, chest pain, fever or headaches. Treatments tried: Tylenol, Ibuprofen and Salanpas. The treatment provided mild relief.       Constitution: Negative for chills, sweating, fatigue, fever and unexpected weight change. "   HENT:  Negative for ear pain, drooling, congestion, sore throat, trouble swallowing and voice change.    Neck: Negative for neck pain, neck stiffness, painful lymph nodes and neck swelling.   Cardiovascular:  Negative for chest pain, leg swelling, palpitations, sob on exertion and passing out.   Eyes:  Negative for eye pain, eye redness, photophobia, double vision and blurred vision.   Respiratory:  Negative for chest tightness, cough, sputum production, bloody sputum, stridor and wheezing.    Gastrointestinal:  Negative for abdominal pain, abdominal bloating, nausea, vomiting, constipation, diarrhea and heartburn.   Musculoskeletal:  Positive for back pain. Negative for joint pain, joint swelling, muscle cramps and muscle ache.   Skin:  Negative for rash and hives.   Allergic/Immunologic: Negative for hives and itching.   Neurological:  Negative for dizziness, light-headedness, passing out, loss of balance, headaches, altered mental status, loss of consciousness and seizures.   Hematologic/Lymphatic: Negative for swollen lymph nodes.   Psychiatric/Behavioral:  Negative for altered mental status and nervous/anxious. The patient is not nervous/anxious.       Objective:     Physical Exam   Constitutional: She is oriented to person, place, and time. She appears well-developed. She is cooperative.  Non-toxic appearance. She does not appear ill. No distress.   HENT:   Head: Normocephalic and atraumatic.   Ears:   Right Ear: Hearing, tympanic membrane, external ear and ear canal normal.   Left Ear: Hearing, tympanic membrane, external ear and ear canal normal.   Nose: Nose normal. No mucosal edema, rhinorrhea or nasal deformity. No epistaxis. Right sinus exhibits no maxillary sinus tenderness and no frontal sinus tenderness. Left sinus exhibits no maxillary sinus tenderness and no frontal sinus tenderness.   Mouth/Throat: Uvula is midline, oropharynx is clear and moist and mucous membranes are normal. No trismus in the  jaw. Normal dentition. No uvula swelling. No oropharyngeal exudate, posterior oropharyngeal edema or posterior oropharyngeal erythema.   Eyes: Conjunctivae and lids are normal. No scleral icterus.   Neck: Trachea normal and phonation normal. Neck supple. No edema present. No erythema present. No neck rigidity present.   Cardiovascular: Normal rate, regular rhythm, normal heart sounds and normal pulses.   Pulmonary/Chest: Effort normal and breath sounds normal. No respiratory distress. She has no decreased breath sounds. She has no rhonchi.   Abdominal: Normal appearance. She exhibits no distension and no abdominal bruit. Soft. There is abdominal tenderness (mild RLQ TTP) in the right lower quadrant. There is no guarding, no tenderness at McBurney's point, negative De's sign, no left CVA tenderness and no right CVA tenderness.      Comments: No palpable pulsatile mass   Musculoskeletal: Normal range of motion.         General: Tenderness present. No deformity. Normal range of motion.        Back:    Neurological: She is alert and oriented to person, place, and time. She exhibits normal muscle tone. Coordination normal.   Skin: Skin is warm, dry, intact, not diaphoretic and not pale.   Psychiatric: Her speech is normal and behavior is normal. Judgment and thought content normal.   Nursing note and vitals reviewed.    Results for orders placed or performed in visit on 03/15/24   POCT Urinalysis, Dipstick, Automated, W/O Scope   Result Value Ref Range    POC Blood, Urine Negative Negative    POC Bilirubin, Urine Positive (A) Negative    POC Urobilinogen, Urine NORMAL 0.1 - 1.1    POC Ketones, Urine Positive (A) Negative    POC Protein, Urine Positive (A) Negative    POC Nitrates, Urine Negative Negative    POC Glucose, Urine Negative Negative    pH, UA 6.5 5 - 8    POC Specific Gravity, Urine 1.010 1.003 - 1.029    POC Leukocytes, Urine Negative Negative    Color, UA Yellow Light Yellow, Yellow      Assessment:      1. Back pain, unspecified back location, unspecified back pain laterality, unspecified chronicity    2. Nausea        Plan:     Patient unable to provide urine specimen during visit but very important to differentiate causes of pain. Ddx MSK pain, kidney stone, UTI/pyelo, intra-abd process, shingles vs other. Will return with urine specimen as soon as she collects it.     Patient was later able to provide a urinalysis.  She has protein, ketones and bilirubin which are most consistent with some dehydration.  There are no leukocytes or blood which makes kidney stone and kidney infection much less likely but as discussed with patient is not a definitive rule out.  She is feeling much better after Toradol.  She is comfortable going home.  We will send in some Flexeril for her back pain.  She has strict instructions to go to the ED should her pain return, worsen or for uncontrolled vomiting, any fever or new concerns.      Back pain, unspecified back location, unspecified back pain laterality, unspecified chronicity  -     POCT Urinalysis, Dipstick, Automated, W/O Scope  -     ketorolac injection 30 mg  -     cyclobenzaprine (FLEXERIL) 5 MG tablet; Take 1 tablet (5 mg total) by mouth 3 (three) times daily as needed for Muscle spasms (use caution for drowsiness).  Dispense: 20 tablet; Refill: 0    Nausea  -     ondansetron disintegrating tablet 4 mg      Patient Instructions   Increase oral hydration with Gatorade, Powerade, Pedialyte.     Zofran as needed.     Should your pain worsen, or you have uncontrolled vomiting, fever, or new concerns, go to hospital ER.

## 2024-03-18 ENCOUNTER — PATIENT MESSAGE (OUTPATIENT)
Dept: ADMINISTRATIVE | Facility: HOSPITAL | Age: 74
End: 2024-03-18
Payer: MEDICARE

## 2024-03-19 ENCOUNTER — PATIENT MESSAGE (OUTPATIENT)
Dept: PAIN MEDICINE | Facility: CLINIC | Age: 74
End: 2024-03-19
Payer: MEDICARE

## 2024-03-20 NOTE — PROGRESS NOTES
This note was completed with dictation software and grammatical errors may exist.    CC:  Bilateral foot pain, back pain, leg pain    HPI:  The patient is a 73-year-old woman with a history of anxiety, depression, previous lumbar surgery who presents in referral from Dr. Adam for bilateral foot pain.  She is status post left hip joint injection on 02/23/2024 with 40% relief.  She does feel that this was worth having the injection done in her pain has been reduced in her groin.  However, she reports severe worsening right low back pain radiating to the right buttock.  She states that she reached and felt like she pulled a muscle.  The pain has been constant since yesterday and worse with walking.  She denies relief with Flexeril or Tylenol.  She denies weakness or numbness.    Previous history:  The patient reports developing bilateral foot pain about 25 years ago with numbness and tingling, has been diagnosed with neuropathy but workup has not revealed any particular  etiology.  This has been progressing over the years and has gone from being located only in the feet and now extending proximally up to the knees bilaterally.  She describes it as throbbing, tingling with abnormal sensation, particularly worse with standing, walking, exercising.  She does get some relief with rest, heat and medications. She has been taking gabapentin for years, several years has been on 600 mg twice daily with about 40% relief.  She also has a history of low back and buttock pain, ended up having severe right leg pain and in 2001 underwent laminectomy, unclear what level but reports that her right back and leg pain resolved at that time.  However over the years she has developed more bilateral buttock pain, thigh pain worse with standing and walking, describes this as dull.  She denies any weakness in her legs, denies any bowel or bladder incontinence.    Pain intervention history:  She has done physical therapy multiple times,  chiropractic care without relief.  She has not tried Cymbalta.  She has been taking gabapentin 600 mg twice daily with about 40% relief, switched to Lyrica.  L5/S1 CHEIKH performed on 05/20/2019 with greater than 70% relief ongoing of her back and leg pain, approach from the right due to previous left hemilaminectomy. She is status post L5/S1 interlaminar epidural steroid injection from the right side on 08/04/2022 with 50% relief of her back pain but 0% relief of her leg pain.   She is status post L5/S1 CHEIKH from the right on 08/03/2023 with 100% relief.  She is status post left hip joint injection on 02/23/2024 with 40% relief.     Spine surgeries:  L5/S1 left hemilaminectomy    Antineuropathics:  Lyrica 100 twice daily  NSAIDs:  Naproxen  Physical therapy:  Completed physical therapy, does Pilates twice a week.  Antidepressants:  Elavil 10 mg, Celexa 10  Muscle relaxers:  Opioids:  Antiplatelets/Anticoagulants:    ROS:  She reports back pain only.  Balance of review of systems is negative.    Past Medical History:   Diagnosis Date    Anxiety     Arthritis     Chronic allergic rhinitis 11/19/2012    Depression     Essential hypertension 9/14/2020    Herniated lumbar intervertebral disc     L3    Migraine     painless with numbness       Past Surgical History:   Procedure Laterality Date    COLONOSCOPY      COLONOSCOPY N/A 03/02/2020    Procedure: COLONOSCOPY;  Surgeon: Flash Garnett MD;  Location: [facility];  Service: Endoscopy;  Laterality: N/A;    EPIDURAL STEROID INJECTION INTO LUMBAR SPINE N/A 05/20/2019    Procedure: Injection-steroid-epidural-lumbar;  Surgeon: Patrick Goyal MD;  Location: [facility] OR;  Service: Pain Management;  Laterality: N/A;  L5/S1 from right side    EPIDURAL STEROID INJECTION INTO LUMBAR SPINE N/A 08/04/2022    Procedure: Injection-steroid-epidural-lumbar, L5/S1  from the right side;  Surgeon: Patrick Goyal MD;  Location: [facility] OR;  Service: Pain Management;  Laterality: N/A;     EPIDURAL STEROID INJECTION INTO LUMBAR SPINE N/A 8/3/2023    Procedure: Injection-steroid-epidural-lumbar L5/S1 from the right;  Surgeon: Patrick Goyal MD;  Location: [facility] OR;  Service: Pain Management;  Laterality: N/A;    EYE SURGERY  May, 2018    cataract surgery    NASAL SEPTUM SURGERY N/A 2017    SPINE SURGERY  2001    L4    TONSILLECTOMY         Social History     Socioeconomic History    Marital status:      Spouse name: Kraig    Number of children: 3   Tobacco Use    Smoking status: Former     Current packs/day: 0.00     Types: Cigarettes     Quit date: 1973     Years since quittin.8    Smokeless tobacco: Never    Tobacco comments:     social smoker only   Substance and Sexual Activity    Alcohol use: Yes     Alcohol/week: 5.0 standard drinks of alcohol     Types: 5 Glasses of wine per week     Comment: one glass of wine with dinner daily    Drug use: No    Sexual activity: Yes     Partners: Female     Birth control/protection: Post-menopausal   Social History Narrative    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)     Exercise : Most days per week : walking 4d/week + Pilates + cardio + resistance     Diet : pt suspects lactose intolerance      Social Determinants of Health     Financial Resource Strain: Low Risk  (2023)    Overall Financial Resource Strain (CARDIA)     Difficulty of Paying Living Expenses: Not hard at all   Food Insecurity: No Food Insecurity (2023)    Hunger Vital Sign     Worried About Running Out of Food in the Last Year: Never true     Ran Out of Food in the Last Year: Never true   Transportation Needs: No Transportation Needs (2023)    PRAPARE - Transportation     Lack of Transportation (Medical): No     Lack of Transportation (Non-Medical): No   Recent Concern: Transportation Needs - Unmet Transportation Needs (11/3/2023)    PRAPARE - Transportation     Lack of Transportation (Medical): Yes     Lack of Transportation (Non-Medical):  "Yes   Physical Activity: Insufficiently Active (11/21/2023)    Exercise Vital Sign     Days of Exercise per Week: 2 days     Minutes of Exercise per Session: 50 min   Stress: No Stress Concern Present (11/21/2023)    [employer] - Occupational Stress Questionnaire     Feeling of Stress : Not at all   Social Connections: Unknown (11/21/2023)    Social Connection and Isolation Panel [NHANES]     Frequency of Communication with Friends and Family: Three times a week     Frequency of Social Gatherings with Friends and Family: Once a week     Active Member of Clubs or Organizations: No     Attends Club or Organization Meetings: Never     Marital Status:    Housing Stability: Low Risk  (11/21/2023)    Housing Stability Vital Sign     Unable to Pay for Housing in the Last Year: No     Number of Places Lived in the Last Year: 1     Unstable Housing in the Last Year: No         Medications/Allergies: See med card    Vitals:    03/14/24 1121   BP: (!) 193/79   Pulse: (!) 57   Weight: 62.8 kg (138 lb 7.2 oz)   Height: 5' 4" (1.626 m)   PainSc:   9   PainLoc: Back         3/14/2024    11:23 AM 2/5/2024    10:36 AM 12/13/2023     9:36 AM   Last 3 PDI Scores   Pain Disability Index (PDI) 49 25 11         Physical exam:  Gen: A and O x3, pleasant, well-groomed  Skin: No rashes or obvious lesions  HEENT: PERRLA, no obvious deformities on ears or in canals. Trachea midline.  CVS: Regular rate and rhythm, normal palpable pulses.  Resp:No increased work of breathing, symmetrical chest rise.  Abdomen: Soft, NT/ND.  Musculoskeletal:  No antalgic gait.     Neuro:  Lower extremities: 5/5 strength bilaterally  Reflexes: Patellar 0+, Achilles 0+ bilaterally.  Sensory:  Intact and symmetrical to light touch and pinprick in L2-S1 dermatomes except for decreased sensation to light touch and pinprick from her knees distally bilaterally.    Lumbar spine:  Lumbar spine:  Range of motion is mildly reduced with " flexion with slight increased pain, moderately decreased with extension with increased pain in her right low back.  Magnus's test causes no increased pain on either side.    Supine straight leg raise is negative bilaterally.    Internal and external rotation of the hip causes Increased left groin, lateral hip and anterior thigh pain.  Myofascial exam: No tenderness to palpation across lumbar paraspinous muscles.  No tenderness to palpation over left greater trochanteric bursa    Imagin17 Xray L-spine  Five views lumbar spine were obtained (AP, lateral, right oblique, left oblique and spot views). There is a upper lumbar dextroscoliosis and a mid lumbar levoscoliosis with superimposed multilevel degenerative disc and facet disease. There is loss of disc space height throughout the lumbar region there is lower lumbar degenerative facet disease.    4/15/14 BL Lower extremity EMG:   Mild sensorimotor predominantly axonal neuropathy.    19 MRI L-spine:  L5-S1: Spondylosis associated with disc space narrowing and mild marginal anterior spondylotic osteophytes.  There is a disc protrusion and osteophyte complex in the left neural foramen that causes a moderate left foraminal stenosis.  There are postoperative changes from a left L5-S1 laminectomy without definite signs for recurrent disc herniations.  There is facet arthropathy.  L4-5: Disc space narrowing associated with marginal anterior spondylotic osteophytes.  There is a mild circumferential annular disc bulge.  Facet arthropathy noted bilaterally.  There is also at least a moderate right lateral recess stenosis.  L3-4: Disc degeneration with with the or disc space narrowing and marginal anterior spondylotic osteophyte.  There is a broad-based posterior osteophyte the and disc bulge complex with compression of the thecal sac greater on the right.  There is a moderate to severe right lateral recess stenosis (image 32 series 5002.  There is associated  facet arthropathy.  Mild central canal spinal stenosis.  L2-3: Disc degeneration with disc space narrowing.  There is a broad-based circumferential annular disc bulge.  In the left neural foramen in addition a disc protrusion with moderate obscuration of the foraminal fat is noted.  In the far lateral aspect of the neural foramen (image 16 series 5 the disc protrusion contacts the exiting left L2 root.  Clinical correlation for a left L2 radiculopathy suggested.  L1-2: Disc degeneration with disc space narrowing and marginal anterior spondylotic osteophyte.  There is a broad-based left paracentral disc protrusion with compression of the thecal sac anterolaterally.  There is an extension of the disc protrusion in the left neural foramen associated with at least a moderate foraminal stenosis.  Compression of the exiting left L1 root far laterally in the foramen not excluded.  Right neural foramen is unremarkable.  T12-L1: No disc herniations or spinal stenosis or foraminal stenosis.    Assessment:    The patient is a 73-year-old woman with a history of anxiety, depression, previous lumbar surgery who presents in referral from Dr. Adam for bilateral foot pain.     1. Lumbar radiculopathy  DISCONTINUED: methocarbamoL (ROBAXIN) 500 MG Tab      2. DDD (degenerative disc disease), lumbar        3. Left hip pain        4. Lumbar spondylosis                Plan:  1. The patient had some relief following the left hip joint injection with interventional Radiology.  We can repeat this in the future if necessary.    2. For back pain I provided a prescription for methocarbamol and advised her to call next week if her symptoms do not improve.  We can consider an L5/S1 interlaminar epidural steroid injection from lower right due to her left hemilaminectomy.  She has done well with this in the past.

## 2024-03-23 ENCOUNTER — PATIENT MESSAGE (OUTPATIENT)
Dept: PAIN MEDICINE | Facility: CLINIC | Age: 74
End: 2024-03-23
Payer: MEDICARE

## 2024-03-25 DIAGNOSIS — M54.16 LUMBAR RADICULOPATHY: Primary | ICD-10-CM

## 2024-03-25 RX ORDER — ALPRAZOLAM 1 MG/1
1 TABLET, ORALLY DISINTEGRATING ORAL ONCE AS NEEDED
Status: CANCELLED | OUTPATIENT
Start: 2024-03-25 | End: 2035-08-22

## 2024-03-25 NOTE — TELEPHONE ENCOUNTER
Physician - Dr Goyal    Type of Procedure/Injection - Lumbar Epidural  L5/S1  from the right      Laterality - NA      Anxiolysis- Local      Need to hold medication - Yes      NSAIDs for 2 days      Clearance needed - No      Follow up - 4 week

## 2024-03-26 ENCOUNTER — CLINICAL SUPPORT (OUTPATIENT)
Dept: FAMILY MEDICINE | Facility: CLINIC | Age: 74
End: 2024-03-26
Payer: MEDICARE

## 2024-03-26 ENCOUNTER — PATIENT MESSAGE (OUTPATIENT)
Dept: ADMINISTRATIVE | Facility: HOSPITAL | Age: 74
End: 2024-03-26
Payer: MEDICARE

## 2024-03-26 ENCOUNTER — PATIENT OUTREACH (OUTPATIENT)
Dept: ADMINISTRATIVE | Facility: HOSPITAL | Age: 74
End: 2024-03-26
Payer: MEDICARE

## 2024-03-26 DIAGNOSIS — Z78.0 POSTMENOPAUSAL STATE: Primary | ICD-10-CM

## 2024-03-26 DIAGNOSIS — E53.8 B12 DEFICIENCY: Primary | ICD-10-CM

## 2024-03-26 PROCEDURE — 99999PBSHW PR PBB SHADOW TECHNICAL ONLY FILED TO HB: Mod: PBBFAC,,,

## 2024-03-26 PROCEDURE — 96372 THER/PROPH/DIAG INJ SC/IM: CPT | Mod: PBBFAC,PN

## 2024-03-26 RX ADMIN — CYANOCOBALAMIN 1000 MCG: 1000 INJECTION, SOLUTION INTRAMUSCULAR at 09:03

## 2024-03-26 NOTE — PROGRESS NOTES
Pt name and  verified  B12 administered  Pt tolerated well and left exam room.     Dorsal Nasal Flap Text: The defect edges were debeveled with a #15 scalpel blade.  Given the location of the defect and the proximity to free margins a dorsal nasal flap was deemed most appropriate.  Using a sterile surgical marker, an appropriate dorsal nasal flap was drawn around the defect.    The area thus outlined was incised deep to adipose tissue with a #15 scalpel blade.  The skin margins were undermined to an appropriate distance in all directions utilizing iris scissors.

## 2024-03-26 NOTE — PROGRESS NOTES
Population Health Chart Review & Patient Outreach Details      Additional [facility] Notes:               Updates Requested / Reviewed:      Updated Care Coordination Note, Care Everywhere, , External Sources: DIS, and Immunizations Reconciliation Completed or Queried: [place]         Health Maintenance Topics Overdue:      VB Score: 2     Osteoporosis Screening  Uncontrolled BP    Tetanus Vaccine                  Health Maintenance Topic(s) Outreach Outcomes & Actions Taken:    Osteoporosis Screening - Outreach Outcomes & Actions Taken  : Dexa Order Placed

## 2024-04-09 ENCOUNTER — TELEPHONE (OUTPATIENT)
Dept: FAMILY MEDICINE | Facility: CLINIC | Age: 74
End: 2024-04-09
Payer: MEDICARE

## 2024-04-09 ENCOUNTER — HOSPITAL ENCOUNTER (OUTPATIENT)
Dept: RADIOLOGY | Facility: HOSPITAL | Age: 74
Discharge: HOME | End: 2024-04-09
Attending: FAMILY MEDICINE
Payer: MEDICARE

## 2024-04-09 DIAGNOSIS — Z78.0 POSTMENOPAUSAL STATE: ICD-10-CM

## 2024-04-09 PROCEDURE — 77080 DXA BONE DENSITY AXIAL: CPT | Mod: 26,,, | Performed by: RADIOLOGY

## 2024-04-09 PROCEDURE — 77080 DXA BONE DENSITY AXIAL: CPT | Mod: TC,PO

## 2024-04-09 NOTE — TELEPHONE ENCOUNTER
----- Message from Elizabeth Zhu sent at 4/9/2024  1:00 PM CDT -----  Contact: Pt  [phone]  Patient is calling to have her Monthly B12 Shot scheduled for April 25-29.    Please call and advise.    Thank You

## 2024-04-18 ENCOUNTER — TELEPHONE (OUTPATIENT)
Dept: PAIN MEDICINE | Facility: CLINIC | Age: 74
End: 2024-04-18
Payer: MEDICARE

## 2024-04-18 ENCOUNTER — HOSPITAL ENCOUNTER (OUTPATIENT)
Dept: RADIOLOGY | Facility: HOSPITAL | Age: 74
Discharge: HOME | End: 2024-04-18
Attending: ANESTHESIOLOGY
Payer: MEDICARE

## 2024-04-18 ENCOUNTER — HOSPITAL ENCOUNTER (OUTPATIENT)
Facility: HOSPITAL | Age: 74
Discharge: HOME | End: 2024-04-18
Attending: ANESTHESIOLOGY | Admitting: ANESTHESIOLOGY
Payer: MEDICARE

## 2024-04-18 VITALS
OXYGEN SATURATION: 96 % | DIASTOLIC BLOOD PRESSURE: 69 MMHG | BODY MASS INDEX: 23.05 KG/M2 | SYSTOLIC BLOOD PRESSURE: 155 MMHG | RESPIRATION RATE: 16 BRPM | WEIGHT: 135 LBS | TEMPERATURE: 97 F | HEART RATE: 66 BPM | HEIGHT: 64 IN

## 2024-04-18 DIAGNOSIS — M54.50 LOWER BACK PAIN: ICD-10-CM

## 2024-04-18 DIAGNOSIS — M54.16 LUMBAR RADICULOPATHY: ICD-10-CM

## 2024-04-18 PROCEDURE — 62323 NJX INTERLAMINAR LMBR/SAC: CPT | Mod: PO | Performed by: ANESTHESIOLOGY

## 2024-04-18 PROCEDURE — 62323 NJX INTERLAMINAR LMBR/SAC: CPT | Mod: ,,, | Performed by: ANESTHESIOLOGY

## 2024-04-18 PROCEDURE — 25500020 PHARM REV CODE 255: Mod: PO | Performed by: ANESTHESIOLOGY

## 2024-04-18 PROCEDURE — 25000003 PHARM REV CODE 250: Mod: PO | Performed by: ANESTHESIOLOGY

## 2024-04-18 PROCEDURE — 76000 FLUOROSCOPY <1 HR PHYS/QHP: CPT | Mod: TC,PO

## 2024-04-18 PROCEDURE — 63600175 PHARM REV CODE 636 W HCPCS: Mod: PO | Performed by: ANESTHESIOLOGY

## 2024-04-18 RX ORDER — ALPRAZOLAM 0.5 MG/1
1 TABLET, ORALLY DISINTEGRATING ORAL ONCE AS NEEDED
Status: COMPLETED | OUTPATIENT
Start: 2024-04-18 | End: 2024-04-18

## 2024-04-18 RX ORDER — METHYLPREDNISOLONE ACETATE 80 MG/ML
INJECTION, SUSPENSION INTRA-ARTICULAR; INTRALESIONAL; INTRAMUSCULAR; SOFT TISSUE
Status: DISCONTINUED | OUTPATIENT
Start: 2024-04-18 | End: 2024-04-18 | Stop reason: HOSPADM

## 2024-04-18 RX ORDER — LIDOCAINE HYDROCHLORIDE 10 MG/ML
INJECTION, SOLUTION EPIDURAL; INFILTRATION; INTRACAUDAL; PERINEURAL
Status: DISCONTINUED | OUTPATIENT
Start: 2024-04-18 | End: 2024-04-18 | Stop reason: HOSPADM

## 2024-04-18 RX ADMIN — ALPRAZOLAM 1 MG: 0.5 TABLET, ORALLY DISINTEGRATING ORAL at 02:04

## 2024-04-18 NOTE — H&P
CC: Back pain    HPI: The patient is a 74yo woman with a history of lumbar radiculopathy here for L5/S1 CHEIKH. There are no major changes in history and physical from 3/14/24.    Past Medical History:   Diagnosis Date    Anxiety     Arthritis     Chronic allergic rhinitis 11/19/2012    Depression     Essential hypertension 9/14/2020    Herniated lumbar intervertebral disc     L3    Migraine     painless with numbness       Past Surgical History:   Procedure Laterality Date    COLONOSCOPY      COLONOSCOPY N/A 03/02/2020    Procedure: COLONOSCOPY;  Surgeon: Flash Garnett MD;  Location: [facility] ENDO;  Service: Endoscopy;  Laterality: N/A;    EPIDURAL STEROID INJECTION INTO LUMBAR SPINE N/A 05/20/2019    Procedure: Injection-steroid-epidural-lumbar;  Surgeon: Patrick Goyal MD;  Location: [facility] OR;  Service: Pain Management;  Laterality: N/A;  L5/S1 from right side    EPIDURAL STEROID INJECTION INTO LUMBAR SPINE N/A 08/04/2022    Procedure: Injection-steroid-epidural-lumbar, L5/S1  from the right side;  Surgeon: Patrick Goyal MD;  Location: [facility] OR;  Service: Pain Management;  Laterality: N/A;    EPIDURAL STEROID INJECTION INTO LUMBAR SPINE N/A 8/3/2023    Procedure: Injection-steroid-epidural-lumbar L5/S1 from the right;  Surgeon: Patrick Goyal MD;  Location: [facility] OR;  Service: Pain Management;  Laterality: N/A;    EYE SURGERY  May, 2018    cataract surgery    NASAL SEPTUM SURGERY N/A 09/2017    SPINE SURGERY  2001    L4    TONSILLECTOMY         Family History   Problem Relation Name Age of Onset    Cancer Mother Jazzmine Murphy 78        mother    Heart disease Mother Jazzmine PANIAGUA'Donnell 78        cabg    COPD Mother Jazzmine PANIAGUA'Donnell     Hypertension Mother Jazzmine Arrington     Vision loss Mother Jazzmine PANIAGUA'Donnell         macular degeration    Diabetes Father [place]     Heart disease Father [place] 71        cabg    Hypertension Father Rick [place]     Diabetes Brother Chapo  Murphy     Cancer Brother Chapo Murphy         brother    Heart disease Brother Chapo Ar[place] 50        [place]    Hypertension Brother Chapo Murphy     Diabetes Paternal Grandmother Leny Murphy        Social History     Socioeconomic History    Marital status:      Spouse name: Kraig    Number of children: 3   Tobacco Use    Smoking status: Former     Current packs/day: 0.00     Types: Cigarettes     Quit date: 1973     Years since quittin.9    Smokeless tobacco: Never    Tobacco comments:     social smoker only   Substance and Sexual Activity    Alcohol use: Yes     Alcohol/week: 5.0 standard drinks of alcohol     Types: 5 Glasses of wine per week     Comment: one glass of wine with dinner daily    Drug use: No    Sexual activity: Yes     Partners: Female     Birth control/protection: Post-menopausal   Social History Narrative    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)     Exercise : Most days per week : walking 4d/week + Pilates + cardio + resistance     Diet : pt suspects lactose intolerance      Social Determinants of Health     Financial Resource Strain: Low Risk  (2023)    Overall Financial Resource Strain (CARDIA)     Difficulty of Paying Living Expenses: Not hard at all   Food Insecurity: No Food Insecurity (2023)    Hunger Vital Sign     Worried About Running Out of Food in the Last Year: Never true     Ran Out of Food in the Last Year: Never true   Transportation Needs: No Transportation Needs (2023)    PRAPARE - Transportation     Lack of Transportation (Medical): No     Lack of Transportation (Non-Medical): No   Recent Concern: Transportation Needs - Unmet Transportation Needs (11/3/2023)    PRAPARE - Transportation     Lack of Transportation (Medical): Yes     Lack of Transportation (Non-Medical): Yes   Physical Activity: Insufficiently Active (2023)    Exercise Vital Sign     Days of Exercise per Week: 2 days     Minutes of Exercise per Session:  "50 min   Stress: No Stress Concern Present (11/21/2023)    [place] of Occupational Health - Occupational Stress Questionnaire     Feeling of Stress : Not at all   Social Connections: Unknown (11/21/2023)    Social Connection and Isolation Panel [NHANES]     Frequency of Communication with Friends and Family: Three times a week     Frequency of Social Gatherings with Friends and Family: Once a week     Active Member of Clubs or Organizations: No     Attends Club or Organization Meetings: Never     Marital Status:    Housing Stability: Low Risk  (11/21/2023)    Housing Stability Vital Sign     Unable to Pay for Housing in the Last Year: No     Number of Places Lived in the Last Year: 1     Unstable Housing in the Last Year: No       No current facility-administered medications for this encounter.       Review of patient's allergies indicates:   Allergen Reactions    Penicillins Shortness Of Breath       Vitals:    04/11/24 1247 04/18/24 1454 04/18/24 1455   BP:  (!) 158/69 (!) 158/69   Pulse:  72    Resp:  16    Temp:  97.2 °F (36.2 °C)    TempSrc:  Skin    SpO2:  98%    Weight: 61.2 kg (135 lb)     Height: 5' 4" (1.626 m)         ASA 2, Mallampati 2    REVIEW OF SYSTEMS:     GENERAL: No weight loss, malaise or fevers.  HEENT:  No recent changes in vision or hearing  NECK: Negative for lumps, no difficulty with swallowing.  RESPIRATORY: Negative for cough, wheezing or shortness of breath, patient denies any recent URI.  CARDIOVASCULAR: Negative for chest pain, leg swelling or palpitations.  GI: Negative for abdominal discomfort, blood in stools or black stools or change in bowel habits.  MUSCULOSKELETAL: See HPI.  SKIN: Negative for lesions, rash, and itching.  PSYCH: No suicidal or homicidal ideations, no current mood disturbances.  HEMATOLOGY/LYMPHOLOGY: Negative for prolonged bleeding, bruising easily or swollen nodes. Patient is not currently taking any anti-coagulants  ENDO: No history of " diabetes or thyroid dysfunction  NEURO: No history of syncope, paralysis, seizures or tremors.All other reviewed and negative other than HPI.    Physical exam:  Gen: A and O x3, pleasant, well-groomed  Skin: No rashes or obvious lesions  HEENT: PERRLA, no obvious deformities on ears or in canals. No thyroid masses, trachea midline, no palpable lymph nodes in neck, axilla.  CVS: Regular rate and rhythm, normal S1 and S2, no murmurs.  Resp: Clear to auscultation bilaterally.  Abdomen: Soft, NT/ND, normal bowel sounds present.  Musculoskeletal/Neuro: Moving all extremities    Assessment:  Lumbar radiculopathy  -     Case Request Operating Room: Injection-steroid-epidural-lumbar     L5/S1 from the right  -     Place in Outpatient; Standing  -     Vital signs; Standing  -     Verify informed consent; Standing  -     Notify physician ; Standing  -     Notify physician ; Standing  -     Notify physician (specify); Standing  -     Diet NPO; Standing  -     alprazolam ODT dissolvable tablet 1 mg    Other orders  -     IP VTE LOW RISK PATIENT; Standing

## 2024-04-18 NOTE — TELEPHONE ENCOUNTER
----- Message from Amanda Bazanlin sent at 4/18/2024  9:14 AM CDT -----  Type: Needs Medical Advice  Who Called:  pt  Best Call Back Number: [phone]    Additional Information: pt is calling in regards to  her injection for this morning. Pt says she is having a migraine and wants to know if she can take any medication for it before getting her injection. Needs to speak to someone urgently. Please call back and advise. Thanks!

## 2024-04-18 NOTE — OP NOTE

## 2024-04-28 NOTE — TELEPHONE ENCOUNTER
No care due was identified.  [facility] Embedded Care Due Messages. Reference number: [id number].   4/28/2024 6:00:03 AM CDT

## 2024-04-29 ENCOUNTER — CLINICAL SUPPORT (OUTPATIENT)
Dept: FAMILY MEDICINE | Facility: CLINIC | Age: 74
End: 2024-04-29
Payer: MEDICARE

## 2024-04-29 DIAGNOSIS — E53.8 B12 DEFICIENCY: Primary | ICD-10-CM

## 2024-04-29 PROCEDURE — 96372 THER/PROPH/DIAG INJ SC/IM: CPT | Mod: PBBFAC,PN

## 2024-04-29 PROCEDURE — 99499 UNLISTED E&M SERVICE: CPT | Mod: S$PBB,,, | Performed by: FAMILY MEDICINE

## 2024-04-29 PROCEDURE — 99999PBSHW PR PBB SHADOW TECHNICAL ONLY FILED TO HB: Mod: PBBFAC,,,

## 2024-04-29 RX ORDER — LOSARTAN POTASSIUM 50 MG/1
TABLET ORAL
Qty: 90 TABLET | Refills: 1 | Status: SHIPPED | OUTPATIENT
Start: 2024-04-29

## 2024-04-29 RX ADMIN — CYANOCOBALAMIN 1000 MCG: 1000 INJECTION, SOLUTION INTRAMUSCULAR at 09:04

## 2024-04-29 NOTE — TELEPHONE ENCOUNTER
Refill Routing Note   Medication(s) are not appropriate for processing by [facility] for the following reason(s):        Required vitals abnormal  04/18/24 (!) 155/69   03/22/24 (!) 160/52   03/15/24 (!) 156/75       [facility] action(s):  Defer        Medication Therapy Plan: 04/18/24 (!) 155/69 03/22/24 (!) 160/52 03/15/24 (!) 156/75      Appointments  past 12m or future 3m with PCP    Date Provider   Last Visit   11/28/2023 Td Montana MD   Next Visit   Visit date not found Td Montana MD   ED visits in past 90 days: 1        Note composed:12:20 PM 04/29/2024

## 2024-04-29 NOTE — PROGRESS NOTES
Pt here for cyanocabalamin injection(see MAR). Confirmed name and . Cyanocabalamin 1000mcg given left delt. Pt tolerated well. Confirmed next dose and appt.

## 2024-05-01 ENCOUNTER — TELEPHONE (OUTPATIENT)
Dept: PAIN MEDICINE | Facility: CLINIC | Age: 74
End: 2024-05-01
Payer: MEDICARE

## 2024-05-01 ENCOUNTER — PATIENT MESSAGE (OUTPATIENT)
Dept: PAIN MEDICINE | Facility: CLINIC | Age: 74
End: 2024-05-01
Payer: MEDICARE

## 2024-05-01 DIAGNOSIS — M54.16 LUMBAR RADICULOPATHY: Primary | ICD-10-CM

## 2024-05-01 DIAGNOSIS — M51.36 DDD (DEGENERATIVE DISC DISEASE), LUMBAR: ICD-10-CM

## 2024-05-01 NOTE — TELEPHONE ENCOUNTER
I suggest that we get her in with a physical therapist prior to her injection follow-up later this month.  I completed orders, please send to [facility] in [place].

## 2024-05-01 NOTE — TELEPHONE ENCOUNTER
Spoke with pt, relayed Adolfo message about trying physical therapy first. Advised PT orders have been faxed over today to [facility] in [place].

## 2024-05-09 ENCOUNTER — OFFICE VISIT (OUTPATIENT)
Dept: FAMILY MEDICINE | Facility: CLINIC | Age: 74
End: 2024-05-09
Payer: MEDICARE

## 2024-05-09 VITALS
DIASTOLIC BLOOD PRESSURE: 80 MMHG | HEIGHT: 64 IN | BODY MASS INDEX: 23.09 KG/M2 | WEIGHT: 135.25 LBS | SYSTOLIC BLOOD PRESSURE: 108 MMHG | HEART RATE: 69 BPM | OXYGEN SATURATION: 95 %

## 2024-05-09 DIAGNOSIS — Z00.00 ENCOUNTER FOR MEDICARE ANNUAL WELLNESS EXAM: Primary | ICD-10-CM

## 2024-05-09 DIAGNOSIS — F33.42 RECURRENT MAJOR DEPRESSIVE DISORDER, IN FULL REMISSION: ICD-10-CM

## 2024-05-09 DIAGNOSIS — D69.2 OTHER NONTHROMBOCYTOPENIC PURPURA: ICD-10-CM

## 2024-05-09 PROCEDURE — G0439 PPPS, SUBSEQ VISIT: HCPCS | Mod: ,,, | Performed by: NURSE PRACTITIONER

## 2024-05-09 PROCEDURE — 99215 OFFICE O/P EST HI 40 MIN: CPT | Mod: PBBFAC,PN | Performed by: NURSE PRACTITIONER

## 2024-05-09 PROCEDURE — 99999 PR PBB SHADOW E&M-EST. PATIENT-LVL V: CPT | Mod: PBBFAC,,, | Performed by: NURSE PRACTITIONER

## 2024-05-09 NOTE — PATIENT INSTRUCTIONS
Counseling and Referral of Other Preventative  (Italic type indicates deductible and co-insurance are waived)    Patient Name: Yolie Brambila  Today's Date: 5/9/2024    Health Maintenance       Date Due Completion Date    TETANUS VACCINE 11/03/2021 11/3/2011    COVID-19 Vaccine (7 - 2023-24 season) 02/05/2024 10/5/2023    Colorectal Cancer Screening 03/02/2025 3/2/2020    Override on 11/5/2008: (N/S)    DEXA Scan 04/09/2027 4/9/2024    Lipid Panel 11/28/2028 11/28/2023        No orders of the defined types were placed in this encounter.    The following information is provided to all patients.  This information is to help you find resources for any of the problems found today that may be affecting your health:                  Living healthy guide: www.[facility].[place].gov      Understanding Diabetes: www.diabetes.org      Eating healthy: www.cdc.gov/healthyweight      CDC home safety checklist: www.cdc.gov/steadi/patient.html      Agency on Aging: www.goea.[place].[facility]      Alcoholics anonymous (AA): www.aa.org      Physical Activity: www.josé miguel.nih.gov/rf5bqkp      Tobacco use: www.quitwithusla.org

## 2024-05-09 NOTE — PROGRESS NOTES
"  Yolie Brambila presented for an initial Medicare AWV today. The following components were reviewed and updated:    Medical history  Family History  Social history  Allergies and Current Medications  Health Risk Assessment  Health Maintenance  Care Team    **See Completed Assessments for Annual Wellness visit with in the encounter summary    The following assessments were completed:  Depression Screening  Cognitive function Screening    Timed Get Up Test  Whisper Test      Opioid documentation:      Patient does not have a current opioid prescription.          Vitals:    05/09/24 0821   BP: 108/80   BP Location: Right arm   Patient Position: Sitting   Pulse: 69   SpO2: 95%   Weight: 61.4 kg (135 lb 4 oz)   Height: 5' 4" (1.626 m)     Body mass index is 23.22 kg/m².       Physical Exam  Vitals reviewed.   Constitutional:       General: She is not in acute distress.  HENT:      Head: Normocephalic.   Cardiovascular:      Rate and Rhythm: Normal rate.   Pulmonary:      Effort: Pulmonary effort is normal. No respiratory distress.   Skin:     General: Skin is warm.   Neurological:      General: No focal deficit present.      Mental Status: She is alert.           Diagnoses and health risks identified today and associated recommendations/orders:  1. Encounter for Medicare annual wellness exam  Reviewed health maintenance and provided recommendations   Recommend tdap vaccine   - Ambulatory Referral/Consult to Enhanced Annual Wellness Visit (eAWV)    2. Other nonthrombocytopenic purpura  Continue to monitor  Followed by Td Montana MD .      3. Recurrent major depressive disorder, in full remission  Continue to monitor  Followed by Td Montana MD   No si/hi  Taking celexa.        Provided Yolie PANIAGUA with a 5-10 year written screening schedule and personal prevention plan. Recommendations were developed using the USPSTF age appropriate recommendations. Education, counseling, and referrals were provided as needed. "  After Visit Summary printed and given to patient which includes a list of additional screenings\tests needed.    No follow-ups on file.      Tanya Paul NP

## 2024-05-16 ENCOUNTER — OFFICE VISIT (OUTPATIENT)
Dept: PAIN MEDICINE | Facility: CLINIC | Age: 74
End: 2024-05-16
Payer: MEDICARE

## 2024-05-16 VITALS
DIASTOLIC BLOOD PRESSURE: 67 MMHG | HEIGHT: 64 IN | WEIGHT: 134.69 LBS | BODY MASS INDEX: 22.99 KG/M2 | HEART RATE: 67 BPM | SYSTOLIC BLOOD PRESSURE: 172 MMHG

## 2024-05-16 DIAGNOSIS — G60.9 IDIOPATHIC PERIPHERAL NEUROPATHY: ICD-10-CM

## 2024-05-16 DIAGNOSIS — M47.816 LUMBAR SPONDYLOSIS: ICD-10-CM

## 2024-05-16 DIAGNOSIS — M54.16 LUMBAR RADICULOPATHY: Primary | ICD-10-CM

## 2024-05-16 DIAGNOSIS — M51.36 DDD (DEGENERATIVE DISC DISEASE), LUMBAR: ICD-10-CM

## 2024-05-16 PROCEDURE — 99999 PR PBB SHADOW E&M-EST. PATIENT-LVL IV: CPT | Mod: PBBFAC,,, | Performed by: PHYSICIAN ASSISTANT

## 2024-05-16 PROCEDURE — 99214 OFFICE O/P EST MOD 30 MIN: CPT | Mod: PBBFAC,PO | Performed by: PHYSICIAN ASSISTANT

## 2024-05-16 PROCEDURE — 99214 OFFICE O/P EST MOD 30 MIN: CPT | Mod: S$PBB,,, | Performed by: PHYSICIAN ASSISTANT

## 2024-05-16 RX ORDER — PREGABALIN 100 MG/1
100 CAPSULE ORAL 2 TIMES DAILY
Qty: 60 CAPSULE | Refills: 2 | Status: SHIPPED | OUTPATIENT
Start: 2024-05-16

## 2024-05-23 NOTE — PROGRESS NOTES
This note was completed with dictation software and grammatical errors may exist.    CC:  Bilateral foot pain, back pain, leg pain    HPI:  The patient is a 73-year-old woman with a history of anxiety, depression, previous lumbar surgery who presents in referral from Dr. Adam for bilateral foot pain.  She is status post L5/S1 interlaminar epidural steroid injection from the right on 04/18/2024 with 60-70% relief.  She does report pain in the sacrum and bilateral buttocks with prolonged sitting.  She has been participating in physical therapy at [facility] with Elizabeth with very good relief as well.  She also continues Pilates.  She continues Lyrica and overall her symptoms are currently tolerable.      Previous history:  The patient reports developing bilateral foot pain about 25 years ago with numbness and tingling, has been diagnosed with neuropathy but workup has not revealed any particular  etiology.  This has been progressing over the years and has gone from being located only in the feet and now extending proximally up to the knees bilaterally.  She describes it as throbbing, tingling with abnormal sensation, particularly worse with standing, walking, exercising.  She does get some relief with rest, heat and medications. She has been taking gabapentin for years, several years has been on 600 mg twice daily with about 40% relief.  She also has a history of low back and buttock pain, ended up having severe right leg pain and in 2001 underwent laminectomy, unclear what level but reports that her right back and leg pain resolved at that time.  However over the years she has developed more bilateral buttock pain, thigh pain worse with standing and walking, describes this as dull.  She denies any weakness in her legs, denies any bowel or bladder incontinence.    Pain intervention history:  She has done physical therapy multiple times, chiropractic care without relief.  She has not tried Cymbalta.  She has been  taking gabapentin 600 mg twice daily with about 40% relief, switched to Lyrica.  L5/S1 CHEIKH performed on 05/20/2019 with greater than 70% relief ongoing of her back and leg pain, approach from the right due to previous left hemilaminectomy. She is status post L5/S1 interlaminar epidural steroid injection from the right side on 08/04/2022 with 50% relief of her back pain but 0% relief of her leg pain.   She is status post L5/S1 CHEIKH from the right on 08/03/2023 with 100% relief.  She is status post left hip joint injection on 02/23/2024 with 40% relief.   She is status post L5/S1 interlaminar epidural steroid injection from the right on 04/18/2024 with 60-70% relief.     Spine surgeries:  L5/S1 left hemilaminectomy    Antineuropathics:  Lyrica 100 twice daily  NSAIDs:  Naproxen  Physical therapy:  Completed physical therapy, does Pilates twice a week.  Antidepressants:  Elavil 10 mg, Celexa 10  Muscle relaxers:  Opioids:  Antiplatelets/Anticoagulants:    ROS:  She reports back pain only.  Balance of review of systems is negative.    Past Medical History:   Diagnosis Date    Anxiety     Arthritis     Chronic allergic rhinitis 11/19/2012    Depression     Essential hypertension 9/14/2020    Herniated lumbar intervertebral disc     L3    Migraine     painless with numbness       Past Surgical History:   Procedure Laterality Date    COLONOSCOPY      COLONOSCOPY N/A 03/02/2020    Procedure: COLONOSCOPY;  Surgeon: Flash Garnett MD;  Location: [facility];  Service: Endoscopy;  Laterality: N/A;    EPIDURAL STEROID INJECTION INTO LUMBAR SPINE N/A 05/20/2019    Procedure: Injection-steroid-epidural-lumbar;  Surgeon: Patrick Goyal MD;  Location: [facility];  Service: Pain Management;  Laterality: N/A;  L5/S1 from right side    EPIDURAL STEROID INJECTION INTO LUMBAR SPINE N/A 08/04/2022    Procedure: Injection-steroid-epidural-lumbar, L5/S1  from the right side;  Surgeon: Patrick Goyal MD;  Location: [facility];   Service: Pain Management;  Laterality: N/A;    EPIDURAL STEROID INJECTION INTO LUMBAR SPINE N/A 8/3/2023    Procedure: Injection-steroid-epidural-lumbar L5/S1 from the right;  Surgeon: Patrick Gyoal MD;  Location: [facility] OR;  Service: Pain Management;  Laterality: N/A;    EPIDURAL STEROID INJECTION INTO LUMBAR SPINE N/A 2024    Procedure: Injection-steroid-epidural-lumbar     L5/S1 from the right;  Surgeon: Patrick Goyal MD;  Location: [facility] OR;  Service: Pain Management;  Laterality: N/A;    EYE SURGERY  May, 2018    cataract surgery    NASAL SEPTUM SURGERY N/A 2017    SPINE SURGERY  2001    L4    TONSILLECTOMY         Social History     Socioeconomic History    Marital status:      Spouse name: Kraig    Number of children: 3   Tobacco Use    Smoking status: Former     Current packs/day: 0.00     Types: Cigarettes     Quit date: 1973     Years since quittin.0    Smokeless tobacco: Never    Tobacco comments:     social smoker only   Substance and Sexual Activity    Alcohol use: Yes     Alcohol/week: 5.0 standard drinks of alcohol     Types: 5 Glasses of wine per week     Comment: one glass of wine with dinner daily    Drug use: No    Sexual activity: Yes     Partners: Female     Birth control/protection: Post-menopausal   Social History Narrative    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)     Exercise : Most days per week : walking 4d/week + Pilates + cardio + resistance     Diet : pt suspects lactose intolerance      Social Determinants of Health     Financial Resource Strain: Low Risk  (2024)    Overall Financial Resource Strain (CARDIA)     Difficulty of Paying Living Expenses: Not hard at all   Food Insecurity: No Food Insecurity (2024)    Hunger Vital Sign     Worried About Running Out of Food in the Last Year: Never true     Ran Out of Food in the Last Year: Never true   Transportation Needs: No Transportation Needs (2024)    PRAPARE - Transportation      "Lack of Transportation (Medical): No     Lack of Transportation (Non-Medical): No   Physical Activity: Insufficiently Active (5/9/2024)    Exercise Vital Sign     Days of Exercise per Week: 2 days     Minutes of Exercise per Session: 50 min   Stress: No Stress Concern Present (5/9/2024)    [place] of Occupational Health - Occupational Stress Questionnaire     Feeling of Stress : Not at all   Housing Stability: Low Risk  (5/9/2024)    Housing Stability Vital Sign     Unable to Pay for Housing in the Last Year: No     Homeless in the Last Year: No         Medications/Allergies: See med card    Vitals:    05/16/24 1006   BP: (!) 172/67   Pulse: 67   Weight: 61.1 kg (134 lb 11.2 oz)   Height: 5' 4" (1.626 m)   PainSc: 0-No pain   PainLoc: Back         5/16/2024    10:03 AM 3/14/2024    11:23 AM 2/5/2024    10:36 AM   Last 3 PDI Scores   Pain Disability Index (PDI) 15 49 25         Physical exam:  Gen: A and O x3, pleasant, well-groomed  Skin: No rashes or obvious lesions  HEENT: PERRLA, no obvious deformities on ears or in canals. Trachea midline.  CVS: Regular rate and rhythm, normal palpable pulses.  Resp:No increased work of breathing, symmetrical chest rise.  Abdomen: Soft, NT/ND.  Musculoskeletal:  No antalgic gait.     Neuro:  Lower extremities: 5/5 strength bilaterally  Reflexes: Patellar 0+, Achilles 0+ bilaterally.  Sensory:  Intact and symmetrical to light touch and pinprick in L2-S1 dermatomes except for decreased sensation to light touch and pinprick from her knees distally bilaterally.    Lumbar spine:  Lumbar spine:  Range of motion is mildly reduced with flexion with slight increased pain, moderately decreased with extension with increased pain in her right low back.  Magnus's test causes no increased pain on either side.    Supine straight leg raise is negative bilaterally.    Internal and external rotation of the hip causes Increased left groin, lateral hip and anterior thigh " pain.  Myofascial exam: No tenderness to palpation across lumbar paraspinous muscles.  No tenderness to palpation over left greater trochanteric bursa    Imagin17 Xray L-spine  Five views lumbar spine were obtained (AP, lateral, right oblique, left oblique and spot views). There is a upper lumbar dextroscoliosis and a mid lumbar levoscoliosis with superimposed multilevel degenerative disc and facet disease. There is loss of disc space height throughout the lumbar region there is lower lumbar degenerative facet disease.    4/15/14 BL Lower extremity EMG:   Mild sensorimotor predominantly axonal neuropathy.    19 MRI L-spine:  L5-S1: Spondylosis associated with disc space narrowing and mild marginal anterior spondylotic osteophytes.  There is a disc protrusion and osteophyte complex in the left neural foramen that causes a moderate left foraminal stenosis.  There are postoperative changes from a left L5-S1 laminectomy without definite signs for recurrent disc herniations.  There is facet arthropathy.  L4-5: Disc space narrowing associated with marginal anterior spondylotic osteophytes.  There is a mild circumferential annular disc bulge.  Facet arthropathy noted bilaterally.  There is also at least a moderate right lateral recess stenosis.  L3-4: Disc degeneration with with the or disc space narrowing and marginal anterior spondylotic osteophyte.  There is a broad-based posterior osteophyte the and disc bulge complex with compression of the thecal sac greater on the right.  There is a moderate to severe right lateral recess stenosis (image 32 series 5002.  There is associated facet arthropathy.  Mild central canal spinal stenosis.  L2-3: Disc degeneration with disc space narrowing.  There is a broad-based circumferential annular disc bulge.  In the left neural foramen in addition a disc protrusion with moderate obscuration of the foraminal fat is noted.  In the far lateral aspect of the neural foramen  (image 16 series 5 the disc protrusion contacts the exiting left L2 root.  Clinical correlation for a left L2 radiculopathy suggested.  L1-2: Disc degeneration with disc space narrowing and marginal anterior spondylotic osteophyte.  There is a broad-based left paracentral disc protrusion with compression of the thecal sac anterolaterally.  There is an extension of the disc protrusion in the left neural foramen associated with at least a moderate foraminal stenosis.  Compression of the exiting left L1 root far laterally in the foramen not excluded.  Right neural foramen is unremarkable.  T12-L1: No disc herniations or spinal stenosis or foraminal stenosis.    Assessment:    The patient is a 73-year-old woman with a history of anxiety, depression, previous lumbar surgery who presents in referral from Dr. Adam for bilateral foot pain.     1. Lumbar radiculopathy        2. DDD (degenerative disc disease), lumbar        3. Lumbar spondylosis        4. Idiopathic peripheral neuropathy                Plan:  1. The patient did very well following the L5/S1 interlaminar epidural steroid injection from the right.  She can contact us to repeat this in the future if necessary.  2. She will continue physical therapy at [facility] with Elizabeth and continue Pilates.    3. Dr. Goyal refilled Lyrica 100 mg twice a day.  I have reviewed the [place] Board of Pharmacy website and there are no abberancies.    4. Follow-up in 6 months or sooner as needed.

## 2024-05-29 ENCOUNTER — CLINICAL SUPPORT (OUTPATIENT)
Dept: FAMILY MEDICINE | Facility: CLINIC | Age: 74
End: 2024-05-29
Payer: MEDICARE

## 2024-05-29 DIAGNOSIS — E53.8 B12 DEFICIENCY: Primary | ICD-10-CM

## 2024-05-29 PROCEDURE — 96372 THER/PROPH/DIAG INJ SC/IM: CPT | Mod: PBBFAC,PN

## 2024-05-29 PROCEDURE — 99999PBSHW PR PBB SHADOW TECHNICAL ONLY FILED TO HB: Mod: PBBFAC,,,

## 2024-05-29 RX ADMIN — CYANOCOBALAMIN 1000 MCG: 1000 INJECTION, SOLUTION INTRAMUSCULAR at 09:05

## 2024-06-05 ENCOUNTER — PATIENT MESSAGE (OUTPATIENT)
Dept: FAMILY MEDICINE | Facility: CLINIC | Age: 74
End: 2024-06-05
Payer: MEDICARE

## 2024-07-01 ENCOUNTER — CLINICAL SUPPORT (OUTPATIENT)
Dept: FAMILY MEDICINE | Facility: CLINIC | Age: 74
End: 2024-07-01
Payer: MEDICARE

## 2024-07-01 DIAGNOSIS — E53.8 B12 DEFICIENCY: Primary | ICD-10-CM

## 2024-07-01 PROCEDURE — 99211 OFF/OP EST MAY X REQ PHY/QHP: CPT | Mod: S$PBB,,, | Performed by: FAMILY MEDICINE

## 2024-07-15 ENCOUNTER — PATIENT MESSAGE (OUTPATIENT)
Dept: PAIN MEDICINE | Facility: CLINIC | Age: 74
End: 2024-07-15
Payer: MEDICARE

## 2024-07-15 RX ORDER — PREGABALIN 100 MG/1
100 CAPSULE ORAL 2 TIMES DAILY
Qty: 60 CAPSULE | Refills: 2 | Status: SHIPPED | OUTPATIENT
Start: 2024-07-15

## 2024-07-15 RX ORDER — HYDROCODONE BITARTRATE AND ACETAMINOPHEN 5; 325 MG/1; MG/1
1 TABLET ORAL EVERY 8 HOURS PRN
Qty: 15 TABLET | Refills: 0 | Status: SHIPPED | OUTPATIENT
Start: 2024-07-15

## 2024-07-16 RX ORDER — AMITRIPTYLINE HYDROCHLORIDE 10 MG/1
TABLET, FILM COATED ORAL
Qty: 30 TABLET | Refills: 6 | Status: SHIPPED | OUTPATIENT
Start: 2024-07-16

## 2024-07-18 ENCOUNTER — OFFICE VISIT (OUTPATIENT)
Dept: PAIN MEDICINE | Facility: CLINIC | Age: 74
End: 2024-07-18
Payer: MEDICARE

## 2024-07-18 ENCOUNTER — HOSPITAL ENCOUNTER (OUTPATIENT)
Dept: RADIOLOGY | Facility: HOSPITAL | Age: 74
Discharge: HOME | End: 2024-07-18
Attending: PHYSICIAN ASSISTANT
Payer: MEDICARE

## 2024-07-18 VITALS
BODY MASS INDEX: 23.31 KG/M2 | HEIGHT: 64 IN | DIASTOLIC BLOOD PRESSURE: 56 MMHG | SYSTOLIC BLOOD PRESSURE: 126 MMHG | WEIGHT: 136.56 LBS | HEART RATE: 61 BPM

## 2024-07-18 DIAGNOSIS — M54.16 LUMBAR RADICULOPATHY: ICD-10-CM

## 2024-07-18 DIAGNOSIS — M47.816 LUMBAR SPONDYLOSIS: ICD-10-CM

## 2024-07-18 DIAGNOSIS — M51.36 DDD (DEGENERATIVE DISC DISEASE), LUMBAR: ICD-10-CM

## 2024-07-18 DIAGNOSIS — M51.36 DDD (DEGENERATIVE DISC DISEASE), LUMBAR: Primary | ICD-10-CM

## 2024-07-18 PROCEDURE — 99214 OFFICE O/P EST MOD 30 MIN: CPT | Mod: S$PBB,,, | Performed by: PHYSICIAN ASSISTANT

## 2024-07-18 PROCEDURE — 72110 X-RAY EXAM L-2 SPINE 4/>VWS: CPT | Mod: TC,PO

## 2024-07-18 PROCEDURE — 99214 OFFICE O/P EST MOD 30 MIN: CPT | Mod: PBBFAC,25,PO | Performed by: PHYSICIAN ASSISTANT

## 2024-07-18 PROCEDURE — 72110 X-RAY EXAM L-2 SPINE 4/>VWS: CPT | Mod: 26,,, | Performed by: RADIOLOGY

## 2024-07-18 PROCEDURE — 99999 PR PBB SHADOW E&M-EST. PATIENT-LVL IV: CPT | Mod: PBBFAC,,, | Performed by: PHYSICIAN ASSISTANT

## 2024-07-19 ENCOUNTER — TELEPHONE (OUTPATIENT)
Dept: PAIN MEDICINE | Facility: CLINIC | Age: 74
End: 2024-07-19
Payer: MEDICARE

## 2024-07-19 NOTE — TELEPHONE ENCOUNTER
Please let the patient know I reviewed her x-rays and there are no fractures seen so continue with her current physical therapy regimen.

## 2024-07-24 NOTE — PROGRESS NOTES
This note was completed with dictation software and grammatical errors may exist.    CC:  Bilateral foot pain, back pain, leg pain    HPI:  The patient is a 73-year-old woman with a history of anxiety, depression, previous lumbar surgery who presents in referral from Dr. Adam for bilateral foot pain.  She returns in follow-up today, overall had been doing very well with physical therapy.  However she felt a pop in her low back when doing Pilates and her pain worsened.  She did go back to physical therapy in the pain has become intermittent and she felt better but the pain is still a little worse in the mornings than her baseline.  It is located in the midline low back and lateral hips.  She denies any radicular symptoms at this time.  She denies any weakness or numbness.    Previous history:  The patient reports developing bilateral foot pain about 25 years ago with numbness and tingling, has been diagnosed with neuropathy but workup has not revealed any particular  etiology.  This has been progressing over the years and has gone from being located only in the feet and now extending proximally up to the knees bilaterally.  She describes it as throbbing, tingling with abnormal sensation, particularly worse with standing, walking, exercising.  She does get some relief with rest, heat and medications. She has been taking gabapentin for years, several years has been on 600 mg twice daily with about 40% relief.  She also has a history of low back and buttock pain, ended up having severe right leg pain and in 2001 underwent laminectomy, unclear what level but reports that her right back and leg pain resolved at that time.  However over the years she has developed more bilateral buttock pain, thigh pain worse with standing and walking, describes this as dull.  She denies any weakness in her legs, denies any bowel or bladder incontinence.    Pain intervention history:  She has done physical therapy multiple times,  chiropractic care without relief.  She has not tried Cymbalta.  She has been taking gabapentin 600 mg twice daily with about 40% relief, switched to Lyrica.  L5/S1 CHEIKH performed on 05/20/2019 with greater than 70% relief ongoing of her back and leg pain, approach from the right due to previous left hemilaminectomy. She is status post L5/S1 interlaminar epidural steroid injection from the right side on 08/04/2022 with 50% relief of her back pain but 0% relief of her leg pain.   She is status post L5/S1 CHEIKH from the right on 08/03/2023 with 100% relief.  She is status post left hip joint injection on 02/23/2024 with 40% relief.   She is status post L5/S1 interlaminar epidural steroid injection from the right on 04/18/2024 with 60-70% relief.     Spine surgeries:  L5/S1 left hemilaminectomy    Antineuropathics:  Lyrica 100 twice daily  NSAIDs:  Naproxen  Physical therapy:  Completed physical therapy, does Pilates twice a week.  Antidepressants:  Elavil 10 mg, Celexa 10  Muscle relaxers:  Opioids:  Antiplatelets/Anticoagulants:    ROS:  She reports back pain only.  Balance of review of systems is negative.    Past Medical History:   Diagnosis Date    Anxiety     Arthritis     Chronic allergic rhinitis 11/19/2012    Depression     Essential hypertension 9/14/2020    Herniated lumbar intervertebral disc     L3    Migraine     painless with numbness       Past Surgical History:   Procedure Laterality Date    COLONOSCOPY      COLONOSCOPY N/A 03/02/2020    Procedure: COLONOSCOPY;  Surgeon: Flash Garnett MD;  Location: [facility] ENDO;  Service: Endoscopy;  Laterality: N/A;    EPIDURAL STEROID INJECTION INTO LUMBAR SPINE N/A 05/20/2019    Procedure: Injection-steroid-epidural-lumbar;  Surgeon: Patrick Goyal MD;  Location: [facility] OR;  Service: Pain Management;  Laterality: N/A;  L5/S1 from right side    EPIDURAL STEROID INJECTION INTO LUMBAR SPINE N/A 08/04/2022    Procedure: Injection-steroid-epidural-lumbar, L5/S1  from  the right side;  Surgeon: Patrick Goyal MD;  Location: [facility] OR;  Service: Pain Management;  Laterality: N/A;    EPIDURAL STEROID INJECTION INTO LUMBAR SPINE N/A 8/3/2023    Procedure: Injection-steroid-epidural-lumbar L5/S1 from the right;  Surgeon: Patrick Goyal MD;  Location: [facility] OR;  Service: Pain Management;  Laterality: N/A;    EPIDURAL STEROID INJECTION INTO LUMBAR SPINE N/A 2024    Procedure: Injection-steroid-epidural-lumbar     L5/S1 from the right;  Surgeon: Patrick Goyal MD;  Location: [facility] OR;  Service: Pain Management;  Laterality: N/A;    EYE SURGERY  May, 2018    cataract surgery    NASAL SEPTUM SURGERY N/A 2017    SPINE SURGERY  2001    L4    TONSILLECTOMY         Social History     Socioeconomic History    Marital status:      Spouse name: Kraig    Number of children: 3   Tobacco Use    Smoking status: Former     Current packs/day: 0.00     Types: Cigarettes     Quit date: 1973     Years since quittin.1    Smokeless tobacco: Never    Tobacco comments:     social smoker only   Substance and Sexual Activity    Alcohol use: Yes     Alcohol/week: 5.0 standard drinks of alcohol     Types: 5 Glasses of wine per week     Comment: one glass of wine with dinner daily    Drug use: No    Sexual activity: Yes     Partners: Female     Birth control/protection: Post-menopausal   Social History Narrative    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)     Exercise : Most days per week : walking 4d/week + Pilates + cardio + resistance     Diet : pt suspects lactose intolerance      Social Determinants of Health     Financial Resource Strain: Low Risk  (2024)    Overall Financial Resource Strain (CARDIA)     Difficulty of Paying Living Expenses: Not hard at all   Food Insecurity: No Food Insecurity (2024)    Hunger Vital Sign     Worried About Running Out of Food in the Last Year: Never true     Ran Out of Food in the Last Year: Never true   Transportation  "Needs: No Transportation Needs (5/9/2024)    PRAPARE - Transportation     Lack of Transportation (Medical): No     Lack of Transportation (Non-Medical): No   Physical Activity: Insufficiently Active (5/9/2024)    Exercise Vital Sign     Days of Exercise per Week: 2 days     Minutes of Exercise per Session: 50 min   Stress: No Stress Concern Present (5/9/2024)    [employer] - Occupational Stress Questionnaire     Feeling of Stress : Not at all   Housing Stability: Low Risk  (5/9/2024)    Housing Stability Vital Sign     Unable to Pay for Housing in the Last Year: No     Homeless in the Last Year: No         Medications/Allergies: See med card    Vitals:    07/18/24 1106   BP: (!) 126/56   Pulse: 61   Weight: 62 kg (136 lb 9.2 oz)   Height: 5' 4" (1.626 m)   PainSc:   6   PainLoc: Back         7/18/2024    11:07 AM 5/16/2024    10:03 AM 3/14/2024    11:23 AM   Last 3 PDI Scores   Pain Disability Index (PDI) 29 15 49         Physical exam:  Gen: A and O x3, pleasant, well-groomed  Skin: No rashes or obvious lesions  HEENT: PERRLA, no obvious deformities on ears or in canals. Trachea midline.  CVS: Regular rate and rhythm, normal palpable pulses.  Resp:No increased work of breathing, symmetrical chest rise.  Abdomen: Soft, NT/ND.  Musculoskeletal:  No antalgic gait.     Neuro:  Lower extremities: 5/5 strength bilaterally  Reflexes: Patellar 0+, Achilles 0+ bilaterally.  Sensory:  Intact and symmetrical to light touch and pinprick in L2-S1 dermatomes except for decreased sensation to light touch and pinprick from her knees distally bilaterally.    Lumbar spine:  Lumbar spine:  Range of motion is mildly reduced with flexion with slight increased pain, moderately decreased with extension with increased pain in her right low back.  Magnus's test causes no increased pain on either side.    Supine straight leg raise is negative bilaterally.    Internal and external rotation of the hip causes " Increased left groin, lateral hip and anterior thigh pain.  Myofascial exam:  Mild tenderness to palpation across lumbar paraspinous muscles.  No tenderness to percussion to the spinous processes.  No tenderness to palpation over left greater trochanteric bursa    Imagin17 Xray L-spine  Five views lumbar spine were obtained (AP, lateral, right oblique, left oblique and spot views). There is a upper lumbar dextroscoliosis and a mid lumbar levoscoliosis with superimposed multilevel degenerative disc and facet disease. There is loss of disc space height throughout the lumbar region there is lower lumbar degenerative facet disease.    4/15/14 BL Lower extremity EMG:   Mild sensorimotor predominantly axonal neuropathy.    19 MRI L-spine:  L5-S1: Spondylosis associated with disc space narrowing and mild marginal anterior spondylotic osteophytes.  There is a disc protrusion and osteophyte complex in the left neural foramen that causes a moderate left foraminal stenosis.  There are postoperative changes from a left L5-S1 laminectomy without definite signs for recurrent disc herniations.  There is facet arthropathy.  L4-5: Disc space narrowing associated with marginal anterior spondylotic osteophytes.  There is a mild circumferential annular disc bulge.  Facet arthropathy noted bilaterally.  There is also at least a moderate right lateral recess stenosis.  L3-4: Disc degeneration with with the or disc space narrowing and marginal anterior spondylotic osteophyte.  There is a broad-based posterior osteophyte the and disc bulge complex with compression of the thecal sac greater on the right.  There is a moderate to severe right lateral recess stenosis (image 32 series 5002.  There is associated facet arthropathy.  Mild central canal spinal stenosis.  L2-3: Disc degeneration with disc space narrowing.  There is a broad-based circumferential annular disc bulge.  In the left neural foramen in addition a disc  protrusion with moderate obscuration of the foraminal fat is noted.  In the far lateral aspect of the neural foramen (image 16 series 5 the disc protrusion contacts the exiting left L2 root.  Clinical correlation for a left L2 radiculopathy suggested.  L1-2: Disc degeneration with disc space narrowing and marginal anterior spondylotic osteophyte.  There is a broad-based left paracentral disc protrusion with compression of the thecal sac anterolaterally.  There is an extension of the disc protrusion in the left neural foramen associated with at least a moderate foraminal stenosis.  Compression of the exiting left L1 root far laterally in the foramen not excluded.  Right neural foramen is unremarkable.  T12-L1: No disc herniations or spinal stenosis or foraminal stenosis.    07/18/2024 x-ray lumbar spine   Vertebral body heights are preserved. No fractures or destructive changes. Straightening of the expected normal lumbar lordosis without evidence of spondylolisthesis. Broad rotatory levo scoliotic curvature. Severe degenerative disc height loss throughout endplate centered osteophytes. Superimposed bony demineralization.     Assessment:    The patient is a 73-year-old woman with a history of anxiety, depression, previous lumbar surgery who presents in referral from Dr. Adam for bilateral foot pain.     1. DDD (degenerative disc disease), lumbar  X-Ray Lumbar Spine 5 View      2. Lumbar spondylosis        3. Lumbar radiculopathy                Plan:  1. I did order x-rays today and reviewed these to rule out compression fracture because the pop that she had in her low back during Pilates.  There are no fractures seen on the x-rays so she can [facility].  2. She has been taking naproxen but feels ibuprofen works better for her so she is going to change to this.  We discussed the risks of NSAIDs.    3. She can contact us in the future to repeat and L5/S1 interlaminar  epidural steroid injection from the right if her pain worsens.  4. Follow-up in 3 months or sooner as needed.

## 2024-08-01 ENCOUNTER — CLINICAL SUPPORT (OUTPATIENT)
Dept: FAMILY MEDICINE | Facility: CLINIC | Age: 74
End: 2024-08-01
Payer: MEDICARE

## 2024-08-01 DIAGNOSIS — E53.8 B12 DEFICIENCY: Primary | ICD-10-CM

## 2024-08-01 PROCEDURE — 99999PBSHW PR PBB SHADOW TECHNICAL ONLY FILED TO HB: Mod: PBBFAC,,,

## 2024-08-01 PROCEDURE — 96372 THER/PROPH/DIAG INJ SC/IM: CPT | Mod: PBBFAC,PN

## 2024-08-01 PROCEDURE — 99499 UNLISTED E&M SERVICE: CPT | Mod: S$PBB,,, | Performed by: FAMILY MEDICINE

## 2024-08-01 RX ADMIN — CYANOCOBALAMIN 1000 MCG: 1000 INJECTION, SOLUTION INTRAMUSCULAR at 09:08

## 2024-08-01 NOTE — PROGRESS NOTES
Pt here for cyanocabalamin injection(see MAR). Confirmed name and . Cyanocabalamin 1000mcg given L delt. Pt tolerated well. Confirmed next dose and appt.

## 2024-08-02 ENCOUNTER — OFFICE VISIT (OUTPATIENT)
Dept: FAMILY MEDICINE | Facility: CLINIC | Age: 74
End: 2024-08-02
Payer: MEDICARE

## 2024-08-02 VITALS
SYSTOLIC BLOOD PRESSURE: 132 MMHG | HEIGHT: 64 IN | WEIGHT: 136.88 LBS | BODY MASS INDEX: 23.37 KG/M2 | HEART RATE: 68 BPM | OXYGEN SATURATION: 97 % | DIASTOLIC BLOOD PRESSURE: 62 MMHG

## 2024-08-02 DIAGNOSIS — I10 ESSENTIAL HYPERTENSION: ICD-10-CM

## 2024-08-02 DIAGNOSIS — Z79.899 DRUG THERAPY: ICD-10-CM

## 2024-08-02 DIAGNOSIS — Z00.00 PREVENTATIVE HEALTH CARE: ICD-10-CM

## 2024-08-02 DIAGNOSIS — E55.9 VITAMIN D DEFICIENCY: ICD-10-CM

## 2024-08-02 DIAGNOSIS — L60.0 INGROWN LEFT BIG TOENAIL: Primary | ICD-10-CM

## 2024-08-02 DIAGNOSIS — L60.2 HYPERTROPHIC TOENAIL: ICD-10-CM

## 2024-08-02 PROCEDURE — 99215 OFFICE O/P EST HI 40 MIN: CPT | Mod: PBBFAC,PN | Performed by: NURSE PRACTITIONER

## 2024-08-02 PROCEDURE — 99999 PR PBB SHADOW E&M-EST. PATIENT-LVL V: CPT | Mod: PBBFAC,,, | Performed by: NURSE PRACTITIONER

## 2024-08-02 NOTE — PROGRESS NOTES
THIS DOCUMENT WAS MADE IN PART WITH VOICE RECOGNITION SOFTWARE.  OCCASIONALLY THIS SOFTWARE WILL MISINTERPRET WORDS OR PHRASES.     Assessment and Plan:    Ingrown left big toenail  Comments:  nail care,  prevention  and proper footwear discussed  Orders:  -     Ambulatory referral/consult to Podiatry; Future; Expected date: 08/09/2024    Hypertrophic toenail    Essential hypertension  Comments:  controlled   continue losartan   follow-up 3 mth for annual  Orders:  -     Lipid Panel; Future; Expected date: 08/02/2024    Drug therapy  -     CBC Without Differential; Future; Expected date: 08/02/2024  -     Comprehensive Metabolic Panel; Future; Expected date: 08/02/2024  -     Lipid Panel; Future; Expected date: 08/02/2024  -     TSH; Future; Expected date: 08/02/2024  -     Hemoglobin A1C; Future; Expected date: 08/02/2024    Vitamin D deficiency  -     Vitamin D; Future; Expected date: 08/02/2024    Preventative health care  -     CBC Without Differential; Future; Expected date: 08/02/2024  -     Comprehensive Metabolic Panel; Future; Expected date: 08/02/2024  -     Lipid Panel; Future; Expected date: 08/02/2024  -     TSH; Future; Expected date: 08/02/2024  -     Hemoglobin A1C; Future; Expected date: 08/02/2024             Visit summary:    Patient was seen today for evaluation of ingrown toenail.  No evidence of infection  or inflammation on exam.     Advised nail care,  prevention and proper foot wear.    Recommend patient see podiatry for treatment options.    Nonurgent referral placed to Podiatry.    Wellness labs ordered today.    Patient will have completed prior to annual with PCP- Dr. Montana in November.    Follow up in 3 months (on 11/2/2024), or if symptoms worsen or fail to improve, for Annual with PCP.   ______________________________________________________________________  Subjective:    Chief Complaint:  Ingrown toenail    HPI:  Yolie PANIAGUA is a 73 y.o. year old female  with a past medical history  "noted below, here  concerned regarding ingrown toenail on left big toe.  Patient reports  toenail is thick and frequently has to be dug out when she has a  pedicure.   Last month  skin around toenail was red and tender.  This has since resolved. She reports having nail removed before at an Urgent Care. Has never seen  Podiatry.            Idiopathic peripheral neuropathy   Med- Lyrica 100 mg (effective)    History of migraine   Prophylaxis- Amitriptyline 10 mg   Abortive - Ubrevly  Neurology : Nancy Bowles Gavin NP  SOTELO frequency : used to be yearly, has had 2 in last year (severe) and more mild ones   MRI unremarkable       Chronic back pain   Rx : Amitriptyline 10 mg, Lyrica 100 mg, Naproxen BID  Prev Tx : CHEIKH 8/23  Spine surgery in 2001 : laminectomy of L4   Pain mgt : MD Jay Jay     Restless leg syndrome   Rx-Requip 0.50 mg QHS (effective)     Chronic allergic rhinitis   Rx-Nasacort, Claritin   Septoplasty 2017    Essential hypertension   Rx-losartan 50 mg   No home check     Family history colon cancer (Mother dx @ approx 80)  Up-to-date on colon cancer screening   Asymptomatic  Follow-up recommended every 5 years   previous colonoscopy 03/02/2020     Atrophic vaginitis   Rx-Estrace vaginal cream (effective)    Obstructive sleep apnea  No CPAP   "Old diagnosis" / Repeat sleep study was mild CARYL, no need for CPAP   ++ Snoring, denies fatigue      Nicotine dependence   In remission, quit in 1973     Senile osteoporosis  Previous DEXA bone scan 03/01/2021   Rx-alendronate weekly  No recent fractures      Anxiety, depression  med-Celexa 10 mg (effective)       Medications:  Current Outpatient Medications on File Prior to Visit   Medication Sig Dispense Refill    alendronate (FOSAMAX) 70 MG tablet TAKE 1 TABLET(70 MG) BY MOUTH EVERY 7 DAYS 12 tablet 3    amitriptyline (ELAVIL) 10 MG tablet TAKE 1 TABLET(10 MG) BY MOUTH EVERY EVENING 30 tablet 6    citalopram (CELEXA) 10 MG tablet Take 1 tablet (10 mg total) by mouth " once daily. 90 tablet 3    ergocalciferol (ERGOCALCIFEROL) 50,000 unit Cap TAKE 1 CAPSULE BY MOUTH EVERY 7 DAYS 12 capsule 4    losartan (COZAAR) 50 MG tablet TAKE 1 TABLET(50 MG) BY MOUTH EVERY DAY 90 tablet 1    pregabalin (LYRICA) 100 MG capsule Take 1 capsule (100 mg total) by mouth 2 (two) times daily. 60 capsule 2    RESTASIS 0.05 % ophthalmic emulsion Place 1 drop into both eyes 2 (two) times daily.      rOPINIRole (REQUIP) 0.25 MG tablet TAKE 2 TABLETS BY MOUTH EVERY EVENING 180 tablet 3    butalbital-acetaminophen-caffeine -40 mg (FIORICET, ESGIC) -40 mg per tablet 1 tab PO PRN once daily for migraine. No more than 10 tab per month. (Patient not taking: Reported on 8/2/2024) 10 tablet 0    HYDROcodone-acetaminophen (NORCO) 5-325 mg per tablet Take 1 tablet by mouth every 8 (eight) hours as needed for Pain. (Patient not taking: Reported on 8/2/2024) 15 tablet 0    ondansetron (ZOFRAN-ODT) 4 MG TbDL DISSOLVE 1 TABLET(4 MG) ON THE TONGUE EVERY 6 HOURS AS NEEDED FOR NAUSEA (Patient not taking: Reported on 8/2/2024) 30 tablet 11    rimegepant (NURTEC) 75 mg odt Take 1 tablet (75 mg total) by mouth daily as needed. Place ODT tablet on the tongue; alternatively the ODT tablet may be placed under the tongue (Patient not taking: Reported on 8/2/2024) 8 tablet 11    triamcinolone (NASACORT) 55 mcg nasal inhaler 1 spray by Nasal route once daily. (Patient not taking: Reported on 8/2/2024)      [DISCONTINUED] methocarbamoL (ROBAXIN) 500 MG Tab Take 1 tablet (500 mg total) by mouth 4 (four) times daily as needed. 120 tablet 0    [DISCONTINUED] naproxen (NAPROSYN) 250 MG tablet Take 500 mg by mouth 2 (two) times daily with meals.       Current Facility-Administered Medications on File Prior to Visit   Medication Dose Route Frequency Provider Last Rate Last Admin    cyanocobalamin injection 1,000 mcg  1,000 mcg Intramuscular Q28 Days Td Montana MD   1,000 mcg at 08/01/24 0974       Review of  "Systems:  Review of Systems   All other systems reviewed and are negative.      Past Medical History:  Past Medical History:   Diagnosis Date    Anxiety     Arthritis     Chronic allergic rhinitis 11/19/2012    Depression     Essential hypertension 9/14/2020    Herniated lumbar intervertebral disc     L3    Migraine     painless with numbness       Objective:    Vitals:  Vitals:    08/02/24 0936   BP: 132/62   Pulse: 68   SpO2: 97%   Weight: 62.1 kg (136 lb 14.5 oz)   Height: 5' 4" (1.626 m)   PainSc:   5   PainLoc: Back       Physical Exam  Vitals and nursing note reviewed.   Constitutional:       General: She is not in acute distress.  HENT:      Head: Normocephalic and atraumatic.   Eyes:      General: No scleral icterus.     Conjunctiva/sclera: Conjunctivae normal.   Cardiovascular:      Rate and Rhythm: Normal rate and regular rhythm.      Pulses:           Dorsalis pedis pulses are 2+ on the left side.   Pulmonary:      Effort: Pulmonary effort is normal. No respiratory distress.      Breath sounds: Normal breath sounds.   Musculoskeletal:      Right lower leg: No edema.      Left lower leg: No edema.        Feet:    Feet:      Left foot:      Skin integrity: Skin integrity normal.      Toenail Condition: Left toenails are abnormally thick and ingrown.      Comments:  Medial aspect of toenail on 1st toe of left foot appears ingrown.  Thickening also noted to toenail,  surrounding nail bed is without erythema, exudate, tenderness or inflammation.  Skin:     General: Skin is warm and dry.   Neurological:      Mental Status: She is alert and oriented to person, place, and time.   Psychiatric:         Mood and Affect: Mood normal.         Behavior: Behavior normal.         Thought Content: Thought content normal.         Data:  CMP  Sodium   Date Value Ref Range Status   11/28/2023 135 (L) 136 - 145 mmol/L Final     Potassium   Date Value Ref Range Status   11/28/2023 5.1 3.5 - 5.1 mmol/L Final     Chloride   Date " Value Ref Range Status   11/28/2023 101 95 - 110 mmol/L Final     CO2   Date Value Ref Range Status   11/28/2023 24 23 - 29 mmol/L Final     Glucose   Date Value Ref Range Status   11/28/2023 89 70 - 110 mg/dL Final     BUN   Date Value Ref Range Status   11/28/2023 11 8 - 23 mg/dL Final     Creatinine   Date Value Ref Range Status   11/28/2023 0.8 0.5 - 1.4 mg/dL Final     Calcium   Date Value Ref Range Status   11/28/2023 8.8 8.7 - 10.5 mg/dL Final     Total Protein   Date Value Ref Range Status   11/28/2023 6.6 6.0 - 8.4 g/dL Final     Albumin   Date Value Ref Range Status   11/28/2023 3.8 3.5 - 5.2 g/dL Final     Total Bilirubin   Date Value Ref Range Status   11/28/2023 0.6 0.1 - 1.0 mg/dL Final     Comment:     For infants and newborns, interpretation of results should be based  on gestational age, weight and in agreement with clinical  observations.    Premature Infant recommended reference ranges:  Up to 24 hours.............<8.0 mg/dL  Up to 48 hours............<12.0 mg/dL  3-5 days..................<15.0 mg/dL  6-29 days.................<15.0 mg/dL       Alkaline Phosphatase   Date Value Ref Range Status   11/28/2023 52 (L) 55 - 135 U/L Final     AST   Date Value Ref Range Status   11/28/2023 21 10 - 40 U/L Final     ALT   Date Value Ref Range Status   11/28/2023 15 10 - 44 U/L Final     Anion Gap   Date Value Ref Range Status   11/28/2023 10 8 - 16 mmol/L Final     eGFR   Date Value Ref Range Status   11/28/2023 >60.0 >60 mL/min/1.73 m^2 Final    .      Medical history reviewed, Medications reconciled.          Mckayla Levy, FNP-C  Family Medicine

## 2024-08-29 ENCOUNTER — CLINICAL SUPPORT (OUTPATIENT)
Dept: FAMILY MEDICINE | Facility: CLINIC | Age: 74
End: 2024-08-29
Payer: MEDICARE

## 2024-08-29 DIAGNOSIS — E53.8 B12 DEFICIENCY: Primary | ICD-10-CM

## 2024-08-29 PROCEDURE — 96372 THER/PROPH/DIAG INJ SC/IM: CPT | Mod: PBBFAC,PN

## 2024-08-29 PROCEDURE — 99999PBSHW PR PBB SHADOW TECHNICAL ONLY FILED TO HB: Mod: PBBFAC,,,

## 2024-08-29 RX ADMIN — CYANOCOBALAMIN 1000 MCG: 1000 INJECTION, SOLUTION INTRAMUSCULAR at 09:08

## 2024-09-05 ENCOUNTER — OFFICE VISIT (OUTPATIENT)
Dept: PSYCHIATRY | Facility: CLINIC | Age: 74
End: 2024-09-05
Payer: MEDICARE

## 2024-09-05 ENCOUNTER — PATIENT MESSAGE (OUTPATIENT)
Dept: FAMILY MEDICINE | Facility: CLINIC | Age: 74
End: 2024-09-05
Payer: MEDICARE

## 2024-09-05 DIAGNOSIS — F43.22 ADJUSTMENT DISORDER WITH ANXIOUS MOOD: Primary | ICD-10-CM

## 2024-09-05 PROCEDURE — 90791 PSYCH DIAGNOSTIC EVALUATION: CPT | Mod: ,,,
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
## 2024-09-16 ENCOUNTER — OFFICE VISIT (OUTPATIENT)
Dept: PODIATRY | Facility: CLINIC | Age: 74
End: 2024-09-16
Payer: MEDICARE

## 2024-09-16 VITALS — WEIGHT: 136.88 LBS | HEIGHT: 64 IN | BODY MASS INDEX: 23.37 KG/M2

## 2024-09-16 DIAGNOSIS — L60.0 INGROWN LEFT BIG TOENAIL: ICD-10-CM

## 2024-09-16 DIAGNOSIS — M79.675 PAIN OF LEFT GREAT TOE: Primary | ICD-10-CM

## 2024-09-16 PROCEDURE — 11750 EXCISION NAIL&NAIL MATRIX: CPT | Mod: PBBFAC,PO | Performed by: STUDENT IN AN ORGANIZED HEALTH CARE EDUCATION/TRAINING PROGRAM

## 2024-09-16 PROCEDURE — 99213 OFFICE O/P EST LOW 20 MIN: CPT | Mod: PBBFAC,PO,25 | Performed by: STUDENT IN AN ORGANIZED HEALTH CARE EDUCATION/TRAINING PROGRAM

## 2024-09-16 PROCEDURE — 99999 PR PBB SHADOW E&M-EST. PATIENT-LVL III: CPT | Mod: PBBFAC,,, | Performed by: STUDENT IN AN ORGANIZED HEALTH CARE EDUCATION/TRAINING PROGRAM

## 2024-09-16 PROCEDURE — 99204 OFFICE O/P NEW MOD 45 MIN: CPT | Mod: 25,S$PBB,, | Performed by: STUDENT IN AN ORGANIZED HEALTH CARE EDUCATION/TRAINING PROGRAM

## 2024-09-25 ENCOUNTER — TELEPHONE (OUTPATIENT)
Dept: FAMILY MEDICINE | Facility: CLINIC | Age: 74
End: 2024-09-25
Payer: MEDICARE

## 2024-09-25 NOTE — TELEPHONE ENCOUNTER
----- Message from Cristina Samano sent at 9/25/2024  3:42 PM CDT -----  Regarding: advise  Contact: patient  Type: Needs Medical Advice  Who Called:  patient   Symptoms (please be specific):    How long has patient had these symptoms:    Pharmacy name and phone #:    Best Call Back Number: [phone]    Additional Information: hey pt is returning ur call are u available MRN: [id number] LEONA CAMPOS

## 2024-09-25 NOTE — TELEPHONE ENCOUNTER
Tried to reach pt to see what injection she needed scheduled. No answer. Left message for pt to call back.

## 2024-09-25 NOTE — TELEPHONE ENCOUNTER
----- Message from Amanda Blas sent at 9/25/2024  2:01 PM CDT -----  Type:  Patient Returning Call    Who Called:  pt  Who Left Message for Patient:  Chayo  Does the patient know what this is regarding?:  yes  Best Call Back Number:  [phone]  Additional Information:  Pt is returning a call in regards to a missed call from the office. Please call back and advise. Thanks!

## 2024-09-25 NOTE — TELEPHONE ENCOUNTER
----- Message from Kacie Reid sent at 9/25/2024  9:30 AM CDT -----  Regarding: Reschedule needed  Name of Who is Calling:Yolie           What is the request in detail:Pt is requesting a call back to schedule her upcoming injection.            Can the clinic reply by [facility]:No            What Number to Call Back if not in [facility]: [phone]

## 2024-09-30 ENCOUNTER — OFFICE VISIT (OUTPATIENT)
Dept: PSYCHIATRY | Facility: CLINIC | Age: 74
End: 2024-09-30
Payer: MEDICARE

## 2024-09-30 DIAGNOSIS — F43.23 ADJUSTMENT DISORDER WITH MIXED ANXIETY AND DEPRESSED MOOD: Primary | ICD-10-CM

## 2024-09-30 PROCEDURE — 90834 PSYTX W PT 45 MINUTES: CPT | Mod: ,,,

## 2024-09-30 NOTE — PROGRESS NOTES
Individual Psychotherapy (THANG/ANAW/PhD)  Yolie PANIAGUA Kosta,  9/30/2024    Site:  [facility]         Therapeutic Intervention: Met with patient for individual psychotherapy.    Chief complaint/reason for encounter: depression and caregiver stress       Interval history and content of current session: Patient shared continued challenges with her husbands declining memory and cognitive functioning. She noted concern about being able to attend upcoming trips. Identified alternatives to leaving her  home alone including having his son or her children check in. Also discussed the neighbor being an potential option. Patient also encouraged to utilize calendar at home to note upcoming trips, appointments, etc for patient's  to refer to. Patient was encouraged to set limits and boundaries on her protected time in effort to decrease frustration.      Treatment plan:  Target symptoms: adjustment, frustration  Why chosen therapy is appropriate versus another modality: relevant to diagnosis, patient responds to this modality, evidence based practice  Outcome monitoring methods: self-report, checklist/rating scale  Therapeutic intervention type: insight oriented psychotherapy, behavior modifying psychotherapy, supportive psychotherapy    Risk parameters:  Patient reports no suicidal ideation  Patient reports no homicidal ideation  Patient reports no self-injurious behavior  Patient reports no violent behavior    Verbal deficits: None    Patient's response to intervention:  The patient's response to intervention is accepting.    Progress toward goals and other mental status changes:  The patient's progress toward goals is good.    Patient advised to call [phone] or present the the nearest ED if they experience suicidal or homicidal ideation, plan or intent.          8/28/2023    11:52 AM 8/2/2023     1:38 PM   Results of the PHQ8   Little interest or pleasure in doing things More than half the days Several days    Feeling down, depressed, or hopeless Several days Several days   Trouble falling or staying asleep, or sleeping too much Nearly every day More than half the days   Feeling tired or having little energy Nearly every day More than half the days   Poor appetite or overeating Not at all Not at all   Feeling bad about yourself - or that you are a failure or have let yourself or your family down More than half the days More than half the days   Trouble concentrating on things, such as reading the newspaper or watching television Nearly every day Nearly every day   Moving or speaking so slowly that other people could have noticed. Or the opposite - being so fidgety or restless that you have been moving around a lot more than usual Not at all Not at all   Total Score  14 11           9/30/2024     9:48 AM 9/5/2024     9:13 AM 8/28/2023    11:51 AM   GAD7   1. Feeling nervous, anxious, or on edge? 0 0 0    2. Not being able to stop or control worrying? 1 0 0    3. Worrying too much about different things? 1 0 1    4. Trouble relaxing? 0 0 0    5. Being so restless that it is hard to sit still? 0 0 0    6. Becoming easily annoyed or irritable? 2 2 2    7. Feeling afraid as if something awful might happen? 0 0 0    8. If you checked off any problems, how difficult have these problems made it for you to do your work, take care of things at home, or get along with other people? 1 1    NICKOLAS-7 Score 4 2 3       Patient-reported       Diagnosis:     ICD-10-CM ICD-9-CM   1. Adjustment disorder with mixed anxiety and depressed mood  F43.23 309.28       Plan: Pt plans to continue individual psychotherapy    Return to clinic: as scheduled    Length of Service (minutes): 45      Liana Bee PsyD

## 2024-10-03 ENCOUNTER — CLINICAL SUPPORT (OUTPATIENT)
Dept: FAMILY MEDICINE | Facility: CLINIC | Age: 74
End: 2024-10-03
Payer: MEDICARE

## 2024-10-03 ENCOUNTER — OFFICE VISIT (OUTPATIENT)
Dept: PODIATRY | Facility: CLINIC | Age: 74
End: 2024-10-03
Payer: MEDICARE

## 2024-10-03 VITALS — WEIGHT: 136.88 LBS | BODY MASS INDEX: 23.37 KG/M2 | HEIGHT: 64 IN

## 2024-10-03 DIAGNOSIS — E53.8 B12 DEFICIENCY: Primary | ICD-10-CM

## 2024-10-03 DIAGNOSIS — Z98.890 POST-OPERATIVE STATE: Primary | ICD-10-CM

## 2024-10-03 PROCEDURE — 99213 OFFICE O/P EST LOW 20 MIN: CPT | Mod: PBBFAC,PO | Performed by: STUDENT IN AN ORGANIZED HEALTH CARE EDUCATION/TRAINING PROGRAM

## 2024-10-03 PROCEDURE — 99999PBSHW PR PBB SHADOW TECHNICAL ONLY FILED TO HB: Mod: PBBFAC,,,

## 2024-10-03 PROCEDURE — 96372 THER/PROPH/DIAG INJ SC/IM: CPT | Mod: PBBFAC,PN

## 2024-10-03 PROCEDURE — 99999 PR PBB SHADOW E&M-EST. PATIENT-LVL III: CPT | Mod: PBBFAC,,, | Performed by: STUDENT IN AN ORGANIZED HEALTH CARE EDUCATION/TRAINING PROGRAM

## 2024-10-03 PROCEDURE — 99212 OFFICE O/P EST SF 10 MIN: CPT | Mod: S$PBB,,, | Performed by: STUDENT IN AN ORGANIZED HEALTH CARE EDUCATION/TRAINING PROGRAM

## 2024-10-03 RX ADMIN — CYANOCOBALAMIN 1000 MCG: 1000 INJECTION, SOLUTION INTRAMUSCULAR at 08:10

## 2024-10-03 NOTE — PROGRESS NOTES
Patient returns today s/p PNA L great. DOS (9/16/24).    Patient is doing well.    Pain is well controlled.    There are no signs of infection.     Continue as previously outlined. Avoid lotions and pedicures for another 2 weeks.    F/u as needed.

## 2024-10-09 ENCOUNTER — PATIENT MESSAGE (OUTPATIENT)
Dept: PSYCHIATRY | Facility: CLINIC | Age: 74
End: 2024-10-09
Payer: MEDICARE

## 2024-10-10 ENCOUNTER — PATIENT MESSAGE (OUTPATIENT)
Dept: FAMILY MEDICINE | Facility: CLINIC | Age: 74
End: 2024-10-10
Payer: MEDICARE

## 2024-10-10 DIAGNOSIS — F43.21 ADJUSTMENT DISORDER WITH DEPRESSED MOOD: Primary | ICD-10-CM

## 2024-10-10 RX ORDER — CITALOPRAM 20 MG/1
20 TABLET, FILM COATED ORAL DAILY
Qty: 30 TABLET | Refills: 11 | Status: SHIPPED | OUTPATIENT
Start: 2024-10-10 | End: 2025-10-10

## 2024-10-22 ENCOUNTER — PATIENT MESSAGE (OUTPATIENT)
Dept: PAIN MEDICINE | Facility: CLINIC | Age: 74
End: 2024-10-22
Payer: MEDICARE

## 2024-10-24 NOTE — PATIENT INSTRUCTIONS
Consider ropinorole and zyrtec as possible contributors to dry eyes.     Check blood pressure weekly. Some in am and some in PM.    [Alert] : alert [Normal Voice/Communication] : normal voice/communication [Healthy Appearing] : healthy appearing [No Acute Distress] : no acute distress [Sclera] : the sclera and conjunctiva were normal [Hearing Threshold Finger Rub Not [place]] : hearing was normal [Oropharynx] : the oropharynx was normal [Normal Appearance] : the appearance of the neck was normal [No Respiratory Distress] : no respiratory distress [No Acc Muscle Use] : no accessory muscle use [Respiration, Rhythm And Depth] : normal respiratory rhythm and effort [Heart Rate And Rhythm] : heart rate was normal and rhythm regular [None] : no edema [Abdomen Tenderness] : non-tender [No Masses] : no abdominal mass palpated [Abdomen Soft] : soft [] : no hepatosplenomegaly [Abnormal Walk] : normal gait [Oriented To Time, Place, And Person] : oriented to person, place, and time

## 2024-10-25 RX ORDER — CITALOPRAM 10 MG/1
10 TABLET ORAL
Qty: 90 TABLET | Refills: 3 | OUTPATIENT
Start: 2024-10-25

## 2024-10-25 RX ORDER — LOSARTAN POTASSIUM 50 MG/1
TABLET ORAL
Qty: 90 TABLET | Refills: 1 | Status: SHIPPED | OUTPATIENT
Start: 2024-10-25

## 2024-10-25 RX ORDER — ROPINIROLE 0.25 MG/1
TABLET, FILM COATED ORAL
Qty: 180 TABLET | Refills: 3 | Status: SHIPPED | OUTPATIENT
Start: 2024-10-25

## 2024-10-25 NOTE — TELEPHONE ENCOUNTER
Care Due:                  Date            Visit Type   Department     Provider  --------------------------------------------------------------------------------                                MYCHART                              FOLLOWUP/OF  [facility]  Last Visit: 11-      FICE VISIT   MEDICINE       Td Montana                              EP -                              PRIMARY      [facility] FAM[facility]  Next Visit: 11-      CARE ([facility])   MEDICINE       Td Montana                                                            Last  Test          Frequency    Reason                     Performed    Due Date  --------------------------------------------------------------------------------    CMP.........  12 months..  alendronate,               11- 11-                             ergocalciferol, losartan.    Mg Level....  12 months..  alendronate..............  Not Found    Overdue    Phosphate...  12 months..  alendronate..............  Not Found    Overdue    Vitamin D...  12 months..  ergocalciferol...........  Not Found    Overdue    [employer] Embedded Care Due Messages. Reference number: [id number].   10/25/2024 5:59:46 AM CDT

## 2024-10-29 ENCOUNTER — LAB VISIT (OUTPATIENT)
Dept: LAB | Facility: HOSPITAL | Age: 74
End: 2024-10-29
Payer: MEDICARE

## 2024-10-29 DIAGNOSIS — E55.9 VITAMIN D DEFICIENCY: ICD-10-CM

## 2024-10-29 DIAGNOSIS — Z00.00 PREVENTATIVE HEALTH CARE: ICD-10-CM

## 2024-10-29 DIAGNOSIS — I10 ESSENTIAL HYPERTENSION: ICD-10-CM

## 2024-10-29 DIAGNOSIS — Z79.899 DRUG THERAPY: ICD-10-CM

## 2024-10-29 LAB
25(OH)D3+25(OH)D2 SERPL-MCNC: 40 NG/ML (ref 30–96)
ALBUMIN SERPL BCP-MCNC: 4 G/DL (ref 3.5–5.2)
ALP SERPL-CCNC: 66 U/L (ref 40–150)
ALT SERPL W/O P-5'-P-CCNC: 10 U/L (ref 10–44)
ANION GAP SERPL CALC-SCNC: 10 MMOL/L (ref 8–16)
AST SERPL-CCNC: 20 U/L (ref 10–40)
BILIRUB SERPL-MCNC: 0.3 MG/DL (ref 0.1–1)
BUN SERPL-MCNC: 14 MG/DL (ref 8–23)
CALCIUM SERPL-MCNC: 9.2 MG/DL (ref 8.7–10.5)
CHLORIDE SERPL-SCNC: 101 MMOL/L (ref 95–110)
CHOLEST SERPL-MCNC: 196 MG/DL (ref 120–199)
CHOLEST/HDLC SERPL: 4.2 {RATIO} (ref 2–5)
CO2 SERPL-SCNC: 25 MMOL/L (ref 23–29)
CREAT SERPL-MCNC: 0.9 MG/DL (ref 0.5–1.4)
ERYTHROCYTE [DISTWIDTH] IN BLOOD BY AUTOMATED COUNT: 12 % (ref 11.5–14.5)
EST. GFR  (NO RACE VARIABLE): >60 ML/MIN/1.73 M^2
ESTIMATED AVG GLUCOSE: 120 MG/DL (ref 68–131)
GLUCOSE SERPL-MCNC: 173 MG/DL (ref 70–110)
HBA1C MFR BLD: 5.8 % (ref 4–5.6)
HCT VFR BLD AUTO: 38.5 % (ref 37–48.5)
HDLC SERPL-MCNC: 47 MG/DL (ref 40–75)
HDLC SERPL: 24 % (ref 20–50)
HGB BLD-MCNC: 12.6 G/DL (ref 12–16)
LDLC SERPL CALC-MCNC: 113.8 MG/DL (ref 63–159)
MCH RBC QN AUTO: 30.4 PG (ref 27–31)
MCHC RBC AUTO-ENTMCNC: 32.7 G/DL (ref 32–36)
MCV RBC AUTO: 93 FL (ref 82–98)
NONHDLC SERPL-MCNC: 149 MG/DL
PLATELET # BLD AUTO: 329 K/UL (ref 150–450)
PMV BLD AUTO: 10 FL (ref 9.2–12.9)
POTASSIUM SERPL-SCNC: 4.3 MMOL/L (ref 3.5–5.1)
PROT SERPL-MCNC: 6.9 G/DL (ref 6–8.4)
RBC # BLD AUTO: 4.14 M/UL (ref 4–5.4)
SODIUM SERPL-SCNC: 136 MMOL/L (ref 136–145)
TRIGL SERPL-MCNC: 176 MG/DL (ref 30–150)
TSH SERPL DL<=0.005 MIU/L-ACNC: 0.8 UIU/ML (ref 0.4–4)
WBC # BLD AUTO: 5.24 K/UL (ref 3.9–12.7)

## 2024-10-29 PROCEDURE — 80061 LIPID PANEL: CPT | Performed by: NURSE PRACTITIONER

## 2024-10-29 PROCEDURE — 83036 HEMOGLOBIN GLYCOSYLATED A1C: CPT | Performed by: NURSE PRACTITIONER

## 2024-10-29 PROCEDURE — 84443 ASSAY THYROID STIM HORMONE: CPT | Performed by: NURSE PRACTITIONER

## 2024-10-29 PROCEDURE — 80053 COMPREHEN METABOLIC PANEL: CPT | Performed by: NURSE PRACTITIONER

## 2024-10-29 PROCEDURE — 36415 COLL VENOUS BLD VENIPUNCTURE: CPT | Mod: PN | Performed by: NURSE PRACTITIONER

## 2024-10-29 PROCEDURE — 82306 VITAMIN D 25 HYDROXY: CPT | Performed by: NURSE PRACTITIONER

## 2024-10-29 PROCEDURE — 85027 COMPLETE CBC AUTOMATED: CPT | Performed by: NURSE PRACTITIONER

## 2024-11-05 ENCOUNTER — TELEPHONE (OUTPATIENT)
Dept: RHEUMATOLOGY | Facility: CLINIC | Age: 74
End: 2024-11-05
Payer: MEDICARE

## 2024-11-05 ENCOUNTER — OFFICE VISIT (OUTPATIENT)
Dept: FAMILY MEDICINE | Facility: CLINIC | Age: 74
End: 2024-11-05
Payer: MEDICARE

## 2024-11-05 ENCOUNTER — HOSPITAL ENCOUNTER (OUTPATIENT)
Dept: RADIOLOGY | Facility: HOSPITAL | Age: 74
Discharge: HOME | End: 2024-11-05
Attending: FAMILY MEDICINE
Payer: MEDICARE

## 2024-11-05 VITALS
OXYGEN SATURATION: 97 % | BODY MASS INDEX: 23.17 KG/M2 | HEART RATE: 76 BPM | SYSTOLIC BLOOD PRESSURE: 132 MMHG | WEIGHT: 135.69 LBS | DIASTOLIC BLOOD PRESSURE: 70 MMHG | HEIGHT: 64 IN

## 2024-11-05 DIAGNOSIS — Z23 NEED FOR VACCINATION: ICD-10-CM

## 2024-11-05 DIAGNOSIS — M81.0 SENILE OSTEOPOROSIS: ICD-10-CM

## 2024-11-05 DIAGNOSIS — R23.2 HOT FLASHES: ICD-10-CM

## 2024-11-05 DIAGNOSIS — Z23 IMMUNIZATION DUE: Primary | ICD-10-CM

## 2024-11-05 DIAGNOSIS — G60.9 IDIOPATHIC PERIPHERAL NEUROPATHY: ICD-10-CM

## 2024-11-05 DIAGNOSIS — R05.3 CHRONIC COUGH: ICD-10-CM

## 2024-11-05 DIAGNOSIS — G43.109 MIGRAINE EQUIVALENT SYNDROME: ICD-10-CM

## 2024-11-05 DIAGNOSIS — F33.42 RECURRENT MAJOR DEPRESSIVE DISORDER, IN FULL REMISSION: ICD-10-CM

## 2024-11-05 DIAGNOSIS — G47.33 OSA (OBSTRUCTIVE SLEEP APNEA): ICD-10-CM

## 2024-11-05 DIAGNOSIS — I10 ESSENTIAL HYPERTENSION: ICD-10-CM

## 2024-11-05 DIAGNOSIS — E53.8 B12 DEFICIENCY: ICD-10-CM

## 2024-11-05 PROCEDURE — 99999 PR PBB SHADOW E&M-EST. PATIENT-LVL IV: CPT | Mod: PBBFAC,,, | Performed by: FAMILY MEDICINE

## 2024-11-05 PROCEDURE — 99999PBSHW PR PBB SHADOW TECHNICAL ONLY FILED TO HB: Mod: PBBFAC,,,

## 2024-11-05 PROCEDURE — 96372 THER/PROPH/DIAG INJ SC/IM: CPT | Mod: PBBFAC,PN

## 2024-11-05 PROCEDURE — 90653 IIV ADJUVANT VACCINE IM: CPT | Mod: PBBFAC,PN

## 2024-11-05 PROCEDURE — 71046 X-RAY EXAM CHEST 2 VIEWS: CPT | Mod: TC,PN

## 2024-11-05 PROCEDURE — 99999PBSHW FLU VACCINE - ADJUVANTED: Mod: PBBFAC,,,

## 2024-11-05 PROCEDURE — 71046 X-RAY EXAM CHEST 2 VIEWS: CPT | Mod: 26,,, | Performed by: RADIOLOGY

## 2024-11-05 PROCEDURE — 99214 OFFICE O/P EST MOD 30 MIN: CPT | Mod: PBBFAC,PN | Performed by: FAMILY MEDICINE

## 2024-11-05 RX ORDER — CYANOCOBALAMIN 1000 UG/ML
1000 INJECTION, SOLUTION INTRAMUSCULAR; SUBCUTANEOUS
Status: SHIPPED | OUTPATIENT
Start: 2024-11-05 | End: 2025-10-07

## 2024-11-05 RX ORDER — PANTOPRAZOLE SODIUM 40 MG/1
40 TABLET, DELAYED RELEASE ORAL
Qty: 90 TABLET | OUTPATIENT
Start: 2024-11-05

## 2024-11-05 RX ORDER — PANTOPRAZOLE SODIUM 40 MG/1
40 TABLET, DELAYED RELEASE ORAL DAILY
Qty: 60 TABLET | Refills: 0 | Status: SHIPPED | OUTPATIENT
Start: 2024-11-05 | End: 2025-11-05

## 2024-11-05 RX ADMIN — CYANOCOBALAMIN 1000 MCG: 1000 INJECTION, SOLUTION INTRAMUSCULAR at 10:11

## 2024-11-05 NOTE — TELEPHONE ENCOUNTER
No care due was identified.  [facility] Embedded Care Due Messages. Reference number: [id number].   11/05/2024 11:15:59 AM CST   Strong peripheral pulses

## 2024-11-05 NOTE — PROGRESS NOTES
THIS DOCUMENT WAS MADE IN PART WITH VOICE RECOGNITION SOFTWARE.  OCCASIONALLY THIS SOFTWARE WILL MISINTERPRET WORDS OR PHRASES.    Assessment and Plan:    1. Immunization due        2. Migraine equivalent syndrome        3. Idiopathic peripheral neuropathy        4. B12 deficiency  cyanocobalamin injection 1,000 mcg      5. Recurrent major depressive disorder, in full remission        6. CARYL (obstructive sleep apnea)        7. Senile osteoporosis        8. Essential hypertension        9. Hot flashes  pantoprazole (PROTONIX) 40 MG tablet    X-Ray Chest PA And Lateral      10. Chronic cough  X-Ray Chest PA And Lateral      11. Need for vaccination  Influenza - Trivalent (Adjuvanted)            PLAN       Assessment & Plan    FATIGUE AND HYPERSOMNIA:   Explained various potential causes of fatigue and hypersomnia.    PRE-DIABETES:   Discussed pre-diabetes status and low risk of progression to diabetes at patient's age.    HOT FLASHES:   Informed about potential causes of hot flashes in post-menopausal women.    GASTROESOPHAGEAL REFLUX DISEASE (GERD):   Educated on the relationship between reflux and symptoms like hot flashes and chronic cough.   Started pantoprazole for 2 months to address potential reflux contributing to hot flashes and cough.    HYPERTENSION:   Yolie to check blood pressure at home 1 time per week.   Continued losartan for blood pressure management.    NEUROPATHY:   Continued Lyrica 100 mg twice daily for neuropathy.    MIGRAINE:   Continued amitriptyline for migraine prevention.   Continued Fioricet as needed for migraine .    RESTLESS LEG SYNDROME:   Continued ropinirole (Requip) 0.5 mg at night for restless leg syndrome.    OSTEOPOROSIS:   Continued weekly alendronate for osteoporosis.   Referred to Mariam Balderrama in the osteoporosis clinic at [facility] in [place] for comprehensive osteoporosis management.    DEPRESSION/ANXIETY:   Continued citalopram 20 mg daily for  depression/anxiety.    MEDICATIONS/SUPPLEMENTS:   Continued daily scheduled magnesium supplementation.   Administered B12 injection.    FLU VACCINATION:   Administered flu vaccine.    LABS:   Chest XR ordered.    FOLLOW UP:   Follow up in 1 month to reassess chronic cough, hot flashes, and energy levels.   Contact office if symptoms worsen or new symptoms develop (e.g., chest pain, blood in stool/urine, rashes).             ______________________________________________________________________  Subjective:    Chief Complaint:  Chief Complaint   Patient presents with    Annual Exam        HPI:  Yolie PANIAGUA is a 74 y.o. year old             History of Present Illness    CHIEF COMPLAINT:  Yolie presents today for a checkup visit and B12 injection.    MEDICATIONS:  She takes Lyrica 100 mg twice daily for neuropathy, reporting effective symptom relief. For migraines, she uses amitriptyline for prevention and Fioricet as needed for abortive therapy, having switched from Ubrelvy due to ineffectiveness. She takes magnesium supplements daily for migraine management. Ropinirole 0.5 mg nightly has improved her restless leg syndrome symptoms. Losartan is taken for blood pressure management. She takes alendronate weekly for osteoporosis. Her citalopram dosage was recently increased, improving control of anxiety and depression symptoms.    SLEEP DISORDERS:  She has a history of sleep apnea, previously diagnosed as moderate but later reassessed as mild. She is not currently using a CPAP device. She reports sleeping excessively, requiring about 11 hours of sleep per night, with difficulty waking up and daytime fatigue. She has been avoiding nighttime events for the past couple of years due to going to bed early and has difficulty driving at night.    MENTAL HEALTH:  She sees therapist Dr. Bee for mental health support. She denies experiencing any uncontrolled depression or anxiety symptoms. She reports dealing with her 's  aging issues, as he is almost 91 years old.    PHYSICAL ACTIVITY:  She engages in Pilates twice weekly and is currently in physical therapy for her back, likely to be discharged soon. She reports her back is doing well.    GASTROINTESTINAL ISSUES:  She experiences heartburn symptoms, currently managing with OTC medication. She also reports a chronic cough attributed to acid reflux, with frequent throat raspiness and difficulty clearing it. She denies coughing up any substances.    MENOPAUSAL SYMPTOMS:  She reports experiencing intense hot flashes, which began approximately two months ago. These occur frequently, ranging from every day to multiple times per day, and are described as very uncomfortable. She notes these symptoms had been absent for years before their recent recurrence.    FAMILY HISTORY:  She reports a family history of colon cancer. Her last colonoscopy was in March 2020, with a recommendation for follow-up every five years. She denies any current symptoms related to colon cancer.    SOCIAL HISTORY:  She has a history of social smoking, primarily during college years and for a few years after, only when going out. She denies being a regular or heavy smoker during this time.      ROS:  ROS as indicated in HPI.                 Idiopathic peripheral neuropathy   Med- Lyrica 100 mg BID (effective)    History of migraine   Prophylaxis- Amitriptyline 10 mg + OTC Magnesium   Abortive - Fioricet (avg about one per month)  Prev rx : Ubrevly   Neurology : Nancy Alonso NP  SOTELO frequency : used to be yearly, has had 2 in last year (severe) and more mild ones   MRI unremarkable       Chronic back pain   Rx : Amitriptyline 10 mg, Lyrica 100 mg, Naproxen BID  Prev Tx : CHEIKH 8/23  Spine surgery in 2001 : laminectomy of L4   Pain mgt : MD Jay Jay     Restless leg syndrome   Rx-Requip 0.50 mg QHS (effective)     Chronic allergic rhinitis   Rx-Nasacort, Claritin   Septoplasty 2017    Essential hypertension  "  Rx-losartan 50 mg   No home check     Family history colon cancer (Mother dx @ approx 80)  Up-to-date on colon cancer screening   Asymptomatic  Follow-up recommended every 5 years   previous colonoscopy 03/02/2020     Atrophic vaginitis   Prev Rx-Estrace vaginal cream (effective)    Obstructive sleep apnea  No CPAP   "Old diagnosis" / Repeat sleep study was mild CARYL, no need for CPAP   ++ Snoring, denies fatigue      Nicotine dependence   In remission, quit in 1973     Senile osteoporosis  Previous DEXA bone scan 4/24  Rx-alendronate weekly  No recent fractures      Anxiety, depression  med-Celexa 20 mg (effective)     Glucose intolerance  Prev A1c : 5.8   Rx None         Past Medical History:  Past Medical History:   Diagnosis Date    Anxiety     Arthritis     Chronic allergic rhinitis 11/19/2012    Depression     Essential hypertension 9/14/2020    Herniated lumbar intervertebral disc     L3    Migraine     painless with numbness       Past Surgical History:  Past Surgical History:   Procedure Laterality Date    COLONOSCOPY      COLONOSCOPY N/A 03/02/2020    Procedure: COLONOSCOPY;  Surgeon: Flash Garnett MD;  Location: [facility] ENDO;  Service: Endoscopy;  Laterality: N/A;    EPIDURAL STEROID INJECTION INTO LUMBAR SPINE N/A 05/20/2019    Procedure: Injection-steroid-epidural-lumbar;  Surgeon: Patrick Goyal MD;  Location: [facility] OR;  Service: Pain Management;  Laterality: N/A;  L5/S1 from right side    EPIDURAL STEROID INJECTION INTO LUMBAR SPINE N/A 08/04/2022    Procedure: Injection-steroid-epidural-lumbar, L5/S1  from the right side;  Surgeon: Patrick Goyal MD;  Location: [facility] OR;  Service: Pain Management;  Laterality: N/A;    EPIDURAL STEROID INJECTION INTO LUMBAR SPINE N/A 8/3/2023    Procedure: Injection-steroid-epidural-lumbar L5/S1 from the right;  Surgeon: Patrick Goyal MD;  Location: [facility] OR;  Service: Pain Management;  Laterality: N/A;    EPIDURAL STEROID INJECTION INTO LUMBAR SPINE " N/A 2024    Procedure: Injection-steroid-epidural-lumbar     L5/S1 from the right;  Surgeon: Patrick Goyal MD;  Location: [facility] [place];  Service: Pain Management;  Laterality: N/A;    EYE SURGERY  May, 2018    cataract surgery    NASAL SEPTUM SURGERY N/A 2017    SPINE SURGERY  2001    L4    TONSILLECTOMY         Family History:  Family History   Problem Relation Name Age of Onset    Cancer Mother Jazzmine Arrington 78        mother    Heart disease Mother Jazzmine Arrington 78        cabg    COPD Mother Jazzmine Arrington     Hypertension Mother Jazzmine Arrington     Vision loss Mother Jazzmine Arrington         macular degeration    Diabetes Father Rick Arrington     Heart disease Father Rick Arrington 71        cabg    Hypertension Father Rick Arrington     Diabetes Brother Chapo Arrington     Cancer Brother Chapo PANIAGUAKincheloe         brother    Heart disease Brother Chapo PANIAGUAKincheloe 50        Mi    Hypertension Brother Chapo Kincheloe     Diabetes Paternal Grandmother Leny Kincheloe        Social History:  Social History     Socioeconomic History    Marital status:      Spouse name: Kraig    Number of children: 3   Tobacco Use    Smoking status: Former     Current packs/day: 0.00     Types: Cigarettes     Quit date: 1973     Years since quittin.4     Passive exposure: Past    Smokeless tobacco: Never    Tobacco comments:     social smoker only   Substance and Sexual Activity    Alcohol use: Yes     Alcohol/week: 5.0 standard drinks of alcohol     Types: 5 Glasses of wine per week     Comment: one glass of wine with dinner daily    Drug use: No    Sexual activity: Yes     Partners: Female     Birth control/protection: Post-menopausal   Social History Narrative    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)     Exercise : Most days per week : walking 4d/week + Pilates + cardio + resistance     Diet : pt suspects lactose intolerance      Social Drivers of Health     Financial Resource Strain: Low  Risk  (5/9/2024)    Overall Financial Resource Strain (CARDIA)     Difficulty of Paying Living Expenses: Not hard at all   Food Insecurity: No Food Insecurity (5/9/2024)    Hunger Vital Sign     Worried About Running Out of Food in the Last Year: Never true     Ran Out of Food in the Last Year: Never true   Transportation Needs: No Transportation Needs (5/9/2024)    PRAPARE - Transportation     Lack of Transportation (Medical): No     Lack of Transportation (Non-Medical): No   Physical Activity: Insufficiently Active (5/9/2024)    Exercise Vital Sign     Days of Exercise per Week: 2 days     Minutes of Exercise per Session: 50 min   Stress: No Stress Concern Present (5/9/2024)    [employer] - Occupational Stress Questionnaire     Feeling of Stress : Not at all   Housing Stability: Low Risk  (5/9/2024)    Housing Stability Vital Sign     Unable to Pay for Housing in the Last Year: No     Homeless in the Last Year: No       Medications:  Current Outpatient Medications on File Prior to Visit   Medication Sig Dispense Refill    alendronate (FOSAMAX) 70 MG tablet TAKE 1 TABLET(70 MG) BY MOUTH EVERY 7 DAYS 12 tablet 3    amitriptyline (ELAVIL) 10 MG tablet TAKE 1 TABLET(10 MG) BY MOUTH EVERY EVENING 30 tablet 6    butalbital-acetaminophen-caffeine -40 mg (FIORICET, ESGIC) -40 mg per tablet 1 tab PO PRN once daily for migraine. No more than 10 tab per month. 10 tablet 0    citalopram (CELEXA) 20 MG tablet Take 1 tablet (20 mg total) by mouth once daily. 30 tablet 11    ergocalciferol (ERGOCALCIFEROL) 50,000 unit Cap TAKE 1 CAPSULE BY MOUTH EVERY 7 DAYS 12 capsule 4    HYDROcodone-acetaminophen (NORCO) 5-325 mg per tablet Take 1 tablet by mouth every 8 (eight) hours as needed for Pain. 15 tablet 0    losartan (COZAAR) 50 MG tablet TAKE 1 TABLET(50 MG) BY MOUTH EVERY DAY 90 tablet 1    ondansetron (ZOFRAN-ODT) 4 MG TbDL DISSOLVE 1 TABLET(4 MG) ON THE TONGUE EVERY 6 HOURS AS NEEDED  FOR NAUSEA 30 tablet 11    pregabalin (LYRICA) 100 MG capsule Take 1 capsule (100 mg total) by mouth 2 (two) times daily. 60 capsule 2    RESTASIS 0.05 % ophthalmic emulsion Place 1 drop into both eyes 2 (two) times daily.      rOPINIRole (REQUIP) 0.25 MG tablet TAKE 2 TABLETS BY MOUTH EVERY EVENING 180 tablet 3    triamcinolone (NASACORT) 55 mcg nasal inhaler 1 spray by Nasal route once daily.      [DISCONTINUED] rimegepant (NURTEC) 75 mg odt Take 1 tablet (75 mg total) by mouth daily as needed. Place ODT tablet on the tongue; alternatively the ODT tablet may be placed under the tongue 8 tablet 11     Current Facility-Administered Medications on File Prior to Visit   Medication Dose Route Frequency Provider Last Rate Last Admin    [DISCONTINUED] cyanocobalamin injection 1,000 mcg  1,000 mcg Intramuscular Q28 Days Td Montana MD   1,000 mcg at 10/03/24 0845       Allergies:  Penicillins    Immunizations:  Immunization History   Administered Date(s) Administered    COVID-19 MRNA, LN-S PF (MODERNA HALF 0.25 ML DOSE) 11/26/2021, 04/04/2022    COVID-19, MRNA, LN-S, PF (MODERNA FULL 0.5 ML DOSE) 01/22/2021, 02/19/2021    COVID-19, mRNA, LNP-S, PF, astrid-sucrose, 30 mcg/0.3 mL (Pfizer Ages 12+) 10/05/2023    COVID-19, mRNA, LNP-S, bivalent booster, PF (Moderna Omicron)12 + YEARS 10/07/2022    H1N1 Swine Flu Vaccine 12/30/2009    Influenza (FLUAD) - Quadrivalent - Adjuvanted - PF *Preferred* (65+) 10/13/2021, 10/04/2022, 09/07/2023    Influenza - Quadrivalent - PF *Preferred* (6 months and older) 10/24/2007, 10/06/2009, 10/25/2010, 11/03/2011, 11/19/2012    Influenza - Trivalent - Afluria, Fluzone MDV 10/24/2007, 10/06/2009, 10/25/2010, 11/03/2011    Influenza - Trivalent - Fluad - Adjuvanted - PF (65 years and older 11/05/2024    Influenza - Trivalent - Fluzone High Dose - PF (65 years and older) 10/08/2013, 11/11/2014, 10/26/2015, 10/05/2017, 09/20/2018, 09/24/2019, 09/14/2020    Influenza A (H1N1) 2009 Monovalent -  "IM 12/30/2009    Influenza Split 11/19/2012    Pneumococcal Conjugate - 13 Valent 01/18/2016    Pneumococcal Polysaccharide - 23 Valent 10/08/2013    RSVpreF (Arexvy) 11/06/2023    Tdap 11/03/2011    Zoster 01/22/2014    Zoster Recombinant 05/02/2022, 07/12/2022       Review of Systems:  Review of Systems   Constitutional:  Negative for activity change and unexpected weight change.   HENT:  Negative for hearing loss, rhinorrhea and trouble swallowing.    Eyes:  Negative for discharge and visual disturbance.   Respiratory:  Negative for chest tightness and wheezing.    Cardiovascular:  Negative for chest pain and palpitations.   Gastrointestinal:  Negative for blood in stool, constipation, diarrhea and vomiting.   Endocrine: Negative for polydipsia and polyuria.   Genitourinary:  Negative for difficulty urinating, dysuria, hematuria and menstrual problem.   Musculoskeletal:  Negative for arthralgias, joint swelling and neck pain.   Neurological:  Negative for weakness and headaches.   Psychiatric/Behavioral:  Negative for confusion and dysphoric mood.        Objective:    Vitals:  Vitals:    11/05/24 1013   BP: 132/70   Pulse: 76   SpO2: 97%   Weight: 61.5 kg (135 lb 11.1 oz)   Height: 5' 4" (1.626 m)   PainSc: 0-No pain         Physical Exam  Vitals reviewed.   Constitutional:       General: She is not in acute distress.  HENT:      Head: Normocephalic and atraumatic.   Eyes:      Pupils: Pupils are equal, round, and reactive to light.   Cardiovascular:      Rate and Rhythm: Normal rate and regular rhythm.      Heart sounds: No murmur heard.     No friction rub.   Pulmonary:      Effort: Pulmonary effort is normal.      Breath sounds: Normal breath sounds.   Abdominal:      General: Bowel sounds are normal. There is no distension.      Palpations: Abdomen is soft.      Tenderness: There is no abdominal tenderness.   Musculoskeletal:      Cervical back: Neck supple.   Skin:     General: Skin is warm and dry.      " Findings: No rash.   Psychiatric:         Behavior: Behavior normal.             Td Montana MD  Family Medicine

## 2024-11-05 NOTE — TELEPHONE ENCOUNTER
Refill Decision Note  Quick DC. Request already responded to by other means (e.g. phone or fax)    Yolie Brambila  is requesting a refill authorization.  Brief Assessment and Rationale for Refill:  Quick Discontinue     Medication Therapy Plan:       Medication Reconciliation Completed: No   Comments:           Note composed:2:51 PM 11/05/2024

## 2024-11-06 ENCOUNTER — OFFICE VISIT (OUTPATIENT)
Dept: PSYCHIATRY | Facility: CLINIC | Age: 74
End: 2024-11-06
Payer: MEDICARE

## 2024-11-06 DIAGNOSIS — F43.23 ADJUSTMENT DISORDER WITH MIXED ANXIETY AND DEPRESSED MOOD: Primary | ICD-10-CM

## 2024-11-06 PROCEDURE — 90832 PSYTX W PT 30 MINUTES: CPT | Mod: ,,,

## 2024-11-06 NOTE — PROGRESS NOTES
Individual Psychotherapy (THANG/ANAW/PhD)  Yolie [place]oware,  11/6/2024    Site:  [facility]         Therapeutic Intervention: Met with patient for individual psychotherapy.    Chief complaint/reason for encounter: depression and caregiver stress       Interval history and content of current session: Patient feels as if she is managing well. She notes she went on her vacation and was able to find care for her . She discussed some feelings of guilt that arose however she was able to accept her feelings and continue to enjoy her trip. Notes the increase in medication has also been helpful. At this time, patient feels as if she is appropriate to go to an as needed basis for therapy. Writer is agreeable.     Treatment plan:  Target symptoms: adjustment, frustration  Why chosen therapy is appropriate versus another modality: relevant to diagnosis, patient responds to this modality, evidence based practice  Outcome monitoring methods: self-report, checklist/rating scale  Therapeutic intervention type: insight oriented psychotherapy, behavior modifying psychotherapy, supportive psychotherapy    Risk parameters:  Patient reports no suicidal ideation  Patient reports no homicidal ideation  Patient reports no self-injurious behavior  Patient reports no violent behavior    Verbal deficits: None    Patient's response to intervention:  The patient's response to intervention is accepting.    Progress toward goals and other mental status changes:  The patient's progress toward goals is good. No additional concerns at this time.    Patient advised to call [phone] or present the the nearest ED if they experience suicidal or homicidal ideation, plan or intent.          11/5/2024     9:16 AM 10/16/2024    12:03 PM 8/28/2023    11:52 AM 8/2/2023     1:38 PM   Results of the PHQ8   Little interest or pleasure in doing things Several days Several days More than half the days Several days   Feeling down, depressed, or hopeless Several  days Several days Several days Several days   Trouble falling or staying asleep, or sleeping too much Not at all Not at all Nearly every day More than half the days   Feeling tired or having little energy Several days Several days Nearly every day More than half the days   Poor appetite or overeating Several days Several days Not at all Not at all   Feeling bad about yourself - or that you are a failure or have let yourself or your family down Several days Several days More than half the days More than half the days   Trouble concentrating on things, such as reading the newspaper or watching television Several days Several days Nearly every day Nearly every day   Moving or speaking so slowly that other people could have noticed. Or the opposite - being so fidgety or restless that you have been moving around a lot more than usual Not at all Not at all Not at all Not at all   Total Score  6 6 14 11           11/5/2024     9:16 AM 10/16/2024    12:03 PM 9/30/2024     9:48 AM   GAD7   1. Feeling nervous, anxious, or on edge? 1  1  0   2. Not being able to stop or control worrying? 0  0  1   3. Worrying too much about different things? 1  1  1   4. Trouble relaxing? 0  0  0   5. Being so restless that it is hard to sit still? 0  0  0   6. Becoming easily annoyed or irritable? 1  1  2   7. Feeling afraid as if something awful might happen? 0  0  0   8. If you checked off any problems, how difficult have these problems made it for you to do your work, take care of things at home, or get along with other people? 1  1  1   NICKOLAS-7 Score 3  3  4       Patient-reported       Diagnosis:     ICD-10-CM ICD-9-CM   1. Adjustment disorder with mixed anxiety and depressed mood  F43.23 [id number]         Plan: Pt plans to continue individual psychotherapy    Return to clinic: as needed    Length of Service (minutes): 30      Liana Bee PsyD

## 2024-11-13 ENCOUNTER — OFFICE VISIT (OUTPATIENT)
Dept: RHEUMATOLOGY | Facility: CLINIC | Age: 74
End: 2024-11-13
Payer: MEDICARE

## 2024-11-13 VITALS
WEIGHT: 138 LBS | HEART RATE: 81 BPM | HEIGHT: 64 IN | SYSTOLIC BLOOD PRESSURE: 117 MMHG | BODY MASS INDEX: 23.56 KG/M2 | DIASTOLIC BLOOD PRESSURE: 56 MMHG

## 2024-11-13 DIAGNOSIS — Z51.81 MEDICATION MONITORING ENCOUNTER: ICD-10-CM

## 2024-11-13 DIAGNOSIS — M81.0 OSTEOPOROSIS, UNSPECIFIED OSTEOPOROSIS TYPE, UNSPECIFIED PATHOLOGICAL FRACTURE PRESENCE: Primary | ICD-10-CM

## 2024-11-13 DIAGNOSIS — Z91.89 FRACTURE RISK ASSESSMENT SCORE (FRAX) INDICATING GREATER THAN 3% RISK FOR HIP FRACTURE: ICD-10-CM

## 2024-11-13 DIAGNOSIS — Z71.85 IMMUNIZATION COUNSELING: ICD-10-CM

## 2024-11-13 DIAGNOSIS — Z91.81 AT RISK FOR FALLS: ICD-10-CM

## 2024-11-13 PROCEDURE — 99499 UNLISTED E&M SERVICE: CPT | Mod: S$PBB,,, | Performed by: FAMILY MEDICINE

## 2024-11-13 PROCEDURE — 99214 OFFICE O/P EST MOD 30 MIN: CPT | Mod: PBBFAC,PO

## 2024-11-13 PROCEDURE — 99999 PR PBB SHADOW E&M-EST. PATIENT-LVL IV: CPT | Mod: PBBFAC,,,

## 2024-11-13 NOTE — PROGRESS NOTES
Time: 40 mins  # of DX: 5    Subjective:     Patient ID:  Yolie Brambila    Chief Complaint: Osteoporosis Management     Referring Provider: Td Montana MD    History of Present Illness:  Pt is a 74 y.o. female who presents to the clinic for osteoporosis management and fosamax f/u. This is a new patient to me but has been following with Dr. Montana. Patient is presenting with depression, HTN, migraine, neuropathy, CARYL, and osteoporosis on alendronate since 3/2021.     Prior Osteoporosis Therapies:   none    Osteoporosis:  Current treatment: Alendronate 70 mg PO weekly; takes it on Sunday nights  Taking alendronate regularly: yes  Personal Hx of fracture as an adult: left proximal humerus fracture during COVID tripped over dog's bed and fell on her shoulder in 2020  Any fractures within the last year: yes  Any falls within the last year: yes  Physically active: yes, pilates twice a week, center and balance class  Smoker: no  Alcohol intake; number of drinks per week: 1 glass of wine every evening   Are you established with a dentist: yes  Do you see your dentist for regulary check-ups/exams (~6 months): yes    Current calcium and Vit D intake:  Dietary sources: milk with cereal every morning, cheese, beans   Diet with adequate calcium: no  Supplements: open to starting calcium citrate 500- 600 mg daily, taking vitamin D 50,000 units weekly     Vaccines:  Recommend influenza annually, prevnar 20 if not completed yet, TdaP every 10 years, shingrix x 2, COVID, Hep B (adults 19-60 yo and 60 or older if have risk factors) and RSV if 60 years old or older  Patient is due for Prevnar 20 (once) and TdaP (every 10 years)    Influenza: Discuss and recommend annually. 11/5/24  PCV 20: Based on share clinical decision making. Either way Pneumonia vaccines are complete. Due now if pt would like to receive  PCV 13: 1/2016  PPSV 23: 10/2013  Tetanus (TdaP): Discuss and recommend booster every 10 years. 11/2011. Due  "now  Shingrix: 5/2022 & 7/2022  Covid: CDC recommends 1 new [id number] dose in immunocompromised patients that has received 2 or more COVID doses. 10/2024  RSV: Discuss and recommend 1 dose if 60 years old or older. 11/2023 & 10/2024    Current Medications:  Current Outpatient Medications   Medication Instructions    alendronate (FOSAMAX) 70 mg, Oral, Every 7 days    amitriptyline (ELAVIL) 10 MG tablet TAKE 1 TABLET(10 MG) BY MOUTH EVERY EVENING    butalbital-acetaminophen-caffeine -40 mg (FIORICET, ESGIC) -40 mg per tablet 1 tab PO PRN once daily for migraine. No more than 10 tab per month.    citalopram (CELEXA) 20 mg, Oral, Daily    ergocalciferol (ERGOCALCIFEROL) 50,000 Units, Oral, Every 7 days    HYDROcodone-acetaminophen (NORCO) 5-325 mg per tablet 1 tablet, Oral, Every 8 hours PRN    losartan (COZAAR) 50 MG tablet TAKE 1 TABLET(50 MG) BY MOUTH EVERY DAY    ondansetron (ZOFRAN-ODT) 4 MG TbDL DISSOLVE 1 TABLET(4 MG) ON THE TONGUE EVERY 6 HOURS AS NEEDED FOR NAUSEA    pantoprazole (PROTONIX) 40 mg, Oral, Daily    pregabalin (LYRICA) 100 mg, Oral, 2 times daily    RESTASIS 0.05 % ophthalmic emulsion 1 drop, 2 times daily    rOPINIRole (REQUIP) 0.25 MG tablet TAKE 2 TABLETS BY MOUTH EVERY EVENING    triamcinolone (NASACORT) 55 mcg nasal inhaler 1 spray, Daily     Objective:     Vitals:    11/13/24 0907   BP: (!) 117/56   Pulse: 81   Weight: 62.6 kg (138 lb 0.1 oz)   Height: 5' 4.02" (1.626 m)   PainSc: 0-No pain      BP Readings from Last 4 Encounters:   11/05/24 132/70   08/02/24 132/62   07/18/24 (!) 126/56   05/16/24 (!) 172/67     Body mass index is 23.68 kg/m².      Fall Risk Assessment:  Completed "Check Your Risk for Falling" Questionnaire; score 5 for feel unsteady when walking sometimes when going up/down stairs (1), use furniture to steady themselves when walking at home (1), need to push with hands to stand up from chair - sometimes but able to stand without (1), lost of some feeling in feet " (1), and take medication to help with sleep or improve mood (1)    Monitoring Lab Results:  Lab Results   Component Value Date    CALCIUM 9.2 10/29/2024    ALBUMIN 4.0 10/29/2024    ZMKSDGLV14CW 40 10/29/2024    CREATININE 0.9 10/29/2024    EGFRNORACEVR >60.0 10/29/2024     Lab Results   Component Value Date     10/29/2024    K 4.3 10/29/2024     10/29/2024    CO2 25 10/29/2024     (H) 10/29/2024    BUN 14 10/29/2024    PROT 6.9 10/29/2024    BILITOT 0.3 10/29/2024    ALKPHOS 66 10/29/2024    AST 20 10/29/2024    ALT 10 10/29/2024     DEXA Results: 4/9/2024  Narrative & Impression  EXAMINATION:  DXA BONE DENSITY AXIAL SKELETON 1 OR MORE SITES     CLINICAL HISTORY:  Asymptomatic menopausal state     TECHNIQUE:  DXA scanning was performed over the left hip and lumbar spine.  Review of the images confirms satisfactory positioning and technique.     COMPARISON:  DEXA scan-03/01/2021     FINDINGS:  The L1-L4 vertebral bone mineral density is equal to 1.084 g/cm squared with a T score of 0.3.  There has been a 4.5% interval increase in bone mineral density of the lumbar spine relative to the prior study.  There is degenerative change of the lumbar spine which may falsely elevate bone mineral density measurements.     The left femoral neck bone mineral density is equal to 0.524 g/cm squared with a T score of -2.9.  There has been  a 1.3% interval increase in bone mineral density of the left femoral neck relative to the prior study.     There is a 19% risk of a major osteoporotic fracture and a 7.1% risk of hip fracture in the next 10 years (FRAX).     Impression: Osteoporosis     Assessment:     Pt is a 74 y.o. female with osteoporosis. Patient was referred to the Rheumatology pharmacist for osteoporosis management. Provided patient with education about bone health and fall risk. Seems that patient is at risk for falls per assessment questionnaire. Counseled patient about importance of calcium and  vitamin D. Patient has been on Alendronate 70 mg weekly and seems to be doing well. Plan is to continue Fosamax. Also, explained importance of vaccines.     Plan:      1. Osteoporosis, unspecified osteoporosis type, unspecified pathological fracture presence (Primary)  Continue Alendronate 70 mg once a week  Patient provided with handout of foods/drinks rich in calcium and vitamin D  Pt verbalized understanding instructions  Next DXA due 4/2026    2. Fracture Risk Assessment Score (FRAX) indicating greater than 3% risk for hip fracture  See #1    3. Medication monitoring encounter  Reviewed safety labs for Alendronate    4. Immunization counseling  Vaccines needed: PCV 20 and TdaP   Pt agreed to complete recommended vaccines    5. At risk for falls  Reviewed ways to decrease fall risk  Continue weight bearing exercises and balance classes      Follow-up scheduled in about 1 year    Mariam Narvaez, PharmD, [facility]  Rheumatology Clinical Pharmacist  [facility]

## 2024-11-13 NOTE — PATIENT INSTRUCTIONS
It was a pleasure talking with you today. As a reminder, my name is Dr. Mariam Narvaez. I'm the clinical pharmacist in the Rheumatology office. If you have any questions or need any assistance, please reach out to the office.     Next follow-up on Friday,10/3/2025 at 9am with Rheumatology Clinical Pharmacist    - Vaccines:   Pneumonia (PCV 20): 1 dose - Primary care provider's (PCP's) office or local pharmacy  Tetanus (Tdap): 1 dose every 10 years - Local pharmacy    Take 500 - 600 mg once a day

## 2024-11-19 ENCOUNTER — PATIENT MESSAGE (OUTPATIENT)
Dept: FAMILY MEDICINE | Facility: CLINIC | Age: 74
End: 2024-11-19
Payer: MEDICARE

## 2024-11-22 ENCOUNTER — PATIENT MESSAGE (OUTPATIENT)
Dept: PODIATRY | Facility: CLINIC | Age: 74
End: 2024-11-22
Payer: MEDICARE

## 2024-11-22 DIAGNOSIS — M54.16 LUMBAR RADICULOPATHY: Primary | ICD-10-CM

## 2024-11-24 ENCOUNTER — PATIENT MESSAGE (OUTPATIENT)
Dept: PAIN MEDICINE | Facility: CLINIC | Age: 74
End: 2024-11-24
Payer: MEDICARE

## 2024-11-25 RX ORDER — PREGABALIN 100 MG/1
100 CAPSULE ORAL 2 TIMES DAILY
Qty: 60 CAPSULE | Refills: 2 | Status: SHIPPED | OUTPATIENT
Start: 2024-11-25

## 2024-12-09 ENCOUNTER — CLINICAL SUPPORT (OUTPATIENT)
Dept: FAMILY MEDICINE | Facility: CLINIC | Age: 74
End: 2024-12-09
Payer: MEDICARE

## 2024-12-09 DIAGNOSIS — E53.8 B12 DEFICIENCY: Primary | ICD-10-CM

## 2024-12-09 PROCEDURE — 96372 THER/PROPH/DIAG INJ SC/IM: CPT | Mod: PBBFAC,PN

## 2024-12-09 PROCEDURE — 99999PBSHW PR PBB SHADOW TECHNICAL ONLY FILED TO HB: Mod: PBBFAC,,,

## 2024-12-09 RX ADMIN — CYANOCOBALAMIN 1000 MCG: 1000 INJECTION, SOLUTION INTRAMUSCULAR at 09:12

## 2024-12-10 ENCOUNTER — OFFICE VISIT (OUTPATIENT)
Dept: FAMILY MEDICINE | Facility: CLINIC | Age: 74
End: 2024-12-10
Payer: MEDICARE

## 2024-12-10 VITALS
BODY MASS INDEX: 23.36 KG/M2 | SYSTOLIC BLOOD PRESSURE: 130 MMHG | HEIGHT: 64 IN | WEIGHT: 136.81 LBS | DIASTOLIC BLOOD PRESSURE: 52 MMHG | HEART RATE: 76 BPM | OXYGEN SATURATION: 99 %

## 2024-12-10 DIAGNOSIS — I10 ESSENTIAL HYPERTENSION: ICD-10-CM

## 2024-12-10 DIAGNOSIS — Z23 IMMUNIZATION DUE: ICD-10-CM

## 2024-12-10 DIAGNOSIS — R05.3 CHRONIC COUGH: ICD-10-CM

## 2024-12-10 DIAGNOSIS — S61.211A LACERATION OF LEFT INDEX FINGER WITHOUT FOREIGN BODY WITHOUT DAMAGE TO NAIL, INITIAL ENCOUNTER: ICD-10-CM

## 2024-12-10 DIAGNOSIS — R23.2 HOT FLASHES: ICD-10-CM

## 2024-12-10 DIAGNOSIS — R05.3 CHRONIC COUGH: Primary | ICD-10-CM

## 2024-12-10 PROCEDURE — 99999PBSHW PR PBB SHADOW TECHNICAL ONLY FILED TO HB: Mod: PBBFAC,,,

## 2024-12-10 PROCEDURE — 99213 OFFICE O/P EST LOW 20 MIN: CPT | Mod: PBBFAC,PN,25 | Performed by: FAMILY MEDICINE

## 2024-12-10 PROCEDURE — G2211 COMPLEX E/M VISIT ADD ON: HCPCS | Mod: S$PBB,,, | Performed by: FAMILY MEDICINE

## 2024-12-10 PROCEDURE — 90471 IMMUNIZATION ADMIN: CPT | Mod: PBBFAC,PN

## 2024-12-10 PROCEDURE — 99214 OFFICE O/P EST MOD 30 MIN: CPT | Mod: S$PBB,,, | Performed by: FAMILY MEDICINE

## 2024-12-10 PROCEDURE — 90715 TDAP VACCINE 7 YRS/> IM: CPT | Mod: PBBFAC,PN

## 2024-12-10 PROCEDURE — 99999 PR PBB SHADOW E&M-EST. PATIENT-LVL III: CPT | Mod: PBBFAC,,, | Performed by: FAMILY MEDICINE

## 2024-12-10 RX ORDER — LOSARTAN POTASSIUM 100 MG/1
100 TABLET ORAL DAILY
COMMUNITY
End: 2024-12-10

## 2024-12-10 RX ORDER — OMEPRAZOLE 40 MG/1
40 CAPSULE, DELAYED RELEASE ORAL
Qty: 180 CAPSULE | OUTPATIENT
Start: 2024-12-10

## 2024-12-10 RX ORDER — AZELASTINE 1 MG/ML
2 SPRAY, METERED NASAL EVERY MORNING
Qty: 30 ML | Refills: 2 | Status: SHIPPED | OUTPATIENT
Start: 2024-12-10 | End: 2025-12-10

## 2024-12-10 RX ORDER — IPRATROPIUM BROMIDE 21 UG/1
2 SPRAY, METERED NASAL NIGHTLY
Qty: 30 ML | Refills: 2 | Status: SHIPPED | OUTPATIENT
Start: 2024-12-10

## 2024-12-10 RX ORDER — OMEPRAZOLE 40 MG/1
40 CAPSULE, DELAYED RELEASE ORAL
Qty: 60 CAPSULE | Refills: 0 | Status: SHIPPED | OUTPATIENT
Start: 2024-12-10 | End: 2025-12-10

## 2024-12-10 RX ORDER — LOSARTAN POTASSIUM 100 MG/1
100 TABLET ORAL DAILY
Qty: 90 TABLET | Refills: 3 | Status: SHIPPED | OUTPATIENT
Start: 2024-12-10 | End: 2025-12-10

## 2024-12-10 RX ADMIN — TETANUS TOXOID, REDUCED DIPHTHERIA TOXOID AND ACELLULAR PERTUSSIS VACCINE, ADSORBED 0.5 ML: 5; 2.5; 8; 8; 2.5 SUSPENSION INTRAMUSCULAR at 11:12

## 2024-12-10 NOTE — TELEPHONE ENCOUNTER
No care due was identified.  Health [facility] Embedded Care Due Messages. Reference number: [id number].   12/10/2024 12:25:07 PM CST

## 2024-12-10 NOTE — PROGRESS NOTES
THIS DOCUMENT WAS MADE IN PART WITH VOICE RECOGNITION SOFTWARE.  OCCASIONALLY THIS SOFTWARE WILL MISINTERPRET WORDS OR PHRASES.    Assessment and Plan:    1. Chronic cough  omeprazole (PRILOSEC) 40 MG capsule    azelastine (ASTELIN) 137 mcg (0.1 %) nasal spray    ipratropium (ATROVENT) 21 mcg (0.03 %) nasal spray      2. Hot flashes        3. Immunization due  DIPH,PERTUSS(ACEL),TET VAC(PF)(ADULT)(ADACEL)(TDaP)      4. Laceration of left index finger without foreign body without damage to nail, initial encounter  DIPH,PERTUSS(ACEL),TET VAC(PF)(ADULT)(ADACEL)(TDaP)      5. Essential hypertension  losartan (COZAAR) 100 MG tablet              PLAN       Assessment & Plan    PLAN SUMMARY:   Started azelastine nasal spray (morning) and ipratropium nasal spray (nighttime)   Continued nasal corticosteroid spray   Started omeprazole twice daily   Continued Zyrtec (generic)   Increased losartan from 50 mg to 100 mg daily   Administered tetanus vaccine in clinic   Follow-up in 3 weeks to reassess cough and medication efficacy    ESSENTIAL HYPERTENSION:   Explained target blood pressure range of 120s/80s.   Yolie to monitor and record blood pressure readings.   Yolie to bring blood pressure cuff to next appointment for accuracy check.   Increased losartan from 50 mg to 100 mg daily.   Yolie to bring blood pressure cuff and recorded readings to next appointment.    ALLERGIC RHINITIS:   Started azelastine nasal spray in the morning.   Started ipratropium nasal spray at nighttime.   Continued nasal corticosteroid spray as before.   Continued Zyrtec (generic) as before.    GASTROESOPHAGEAL REFLUX DISEASE:   Started omeprazole twice daily.    LACERATION OF FINGER:   Discussed potential skin irritation with prolonged use of Neosporin on finger laceration.    IMMUNIZATION:   Administered tetanus vaccine in clinic.    FOLLOW-UP:   Follow up in 3 weeks to reassess cough and medication efficacy.              ______________________________________________________________________  Subjective:    Chief Complaint:  Chief Complaint   Patient presents with    Follow-up     F/u         HPI:  Yolie PANIAGUA is a 74 y.o. year old             History of Present Illness    CHIEF COMPLAINT:  Yolie presents today for follow-up on persistent cough.    RESPIRATORY SYMPTOMS:  She reports a persistent cough despite previous treatment. She also notes slight shortness of breath. She experiences post-nasal drip, particularly in the mornings and occasionally throughout the day, with morning symptoms being worse. She is currently taking generic Zyrtec and nasal corticosteroids for allergy management, but continues to have unresolved symptoms despite this regimen.    GASTROINTESTINAL SYMPTOMS:  She was previously diagnosed with acid reflux by an internist. She denies experiencing heartburn but reports difficulty swallowing and regurgitation. She was treated with omeprazole, which provided some improvement but did not completely resolve the issue. Previous treatment with pantoprazole was ineffective in managing her symptoms.    HYPERTENSION:  She reports recent high blood pressure readings, with systolic values in the 150s. She is currently taking Losartan for blood pressure management.    RECENT INJURY:  She reports a recent finger laceration. The injury resulted in significant bleeding initially, which continued for a few days. She has been applying Neosporin to the wound.    SOCIAL HISTORY:  She quit smoking in the 1970s.      ROS:  ROS as indicated in HPI.                 Idiopathic peripheral neuropathy   Med- Lyrica 100 mg BID (effective)    History of migraine   Prophylaxis- Amitriptyline 10 mg + OTC Magnesium   Abortive - Fioricet (avg about one per month)  Prev rx : Ubrevly   Neurology : Nancy Alonso NP  SOTELO frequency : used to be yearly, has had 2 in last year (severe) and more mild ones   MRI unremarkable       Chronic back pain  "  Rx : Amitriptyline 10 mg, Lyrica 100 mg, Naproxen BID  Prev Tx : CHEIKH 8/23  Spine surgery in 2001 : laminectomy of L4   Pain mgt : MD Jay Jay     Restless leg syndrome   Rx-Requip 0.50 mg QHS (effective)     Chronic allergic rhinitis   Rx-Nasacort, zyrtec    Septoplasty 2017    Essential hypertension   Rx-losartan 50 mg   No home check     Family history colon cancer (Mother dx @ approx 80)  Up-to-date on colon cancer screening   Asymptomatic  Follow-up recommended every 5 years   previous colonoscopy 03/02/2020     Atrophic vaginitis   Prev Rx-Estrace vaginal cream (effective)    Obstructive sleep apnea  No CPAP   "Old diagnosis" / Repeat sleep study was mild CARYL, no need for CPAP   ++ Snoring, denies fatigue      Nicotine dependence   In remission, quit in 1973     Senile osteoporosis  Previous DEXA bone scan 4/24  Rx-alendronate weekly  No recent fractures      Anxiety, depression  med-Celexa 20 mg (effective)     Glucose intolerance  Prev A1c : 5.8   Rx None     Chronic Cough  Prev rx : Protonix (ineffective)         Past Medical History:  Past Medical History:   Diagnosis Date    Anxiety     Arthritis     Chronic allergic rhinitis 11/19/2012    Depression     Essential hypertension 9/14/2020    Herniated lumbar intervertebral disc     L3    Migraine     painless with numbness       Past Surgical History:  Past Surgical History:   Procedure Laterality Date    COLONOSCOPY      COLONOSCOPY N/A 03/02/2020    Procedure: COLONOSCOPY;  Surgeon: Flash Garnett MD;  Location: [facility];  Service: Endoscopy;  Laterality: N/A;    EPIDURAL STEROID INJECTION INTO LUMBAR SPINE N/A 05/20/2019    Procedure: Injection-steroid-epidural-lumbar;  Surgeon: Patrick Goyal MD;  Location: [facility] OR;  Service: Pain Management;  Laterality: N/A;  L5/S1 from right side    EPIDURAL STEROID INJECTION INTO LUMBAR SPINE N/A 08/04/2022    Procedure: Injection-steroid-epidural-lumbar, L5/S1  from the right side;  Surgeon: Patrick MALDONADO" MD Jay Jay;  Location: [facility] OR;  Service: Pain Management;  Laterality: N/A;    EPIDURAL STEROID INJECTION INTO LUMBAR SPINE N/A 8/3/2023    Procedure: Injection-steroid-epidural-lumbar L5/S1 from the right;  Surgeon: Patrick Goyal MD;  Location: [facility] OR;  Service: Pain Management;  Laterality: N/A;    EPIDURAL STEROID INJECTION INTO LUMBAR SPINE N/A 2024    Procedure: Injection-steroid-epidural-lumbar     L5/S1 from the right;  Surgeon: Patrick Goyal MD;  Location: [facility] OR;  Service: Pain Management;  Laterality: N/A;    EYE SURGERY  May, 2018    cataract surgery    NASAL SEPTUM SURGERY N/A 2017    SPINE SURGERY  2001    L4    TONSILLECTOMY         Family History:  Family History   Problem Relation Name Age of Onset    Cancer Mother Jazzmine Murphy 78        mother    Heart disease Mother Jazzmine Murphy 78        cabg    COPD Mother Jazzmine Murphy     Hypertension Mother Jazzmine Arrington     Vision loss Mother Jazzmine Arrington         macular degeration    Diabetes Father Rick Morgan     Heart disease Father Rick Murphy 71        cabg    Hypertension Father Rick Murphy     Diabetes Brother Chapo Arrington     Cancer Brother Chapo Arrington         brother    Heart disease Brother Chapo KWANMorgan 50        Mi    Hypertension Brother Chapo Arrington     Diabetes Paternal Grandmother Leny Murphy        Social History:  Social History     Socioeconomic History    Marital status:      Spouse name: Kraig    Number of children: 3   Tobacco Use    Smoking status: Former     Current packs/day: 0.00     Types: Cigarettes     Quit date: 1973     Years since quittin.5     Passive exposure: Past    Smokeless tobacco: Never    Tobacco comments:     social smoker only   Substance and Sexual Activity    Alcohol use: Yes     Alcohol/week: 5.0 standard drinks of alcohol     Types: 5 Glasses of wine per week     Comment: one glass of wine with dinner daily    Drug use: No     Sexual activity: Yes     Partners: Female     Birth control/protection: Post-menopausal   Social History Narrative    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)     Exercise : Most days per week : walking 4d/week + Pilates + cardio + resistance     Diet : pt suspects lactose intolerance      Social Drivers of Health     Financial Resource Strain: Low Risk  (5/9/2024)    Overall Financial Resource Strain (CARDIA)     Difficulty of Paying Living Expenses: Not hard at all   Food Insecurity: No Food Insecurity (5/9/2024)    Hunger Vital Sign     Worried About Running Out of Food in the Last Year: Never true     Ran Out of Food in the Last Year: Never true   Transportation Needs: No Transportation Needs (5/9/2024)    PRAPARE - Transportation     Lack of Transportation (Medical): No     Lack of Transportation (Non-Medical): No   Physical Activity: Insufficiently Active (5/9/2024)    Exercise Vital Sign     Days of Exercise per Week: 2 days     Minutes of Exercise per Session: 50 min   Stress: No Stress Concern Present (5/9/2024)    [place] [employer] - Occupational Stress Questionnaire     Feeling of Stress : Not at all   Housing Stability: Low Risk  (5/9/2024)    Housing Stability Vital Sign     Unable to Pay for Housing in the Last Year: No     Homeless in the Last Year: No       Medications:  Current Outpatient Medications on File Prior to Visit   Medication Sig Dispense Refill    alendronate (FOSAMAX) 70 MG tablet TAKE 1 TABLET(70 MG) BY MOUTH EVERY 7 DAYS 12 tablet 3    amitriptyline (ELAVIL) 10 MG tablet TAKE 1 TABLET(10 MG) BY MOUTH EVERY EVENING 30 tablet 6    butalbital-acetaminophen-caffeine -40 mg (FIORICET, ESGIC) -40 mg per tablet 1 tab PO PRN once daily for migraine. No more than 10 tab per month. 10 tablet 0    citalopram (CELEXA) 20 MG tablet Take 1 tablet (20 mg total) by mouth once daily. 30 tablet 11    ergocalciferol (ERGOCALCIFEROL) 50,000 unit Cap TAKE 1 CAPSULE  BY MOUTH EVERY 7 DAYS 12 capsule 4    HYDROcodone-acetaminophen (NORCO) 5-325 mg per tablet Take 1 tablet by mouth every 8 (eight) hours as needed for Pain. 15 tablet 0    ondansetron (ZOFRAN-ODT) 4 MG TbDL DISSOLVE 1 TABLET(4 MG) ON THE TONGUE EVERY 6 HOURS AS NEEDED FOR NAUSEA 30 tablet 11    pregabalin (LYRICA) 100 MG capsule TAKE 1 CAPSULE(100 MG) BY MOUTH TWICE DAILY 60 capsule 2    RESTASIS 0.05 % ophthalmic emulsion Place 1 drop into both eyes 2 (two) times daily.      rOPINIRole (REQUIP) 0.25 MG tablet TAKE 2 TABLETS BY MOUTH EVERY EVENING 180 tablet 3    triamcinolone (NASACORT) 55 mcg nasal inhaler 1 spray by Nasal route once daily.      [DISCONTINUED] losartan (COZAAR) 50 MG tablet TAKE 1 TABLET(50 MG) BY MOUTH EVERY DAY 90 tablet 1    [DISCONTINUED] pantoprazole (PROTONIX) 40 MG tablet Take 1 tablet (40 mg total) by mouth once daily. 60 tablet 0    [DISCONTINUED] losartan (COZAAR) 100 MG tablet Take 100 mg by mouth once daily.       Current Facility-Administered Medications on File Prior to Visit   Medication Dose Route Frequency Provider Last Rate Last Admin    cyanocobalamin injection 1,000 mcg  1,000 mcg Intramuscular Q28 Days    1,000 mcg at 12/09/24 0939       Allergies:  Penicillins    Immunizations:  Immunization History   Administered Date(s) Administered    COVID-19 MRNA, LN-S PF (MODERNA HALF 0.25 ML DOSE) 11/26/2021, 04/04/2022    COVID-19, MRNA, LN-S, PF (MODERNA FULL 0.5 ML DOSE) 01/22/2021, 02/19/2021    COVID-19, mRNA, LNP-S, PF, astrid-sucrose, 30 mcg/0.3 mL (Pfizer Ages 12+) 10/05/2023    COVID-19, mRNA, LNP-S, bivalent booster, PF (Moderna Omicron)12 + YEARS 10/07/2022    H1N1 Swine Flu Vaccine 12/30/2009    Influenza (FLUAD) - Quadrivalent - Adjuvanted - PF *Preferred* (65+) 10/13/2021, 10/04/2022, 09/07/2023    Influenza - Quadrivalent - PF *Preferred* (6 months and older) 10/24/2007, 10/06/2009, 10/25/2010, 11/03/2011, 11/19/2012    Influenza - Trivalent - Afluria, Fluzone MDV  "10/24/2007, 10/06/2009, 10/25/2010, 11/03/2011    Influenza - Trivalent - Fluad - Adjuvanted - PF (65 years and older 11/05/2024    Influenza - Trivalent - Fluzone High Dose - PF (65 years and older) 10/08/2013, 11/11/2014, 10/26/2015, 10/05/2017, 09/20/2018, 09/24/2019, 09/14/2020    Influenza A (H1N1) 2009 Monovalent - IM 12/30/2009    Influenza Split 11/19/2012    Pneumococcal Conjugate - 13 Valent 01/18/2016    Pneumococcal Polysaccharide - 23 Valent 10/08/2013    RSVpreF (Arexvy) 11/06/2023    Tdap 11/03/2011, 12/10/2024    Zoster 01/22/2014    Zoster Recombinant 05/02/2022, 07/12/2022       Review of Systems:  Review of Systems   Constitutional:  Negative for activity change and unexpected weight change.   HENT:  Negative for hearing loss, rhinorrhea and trouble swallowing.    Eyes:  Negative for discharge and visual disturbance.   Respiratory:  Negative for chest tightness and wheezing.    Cardiovascular:  Negative for chest pain and palpitations.   Gastrointestinal:  Negative for blood in stool, constipation, diarrhea and vomiting.   Endocrine: Negative for polydipsia and polyuria.   Genitourinary:  Negative for difficulty urinating, dysuria, hematuria and menstrual problem.   Musculoskeletal:  Negative for arthralgias, joint swelling and neck pain.   Neurological:  Negative for weakness and headaches.   Psychiatric/Behavioral:  Negative for confusion and dysphoric mood.        Objective:    Vitals:  Vitals:    12/10/24 1037   BP: (!) 130/52   Pulse: 76   SpO2: 99%   Weight: 62.1 kg (136 lb 12.7 oz)   Height: 5' 4" (1.626 m)   PainSc: 0-No pain         Physical Exam  Vitals reviewed.   Constitutional:       General: She is not in acute distress.  HENT:      Head: Normocephalic and atraumatic.   Eyes:      Pupils: Pupils are equal, round, and reactive to light.   Cardiovascular:      Rate and Rhythm: Normal rate and regular rhythm.      Heart sounds: No murmur heard.     No friction rub.   Pulmonary:      " Effort: Pulmonary effort is normal.      Breath sounds: Normal breath sounds.   Abdominal:      General: Bowel sounds are normal. There is no distension.      Palpations: Abdomen is soft.      Tenderness: There is no abdominal tenderness.   Musculoskeletal:      Cervical back: Neck supple.   Skin:     General: Skin is warm and dry.      Findings: No rash.   Psychiatric:         Behavior: Behavior normal.             Td Montana MD  Family Medicine

## 2024-12-10 NOTE — TELEPHONE ENCOUNTER
Refill Decision Note   Yolie ARCELIA Brambila  is requesting a refill authorization.  Brief Assessment and Rationale for Refill:  Quick Discontinue     Medication Therapy Plan: E-Prescribing Status: Receipt confirmed by pharmacy (12/10/2024 11:24 AM CST)      Comments:     Note composed:12:36 PM 12/10/2024

## 2024-12-13 ENCOUNTER — OFFICE VISIT (OUTPATIENT)
Dept: PAIN MEDICINE | Facility: CLINIC | Age: 74
End: 2024-12-13
Payer: MEDICARE

## 2024-12-13 VITALS
SYSTOLIC BLOOD PRESSURE: 171 MMHG | HEART RATE: 63 BPM | HEIGHT: 64 IN | DIASTOLIC BLOOD PRESSURE: 68 MMHG | WEIGHT: 136.81 LBS | BODY MASS INDEX: 23.36 KG/M2

## 2024-12-13 DIAGNOSIS — M47.816 LUMBAR SPONDYLOSIS: ICD-10-CM

## 2024-12-13 DIAGNOSIS — M54.16 LUMBAR RADICULOPATHY: Primary | ICD-10-CM

## 2024-12-13 PROCEDURE — 99214 OFFICE O/P EST MOD 30 MIN: CPT | Mod: PBBFAC,PO | Performed by: PHYSICIAN ASSISTANT

## 2024-12-13 PROCEDURE — 99999 PR PBB SHADOW E&M-EST. PATIENT-LVL IV: CPT | Mod: PBBFAC,,, | Performed by: PHYSICIAN ASSISTANT

## 2024-12-13 PROCEDURE — 99213 OFFICE O/P EST LOW 20 MIN: CPT | Mod: S$PBB,,, | Performed by: PHYSICIAN ASSISTANT

## 2024-12-30 NOTE — PROGRESS NOTES
This note was completed with dictation software and grammatical errors may exist.    CC:  Bilateral foot pain, back pain, leg pain    HPI:  The patient is a 74-year-old woman with a history of anxiety, depression, previous lumbar surgery who presents in referral from Dr. Adam for bilateral foot pain.  She returns in follow-up today doing very well.  She has been attending physical therapy every 2 weeks and Pilates 2-3 times per week.  She denies any radicular symptoms, denies any weakness or numbness at this time.    Previous history:  The patient reports developing bilateral foot pain about 25 years ago with numbness and tingling, has been diagnosed with neuropathy but workup has not revealed any particular  etiology.  This has been progressing over the years and has gone from being located only in the feet and now extending proximally up to the knees bilaterally.  She describes it as throbbing, tingling with abnormal sensation, particularly worse with standing, walking, exercising.  She does get some relief with rest, heat and medications. She has been taking gabapentin for years, several years has been on 600 mg twice daily with about 40% relief.  She also has a history of low back and buttock pain, ended up having severe right leg pain and in 2001 underwent laminectomy, unclear what level but reports that her right back and leg pain resolved at that time.  However over the years she has developed more bilateral buttock pain, thigh pain worse with standing and walking, describes this as dull.  She denies any weakness in her legs, denies any bowel or bladder incontinence.    Pain intervention history:  She has done physical therapy multiple times, chiropractic care without relief.  She has not tried Cymbalta.  She has been taking gabapentin 600 mg twice daily with about 40% relief, switched to Lyrica.  L5/S1 CHEIKH performed on 05/20/2019 with greater than 70% relief ongoing of her back and leg pain, approach  from the right due to previous left hemilaminectomy. She is status post L5/S1 interlaminar epidural steroid injection from the right side on 08/04/2022 with 50% relief of her back pain but 0% relief of her leg pain.   She is status post L5/S1 CHEIKH from the right on 08/03/2023 with 100% relief.  She is status post left hip joint injection on 02/23/2024 with 40% relief.   She is status post L5/S1 interlaminar epidural steroid injection from the right on 04/18/2024 with 60-70% relief.     Spine surgeries:  L5/S1 left hemilaminectomy    Antineuropathics:  Lyrica 100 twice daily  NSAIDs:  Naproxen  Physical therapy:  Completed physical therapy, does Pilates twice a week.  Antidepressants:  Elavil 10 mg, Celexa 10  Muscle relaxers:  Opioids:  Antiplatelets/Anticoagulants:    ROS:  She reports back pain only.  Balance of review of systems is negative.    Past Medical History:   Diagnosis Date    Anxiety     Arthritis     Chronic allergic rhinitis 11/19/2012    Depression     Essential hypertension 9/14/2020    Herniated lumbar intervertebral disc     L3    Migraine     painless with numbness       Past Surgical History:   Procedure Laterality Date    COLONOSCOPY      COLONOSCOPY N/A 03/02/2020    Procedure: COLONOSCOPY;  Surgeon: Flash Garnett MD;  Location: [facility] ENDO;  Service: Endoscopy;  Laterality: N/A;    EPIDURAL STEROID INJECTION INTO LUMBAR SPINE N/A 05/20/2019    Procedure: Injection-steroid-epidural-lumbar;  Surgeon: Patrick Goyal MD;  Location: [facility] OR;  Service: Pain Management;  Laterality: N/A;  L5/S1 from right side    EPIDURAL STEROID INJECTION INTO LUMBAR SPINE N/A 08/04/2022    Procedure: Injection-steroid-epidural-lumbar, L5/S1  from the right side;  Surgeon: Patrick Goyal MD;  Location: [facility] OR;  Service: Pain Management;  Laterality: N/A;    EPIDURAL STEROID INJECTION INTO LUMBAR SPINE N/A 8/3/2023    Procedure: Injection-steroid-epidural-lumbar L5/S1 from the right;  Surgeon:  Patrick Goyal MD;  Location: [facility] OR;  Service: Pain Management;  Laterality: N/A;    EPIDURAL STEROID INJECTION INTO LUMBAR SPINE N/A 2024    Procedure: Injection-steroid-epidural-lumbar     L5/S1 from the right;  Surgeon: Patrick Goyal MD;  Location: [facility] OR;  Service: Pain Management;  Laterality: N/A;    EYE SURGERY  May, 2018    cataract surgery    NASAL SEPTUM SURGERY N/A 2017    SPINE SURGERY  2001    L4    TONSILLECTOMY         Social History     Socioeconomic History    Marital status:      Spouse name: Kraig    Number of children: 3   Tobacco Use    Smoking status: Former     Current packs/day: 0.00     Types: Cigarettes     Quit date: 1973     Years since quittin.6     Passive exposure: Past    Smokeless tobacco: Never    Tobacco comments:     social smoker only   Substance and Sexual Activity    Alcohol use: Yes     Alcohol/week: 5.0 standard drinks of alcohol     Types: 5 Glasses of wine per week     Comment: one glass of wine with dinner daily    Drug use: No    Sexual activity: Yes     Partners: Female     Birth control/protection: Post-menopausal   Social History Narrative    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)     Exercise : Most days per week : walking 4d/week + Pilates + cardio + resistance     Diet : pt suspects lactose intolerance      Social Drivers of Health     Financial Resource Strain: Low Risk  (2024)    Overall Financial Resource Strain (CARDIA)     Difficulty of Paying Living Expenses: Not hard at all   Food Insecurity: No Food Insecurity (2024)    Hunger Vital Sign     Worried About Running Out of Food in the Last Year: Never true     Ran Out of Food in the Last Year: Never true   Transportation Needs: No Transportation Needs (2024)    PRAPARE - Transportation     Lack of Transportation (Medical): No     Lack of Transportation (Non-Medical): No   Physical Activity: Insufficiently Active (2024)    Exercise Vital Sign      "Days of Exercise per Week: 2 days     Minutes of Exercise per Session: 50 min   Stress: No Stress Concern Present (5/9/2024)    [employer] [place] [employer] - Occupational Stress Questionnaire     Feeling of Stress : Not at all   Housing Stability: Low Risk  (5/9/2024)    Housing Stability Vital Sign     Unable to Pay for Housing in the Last Year: No     Homeless in the Last Year: No         Medications/Allergies: See med card    Vitals:    12/13/24 0832   BP: (!) 171/68   Pulse: 63   Weight: 62.1 kg (136 lb 12.7 oz)   Height: 5' 4" (1.626 m)   PainSc: 0-No pain         12/13/2024     8:31 AM 7/18/2024    11:07 AM 5/16/2024    10:03 AM   Last 3 PDI Scores   Pain Disability Index (PDI) 0 29 15         Physical exam:  Gen: A and O x3, pleasant, well-groomed  Skin: No rashes or obvious lesions  HEENT: PERRLA, no obvious deformities on ears or in canals. Trachea midline.  CVS: Regular rate and rhythm, normal palpable pulses.  Resp:No increased work of breathing, symmetrical chest rise.  Abdomen: Soft, NT/ND.  Musculoskeletal:  No antalgic gait.     Neuro:  Lower extremities: 5/5 strength bilaterally  Reflexes: Patellar 0+, Achilles 0+ bilaterally.  Sensory:  Intact and symmetrical to light touch and pinprick in L2-S1 dermatomes except for decreased sensation to light touch and pinprick from her knees distally bilaterally.    Lumbar spine:  Lumbar spine:  Range of motion is mildly reduced with flexion with slight increased pain, moderately decreased with extension with increased pain in her right low back.  Magnus's test causes no increased pain on either side.    Supine straight leg raise is negative bilaterally.    Internal and external rotation of the hip causes Increased left groin, lateral hip and anterior thigh pain.  Myofascial exam:  Mild tenderness to palpation across lumbar paraspinous muscles.  No tenderness to percussion to the spinous processes.  No tenderness to palpation over left greater " trochanteric bursa    Imagin17 Xray L-spine  Five views lumbar spine were obtained (AP, lateral, right oblique, left oblique and spot views). There is a upper lumbar dextroscoliosis and a mid lumbar levoscoliosis with superimposed multilevel degenerative disc and facet disease. There is loss of disc space height throughout the lumbar region there is lower lumbar degenerative facet disease.    4/15/14 BL Lower extremity EMG:   Mild sensorimotor predominantly axonal neuropathy.    19 MRI L-spine:  L5-S1: Spondylosis associated with disc space narrowing and mild marginal anterior spondylotic osteophytes.  There is a disc protrusion and osteophyte complex in the left neural foramen that causes a moderate left foraminal stenosis.  There are postoperative changes from a left L5-S1 laminectomy without definite signs for recurrent disc herniations.  There is facet arthropathy.  L4-5: Disc space narrowing associated with marginal anterior spondylotic osteophytes.  There is a mild circumferential annular disc bulge.  Facet arthropathy noted bilaterally.  There is also at least a moderate right lateral recess stenosis.  L3-4: Disc degeneration with with the or disc space narrowing and marginal anterior spondylotic osteophyte.  There is a broad-based posterior osteophyte the and disc bulge complex with compression of the thecal sac greater on the right.  There is a moderate to severe right lateral recess stenosis (image 32 series 5002.  There is associated facet arthropathy.  Mild central canal spinal stenosis.  L2-3: Disc degeneration with disc space narrowing.  There is a broad-based circumferential annular disc bulge.  In the left neural foramen in addition a disc protrusion with moderate obscuration of the foraminal fat is noted.  In the far lateral aspect of the neural foramen (image 16 series 5 the disc protrusion contacts the exiting left L2 root.  Clinical correlation for a left L2 radiculopathy  suggested.  L1-2: Disc degeneration with disc space narrowing and marginal anterior spondylotic osteophyte.  There is a broad-based left paracentral disc protrusion with compression of the thecal sac anterolaterally.  There is an extension of the disc protrusion in the left neural foramen associated with at least a moderate foraminal stenosis.  Compression of the exiting left L1 root far laterally in the foramen not excluded.  Right neural foramen is unremarkable.  T12-L1: No disc herniations or spinal stenosis or foraminal stenosis.    07/18/2024 x-ray lumbar spine   Vertebral body heights are preserved. No fractures or destructive changes. Straightening of the expected normal lumbar lordosis without evidence of spondylolisthesis. Broad rotatory levo scoliotic curvature. Severe degenerative disc height loss throughout endplate centered osteophytes. Superimposed bony demineralization.     Assessment:    The patient is a 74-year-old woman with a history of anxiety, depression, previous lumbar surgery who presents in referral from Dr. Adam for bilateral foot pain.     1. Lumbar radiculopathy        2. Lumbar spondylosis                Plan:  1. She will continue Pilates as well as physical therapy at [facility] with Elizabeth.    2. She will continue to take Lyrica 100 mg twice daily.  3. Follow-up in 6 months or sooner as needed.

## 2024-12-31 ENCOUNTER — OFFICE VISIT (OUTPATIENT)
Dept: FAMILY MEDICINE | Facility: CLINIC | Age: 74
End: 2024-12-31
Payer: MEDICARE

## 2024-12-31 VITALS
RESPIRATION RATE: 16 BRPM | BODY MASS INDEX: 23.56 KG/M2 | WEIGHT: 138 LBS | TEMPERATURE: 99 F | DIASTOLIC BLOOD PRESSURE: 58 MMHG | SYSTOLIC BLOOD PRESSURE: 136 MMHG | HEIGHT: 64 IN | HEART RATE: 64 BPM | OXYGEN SATURATION: 95 %

## 2024-12-31 DIAGNOSIS — R05.3 CHRONIC COUGH: ICD-10-CM

## 2024-12-31 DIAGNOSIS — I10 ESSENTIAL HYPERTENSION: Primary | ICD-10-CM

## 2024-12-31 PROCEDURE — 99215 OFFICE O/P EST HI 40 MIN: CPT | Mod: PBBFAC,PN | Performed by: FAMILY MEDICINE

## 2024-12-31 PROCEDURE — 99999 PR PBB SHADOW E&M-EST. PATIENT-LVL V: CPT | Mod: PBBFAC,,, | Performed by: FAMILY MEDICINE

## 2024-12-31 PROCEDURE — 99214 OFFICE O/P EST MOD 30 MIN: CPT | Mod: S$PBB,,, | Performed by: FAMILY MEDICINE

## 2024-12-31 RX ORDER — AMLODIPINE BESYLATE 5 MG/1
5 TABLET ORAL NIGHTLY
Qty: 30 TABLET | Refills: 3 | Status: SHIPPED | OUTPATIENT
Start: 2024-12-31 | End: 2025-12-31

## 2024-12-31 NOTE — PROGRESS NOTES
" THIS DOCUMENT WAS MADE IN PART WITH VOICE RECOGNITION SOFTWARE.  OCCASIONALLY THIS SOFTWARE WILL MISINTERPRET WORDS OR PHRASES.    Assessment and Plan:    1. Essential hypertension  amLODIPine (NORVASC) 5 MG tablet      2. Chronic cough  Ambulatory referral/consult to Allergy                PLAN       Assessment & Plan    PLAN SUMMARY:   Continue Azelastine nasal spray, Nasacort, and Ipratropium as prescribed   Continue Omeprazole as prescribed   Continue Losartan at current dose, to be taken in the morning   Start Amlodipine 5mg, to be taken at night   Referral to allergist (Dr. Maza or Dr. Savage) for chronic cough evaluation   Follow up in 3 months for blood pressure control and cough status reassessment   Contact office if new COVID-19 vaccines become available    ESSENTIAL HYPERTENSION:   Explained target blood pressure range, aiming for "yellow zone" to avoid symptoms of hypotension.   Discussed potential side effect of Amlodipine (ankle swelling) and its management.   Yolie to continue monitoring blood pressure at home.   Started Amlodipine 5mg, to be taken at night.   Continued Losartan at current dose, to be taken in the morning.    CHRONIC COUGH:   Referred to allergist (Dr. Maza or Dr. Savage) for evaluation of chronic cough.   Continued Azelastine nasal spray, Nasacort, and Ipratropium as previously prescribed.    PREVENTIVE CARE:   Reviewed patient's up-to-date status on vaccinations, including pneumonia, shingles, and COVID-19.   Contact the office if new COVID-19 vaccines become available.    GASTROESOPHAGEAL REFLUX DISEASE:   Continued Omeprazole as previously prescribed.    FOLLOW-UP:   Follow up in 3 months for reassessment of blood pressure control and cough status.             ______________________________________________________________________  Subjective:    Chief Complaint:  Chief Complaint   Patient presents with    Follow-up     Home bp monitor read this 131/82    " "    HPI:  Yolie PANIAGUA is a 74 y.o. year old             History of Present Illness    CHIEF COMPLAINT:  Yolie presents today for follow-up on blood pressure and chronic cough.    HYPERTENSION:  She reports blood pressure fluctuations ranging from 128-160, with readings predominantly on the higher side. She continues Losartan in the morning.    CHRONIC COUGH:  She reports improvement but not complete resolution of chronic cough, with persistent nocturnal symptoms. She continues Omeprazole, Azelastine, and Ipratropium.    IMMUNIZATIONS:  She has completed the pneumonia vaccine series and is current with COVID vaccination.      ROS:  ROS as indicated in HPI.                 Idiopathic peripheral neuropathy   Med- Lyrica 100 mg BID (effective)    History of migraine   Prophylaxis- Amitriptyline 10 mg + OTC Magnesium   Abortive - Fioricet (avg about one per month)  Prev rx : Ubrevly   Neurology : Nancy Alonso NP  SOTELO frequency : used to be yearly, has had 2 in last year (severe) and more mild ones   MRI unremarkable       Chronic back pain   Rx : Amitriptyline 10 mg, Lyrica 100 mg, Naproxen BID  Prev Tx : CHEIKH 8/23  Spine surgery in 2001 : laminectomy of L4   Pain mgt : MD Jay Jay     Restless leg syndrome   Rx-Requip 0.50 mg QHS (effective)     Chronic allergic rhinitis   Rx-Nasacort, zyrtec    Septoplasty 2017    Essential hypertension   Rx-losartan 100 mg + amlodipine  No home check     Family history colon cancer (Mother dx @ approx 80)  Up-to-date on colon cancer screening   Asymptomatic  Follow-up recommended every 5 years   previous colonoscopy 03/02/2020     Atrophic vaginitis   Prev Rx-Estrace vaginal cream (effective)    Obstructive sleep apnea  No CPAP   "Old diagnosis" / Repeat sleep study was mild CARYL, no need for CPAP   ++ Snoring, denies fatigue      Nicotine dependence   In remission, quit in 1973     Senile osteoporosis  Previous DEXA bone scan 4/24  Rx-alendronate weekly  No recent fractures    "   Anxiety, depression  med-Celexa 20 mg (effective)     Glucose intolerance  Prev A1c : 5.8   Rx None     Chronic Cough  Prev rx : Protonix (ineffective)         Past Medical History:  Past Medical History:   Diagnosis Date    Anxiety     Arthritis     Chronic allergic rhinitis 11/19/2012    Depression     Essential hypertension 9/14/2020    Herniated lumbar intervertebral disc     L3    Migraine     painless with numbness       Past Surgical History:  Past Surgical History:   Procedure Laterality Date    COLONOSCOPY      COLONOSCOPY N/A 03/02/2020    Procedure: COLONOSCOPY;  Surgeon: Flash Garnett MD;  Location: [facility] ENDO;  Service: Endoscopy;  Laterality: N/A;    EPIDURAL STEROID INJECTION INTO LUMBAR SPINE N/A 05/20/2019    Procedure: Injection-steroid-epidural-lumbar;  Surgeon: Patrick Goyal MD;  Location: [facility] OR;  Service: Pain Management;  Laterality: N/A;  L5/S1 from right side    EPIDURAL STEROID INJECTION INTO LUMBAR SPINE N/A 08/04/2022    Procedure: Injection-steroid-epidural-lumbar, L5/S1  from the right side;  Surgeon: Patrick Goyal MD;  Location: [facility] OR;  Service: Pain Management;  Laterality: N/A;    EPIDURAL STEROID INJECTION INTO LUMBAR SPINE N/A 8/3/2023    Procedure: Injection-steroid-epidural-lumbar L5/S1 from the right;  Surgeon: Patrick Goyal MD;  Location: [facility] OR;  Service: Pain Management;  Laterality: N/A;    EPIDURAL STEROID INJECTION INTO LUMBAR SPINE N/A 4/18/2024    Procedure: Injection-steroid-epidural-lumbar     L5/S1 from the right;  Surgeon: Patrick Goyal MD;  Location: [facility] OR;  Service: Pain Management;  Laterality: N/A;    EYE SURGERY  May, 2018    cataract surgery    NASAL SEPTUM SURGERY N/A 09/2017    SPINE SURGERY  2001    L4    TONSILLECTOMY         Family History:  Family History   Problem Relation Name Age of Onset    Cancer Mother Jazzmine PANIAGUA'Donnell 78        mother    Heart disease Mother Jazzmine PANIAGUA'Donnell 78        cabg    COPD Mother  Jazzmine [place]     Hypertension Mother Jazzmine Arrington     Vision loss Mother Jazzmine Arrington         macular degeration    Diabetes Father Rick Arrington     Heart disease Father Rick Arrington 71        cabg    Hypertension Father Rick Arrington     Diabetes Brother Chapo Arrington     Cancer Brother Chapo Arrington         brother    Heart disease Brother Chapo Arrington 50        [place]    Hypertension Brother Chapo Arrington     Diabetes Paternal Grandmother Leny Arrington        Social History:  Social History     Socioeconomic History    Marital status:      Spouse name: Kraig    Number of children: 3   Tobacco Use    Smoking status: Former     Current packs/day: 0.00     Types: Cigarettes     Quit date: 1973     Years since quittin.6     Passive exposure: Past    Smokeless tobacco: Never    Tobacco comments:     social smoker only   Substance and Sexual Activity    Alcohol use: Yes     Alcohol/week: 5.0 standard drinks of alcohol     Types: 5 Glasses of wine per week     Comment: one glass of wine with dinner daily    Drug use: No    Sexual activity: Yes     Partners: Female     Birth control/protection: Post-menopausal   Social History Narrative    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)     Exercise : Most days per week : walking 4d/week + Pilates + cardio + resistance     Diet : pt suspects lactose intolerance      Social Drivers of Health     Financial Resource Strain: Low Risk  (2024)    Overall Financial Resource Strain (CARDIA)     Difficulty of Paying Living Expenses: Not hard at all   Food Insecurity: No Food Insecurity (2024)    Hunger Vital Sign     Worried About Running Out of Food in the Last Year: Never true     Ran Out of Food in the Last Year: Never true   Transportation Needs: No Transportation Needs (2024)    PRAPARE - Transportation     Lack of Transportation (Medical): No     Lack of Transportation (Non-Medical): No   Physical Activity: Insufficiently  Active (5/9/2024)    Exercise Vital Sign     Days of Exercise per Week: 2 days     Minutes of Exercise per Session: 50 min   Stress: No Stress Concern Present (5/9/2024)    [employer] - Occupational Stress Questionnaire     Feeling of Stress : Not at all   Housing Stability: Low Risk  (5/9/2024)    Housing Stability Vital Sign     Unable to Pay for Housing in the Last Year: No     Homeless in the Last Year: No       Medications:  Current Outpatient Medications on File Prior to Visit   Medication Sig Dispense Refill    alendronate (FOSAMAX) 70 MG tablet TAKE 1 TABLET(70 MG) BY MOUTH EVERY 7 DAYS 12 tablet 3    amitriptyline (ELAVIL) 10 MG tablet TAKE 1 TABLET(10 MG) BY MOUTH EVERY EVENING 30 tablet 6    azelastine (ASTELIN) 137 mcg (0.1 %) nasal spray 2 sprays (274 mcg total) by Nasal route every morning. 30 mL 2    butalbital-acetaminophen-caffeine -40 mg (FIORICET, ESGIC) -40 mg per tablet 1 tab PO PRN once daily for migraine. No more than 10 tab per month. 10 tablet 0    citalopram (CELEXA) 20 MG tablet Take 1 tablet (20 mg total) by mouth once daily. 30 tablet 11    ergocalciferol (ERGOCALCIFEROL) 50,000 unit Cap TAKE 1 CAPSULE BY MOUTH EVERY 7 DAYS 12 capsule 4    HYDROcodone-acetaminophen (NORCO) 5-325 mg per tablet Take 1 tablet by mouth every 8 (eight) hours as needed for Pain. 15 tablet 0    ipratropium (ATROVENT) 21 mcg (0.03 %) nasal spray 2 sprays by Each Nostril route every evening. 30 mL 2    losartan (COZAAR) 100 MG tablet Take 1 tablet (100 mg total) by mouth once daily. 90 tablet 3    omeprazole (PRILOSEC) 40 MG capsule Take 1 capsule (40 mg total) by mouth 2 (two) times daily before meals. 60 capsule 0    ondansetron (ZOFRAN-ODT) 4 MG TbDL DISSOLVE 1 TABLET(4 MG) ON THE TONGUE EVERY 6 HOURS AS NEEDED FOR NAUSEA 30 tablet 11    pregabalin (LYRICA) 100 MG capsule TAKE 1 CAPSULE(100 MG) BY MOUTH TWICE DAILY 60 capsule 2    RESTASIS 0.05 % ophthalmic emulsion  Place 1 drop into both eyes 2 (two) times daily.      rOPINIRole (REQUIP) 0.25 MG tablet TAKE 2 TABLETS BY MOUTH EVERY EVENING 180 tablet 3    triamcinolone (NASACORT) 55 mcg nasal inhaler 1 spray by Nasal route once daily.       Current Facility-Administered Medications on File Prior to Visit   Medication Dose Route Frequency Provider Last Rate Last Admin    cyanocobalamin injection 1,000 mcg  1,000 mcg Intramuscular Q28 Days    1,000 mcg at 12/09/24 0939       Allergies:  Penicillins    Immunizations:  Immunization History   Administered Date(s) Administered    COVID-19 MRNA, LN-S PF (MODERNA HALF 0.25 ML DOSE) 11/26/2021, 04/04/2022    COVID-19, MRNA, LN-S, PF (MODERNA FULL 0.5 ML DOSE) 01/22/2021, 02/19/2021    COVID-19, mRNA, LNP-S, PF, astrid-sucrose, 30 mcg/0.3 mL (Pfizer Ages 12+) 10/05/2023    COVID-19, mRNA, LNP-S, bivalent booster, PF (Moderna Omicron)12 + YEARS 10/07/2022    H1N1 Swine Flu Vaccine 12/30/2009    Influenza (FLUAD) - Quadrivalent - Adjuvanted - PF *Preferred* (65+) 10/13/2021, 10/04/2022, 09/07/2023    Influenza - Quadrivalent - PF *Preferred* (6 months and older) 10/24/2007, 10/06/2009, 10/25/2010, 11/03/2011, 11/19/2012    Influenza - Trivalent - Afluria, Fluzone MDV 10/24/2007, 10/06/2009, 10/25/2010, 11/03/2011    Influenza - Trivalent - Fluad - Adjuvanted - PF (65 years and older 11/05/2024    Influenza - Trivalent - Fluzone High Dose - PF (65 years and older) 10/08/2013, 11/11/2014, 10/26/2015, 10/05/2017, 09/20/2018, 09/24/2019, 09/14/2020    Influenza A (H1N1) 2009 Monovalent - IM 12/30/2009    Influenza Split 11/19/2012    Pneumococcal Conjugate - 13 Valent 01/18/2016    Pneumococcal Polysaccharide - 23 Valent 10/08/2013    RSVpreF (Arexvy) 11/06/2023    Tdap 11/03/2011, 12/10/2024    Zoster 01/22/2014    Zoster Recombinant 05/02/2022, 07/12/2022       Review of Systems:  Review of Systems   Constitutional:  Negative for activity change and unexpected weight change.   HENT:  Negative  "for hearing loss, rhinorrhea and trouble swallowing.    Eyes:  Negative for discharge and visual disturbance.   Respiratory:  Negative for chest tightness and wheezing.    Cardiovascular:  Negative for chest pain and palpitations.   Gastrointestinal:  Negative for blood in stool, constipation, diarrhea and vomiting.   Endocrine: Negative for polydipsia and polyuria.   Genitourinary:  Negative for difficulty urinating, dysuria, hematuria and menstrual problem.   Musculoskeletal:  Negative for arthralgias, joint swelling and neck pain.   Neurological:  Negative for weakness and headaches.   Psychiatric/Behavioral:  Negative for confusion and dysphoric mood.        Objective:    Vitals:  Vitals:    12/31/24 1029   BP: (!) 136/58   Pulse: 64   Resp: 16   Temp: 98.9 °F (37.2 °C)   TempSrc: Oral   SpO2: 95%   Weight: 62.6 kg (138 lb 0.1 oz)   Height: 5' 4" (1.626 m)   PainSc: 0-No pain         Physical Exam  Vitals reviewed.   Constitutional:       General: She is not in acute distress.  HENT:      Head: Normocephalic and atraumatic.   Eyes:      Pupils: Pupils are equal, round, and reactive to light.   Cardiovascular:      Rate and Rhythm: Normal rate and regular rhythm.      Heart sounds: No murmur heard.     No friction rub.   Pulmonary:      Effort: Pulmonary effort is normal.      Breath sounds: Normal breath sounds.   Abdominal:      General: Bowel sounds are normal. There is no distension.      Palpations: Abdomen is soft.      Tenderness: There is no abdominal tenderness.   Musculoskeletal:      Cervical back: Neck supple.   Skin:     General: Skin is warm and dry.      Findings: No rash.   Psychiatric:         Behavior: Behavior normal.             Td Montana MD  Family Medicine            "

## 2025-01-05 DIAGNOSIS — R05.3 CHRONIC COUGH: ICD-10-CM

## 2025-01-05 NOTE — TELEPHONE ENCOUNTER
Care Due:                  Date            Visit Type   Department     Provider  --------------------------------------------------------------------------------                                EP -                              PRIMARY      [facility] FAMILY  Last Visit: 12-      CARE ([facility])   MEDICINE       Td Montana                               -                              [facility]  Next Visit: 03-      CARE ([facility])   MEDICINE       Td Montana                                                            Last  Test          Frequency    Reason                     Performed    Due Date  --------------------------------------------------------------------------------    Mg Level....  12 months..  alendronate..............  Not Found    Overdue    Phosphate...  12 months..  alendronate..............  Not Found    Overdue    [employer] Embedded Care Due Messages. Reference number: [id number].   1/05/2025 3:29:45 PM CST

## 2025-01-06 ENCOUNTER — CLINICAL SUPPORT (OUTPATIENT)
Dept: FAMILY MEDICINE | Facility: CLINIC | Age: 75
End: 2025-01-06
Payer: MEDICARE

## 2025-01-06 DIAGNOSIS — E53.8 B12 DEFICIENCY: Primary | ICD-10-CM

## 2025-01-06 PROCEDURE — 96372 THER/PROPH/DIAG INJ SC/IM: CPT | Mod: PBBFAC,PN

## 2025-01-06 PROCEDURE — 99499 UNLISTED E&M SERVICE: CPT | Mod: S$PBB,,, | Performed by: FAMILY MEDICINE

## 2025-01-06 PROCEDURE — 99999PBSHW PR PBB SHADOW TECHNICAL ONLY FILED TO HB: Mod: PBBFAC,,,

## 2025-01-06 RX ORDER — OMEPRAZOLE 40 MG/1
40 CAPSULE, DELAYED RELEASE ORAL
Qty: 60 CAPSULE | Refills: 0 | Status: SHIPPED | OUTPATIENT
Start: 2025-01-06

## 2025-01-06 RX ADMIN — CYANOCOBALAMIN 1000 MCG: 1000 INJECTION, SOLUTION INTRAMUSCULAR at 09:01

## 2025-01-06 NOTE — TELEPHONE ENCOUNTER
Refill Routing Note   Medication(s) are not appropriate for processing by [facility] for the following reason(s):        Outside of protocol    [facility] action(s):  Route   Requires labs : Yes      Medication Therapy Plan: FOVS; Patient's total daily dose exceeds [facility] criteria.      Appointments  past 12m or future 3m with PCP    Date Provider   Last Visit   12/31/2024 Td Montana MD   Next Visit   3/28/2025 Td Montana MD   ED visits in past 90 days: 0        Note composed:1:47 PM 01/06/2025

## 2025-01-07 DIAGNOSIS — R23.2 HOT FLASHES: ICD-10-CM

## 2025-01-07 RX ORDER — PANTOPRAZOLE SODIUM 40 MG/1
40 TABLET, DELAYED RELEASE ORAL
Qty: 60 TABLET | Refills: 0 | OUTPATIENT
Start: 2025-01-07

## 2025-01-07 NOTE — TELEPHONE ENCOUNTER
Refill Decision Note   Yolie Brambila  is requesting a refill authorization.  Brief Assessment and Rationale for Refill:  Quick Discontinue     Medication Therapy Plan:  PATIENT WAS INCREASED TO BID ON 12/10/24      Comments:     Note composed:10:02 AM 01/07/2025            
No care due was identified.  [facility] Embedded Care Due Messages. Reference number: [id number].   1/07/2025 3:23:36 AM CST  
Attending Attestation (For Attendings USE Only)...

## 2025-02-03 ENCOUNTER — CLINICAL SUPPORT (OUTPATIENT)
Dept: FAMILY MEDICINE | Facility: CLINIC | Age: 75
End: 2025-02-03
Payer: MEDICARE

## 2025-02-03 DIAGNOSIS — E53.8 B12 DEFICIENCY: Primary | ICD-10-CM

## 2025-02-03 PROCEDURE — 96372 THER/PROPH/DIAG INJ SC/IM: CPT | Mod: PBBFAC,PN

## 2025-02-03 PROCEDURE — 99999PBSHW PR PBB SHADOW TECHNICAL ONLY FILED TO HB: Mod: PBBFAC,,,

## 2025-02-03 RX ADMIN — CYANOCOBALAMIN 1000 MCG: 1000 INJECTION, SOLUTION INTRAMUSCULAR at 09:02

## 2025-02-06 DIAGNOSIS — R05.3 CHRONIC COUGH: ICD-10-CM

## 2025-02-06 RX ORDER — OMEPRAZOLE 40 MG/1
40 CAPSULE, DELAYED RELEASE ORAL
Qty: 60 CAPSULE | Refills: 0 | Status: SHIPPED | OUTPATIENT
Start: 2025-02-06

## 2025-02-06 NOTE — TELEPHONE ENCOUNTER
Refill Routing Note   Medication(s) are not appropriate for processing by [facility] for the following reason(s):        Outside of protocol-BID dose of the 40mg    [facility] action(s):  Route               Appointments  past 12m or future 3m with PCP    Date Provider   Last Visit   12/31/2024 Td Montana MD   Next Visit   3/28/2025 Td Montana MD   ED visits in past 90 days: 0        Note composed:2:00 PM 02/06/2025

## 2025-02-06 NOTE — TELEPHONE ENCOUNTER
No care due was identified.  [facility] Embedded Care Due Messages. Reference number: [id number].   2/06/2025 11:41:48 AM CST

## 2025-02-15 RX ORDER — AMITRIPTYLINE HYDROCHLORIDE 10 MG/1
TABLET, FILM COATED ORAL
Qty: 30 TABLET | Refills: 6 | Status: SHIPPED | OUTPATIENT
Start: 2025-02-15

## 2025-02-17 NOTE — TELEPHONE ENCOUNTER
No care due was identified.  [facility] Embedded Care Due Messages. Reference number: [id number].   2/17/2025 4:59:10 PM CST

## 2025-02-18 RX ORDER — ALENDRONATE SODIUM 70 MG/1
TABLET ORAL
Qty: 12 TABLET | Refills: 3 | Status: SHIPPED | OUTPATIENT
Start: 2025-02-18

## 2025-02-18 NOTE — TELEPHONE ENCOUNTER
8/22/2022    No chief complaint on file  Assessment and Plan    57 y o  male new patient to office    1  Elevated PSA  · PSA trend  · 8 9 (8/10/2022)  · 5 2 (1/28/2022)  · 3 6 (10/27/2020)  · 2 7 (7/22/2019)  · Negative family history of prostate cancer  · ALLEY reveals a smooth symmetric prostate, no palpable nodules or masses  Estimated gland size between 55-60 g  · Discussed option of continued surveillance, mpMRI of the prostate, or transperineal prostate biopsy  Patient elected for mpMRI of the prostate  · Follow-up in 3 months after mpMRI with repeat PSA at that time  2  BPH with LUTS  · Urinary frequency, weak urinary stream, and nocturia 2-3 times per night  · Recommend increasing tamsulosin to 0 8 mg daily  · Follow-up in 3 months to reassess  If symptoms persist discussed addition of finasteride        History of Present Illness  Heidi Almonte is a 57 y o  male here for evaluation of BPH with LUTS and elevated PSA  He reports urinary frequency, weak urinary stream, and nocturia 2-3 times  He was previously seen by Dr Gonsalez Never about 15 years ago for BPH with LUTS  Has been taking tamsulosin 0 4 mg daily prescribed by his PCP for the past 3 years  His nocturia is exacerbated depending how much he drinks prior to bed and what he drinks  He will drink either water, soda, or occasional beer prior to bedtime which will cause him to get up 4-5 times per night  His symptoms have progressively become worse over the past 6-12 months  In regard to prostate cancer screening, he denies any family history of prostate cancer  Most recent PSA is 8 9 on 8/10/2022, previously  5 2 on 1/28/2022 and 3 6 on 10/27/2020  His father does have a history of bladder cancer  He is a former smoker, having smoked 0 5 ppd from teenager to mid 19's then socially until he quit 8 years ago  Denies history of gross hematuria  Urine dip in office was negative for leukocytes, nitrates, or blood              Review of Systems Refill Routing Note   Medication(s) are not appropriate for processing by [facility] for the following reason(s):        Outside of protocol    [facility] action(s):  Route             Appointments  past 12m or future 3m with PCP    Date Provider   Last Visit   12/31/2024 Td Montana MD   Next Visit   3/28/2025 Td Montana MD   ED visits in past 90 days: 0        Note composed:7:55 AM 02/18/2025           Constitutional: Negative for chills and fever  HENT: Negative for congestion and sore throat  Respiratory: Negative for cough and shortness of breath  Cardiovascular: Negative for chest pain and leg swelling  Gastrointestinal: Negative for abdominal pain, constipation, diarrhea, nausea and vomiting  Genitourinary: Positive for difficulty urinating and frequency  Negative for dysuria, flank pain, hematuria and urgency  Nocturia 2-3 times per night   Musculoskeletal: Positive for neck pain (chronic cervical)  Negative for back pain and gait problem  Skin: Negative for wound  Allergic/Immunologic: Negative for immunocompromised state  Neurological: Negative for dizziness, weakness and numbness  Hematological: Does not bruise/bleed easily  Vitals  Vitals:    08/22/22 1557   BP: 122/80   Weight: 99 8 kg (220 lb)   Height: 5' 10" (1 778 m)       Physical Exam  Vitals reviewed  Constitutional:       General: He is not in acute distress  Appearance: Normal appearance  He is not ill-appearing or toxic-appearing  HENT:      Head: Normocephalic and atraumatic  Eyes:      General: No scleral icterus  Conjunctiva/sclera: Conjunctivae normal    Cardiovascular:      Rate and Rhythm: Normal rate  Pulmonary:      Effort: Pulmonary effort is normal  No respiratory distress  Abdominal:      General: Bowel sounds are normal       Palpations: Abdomen is soft  Tenderness: There is no abdominal tenderness  There is no right CVA tenderness or left CVA tenderness  Hernia: No hernia is present  Genitourinary:     Comments: ALLEY reveals smooth symmetric prostate, no palpable nodules or masses  Estimated gland size between 55-60 g    Musculoskeletal:      Cervical back: Normal range of motion  Right lower leg: No edema  Left lower leg: No edema  Skin:     General: Skin is warm and dry  Coloration: Skin is not jaundiced or pale     Neurological: General: No focal deficit present  Mental Status: He is alert and oriented to person, place, and time  Mental status is at baseline  Gait: Gait normal    Psychiatric:         Mood and Affect: Mood normal          Behavior: Behavior normal          Thought Content:  Thought content normal          Judgment: Judgment normal          Past History  Past Medical History:   Diagnosis Date    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[address] )      Social History     Socioeconomic History    Marital status: Single     Spouse name: None    Number of children: None    Years of education: None    Highest education level: None   Occupational History    None   Tobacco Use    Smoking status: Former Smoker    Smokeless tobacco: Never Used   Vaping Use    Vaping Use: Never used   Substance and Sexual Activity    Alcohol use: None    Drug use: None    Sexual activity: None   Other Topics Concern    None   Social History Narrative    None     Social Determinants of Health     Financial Resource Strain: Not on file   Food Insecurity: Not on file   Transportation Needs: Not on file   Physical Activity: Not on file   Stress: Not on file   Social Connections: Not on file   Intimate Partner Violence: Not on file   Housing Stability: Not on file     Social History     Tobacco Use   Smoking Status Former Smoker   Smokeless Tobacco Never Used     Family History   Problem Relation Age of Onset    Cancer Father     Lung cancer Brother     Cancer Son        The following portions of the patient's history were reviewed and updated as appropriate allergies, current medications, past medical history, past social history, past surgical history and problem list    Imaging:    Results  No results found for this or any previous visit (from the past 1 hour(s)) ]  Lab Results   Component Value Date    PSA 8 9 (H) 08/10/2022    PSA 5 2 (H) 01/28/2022    PSA 3 6 10/27/2020    PSA 2 7 07/22/2019     Lab Results   Component Value Date    GLUCOSE 89 06/07/2014    CALCIUM 8 7 08/10/2022     06/07/2014    K 4 3 08/10/2022    CO2 29 08/10/2022     08/10/2022    BUN 9 08/10/2022    CREATININE 1 05 08/10/2022     Lab Results   Component Value Date    WBC 6 02 08/10/2022    HGB 12 1 08/10/2022    HCT 36 1 (L) 08/10/2022    MCV 91 08/10/2022     08/10/2022       Please Note:  Voice dictation software has been used to create this document  There may be inadvertent transcriptions errors       Vesta Moore

## 2025-02-21 DIAGNOSIS — M54.16 LUMBAR RADICULOPATHY: ICD-10-CM

## 2025-02-24 RX ORDER — PREGABALIN 100 MG/1
100 CAPSULE ORAL 2 TIMES DAILY
Qty: 60 CAPSULE | Refills: 2 | Status: SHIPPED | OUTPATIENT
Start: 2025-02-27

## 2025-02-26 ENCOUNTER — TELEPHONE (OUTPATIENT)
Dept: FAMILY MEDICINE | Facility: CLINIC | Age: 75
End: 2025-02-26
Payer: MEDICARE

## 2025-02-26 NOTE — TELEPHONE ENCOUNTER
Pt called back. Asked pt when did she want to come in for her nurse visit. Pt told me a date and time. Put the pt on the schedule. Pt said thanks and hung up.

## 2025-02-26 NOTE — TELEPHONE ENCOUNTER
----- Message from Heidy sent at 2/26/2025  9:30 AM CST -----  Contact: self  Type:  Needs Medical AdviceWho Called: selfSymptoms (please be specific): pt is wanting to schedule a nurse visit for her B12 shot Would the patient rather a call back or a response via [employer]? callBest Call Back Number:  [phone] Information: please advise and thank you

## 2025-02-27 ENCOUNTER — PATIENT MESSAGE (OUTPATIENT)
Dept: PAIN MEDICINE | Facility: CLINIC | Age: 75
End: 2025-02-27
Payer: MEDICARE

## 2025-03-02 DIAGNOSIS — E55.9 VITAMIN D DEFICIENCY: ICD-10-CM

## 2025-03-02 NOTE — TELEPHONE ENCOUNTER
No care due was identified.  [facility] Embedded Care Due Messages. Reference number: [id number].   3/02/2025 3:23:28 AM CST

## 2025-03-03 ENCOUNTER — CLINICAL SUPPORT (OUTPATIENT)
Dept: FAMILY MEDICINE | Facility: CLINIC | Age: 75
End: 2025-03-03
Payer: MEDICARE

## 2025-03-03 DIAGNOSIS — E53.8 B12 DEFICIENCY: Primary | ICD-10-CM

## 2025-03-03 PROCEDURE — 99999PBSHW PR PBB SHADOW TECHNICAL ONLY FILED TO HB: Mod: PBBFAC,,,

## 2025-03-03 PROCEDURE — 96372 THER/PROPH/DIAG INJ SC/IM: CPT | Mod: PBBFAC,PN

## 2025-03-03 RX ORDER — ERGOCALCIFEROL 1.25 MG/1
50000 CAPSULE ORAL
Qty: 12 CAPSULE | Refills: 4 | Status: SHIPPED | OUTPATIENT
Start: 2025-03-03

## 2025-03-03 RX ADMIN — CYANOCOBALAMIN 1000 MCG: 1000 INJECTION, SOLUTION INTRAMUSCULAR at 09:03

## 2025-03-03 NOTE — TELEPHONE ENCOUNTER
Refill Routing Note   Medication(s) are not appropriate for processing by [facility] for the following reason(s):        Outside of protocol    [facility] action(s):  Route               Appointments  past 12m or future 3m with PCP    Date Provider   Last Visit   12/31/2024 Td Montana MD   Next Visit   3/28/2025 Td Montana MD   ED visits in past 90 days: 0        Note composed:7:39 AM 03/03/2025

## 2025-03-09 DIAGNOSIS — R05.3 CHRONIC COUGH: ICD-10-CM

## 2025-03-09 NOTE — TELEPHONE ENCOUNTER
Care Due:                  Date            Visit Type   Department     Provider  --------------------------------------------------------------------------------                                EP -                              PRIMARY      [facility] FAMILY  Last Visit: 12-      CARE ([facility])   MEDICINE       Td Montana                               -                              [facility]  Next Visit: 03-      CARE ([facility])   MEDIC[facility]       [facility]                                                            Last  Test          Frequency    Reason                     Performed    Due Date  --------------------------------------------------------------------------------    Mg Level....  12 months..  alendronate..............  Not Found    Overdue    Phosphate...  12 months..  alendronate..............  Not Found    Overdue    [employer] Embedded Care Due Messages. Reference number: [id number].   3/09/2025 3:57:43 PM CDT

## 2025-03-10 RX ORDER — OMEPRAZOLE 40 MG/1
40 CAPSULE, DELAYED RELEASE ORAL
Qty: 180 CAPSULE | Refills: 3 | Status: SHIPPED | OUTPATIENT
Start: 2025-03-10

## 2025-03-10 NOTE — TELEPHONE ENCOUNTER
Refill Routing Note   Medication(s) are not appropriate for processing by [facility] for the following reason(s):        Outside of protocol    [facility] action(s):  Route   Requires labs : Yes        Medication Therapy Plan: PPI dose outside of [facility] protocol      Appointments  past 12m or future 3m with PCP    Date Provider   Last Visit   12/31/2024 Td Montana MD   Next Visit   3/28/2025 Td Montana MD   ED visits in past 90 days: 0        Note composed:2:15 PM 03/10/2025

## 2025-03-26 NOTE — PROGRESS NOTES
ALLERGY & IMMUNOLOGY CLINIC -  NEW PATIENT     HISTORY OF PRESENT ILLNESS     Patient ID: Yolie Brambila is a 74 y.o. female    CC:   Chief Complaint   Patient presents with    Cough       03/27/2025  HPI: Yolie Brambila is a 74 y.o. female presents for evaluation of: coughing.  States symptoms have been present for the previous 4-5 years and seemed to arise in her throat.  Does not experience associated chest tightness, wheezing, or shortness of breath.  Was previously on pantoprazole and recently changed to omeprazole daily which she states does mildly relieve symptoms.  Has additionally been started on azelastine nasal spray as well as ipratropium nasal spray twice daily which she states also provides relief of her symptoms.  Symptoms are present throughout the day, however symptoms are most bothersome specifically during the nighttime hours and can awaken her from her sleep.  She has never needed to use a rescue inhaler or been hospitalized for breathing related issues.  She did smoke many years ago however it was mainly in a social manner with a pack of cigarettes lasting her upwards of 1-2 months.  Has not noticed any seasonal variation or readily identifiable triggers.  She has 1 dog at home but does not feel like symptoms are more bothersome when in close proximity.     REVIEW OF SYSTEMS     CONST: no F/C/NS, no unintentional weight changes  Balance of review of systems negative except as mentioned above     MEDICAL HISTORY     MedHx: active problems reviewed  SurgHx:   Past Surgical History:   Procedure Laterality Date    COLONOSCOPY      COLONOSCOPY N/A 03/02/2020    Procedure: COLONOSCOPY;  Surgeon: Flash Garnett MD;  Location: [facility] ENDO;  Service: Endoscopy;  Laterality: N/A;    EPIDURAL STEROID INJECTION INTO LUMBAR SPINE N/A 05/20/2019    Procedure: Injection-steroid-epidural-lumbar;  Surgeon: Patrick Goyal MD;  Location: [facility] OR;  Service: Pain Management;  Laterality: N/A;  L5/S1 from  "right side    EPIDURAL STEROID INJECTION INTO LUMBAR SPINE N/A 08/04/2022    Procedure: Injection-steroid-epidural-lumbar, L5/S1  from the right side;  Surgeon: Patrick Goyal MD;  Location: [facility] OR;  Service: Pain Management;  Laterality: N/A;    EPIDURAL STEROID INJECTION INTO LUMBAR SPINE N/A 8/3/2023    Procedure: Injection-steroid-epidural-lumbar L5/S1 from the right;  Surgeon: Patrick Goyal MD;  Location: [facility] OR;  Service: Pain Management;  Laterality: N/A;    EPIDURAL STEROID INJECTION INTO LUMBAR SPINE N/A 4/18/2024    Procedure: Injection-steroid-epidural-lumbar     L5/S1 from the right;  Surgeon: Patrick Goyal MD;  Location: [facility] OR;  Service: Pain Management;  Laterality: N/A;    EYE SURGERY  May, 2018    cataract surgery    NASAL SEPTUM SURGERY N/A 09/2017    SPINE SURGERY  2001    L4    TONSILLECTOMY         SocHx:   Per HPI    FamHx:   Multiple family members with allergic rhinitis   Allergies: see below  Medications: MAR reviewed       PHYSICAL EXAM     VS: Pulse 64   Ht 5' 4" (1.626 m)   Wt 62 kg (136 lb 11 oz)   SpO2 95%   BMI 23.46 kg/m²   GENERAL: awake, alert, cooperative with exam  LUNGS: CTAB, no w/r/c, no increased WOB  HEART: Normal Rate and regular rhythm, normal S1/S2, no m/g/r  EXTREMITIES: +2 distal pulses, no c/c/e  DERM: no rashes, no skin breaks       CHART REVIEW     Previous Provider Notes     ASSESSMENT/PLAN     Yolie Brambila is a 74 y.o. female with     1. Chronic cough    2. Allergy, subsequent encounter      Cough is likely multifactorial with component of postnasal drip as well as likely acid reflux/laryngopharyngeal reflux.  Recommend continuation of both nasal spray as well as PPI therapy for ongoing relief and we will send for region 6 allergen panel today to assess for possible allergic triggers versus mitigation efforts that can be incorporated into the home.  Given age of onset, low suspicion for eosinophilic asthma but we will send for CBC with " differential to assess for eosinophilia.    Follow up: Pending Results-Call with Results      Rick Maza MD    I spent a total of 35 minutes on the day of the visit. This includes face to face time and non-face to face time preparing to see the patient (eg, review of tests), obtaining and/or reviewing separately obtained history, documenting clinical information in the electronic or other health record, independently interpreting results and communicating results to the patient/family/caregiver, or care coordinator.

## 2025-03-27 ENCOUNTER — OFFICE VISIT (OUTPATIENT)
Dept: ALLERGY | Facility: CLINIC | Age: 75
End: 2025-03-27
Payer: MEDICARE

## 2025-03-27 ENCOUNTER — LAB VISIT (OUTPATIENT)
Dept: LAB | Facility: HOSPITAL | Age: 75
End: 2025-03-27
Payer: MEDICARE

## 2025-03-27 VITALS — OXYGEN SATURATION: 95 % | BODY MASS INDEX: 23.34 KG/M2 | HEIGHT: 64 IN | WEIGHT: 136.69 LBS | HEART RATE: 64 BPM

## 2025-03-27 DIAGNOSIS — R05.3 CHRONIC COUGH: Primary | ICD-10-CM

## 2025-03-27 DIAGNOSIS — T78.40XD ALLERGY, SUBSEQUENT ENCOUNTER: ICD-10-CM

## 2025-03-27 DIAGNOSIS — R05.3 CHRONIC COUGH: ICD-10-CM

## 2025-03-27 LAB
ABSOLUTE EOSINOPHIL (OHS): 0.14 K/UL
ABSOLUTE MONOCYTE (OHS): 0.39 K/UL (ref 0.3–1)
ABSOLUTE NEUTROPHIL COUNT (OHS): 2.86 K/UL (ref 1.8–7.7)
BASOPHILS # BLD AUTO: 0.06 K/UL
BASOPHILS NFR BLD AUTO: 1.3 %
ERYTHROCYTE [DISTWIDTH] IN BLOOD BY AUTOMATED COUNT: 12.1 % (ref 11.5–14.5)
HCT VFR BLD AUTO: 35.1 % (ref 37–48.5)
HGB BLD-MCNC: 11.7 GM/DL (ref 12–16)
IMM GRANULOCYTES # BLD AUTO: 0.01 K/UL (ref 0–0.04)
IMM GRANULOCYTES NFR BLD AUTO: 0.2 % (ref 0–0.5)
LYMPHOCYTES # BLD AUTO: 1.05 K/UL (ref 1–4.8)
MCH RBC QN AUTO: 30.5 PG (ref 27–50)
MCHC RBC AUTO-ENTMCNC: 33.3 G/DL (ref 32–36)
MCV RBC AUTO: 91 FL (ref 82–98)
NUCLEATED RBC (/100WBC) (OHS): 0 /100 WBC
PLATELET # BLD AUTO: 333 K/UL (ref 150–450)
PMV BLD AUTO: 9.9 FL (ref 9.2–12.9)
RBC # BLD AUTO: 3.84 M/UL (ref 4–5.4)
RELATIVE EOSINOPHIL (OHS): 3.1 %
RELATIVE LYMPHOCYTE (OHS): 23.3 % (ref 18–48)
RELATIVE MONOCYTE (OHS): 8.6 % (ref 4–15)
RELATIVE NEUTROPHIL (OHS): 63.5 % (ref 38–73)
WBC # BLD AUTO: 4.51 K/UL (ref 3.9–12.7)

## 2025-03-27 PROCEDURE — 86003 ALLG SPEC IGE CRUDE XTRC EA: CPT

## 2025-03-27 PROCEDURE — 36415 COLL VENOUS BLD VENIPUNCTURE: CPT | Mod: PO

## 2025-03-27 PROCEDURE — 85025 COMPLETE CBC W/AUTO DIFF WBC: CPT

## 2025-03-27 PROCEDURE — 99999 PR PBB SHADOW E&M-EST. PATIENT-LVL IV: CPT | Mod: PBBFAC,,, | Performed by: STUDENT IN AN ORGANIZED HEALTH CARE EDUCATION/TRAINING PROGRAM

## 2025-03-27 PROCEDURE — 99204 OFFICE O/P NEW MOD 45 MIN: CPT | Mod: S$PBB,,, | Performed by: STUDENT IN AN ORGANIZED HEALTH CARE EDUCATION/TRAINING PROGRAM

## 2025-03-27 PROCEDURE — 99214 OFFICE O/P EST MOD 30 MIN: CPT | Mod: PBBFAC,PO | Performed by: STUDENT IN AN ORGANIZED HEALTH CARE EDUCATION/TRAINING PROGRAM

## 2025-03-28 ENCOUNTER — LAB VISIT (OUTPATIENT)
Dept: LAB | Facility: HOSPITAL | Age: 75
End: 2025-03-28
Attending: FAMILY MEDICINE
Payer: MEDICARE

## 2025-03-28 ENCOUNTER — OFFICE VISIT (OUTPATIENT)
Dept: FAMILY MEDICINE | Facility: CLINIC | Age: 75
End: 2025-03-28
Payer: MEDICARE

## 2025-03-28 VITALS
HEIGHT: 64 IN | RESPIRATION RATE: 16 BRPM | WEIGHT: 136 LBS | TEMPERATURE: 99 F | SYSTOLIC BLOOD PRESSURE: 119 MMHG | BODY MASS INDEX: 23.22 KG/M2 | OXYGEN SATURATION: 96 % | HEART RATE: 65 BPM | DIASTOLIC BLOOD PRESSURE: 64 MMHG

## 2025-03-28 DIAGNOSIS — D64.9 NORMOCYTIC ANEMIA: ICD-10-CM

## 2025-03-28 DIAGNOSIS — J30.9 CHRONIC ALLERGIC RHINITIS: ICD-10-CM

## 2025-03-28 DIAGNOSIS — G47.33 OSA (OBSTRUCTIVE SLEEP APNEA): ICD-10-CM

## 2025-03-28 DIAGNOSIS — I10 ESSENTIAL HYPERTENSION: Primary | ICD-10-CM
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
## 2025-03-28 DIAGNOSIS — F33.42 RECURRENT MAJOR DEPRESSIVE DISORDER, IN FULL REMISSION: ICD-10-CM

## 2025-03-28 DIAGNOSIS — Z79.899 DRUG THERAPY: ICD-10-CM

## 2025-03-28 DIAGNOSIS — E53.8 B12 DEFICIENCY: ICD-10-CM

## 2025-03-28 DIAGNOSIS — M81.0 SENILE OSTEOPOROSIS: ICD-10-CM

## 2025-03-28 DIAGNOSIS — D69.2 OTHER NONTHROMBOCYTOPENIC PURPURA: ICD-10-CM

## 2025-03-28 LAB
FERRITIN SERPL-MCNC: 166 NG/ML (ref 20–300)
FOLATE SERPL-MCNC: 5.5 NG/ML (ref 4–24)
IRON SATN MFR SERPL: 36 % (ref 20–50)
IRON SERPL-MCNC: 115 UG/DL (ref 30–160)
TIBC SERPL-MCNC: 323 UG/DL (ref 250–450)
TRANSFERRIN SERPL-MCNC: 218 MG/DL (ref 200–375)
VIT B12 SERPL-MCNC: >2000 PG/ML (ref 210–950)

## 2025-03-28 PROCEDURE — 84466 ASSAY OF TRANSFERRIN: CPT

## 2025-03-28 PROCEDURE — 82728 ASSAY OF FERRITIN: CPT

## 2025-03-28 PROCEDURE — 82607 VITAMIN B-12: CPT

## 2025-03-28 PROCEDURE — 83921 ORGANIC ACID SINGLE QUANT: CPT

## 2025-03-28 PROCEDURE — 99999 PR PBB SHADOW E&M-EST. PATIENT-LVL V: CPT | Mod: PBBFAC,,, | Performed by: FAMILY MEDICINE

## 2025-03-28 PROCEDURE — 36415 COLL VENOUS BLD VENIPUNCTURE: CPT | Mod: PN

## 2025-03-28 PROCEDURE — 99215 OFFICE O/P EST HI 40 MIN: CPT | Mod: PBBFAC,PN | Performed by: FAMILY MEDICINE

## 2025-03-28 PROCEDURE — 82746 ASSAY OF FOLIC ACID SERUM: CPT

## 2025-03-28 RX ORDER — CYCLOSPORINE 0.5 MG/ML
1 EMULSION OPHTHALMIC 2 TIMES DAILY
COMMUNITY

## 2025-03-28 RX ADMIN — CYANOCOBALAMIN 1000 MCG: 1000 INJECTION, SOLUTION INTRAMUSCULAR at 01:03

## 2025-03-28 NOTE — PROGRESS NOTES
THIS DOCUMENT WAS MADE IN PART WITH VOICE RECOGNITION SOFTWARE.  OCCASIONALLY THIS SOFTWARE WILL MISINTERPRET WORDS OR PHRASES.    Assessment and Plan:    1. Essential hypertension        2. Other nonthrombocytopenic purpura        3. Recurrent major depressive disorder, in full remission        4. CARYL (obstructive sleep apnea)        5. Chronic allergic rhinitis        6. Senile osteoporosis        7. B12 deficiency        8. Normocytic anemia  CBC Auto Differential    Vitamin B12    Methylmalonic Acid, Serum    Ferritin    Iron and TIBC    Folate      9. Drug therapy  Vitamin B12      10. Fall, initial encounter                    PLAN       Assessment & Plan    PLAN SUMMARY:   Administer Vitamin B12 injection in office   Continue amlodipine 5 mg at night for BP control   Continue Pilates for core strength and balance   Order iron level, vitamin B12 level, and folate level for anemia workup   Refer to bone clinic (Mariam Narvaez) for osteoporosis management   Follow up in 6 months   Contact office if new issues arise or lab results require discussion    ANEMIA:   Explained normal fluctuations in blood counts and reasons for further investigation of mild anemia.   Vitamin B12 injection administered in office today.   Ordered iron level, vitamin B12 level, and folate level to workup mild anemia.    OSTEOPOROSIS:   Discussed osteoporosis management options, including newer medications and their potential benefits.   Referred to bone clinic (Mariam Narvaez) for osteoporosis management.    FALL PREVENTION:   Educated on importance of fall prevention and risks associated with falls in older adults.   Yolie to continue Pilates for core strength and balance and maintain awareness of surroundings to prevent falls.    HYPERTENSION:   Continue amlodipine 5 mg at night for BP control.    FOLLOW-UP CARE:   Follow up in 6 months.   Contact office sooner if new issues arise or if lab results require further discussion.              ______________________________________________________________________  Subjective:    Chief Complaint:  Chief Complaint   Patient presents with    Follow-up     BP Check         HPI:  Yolie PANIAGUA is a 74 y.o. year old             History of Present Illness    CHIEF COMPLAINT:  Yolie presents today for follow up of various medical conditions.    HYPERTENSION:  She reports well-controlled home blood pressure readings, consistently ranging in 110s-120s systolic with amlodipine initiated three months ago for nighttime administration. She denies any side effects.    RECENT FALL AND DIZZINESS:  She fell at [employer] one week ago after tripping on a clothes rack, experiencing brief dizziness that resolved after sitting. She has history of previous shoulder fracture from a fall.    ANEMIA:  Her blood count recently decreased from 12.6 to 11.7. She acknowledges limited red meat consumption and has history of anemia many years ago. She denies black or bloody stools.    MEDICAL HISTORY:  She has family history of colon cancer in mother diagnosed at age 80. She quit smoking in 1973. Colonoscopy 5 years ago showed diverticulosis.    CURRENT MEDICATIONS:  She continues Lyrica for neuropathy pain with good relief, ropinirole for restless legs syndrome with effectiveness, Lexapro for anxiety and depression working well, and alendronate for osteoporosis management.    BACK PAIN:  She completed 8 months of physical therapy and currently maintains an active Pilates regimen for management.      ROS:  ROS as indicated in HPI.                 Idiopathic peripheral neuropathy   Med- Lyrica 100 mg BID (effective)    History of migraine   Prophylaxis- Amitriptyline 10 mg + OTC Magnesium   Abortive - Fioricet (avg about one per month)  Prev rx : Ubrevly   Neurology : Nancy Alonso NP  SOTELO frequency : used to be yearly, has had 2 in last year (severe) and more mild ones   MRI unremarkable       Chronic back pain   Rx : Amitriptyline 10  "mg, Lyrica 100 mg, Naproxen BID  Prev Tx : CHEIKH 8/23  Spine surgery in 2001 : laminectomy of L4   Pain mgt : MD Jay Jay     Restless leg syndrome   Rx-Requip 0.50 mg QHS (effective)     Chronic allergic rhinitis   Rx-Nasacort, zyrtec    Septoplasty 2017    Essential hypertension   Rx-losartan 100 mg + amlodipine 5   Home : 110's / 60    Family history colon cancer (Mother dx @ approx 80)  Up-to-date on colon cancer screening   Asymptomatic  Follow-up recommended every 5 years   previous colonoscopy 03/02/2020     Atrophic vaginitis   Prev Rx-Estrace vaginal cream (effective)    Obstructive sleep apnea  No CPAP   "Old diagnosis" / Repeat sleep study was mild CARYL, no need for CPAP   ++ Snoring, denies fatigue      Nicotine dependence   In remission, quit in 1973     Senile osteoporosis  Previous DEXA bone scan 4/24  Rx-alendronate weekly  No recent fractures   Referred to bone clinic      Anxiety, depression  med-Celexa 20 mg (effective)     Glucose intolerance  Prev A1c : 5.8    Rx None     Chronic Cough  Prev rx : Protonix (ineffective)         Past Medical History:  Past Medical History:   Diagnosis Date    Anxiety     Arthritis     Chronic allergic rhinitis 11/19/2012    Depression     Essential hypertension 9/14/2020    Herniated lumbar intervertebral disc     L3    Migraine     painless with numbness       Past Surgical History:  Past Surgical History:   Procedure Laterality Date    COLONOSCOPY      COLONOSCOPY N/A 03/02/2020    Procedure: COLONOSCOPY;  Surgeon: Flash Garnett MD;  Location: [facility] ENDO;  Service: Endoscopy;  Laterality: N/A;    EPIDURAL STEROID INJECTION INTO LUMBAR SPINE N/A 05/20/2019    Procedure: Injection-steroid-epidural-lumbar;  Surgeon: Patrick Goyal MD;  Location: [facility] OR;  Service: Pain Management;  Laterality: N/A;  L5/S1 from right side    EPIDURAL STEROID INJECTION INTO LUMBAR SPINE N/A 08/04/2022    Procedure: Injection-steroid-epidural-lumbar, L5/S1  from the right " side;  Surgeon: Patrick Goyal MD;  Location: [facility] OR;  Service: Pain Management;  Laterality: N/A;    EPIDURAL STEROID INJECTION INTO LUMBAR SPINE N/A 8/3/2023    Procedure: Injection-steroid-epidural-lumbar L5/S1 from the right;  Surgeon: Patrick Goyal MD;  Location: [facility] OR;  Service: Pain Management;  Laterality: N/A;    EPIDURAL STEROID INJECTION INTO LUMBAR SPINE N/A 2024    Procedure: Injection-steroid-epidural-lumbar     L5/S1 from the right;  Surgeon: Patrick Goyal MD;  Location: [facility] OR;  Service: Pain Management;  Laterality: N/A;    EYE SURGERY  May, 2018    cataract surgery    NASAL SEPTUM SURGERY N/A 2017    SPINE SURGERY  2001    L4    TONSILLECTOMY         Family History:  Family History   Problem Relation Name Age of Onset    Cancer Mother Jazzmine Murphy 78        mother    Heart disease Mother Jazzmine PANIAGUA'Donnell 78        cabg    COPD Mother Jazzmine Plankinton     Hypertension Mother Jazzmine Arrington     Vision loss Mother Jazzmine Arrington         macular degeration    Diabetes Father Rick Plankinton     Heart disease Father Rick Plankinton 71        cabg    Hypertension Father Rick KWANPlankinton     Diabetes Brother Chapo Arrington     Cancer Brother Chapo Contrerasnell         brother    Heart disease Brother Chapo Arrington 50        Mi    Hypertension Brother Chapo Arrington     Diabetes Paternal Grandmother Leny Murphy        Social History:  Social History     Socioeconomic History    Marital status:      Spouse name: Kraig    Number of children: 3   Tobacco Use    Smoking status: Former     Current packs/day: 0.00     Types: Cigarettes     Quit date: 1973     Years since quittin.8     Passive exposure: Past    Smokeless tobacco: Never    Tobacco comments:     social smoker only   Substance and Sexual Activity    Alcohol use: Yes     Alcohol/week: 5.0 standard drinks of alcohol     Types: 5 Glasses of wine per week     Comment: one glass of wine with  dinner daily    Drug use: No    Sexual activity: Yes     Partners: Female     Birth control/protection: Post-menopausal   Social History Narrative    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)     Exercise : Most days per week : walking 4d/week + Pilates + cardio + resistance     Diet : pt suspects lactose intolerance      Social Drivers of Health     Financial Resource Strain: Low Risk  (3/22/2025)    Overall Financial Resource Strain (CARDIA)     Difficulty of Paying Living Expenses: Not hard at all   Food Insecurity: No Food Insecurity (3/22/2025)    Hunger Vital Sign     Worried About Running Out of Food in the Last Year: Never true     Ran Out of Food in the Last Year: Never true   Transportation Needs: No Transportation Needs (3/22/2025)    PRAPARE - Transportation     Lack of Transportation (Medical): No     Lack of Transportation (Non-Medical): No   Physical Activity: Insufficiently Active (3/22/2025)    Exercise Vital Sign     Days of Exercise per Week: 2 days     Minutes of Exercise per Session: 50 min   Stress: No Stress Concern Present (3/22/2025)    [employer] - Occupational Stress Questionnaire     Feeling of Stress : Not at all   Housing Stability: Low Risk  (3/22/2025)    Housing Stability Vital Sign     Unable to Pay for Housing in the Last Year: No     Number of Times Moved in the Last Year: 0     Homeless in the Last Year: No       Medications:  Current Outpatient Medications on File Prior to Visit   Medication Sig Dispense Refill    alendronate (FOSAMAX) 70 MG tablet TAKE 1 TABLET(70 MG) BY MOUTH ONCE WEEKLY( EVERY 7 DAYS) 12 tablet 3    amitriptyline (ELAVIL) 10 MG tablet TAKE 1 TABLET(10 MG) BY MOUTH EVERY EVENING 30 tablet 6    amLODIPine (NORVASC) 5 MG tablet Take 1 tablet (5 mg total) by mouth every evening. 30 tablet 3    azelastine (ASTELIN) 137 mcg (0.1 %) nasal spray 2 sprays (274 mcg total) by Nasal route every morning. 30 mL 2    citalopram (CELEXA) 20  MG tablet Take 1 tablet (20 mg total) by mouth once daily. 30 tablet 11    cycloSPORINE (RESTASIS) 0.05 % ophthalmic emulsion 1 drop 2 (two) times daily.      ergocalciferol (ERGOCALCIFEROL) 50,000 unit Cap TAKE 1 CAPSULE BY MOUTH EVERY 7 DAYS 12 capsule 4    ipratropium (ATROVENT) 21 mcg (0.03 %) nasal spray 2 sprays by Each Nostril route every evening. 30 mL 2    losartan (COZAAR) 100 MG tablet Take 1 tablet (100 mg total) by mouth once daily. 90 tablet 3    omeprazole (PRILOSEC) 40 MG capsule TAKE 1 CAPSULE(40 MG) BY MOUTH TWICE DAILY BEFORE MEALS 180 capsule 3    pregabalin (LYRICA) 100 MG capsule Take 1 capsule (100 mg total) by mouth 2 (two) times daily. 60 capsule 2    RESTASIS 0.05 % ophthalmic emulsion Place 1 drop into both eyes 2 (two) times daily.      rOPINIRole (REQUIP) 0.25 MG tablet TAKE 2 TABLETS BY MOUTH EVERY EVENING 180 tablet 3    HYDROcodone-acetaminophen (NORCO) 5-325 mg per tablet Take 1 tablet by mouth every 8 (eight) hours as needed for Pain. (Patient not taking: Reported on 3/27/2025) 15 tablet 0    ondansetron (ZOFRAN-ODT) 4 MG TbDL DISSOLVE 1 TABLET(4 MG) ON THE TONGUE EVERY 6 HOURS AS NEEDED FOR NAUSEA (Patient not taking: Reported on 3/27/2025) 30 tablet 11    triamcinolone (NASACORT) 55 mcg nasal inhaler 1 spray by Nasal route once daily. (Patient not taking: Reported on 3/27/2025)      [DISCONTINUED] butalbital-acetaminophen-caffeine -40 mg (FIORICET, ESGIC) -40 mg per tablet 1 tab PO PRN once daily for migraine. No more than 10 tab per month. (Patient not taking: Reported on 3/27/2025) 10 tablet 0     Current Facility-Administered Medications on File Prior to Visit   Medication Dose Route Frequency Provider Last Rate Last Admin    cyanocobalamin injection 1,000 mcg  1,000 mcg Intramuscular Q28 Days    1,000 mcg at 03/03/25 0952       Allergies:  Penicillins    Immunizations:  Immunization History   Administered Date(s) Administered    COVID-19 MRNA, LN-S PF (MODERNA HALF  0.25 ML DOSE) 11/26/2021, 04/04/2022    COVID-19, MRNA, LN-S, PF (MODERNA FULL 0.5 ML DOSE) 01/22/2021, 02/19/2021    COVID-19, mRNA, LNP-S, PF, astrid-sucrose, 30 mcg/0.3 mL (Pfizer Ages 12+) 10/05/2023    COVID-19, mRNA, LNP-S, bivalent booster, PF (Moderna Omicron)12 + YEARS 10/07/2022    H1N1 Swine Flu Vaccine 12/30/2009    Influenza (FLUAD) - Quadrivalent - Adjuvanted - PF *Preferred* (65+) 10/13/2021, 10/04/2022, 09/07/2023    Influenza - Quadrivalent - PF *Preferred* (6 months and older) 10/24/2007, 10/06/2009, 10/25/2010, 11/03/2011, 11/19/2012    Influenza - Trivalent - Afluria, Fluzone MDV 10/24/2007, 10/06/2009, 10/25/2010, 11/03/2011    Influenza - Trivalent - Fluad - Adjuvanted - PF (65 years and older 11/05/2024    Influenza - Trivalent - Fluzone High Dose - PF (65 years and older) 10/08/2013, 11/11/2014, 10/26/2015, 10/05/2017, 09/20/2018, 09/24/2019, 09/14/2020    Influenza A (H1N1) 2009 Monovalent - IM 12/30/2009    Influenza Split 11/19/2012    Pneumococcal Conjugate - 13 Valent 01/18/2016    Pneumococcal Polysaccharide - 23 Valent 10/08/2013    RSVpreF (Arexvy) 11/06/2023    Tdap 11/03/2011, 12/10/2024    Zoster 01/22/2014    Zoster Recombinant 05/02/2022, 07/12/2022       Review of Systems:  Review of Systems   Constitutional:  Negative for activity change and unexpected weight change.   HENT:  Negative for hearing loss, rhinorrhea and trouble swallowing.    Eyes:  Negative for discharge and visual disturbance.   Respiratory:  Negative for chest tightness and wheezing.    Cardiovascular:  Negative for chest pain and palpitations.   Gastrointestinal:  Negative for blood in stool, constipation, diarrhea and vomiting.   Endocrine: Negative for polydipsia and polyuria.   Genitourinary:  Negative for difficulty urinating, dysuria, hematuria and menstrual problem.   Musculoskeletal:  Negative for arthralgias, joint swelling and neck pain.   Neurological:  Negative for weakness and headaches.  "  Psychiatric/Behavioral:  Negative for confusion and dysphoric mood.        Objective:    Vitals:  Vitals:    03/28/25 1007   BP: 119/64   Pulse: 65   Resp: 16   Temp: 98.8 °F (37.1 °C)   TempSrc: Oral   SpO2: 96%   Weight: 61.7 kg (136 lb 0.4 oz)   Height: 5' 4" (1.626 m)   PainSc: 0-No pain         Physical Exam  Vitals reviewed.   Constitutional:       General: She is not in acute distress.  HENT:      Head: Normocephalic and atraumatic.   Eyes:      Pupils: Pupils are equal, round, and reactive to light.   Cardiovascular:      Rate and Rhythm: Normal rate and regular rhythm.      Heart sounds: No murmur heard.     No friction rub.   Pulmonary:      Effort: Pulmonary effort is normal.      Breath sounds: Normal breath sounds.   Abdominal:      General: Bowel sounds are normal. There is no distension.      Palpations: Abdomen is soft.      Tenderness: There is no abdominal tenderness.   Musculoskeletal:      Cervical back: Neck supple.   Skin:     General: Skin is warm and dry.      Findings: No rash.   Psychiatric:         Behavior: Behavior normal.             Td Montana MD  Family Medicine              "

## 2025-04-04 ENCOUNTER — TELEPHONE (OUTPATIENT)
Dept: RHEUMATOLOGY | Facility: CLINIC | Age: 75
End: 2025-04-04
Payer: MEDICARE

## 2025-04-04 NOTE — TELEPHONE ENCOUNTER
Thank you for the referral!    I saw patient for osteoporosis on 11/13/2024. Called patient to check-in to see how she is doing on the fosamax    Stated that she is doing ok with the fosamax but doesn't remember seeing me in Nov. Stated that she would like to come back for an appointment to make sure she is going everything that she can to help her bones to become stronger. Scheduled her for an appointment with me on 4/16/25 at 8am. Notified her that it will say Rheumatology but it is the same appointment for her bone health. Patient is familiar with [address] and aware to come to the 2nd floor

## 2025-04-09 ENCOUNTER — RESULTS FOLLOW-UP (OUTPATIENT)
Dept: FAMILY MEDICINE | Facility: CLINIC | Age: 75
End: 2025-04-09

## 2025-04-10 ENCOUNTER — PATIENT MESSAGE (OUTPATIENT)
Dept: ALLERGY | Facility: CLINIC | Age: 75
End: 2025-04-10
Payer: MEDICARE

## 2025-04-12 ENCOUNTER — PATIENT MESSAGE (OUTPATIENT)
Dept: PSYCHIATRY | Facility: CLINIC | Age: 75
End: 2025-04-12
Payer: MEDICARE

## 2025-04-14 ENCOUNTER — PATIENT OUTREACH (OUTPATIENT)
Dept: PSYCHIATRY | Facility: CLINIC | Age: 75
End: 2025-04-14
Payer: MEDICARE

## 2025-04-20 DIAGNOSIS — I10 ESSENTIAL HYPERTENSION: ICD-10-CM

## 2025-04-20 NOTE — TELEPHONE ENCOUNTER
No care due was identified.  [facility] Embedded Care Due Messages. Reference number: [id number].   4/20/2025 11:39:01 AM CDT

## 2025-04-21 RX ORDER — AMLODIPINE BESYLATE 5 MG/1
5 TABLET ORAL NIGHTLY
Qty: 90 TABLET | Refills: 3 | Status: SHIPPED | OUTPATIENT
Start: 2025-04-21

## 2025-04-21 RX ORDER — LOSARTAN POTASSIUM 50 MG/1
50 TABLET ORAL
Qty: 90 TABLET | Refills: 1 | OUTPATIENT
Start: 2025-04-21

## 2025-04-21 NOTE — TELEPHONE ENCOUNTER
Refill Decision Note   Yolie Brambila  is requesting a refill authorization.  Brief Assessment and Rationale for Refill:  Approve     Medication Therapy Plan:         Comments:     Note composed:2:43 PM 04/21/2025

## 2025-04-21 NOTE — TELEPHONE ENCOUNTER
No care due was identified.  [facility] Embedded Care Due Messages. Reference number: [id number].   4/21/2025 5:54:46 AM CDT

## 2025-04-22 NOTE — TELEPHONE ENCOUNTER
Refill Decision Note   Yolie Brambila  is requesting a refill authorization.  Brief Assessment and Rationale for Refill:  Quick Discontinue     Medication Therapy Plan:  LOSARTAN WAS INCREASED  MG ON 12/10/24 PER PCP.      Comments:     Note composed:8:31 PM 04/21/2025             Cardiogenic shock with severe cpcPH. Lactate normal but continued JAGRUTI which is improving. WIll continue to augment milrinone as tolerates and add nipride for SVR reduction. Increase atrial minimum rate from 70 to 80 bpm. Launched LVAD/transplant evaluation and discussed process at length with patient and wife at bedside, will plan to complete testing next week. Cardiogenic shock with severe cpcPH. Lactate normal but continued JAGRUTI which is improving. WIll continue to augment milrinone as tolerates and add nipride for SVR reduction. Increase atrial minimum rate from 70 to 80 bpm. Launched LVAD/transplant evaluation and discussed process at length with patient and wife at bedside, will plan to complete testing next week. Low threshold for tMCS tonight or over the weekend if does not improve with above interventions

## 2025-04-25 ENCOUNTER — CLINICAL SUPPORT (OUTPATIENT)
Dept: FAMILY MEDICINE | Facility: CLINIC | Age: 75
End: 2025-04-25
Payer: MEDICARE

## 2025-04-25 DIAGNOSIS — E53.8 B12 DEFICIENCY: Primary | ICD-10-CM

## 2025-04-25 RX ADMIN — CYANOCOBALAMIN 1000 MCG: 1000 INJECTION, SOLUTION INTRAMUSCULAR at 09:04

## 2025-04-25 NOTE — PROGRESS NOTES
Pt here for cyanocabalamin injection(see MAR). Confirmed name and . Cyanocabalamin 1000mcg given left glut. Pt tolerated well. Confirmed next dose and appt.

## 2025-04-28 DIAGNOSIS — Z00.00 ENCOUNTER FOR MEDICARE ANNUAL WELLNESS EXAM: ICD-10-CM

## 2025-05-01 ENCOUNTER — OFFICE VISIT (OUTPATIENT)
Dept: PSYCHIATRY | Facility: CLINIC | Age: 75
End: 2025-05-01
Payer: MEDICARE

## 2025-05-01 DIAGNOSIS — F43.21 ADJUSTMENT DISORDER WITH DEPRESSED MOOD: Primary | ICD-10-CM

## 2025-05-01 PROCEDURE — 90834 PSYTX W PT 45 MINUTES: CPT | Mod: ,,,

## 2025-05-01 NOTE — PROGRESS NOTES
Individual Psychotherapy (THANG/ANAW/PhD)  Yolie PANIAGUA Kosta,  5/1/2025    Site:  [place]         Therapeutic Intervention: Met with patient for individual psychotherapy.    Chief complaint/reason for encounter: interpersonal and adjustment       Interval history and content of current session: Patient was last seen in November. She reports that her 's health is declining, leading her to take on increased care giving responsibilities. She stated that she is no longer able to leave him home unattended, which has added to her emotional and physical burden. She is seeking support in managing the growing demands while maintaining her own well-being. During the appointment, explored her expectations for herself and others, helping to reframe unrealistic standards and foster self-compassion. Appointment focused on using solution focused and problem solving strategies to develop a short term care giving plan. Identified practical self-care practices to mitigate caregiver burnout. Psychologist provided reassurance of the patient's capacity to make sound decisions under challenging circumstances.       Treatment plan:  Target symptoms:  caregiver burnout  Why chosen therapy is appropriate versus another modality: relevant to diagnosis, patient responds to this modality  Outcome monitoring methods: self-report, checklist/rating scale  Therapeutic intervention type: insight oriented psychotherapy, behavior modifying psychotherapy, supportive psychotherapy    Risk parameters:  Patient reports no suicidal ideation  Patient reports no homicidal ideation  Patient reports no self-injurious behavior  Patient reports no violent behavior    Verbal deficits: None    Patient's response to intervention:  The patient's response to intervention is accepting.    Progress toward goals and other mental status changes:  The patient's progress toward goals is good.    Patient advised to call [phone] or present the the nearest ED if they  experience suicidal or homicidal ideation, plan or intent.          4/28/2025     5:03 PM 11/5/2024     9:16 AM 10/16/2024    12:03 PM 8/28/2023    11:52 AM 8/2/2023     1:38 PM   Results of the PHQ8   Little interest or pleasure in doing things More than half the days Several days Several days More than half the days Several days   Feeling down, depressed, or hopeless More than half the days Several days Several days Several days Several days   Trouble falling or staying asleep, or sleeping too much Not at all Not at all Not at all Nearly every day More than half the days   Feeling tired or having little energy Not at all Several days Several days Nearly every day More than half the days   Poor appetite or overeating Several days Several days Several days Not at all Not at all   Feeling bad about yourself - or that you are a failure or have let yourself or your family down Not at all Several days Several days More than half the days More than half the days   Trouble concentrating on things, such as reading the newspaper or watching television Nearly every day Several days Several days Nearly every day Nearly every day   Moving or speaking so slowly that other people could have noticed. Or the opposite - being so fidgety or restless that you have been moving around a lot more than usual Not at all Not at all Not at all Not at all Not at all   Total Score  8 6 6 14 11           4/28/2025     5:02 PM 11/5/2024     9:16 AM 10/16/2024    12:03 PM   GAD7   1. Feeling nervous, anxious, or on edge? 0 1 1   2. Not being able to stop or control worrying? 1 0 0   3. Worrying too much about different things? 0 1 1   4. Trouble relaxing? 0 0 0   5. Being so restless that it is hard to sit still? 0 0 0   6. Becoming easily annoyed or irritable? 1 1 1   7. Feeling afraid as if something awful might happen? 0 0 0   8. If you checked off any problems, how difficult have these problems made it for you to do your work, take care of  things at home, or get along with other people? 1 1 1   NICKOLAS-7 Score 2  3  3        Patient-reported       Diagnosis:     ICD-10-CM ICD-9-CM   1. Adjustment disorder with depressed mood  F43.21 309.0       Plan: Pt plans to continue individual psychotherapy    Return to clinic: 3 weeks    Length of Service (minutes): 45    Liana Keith PsyD

## 2025-05-22 ENCOUNTER — PATIENT OUTREACH (OUTPATIENT)
Dept: PSYCHIATRY | Facility: CLINIC | Age: 75
End: 2025-05-22
Payer: MEDICARE

## 2025-05-22 ENCOUNTER — PATIENT MESSAGE (OUTPATIENT)
Dept: PSYCHIATRY | Facility: CLINIC | Age: 75
End: 2025-05-22
Payer: MEDICARE

## 2025-05-27 ENCOUNTER — CLINICAL SUPPORT (OUTPATIENT)
Dept: FAMILY MEDICINE | Facility: CLINIC | Age: 75
End: 2025-05-27
Payer: MEDICARE

## 2025-05-27 DIAGNOSIS — E53.8 B12 DEFICIENCY: Primary | ICD-10-CM

## 2025-05-27 PROCEDURE — 99999PBSHW PR PBB SHADOW TECHNICAL ONLY FILED TO HB: Mod: PBBFAC,,,

## 2025-05-27 PROCEDURE — 96372 THER/PROPH/DIAG INJ SC/IM: CPT | Mod: PBBFAC,PN

## 2025-05-27 RX ADMIN — CYANOCOBALAMIN 1000 MCG: 1000 INJECTION, SOLUTION INTRAMUSCULAR at 09:05

## 2025-05-29 ENCOUNTER — OFFICE VISIT (OUTPATIENT)
Dept: FAMILY MEDICINE | Facility: CLINIC | Age: 75
End: 2025-05-29
Payer: MEDICARE

## 2025-05-29 ENCOUNTER — OFFICE VISIT (OUTPATIENT)
Dept: PSYCHIATRY | Facility: CLINIC | Age: 75
End: 2025-05-29
Payer: MEDICARE

## 2025-05-29 VITALS
HEIGHT: 64 IN | BODY MASS INDEX: 23.95 KG/M2 | OXYGEN SATURATION: 95 % | SYSTOLIC BLOOD PRESSURE: 124 MMHG | HEART RATE: 68 BPM | WEIGHT: 140.31 LBS | DIASTOLIC BLOOD PRESSURE: 62 MMHG

## 2025-05-29 DIAGNOSIS — M81.0 SENILE OSTEOPOROSIS: ICD-10-CM

## 2025-05-29 DIAGNOSIS — F33.0 MILD EPISODE OF RECURRENT MAJOR DEPRESSIVE DISORDER: ICD-10-CM

## 2025-05-29 DIAGNOSIS — M54.16 LUMBAR RADICULOPATHY: ICD-10-CM

## 2025-05-29 DIAGNOSIS — G47.33 OSA (OBSTRUCTIVE SLEEP APNEA): ICD-10-CM

## 2025-05-29 DIAGNOSIS — D69.2 OTHER NONTHROMBOCYTOPENIC PURPURA: ICD-10-CM

## 2025-05-29 DIAGNOSIS — Z00.00 ENCOUNTER FOR MEDICARE ANNUAL WELLNESS EXAM: Primary | ICD-10-CM

## 2025-05-29 DIAGNOSIS — G43.019 INTRACTABLE MIGRAINE WITHOUT AURA AND WITHOUT STATUS MIGRAINOSUS: ICD-10-CM

## 2025-05-29 DIAGNOSIS — E53.8 B12 DEFICIENCY: ICD-10-CM

## 2025-05-29 DIAGNOSIS — M47.816 LUMBAR SPONDYLOSIS: ICD-10-CM

## 2025-05-29 DIAGNOSIS — I10 ESSENTIAL HYPERTENSION: ICD-10-CM

## 2025-05-29 DIAGNOSIS — F33.0 MDD (MAJOR DEPRESSIVE DISORDER), RECURRENT EPISODE, MILD: Primary | ICD-10-CM

## 2025-05-29 DIAGNOSIS — G60.9 IDIOPATHIC PERIPHERAL NEUROPATHY: ICD-10-CM

## 2025-05-29 PROCEDURE — 99215 OFFICE O/P EST HI 40 MIN: CPT | Mod: PBBFAC,PN | Performed by: NURSE PRACTITIONER

## 2025-05-29 PROCEDURE — 99999 PR PBB SHADOW E&M-EST. PATIENT-LVL V: CPT | Mod: PBBFAC,,, | Performed by: NURSE PRACTITIONER

## 2025-05-29 PROCEDURE — 90834 PSYTX W PT 45 MINUTES: CPT | Mod: ,,,

## 2025-05-29 RX ORDER — PREGABALIN 100 MG/1
100 CAPSULE ORAL 2 TIMES DAILY
Qty: 60 CAPSULE | Refills: 2 | Status: SHIPPED | OUTPATIENT
Start: 2025-05-29

## 2025-05-29 RX ORDER — LOSARTAN POTASSIUM 50 MG/1
50 TABLET ORAL
COMMUNITY
Start: 2025-01-23 | End: 2025-06-09

## 2025-05-29 NOTE — PATIENT INSTRUCTIONS
Counseling and Referral of Other Preventative  (Italic type indicates deductible and co-insurance are waived)    Patient Name: Yolie Brambila  Today's Date: 5/29/2025    Health Maintenance       Date Due Completion Date    DEXA Scan 04/09/2027 4/9/2024    Lipid Panel 10/29/2029 10/29/2024    TETANUS VACCINE 12/10/2034 12/10/2024        No orders of the defined types were placed in this encounter.    The following information is provided to all patients.  This information is to help you find resources for any of the problems found today that may be affecting your health:                  Living healthy guide: www.[facility].[place].[facility]      Understanding Diabetes: www.diabetes.org      Eating healthy: www.cdc.gov/healthyweight      CDC home safety checklist: www.cdc.gov/steadi/patient.html      Agency on Aging: www.goea.[place]      Alcoholics anonymous (AA): www.aa.org      Physical Activity: www.josé miguel.nih.gov/ab1qgen      Tobacco use: www.quitwithusla.org

## 2025-05-29 NOTE — PROGRESS NOTES
Individual Psychotherapy (THANG/LCSW/PhD)  Yolie Brambila,  5/29/2025    Site:  [place]         Therapeutic Intervention: Met with patient for individual psychotherapy.    Chief complaint/reason for encounter: depression       Interval history and content of current session: Patient experiencing worsening symptoms of depression over the past couple of weeks which includes loss of interest and pleasure in nearly all activities, fatigue, diminished ability to concentrate, decreased appetite. She reports her  is seemingly stable, even admitting that he is uncomfortable driving. Patient acknowledges this is a major admission on his part. She notes her son and DIL have offered to help out while she is on her retreats. Socratic and open ended questioning facilitated insight into her needs and prompted awareness into how this is contributing to her current symptoms. Patient seeking human connection and community. She has been isolated more recently, but now that she has some help, she believes she could put more effort into making time to meet with friends. Patient discussed upcoming volunteer opportunities as well as available classes for knitting and crocheting. Patient plans to actively seek out additional opportunities to connect with others.    Patient also discussed interest in medication adjustment. Advised patient of option available including consulting with psychiatrist Dr. Reshma Borrego or a referral for medication management. Patient opted to have her pcp manage her medications and is interested in consulting with Dr. Borrego. Patient reports Zoloft has been helpful in the past. She is currently taking elavil and celexa.      Treatment plan:  Target symptoms: depression  Why chosen therapy is appropriate versus another modality: relevant to diagnosis, patient responds to this modality, evidence based practice  Outcome monitoring methods: self-report, checklist/rating scale  Therapeutic intervention  type: insight oriented psychotherapy, behavior modifying psychotherapy, supportive psychotherapy    Risk parameters:  Patient reports no suicidal ideation  Patient reports no homicidal ideation  Patient reports no self-injurious behavior  Patient reports no violent behavior    Verbal deficits: None    Patient's response to intervention:  The patient's response to intervention is accepting.    Progress toward goals and other mental status changes:  The patient's progress toward goals is fair . Pt appears stated age. Dressed appropriately. Pt demonstrates a cooperative attitude. Client displays normal psychomotor activity, with no unusual movements noted. Speech is of normal rate, volume, and tone. Content is relevant and coherent. Client reports mood as depressed. Affect is full range, appropriate to content, and congruent with reported mood. Thought process is logical and goal-directed, with client able to provide clear and relevant responses. No evidence of delusions, obsessions, or phobias. Denies current suicidal or homicidal ideation.      Patient advised to call [phone] or present the the nearest ED if they experience suicidal or homicidal ideation, plan or intent.          5/19/2025    11:41 AM 4/28/2025     5:03 PM 11/5/2024     9:16 AM 10/16/2024    12:03 PM 8/28/2023    11:52 AM 8/2/2023     1:38 PM   Results of the PHQ8   Little interest or pleasure in doing things Several days More than half the days Several days Several days More than half the days Several days   Feeling down, depressed, or hopeless Several days More than half the days Several days Several days Several days Several days   Trouble falling or staying asleep, or sleeping too much Not at all Not at all Not at all Not at all Nearly every day More than half the days   Feeling tired or having little energy More than half the days Not at all Several days Several days Nearly every day More than half the days   Poor appetite or overeating Several  days Several days Several days Several days Not at all Not at all   Feeling bad about yourself - or that you are a failure or have let yourself or your family down Not at all Not at all Several days Several days More than half the days More than half the days   Trouble concentrating on things, such as reading the newspaper or watching television Several days Nearly every day Several days Several days Nearly every day Nearly every day   Moving or speaking so slowly that other people could have noticed. Or the opposite - being so fidgety or restless that you have been moving around a lot more than usual Not at all Not at all Not at all Not at all Not at all Not at all   Total Score  6 8 6 6 14 11           5/19/2025    11:40 AM 4/28/2025     5:02 PM 11/5/2024     9:16 AM   GAD7   1. Feeling nervous, anxious, or on edge? 0 0 1   2. Not being able to stop or control worrying? 0 1 0   3. Worrying too much about different things? 1 0 1   4. Trouble relaxing? 0 0 0   5. Being so restless that it is hard to sit still? 0 0 0   6. Becoming easily annoyed or irritable? 1 1 1   7. Feeling afraid as if something awful might happen? 0 0 0   8. If you checked off any problems, how difficult have these problems made it for you to do your work, take care of things at home, or get along with other people? 1 1 1   NICKOLAS-7 Score 2  2  3        Patient-reported       Diagnosis:     ICD-10-CM ICD-9-CM   1. MDD (major depressive disorder), recurrent episode, mild  F33.0 296.31       Plan: Pt plans to continue individual psychotherapy    Return to clinic: 1 month    Length of Service (minutes): 45    Liana Bee PsyD

## 2025-06-09 NOTE — PROGRESS NOTES
"Yolie Brambila presented for a follow-up Medicare AWV today. The following components were reviewed and updated:    Medical history  Family History  Social history  Allergies and Current Medications  Health Risk Assessment  Health Maintenance  Care Team    **See Completed Assessments for Annual Wellness visit with in the encounter summary    The following assessments were completed:  Depression Screening  Cognitive function Screening    Timed Get Up Test  Whisper Test      Opioid documentation:      Patient does not have a current opioid prescription.          Vitals:    05/29/25 1008   BP: 124/62   BP Location: Right forearm   Patient Position: Sitting   Pulse: 68   SpO2: 95%   Weight: 63.6 kg (140 lb 5.2 oz)   Height: 5' 4" (1.626 m)     Body mass index is 24.09 kg/m².       Physical Exam  Vitals reviewed.   HENT:      Head: Normocephalic.   Cardiovascular:      Rate and Rhythm: Normal rate.   Pulmonary:      Effort: Pulmonary effort is normal.   Skin:     General: Skin is warm.   Neurological:      Mental Status: She is alert.           Diagnoses and health risks identified today and associated recommendations/orders:  1. Encounter for Medicare annual wellness exam  Reviewed health maintenance and provided recommendations    UTD on health maintenance  - Referral to Enhanced Annual Wellness Visit (eAWV) M+1    2. Intractable migraine without aura and without status migrainosus  Continue to monitor  Followed by Td Montana MD .      3. Idiopathic peripheral neuropathy  Continue to monitor  Followed by Td Montana MD .      4. Lumbar radiculopathy  Continue to monitor  Followed by Td Montana MD .      5. Lumbar spondylosis  Continue to monitor  Followed by Dr Goyal.      6. Mild episode of recurrent major depressive disorder  Continue to monitor  Followed by Td Montana MD   No si/hi.      7. Essential hypertension  Continue to monitor  Followed by Td Montana MD .  "   stable    8. Other nonthrombocytopenic purpura  Continue to monitor  Followed by Td Montana MD .      9. B12 deficiency  Continue to monitor  Followed by Td Montana MD .      10. Senile osteoporosis  Continue to monitor  Followed by Td Montana MD .      11. CARYL (obstructive sleep apnea)  Continue to monitor  Followed by dT Montana MD .        Provided Yolie PANIAGUA with a 5-10 year written screening schedule and personal prevention plan. Recommendations were developed using the USPSTF age appropriate recommendations. Education, counseling, and referrals were provided as needed.  After Visit Summary printed and given to patient which includes a list of additional screenings\tests needed.    No follow-ups on file.      Tanya Paul NP

## 2025-06-13 ENCOUNTER — OFFICE VISIT (OUTPATIENT)
Dept: PAIN MEDICINE | Facility: CLINIC | Age: 75
End: 2025-06-13
Payer: MEDICARE

## 2025-06-13 VITALS
HEART RATE: 58 BPM | WEIGHT: 137.56 LBS | BODY MASS INDEX: 23.61 KG/M2 | SYSTOLIC BLOOD PRESSURE: 141 MMHG | DIASTOLIC BLOOD PRESSURE: 65 MMHG

## 2025-06-13 DIAGNOSIS — G60.9 IDIOPATHIC PERIPHERAL NEUROPATHY: Primary | ICD-10-CM

## 2025-06-13 DIAGNOSIS — M47.816 LUMBAR SPONDYLOSIS: ICD-10-CM

## 2025-06-13 PROCEDURE — 99213 OFFICE O/P EST LOW 20 MIN: CPT | Mod: PBBFAC,PO | Performed by: PHYSICIAN ASSISTANT

## 2025-06-13 PROCEDURE — 99999 PR PBB SHADOW E&M-EST. PATIENT-LVL III: CPT | Mod: PBBFAC,,, | Performed by: PHYSICIAN ASSISTANT

## 2025-06-13 NOTE — PROGRESS NOTES
This note was completed with dictation software and grammatical errors may exist.    CC:  Bilateral foot pain, back pain, leg pain    HPI:  The patient is a 74-year-old woman with a history of anxiety, depression, previous lumbar surgery who presents in referral from Dr. Adam for bilateral foot pain.    History of Present Illness    Patient presents for follow-up after approximately six months, reporting she feels excellent and continues to take Lyrica as prescribed. She maintains regular Pilates sessions 2-3 times per week, which she finds beneficial, after concluding PT. Persistent numbness in her feet is constantly present but not painful and tolerable with Lyrica. Recently, neuropathy symptoms have progressed up her legs, characterized as a tingling sensation, particularly noticeable when lying down. Her balance is adequate, noting it is not optimal, but she is careful. No falls have occurred since the last visit.    She experiences some neck and back pain, particularly when vacuuming or making beds. Pain is described as sore and achy, which she attributes to possible arthritis. She manages this by taking rests and pacing herself.    She denies any new issues, weakness in arms or legs, changes in urinary or bowel incontinence, pain during Pilates. She expresses satisfaction with her current condition and management.        Previous history:  The patient reports developing bilateral foot pain about 25 years ago with numbness and tingling, has been diagnosed with neuropathy but workup has not revealed any particular  etiology.  This has been progressing over the years and has gone from being located only in the feet and now extending proximally up to the knees bilaterally.  She describes it as throbbing, tingling with abnormal sensation, particularly worse with standing, walking, exercising.  She does get some relief with rest, heat and medications. She has been taking gabapentin for years, several years has  been on 600 mg twice daily with about 40% relief.  She also has a history of low back and buttock pain, ended up having severe right leg pain and in 2001 underwent laminectomy, unclear what level but reports that her right back and leg pain resolved at that time.  However over the years she has developed more bilateral buttock pain, thigh pain worse with standing and walking, describes this as dull.  She denies any weakness in her legs, denies any bowel or bladder incontinence.    Pain intervention history:  She has done physical therapy multiple times, chiropractic care without relief.  She has not tried Cymbalta.  She has been taking gabapentin 600 mg twice daily with about 40% relief, switched to Lyrica.  L5/S1 CHEIKH performed on 05/20/2019 with greater than 70% relief ongoing of her back and leg pain, approach from the right due to previous left hemilaminectomy. She is status post L5/S1 interlaminar epidural steroid injection from the right side on 08/04/2022 with 50% relief of her back pain but 0% relief of her leg pain.   She is status post L5/S1 CHEIKH from the right on 08/03/2023 with 100% relief.  She is status post left hip joint injection on 02/23/2024 with 40% relief.   She is status post L5/S1 interlaminar epidural steroid injection from the right on 04/18/2024 with 60-70% relief.     Spine surgeries:  L5/S1 left hemilaminectomy    Antineuropathics:  Lyrica 100 twice daily  NSAIDs:  Naproxen  Physical therapy:  Completed physical therapy, does Pilates twice a week.  Antidepressants:  Elavil 10 mg, Celexa 10  Muscle relaxers:  Opioids:  Antiplatelets/Anticoagulants:    ROS:  She reports back pain only.  Balance of review of systems is negative.    Past Medical History:   Diagnosis Date    Anxiety     Arthritis     Chronic allergic rhinitis 11/19/2012    Depression     Essential hypertension 9/14/2020    Herniated lumbar intervertebral disc     L3    Migraine     painless with numbness       Past Surgical  History:   Procedure Laterality Date    COLONOSCOPY      COLONOSCOPY N/A 2020    Procedure: COLONOSCOPY;  Surgeon: Flash Garnett MD;  Location: [facility] ENDO;  Service: Endoscopy;  Laterality: N/A;    EPIDURAL STEROID INJECTION INTO LUMBAR SPINE N/A 2019    Procedure: Injection-steroid-epidural-lumbar;  Surgeon: Patrick Goyal MD;  Location: [facility] OR;  Service: Pain Management;  Laterality: N/A;  L5/S1 from right side    EPIDURAL STEROID INJECTION INTO LUMBAR SPINE N/A 2022    Procedure: Injection-steroid-epidural-lumbar, L5/S1  from the right side;  Surgeon: Patrick Goyal MD;  Location: [facility] OR;  Service: Pain Management;  Laterality: N/A;    EPIDURAL STEROID INJECTION INTO LUMBAR SPINE N/A 8/3/2023    Procedure: Injection-steroid-epidural-lumbar L5/S1 from the right;  Surgeon: Patrick Goyal MD;  Location: [facility] OR;  Service: Pain Management;  Laterality: N/A;    EPIDURAL STEROID INJECTION INTO LUMBAR SPINE N/A 2024    Procedure: Injection-steroid-epidural-lumbar     L5/S1 from the right;  Surgeon: Patrick Goyal MD;  Location: [facility] OR;  Service: Pain Management;  Laterality: N/A;    EYE SURGERY  May, 2018    cataract surgery    NASAL SEPTUM SURGERY N/A 2017    SPINE SURGERY  2001    L4    TONSILLECTOMY         Social History     Socioeconomic History    Marital status:      Spouse name: Kraig    Number of children: 3   Tobacco Use    Smoking status: Former     Current packs/day: 0.00     Types: Cigarettes     Quit date: 1973     Years since quittin.0     Passive exposure: Past    Smokeless tobacco: Never    Tobacco comments:     social smoker only   Substance and Sexual Activity    Alcohol use: Yes     Alcohol/week: 5.0 standard drinks of alcohol     Types: 5 Glasses of wine per week     Comment: one glass of wine with dinner daily    Drug use: No    Sexual activity: Yes     Partners: Female     Birth control/protection: Post-menopausal   Social  History Narrative    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)     Exercise : Most days per week : walking 4d/week + Pilates + cardio + resistance     Diet : pt suspects lactose intolerance      Social Drivers of Health     Financial Resource Strain: Low Risk  (5/29/2025)    Overall Financial Resource Strain (CARDIA)     Difficulty of Paying Living Expenses: Not hard at all   Food Insecurity: No Food Insecurity (5/29/2025)    Hunger Vital Sign     Worried About Running Out of Food in the Last Year: Never true     Ran Out of Food in the Last Year: Never true   Transportation Needs: No Transportation Needs (5/29/2025)    PRAPARE - Transportation     Lack of Transportation (Medical): No     Lack of Transportation (Non-Medical): No   Physical Activity: Insufficiently Active (5/29/2025)    Exercise Vital Sign     Days of Exercise per Week: 2 days     Minutes of Exercise per Session: 50 min   Stress: No Stress Concern Present (5/29/2025)    [employer] [place] [employer] - Occupational Stress Questionnaire     Feeling of Stress : Only a little   Housing Stability: Low Risk  (5/29/2025)    Housing Stability Vital Sign     Unable to Pay for Housing in the Last Year: No     Number of Times Moved in the Last Year: 0     Homeless in the Last Year: No         Medications/Allergies: See med card    Vitals:    06/13/25 0833   BP: (!) 141/65   Pulse: (!) 58   Weight: 62.4 kg (137 lb 9.1 oz)   PainLoc: Back         6/13/2025     8:32 AM 12/13/2024     8:31 AM 7/18/2024    11:07 AM   Last 3 PDI Scores   Pain Disability Index (PDI) 6 0 29         Physical exam:  Gen: A and O x3, pleasant, well-groomed  Skin: No rashes or obvious lesions  HEENT: PERRLA, no obvious deformities on ears or in canals. Trachea midline.  CVS: Regular rate and rhythm, normal palpable pulses.  Resp:No increased work of breathing, symmetrical chest rise.  Abdomen: Soft, NT/ND.  Musculoskeletal:  No antalgic gait.     Neuro:  Lower extremities:  5/5 strength bilaterally  Reflexes: Patellar 0+, Achilles 0+ bilaterally.  Sensory:  Intact and symmetrical to light touch and pinprick in L2-S1 dermatomes except for decreased sensation to light touch and pinprick from her knees distally bilaterally.    Lumbar spine:  Lumbar spine:  Range of motion is mildly reduced with flexion with slight increased pain, moderately decreased with extension with increased pain in her right low back.  Magnus's test causes no increased pain on either side.    Supine straight leg raise is negative bilaterally.    Internal and external rotation of the hip causes Increased left groin, lateral hip and anterior thigh pain.  Myofascial exam:  Mild tenderness to palpation across lumbar paraspinous muscles.  No tenderness to percussion to the spinous processes.  No tenderness to palpation over left greater trochanteric bursa    Imagin17 Xray L-spine  Five views lumbar spine were obtained (AP, lateral, right oblique, left oblique and spot views). There is a upper lumbar dextroscoliosis and a mid lumbar levoscoliosis with superimposed multilevel degenerative disc and facet disease. There is loss of disc space height throughout the lumbar region there is lower lumbar degenerative facet disease.    4/15/14 BL Lower extremity EMG:   Mild sensorimotor predominantly axonal neuropathy.    19 MRI L-spine:  L5-S1: Spondylosis associated with disc space narrowing and mild marginal anterior spondylotic osteophytes.  There is a disc protrusion and osteophyte complex in the left neural foramen that causes a moderate left foraminal stenosis.  There are postoperative changes from a left L5-S1 laminectomy without definite signs for recurrent disc herniations.  There is facet arthropathy.  L4-5: Disc space narrowing associated with marginal anterior spondylotic osteophytes.  There is a mild circumferential annular disc bulge.  Facet arthropathy noted bilaterally.  There is also at least a  moderate right lateral recess stenosis.  L3-4: Disc degeneration with with the or disc space narrowing and marginal anterior spondylotic osteophyte.  There is a broad-based posterior osteophyte the and disc bulge complex with compression of the thecal sac greater on the right.  There is a moderate to severe right lateral recess stenosis (image 32 series 5002.  There is associated facet arthropathy.  Mild central canal spinal stenosis.  L2-3: Disc degeneration with disc space narrowing.  There is a broad-based circumferential annular disc bulge.  In the left neural foramen in addition a disc protrusion with moderate obscuration of the foraminal fat is noted.  In the far lateral aspect of the neural foramen (image 16 series 5 the disc protrusion contacts the exiting left L2 root.  Clinical correlation for a left L2 radiculopathy suggested.  L1-2: Disc degeneration with disc space narrowing and marginal anterior spondylotic osteophyte.  There is a broad-based left paracentral disc protrusion with compression of the thecal sac anterolaterally.  There is an extension of the disc protrusion in the left neural foramen associated with at least a moderate foraminal stenosis.  Compression of the exiting left L1 root far laterally in the foramen not excluded.  Right neural foramen is unremarkable.  T12-L1: No disc herniations or spinal stenosis or foraminal stenosis.    07/18/2024 x-ray lumbar spine   Vertebral body heights are preserved. No fractures or destructive changes. Straightening of the expected normal lumbar lordosis without evidence of spondylolisthesis. Broad rotatory levo scoliotic curvature. Severe degenerative disc height loss throughout endplate centered osteophytes. Superimposed bony demineralization.     Assessment:    The patient is a 74-year-old woman with a history of anxiety, depression, previous lumbar surgery who presents in referral from Dr. Adam for bilateral foot pain.     1. Idiopathic  peripheral neuropathy        2. Lumbar spondylosis                Plan:  1. She will continue Pilates and can contact me in the future to return to [facility] if needed.  2. Dr. Goyal provided a prescription for Lyrica 100 mg twice daily.  We can increase this in the future if her neuropathy worsens.    3. Follow-up in 6 months or sooner as needed.      This note was generated with the assistance of ambient listening technology. Verbal consent was obtained by the patient and accompanying visitor(s) for the recording of patient appointment to facilitate this note. I attest to having reviewed and edited the generated note for accuracy, though some syntax or spelling errors may persist. Please contact the author of this note for any clarification.

## 2025-06-17 ENCOUNTER — CLINICAL SUPPORT (OUTPATIENT)
Dept: FAMILY MEDICINE | Facility: CLINIC | Age: 75
End: 2025-06-17
Payer: MEDICARE

## 2025-06-17 ENCOUNTER — PATIENT MESSAGE (OUTPATIENT)
Dept: FAMILY MEDICINE | Facility: CLINIC | Age: 75
End: 2025-06-17

## 2025-06-17 ENCOUNTER — PATIENT MESSAGE (OUTPATIENT)
Dept: PAIN MEDICINE | Facility: CLINIC | Age: 75
End: 2025-06-17
Payer: MEDICARE

## 2025-06-17 DIAGNOSIS — M54.16 LUMBAR RADICULOPATHY: ICD-10-CM

## 2025-06-17 DIAGNOSIS — E53.8 B12 DEFICIENCY: Primary | ICD-10-CM

## 2025-06-17 DIAGNOSIS — K64.4 EXTERNAL HEMORRHOID: Primary | ICD-10-CM

## 2025-06-17 PROCEDURE — 99999PBSHW PR PBB SHADOW TECHNICAL ONLY FILED TO HB: Mod: PBBFAC,,,

## 2025-06-17 PROCEDURE — 96372 THER/PROPH/DIAG INJ SC/IM: CPT | Mod: PBBFAC,PN

## 2025-06-17 RX ADMIN — CYANOCOBALAMIN 1000 MCG: 1000 INJECTION, SOLUTION INTRAMUSCULAR at 09:06

## 2025-06-19 RX ORDER — PREGABALIN 100 MG/1
100 CAPSULE ORAL 3 TIMES DAILY
Qty: 90 CAPSULE | Refills: 2 | Status: SHIPPED | OUTPATIENT
Start: 2025-06-19

## 2025-06-19 NOTE — TELEPHONE ENCOUNTER
I asked her to contact us if she felt she needed an increase.  She did not at the visit. I have reviewed the [place] [employer] website and there are no abberancies.  Thanks

## 2025-06-25 DIAGNOSIS — Z78.0 MENOPAUSE: ICD-10-CM

## 2025-06-26 ENCOUNTER — OFFICE VISIT (OUTPATIENT)
Dept: PSYCHIATRY | Facility: CLINIC | Age: 75
End: 2025-06-26
Payer: MEDICARE

## 2025-06-26 DIAGNOSIS — F43.21 ADJUSTMENT DISORDER WITH DEPRESSED MOOD: Primary | ICD-10-CM

## 2025-06-26 PROCEDURE — 90834 PSYTX W PT 45 MINUTES: CPT | Mod: ,,,

## 2025-06-26 NOTE — PROGRESS NOTES
Individual Psychotherapy (THANG/ANAW/PhD)  Yolie Brambila,  6/26/2025    Site:  [place]         Therapeutic Intervention: Met with patient for individual psychotherapy.    Chief complaint/reason for encounter: depression       Interval history and content of current session: Patient indicates she is doing better. She notes symptoms of depression have improved. She attributes this to her recent quilting retreat and her  more positive disposition. She noted she has been bored lately, since she has been burnout from sewing. We explored various activities and hobbies she can engage in to fill her time. She notes interest in finding a book club and setting up neighborhood wine nights. Patient also interest in planning a trip. Given her improvement, patient is not interest in medication adjustment at this time but wants to keep the option open.       Treatment plan:  Target symptoms: depression  Why chosen therapy is appropriate versus another modality: relevant to diagnosis, patient responds to this modality, evidence based practice  Outcome monitoring methods: self-report, checklist/rating scale  Therapeutic intervention type: insight oriented psychotherapy, supportive psychotherapy    Risk parameters:  Patient reports no suicidal ideation  Patient reports no homicidal ideation  Patient reports no self-injurious behavior  Patient reports no violent behavior    Verbal deficits: None    Patient's response to intervention:  The patient's response to intervention is accepting.    Progress toward goals and other mental status changes:  The patient's progress toward goals is good.    Patient advised to call [phone] or present the the nearest ED if they experience suicidal or homicidal ideation, plan or intent.          6/21/2025    12:58 PM 5/19/2025    11:41 AM 4/28/2025     5:03 PM 11/5/2024     9:16 AM 10/16/2024    12:03 PM 8/28/2023    11:52 AM 8/2/2023     1:38 PM   Results of the PHQ8   Little interest or  pleasure in doing things More than half the days Several days More than half the days Several days Several days More than half the days Several days   Feeling down, depressed, or hopeless More than half the days Several days More than half the days Several days Several days Several days Several days   Trouble falling or staying asleep, or sleeping too much Not at all Not at all Not at all Not at all Not at all Nearly every day More than half the days   Feeling tired or having little energy Not at all More than half the days Not at all Several days Several days Nearly every day More than half the days   Poor appetite or overeating Several days Several days Several days Several days Several days Not at all Not at all   Feeling bad about yourself - or that you are a failure or have let yourself or your family down Several days Not at all Not at all Several days Several days More than half the days More than half the days   Trouble concentrating on things, such as reading the newspaper or watching television Several days Several days Nearly every day Several days Several days Nearly every day Nearly every day   Moving or speaking so slowly that other people could have noticed. Or the opposite - being so fidgety or restless that you have been moving around a lot more than usual Not at all Not at all Not at all Not at all Not at all Not at all Not at all   Total Score  7 6 8 6 6 14 11           6/21/2025    12:58 PM 5/19/2025    11:40 AM 4/28/2025     5:02 PM   GAD7   1. Feeling nervous, anxious, or on edge? 0 0 0   2. Not being able to stop or control worrying? 0 0 1   3. Worrying too much about different things? 0 1 0   4. Trouble relaxing? 0 0 0   5. Being so restless that it is hard to sit still? 0 0 0   6. Becoming easily annoyed or irritable? 1 1 1   7. Feeling afraid as if something awful might happen? 1 0 0   8. If you checked off any problems, how difficult have these problems made it for you to do your work,  take care of things at home, or get along with other people? 0 1 1   NICKOLAS-7 Score 2  2  2        Patient-reported       Diagnosis:     ICD-10-CM ICD-9-CM   1. Adjustment disorder with depressed mood  F43.21 309.0       Plan: Pt plans to continue individual psychotherapy    Return to clinic: 2 months    Length of Service (minutes): 45    Liana Bee PsyD

## 2025-07-15 ENCOUNTER — CLINICAL SUPPORT (OUTPATIENT)
Dept: FAMILY MEDICINE | Facility: CLINIC | Age: 75
End: 2025-07-15
Payer: MEDICARE

## 2025-07-15 DIAGNOSIS — E53.8 B12 DEFICIENCY: Primary | ICD-10-CM

## 2025-07-15 PROCEDURE — 99999PBSHW PR PBB SHADOW TECHNICAL ONLY FILED TO HB: Mod: PBBFAC,,,

## 2025-07-15 PROCEDURE — 96372 THER/PROPH/DIAG INJ SC/IM: CPT | Mod: PBBFAC,PN

## 2025-07-15 RX ADMIN — CYANOCOBALAMIN 1000 MCG: 1000 INJECTION, SOLUTION INTRAMUSCULAR at 09:07

## 2025-08-12 ENCOUNTER — CLINICAL SUPPORT (OUTPATIENT)
Dept: FAMILY MEDICINE | Facility: CLINIC | Age: 75
End: 2025-08-12
Payer: MEDICARE

## 2025-08-12 DIAGNOSIS — E53.8 B12 DEFICIENCY: Primary | ICD-10-CM

## 2025-08-12 PROCEDURE — 96372 THER/PROPH/DIAG INJ SC/IM: CPT | Mod: PBBFAC,PN

## 2025-08-12 PROCEDURE — 99999PBSHW PR PBB SHADOW TECHNICAL ONLY FILED TO HB: Mod: PBBFAC,,,

## 2025-08-12 RX ADMIN — CYANOCOBALAMIN 1000 MCG: 1000 INJECTION, SOLUTION INTRAMUSCULAR at 09:08

## 2025-08-25 ENCOUNTER — PATIENT MESSAGE (OUTPATIENT)
Dept: FAMILY MEDICINE | Facility: CLINIC | Age: 75
End: 2025-08-25
Payer: MEDICARE

## 2025-08-27 ENCOUNTER — OFFICE VISIT (OUTPATIENT)
Dept: PSYCHIATRY | Facility: CLINIC | Age: 75
End: 2025-08-27
Payer: MEDICARE

## 2025-08-27 DIAGNOSIS — F43.21 ADJUSTMENT DISORDER WITH DEPRESSED MOOD: Primary | ICD-10-CM

## 2025-08-27 PROCEDURE — 90832 PSYTX W PT 30 MINUTES: CPT | Mod: ,,,

## 2025-09-03 ENCOUNTER — OFFICE VISIT (OUTPATIENT)
Dept: FAMILY MEDICINE | Facility: CLINIC | Age: 75
End: 2025-09-03
Payer: MEDICARE

## 2025-09-03 VITALS
HEART RATE: 94 BPM | DIASTOLIC BLOOD PRESSURE: 58 MMHG | HEIGHT: 64 IN | SYSTOLIC BLOOD PRESSURE: 138 MMHG | OXYGEN SATURATION: 95 % | BODY MASS INDEX: 23.6 KG/M2 | WEIGHT: 138.25 LBS

## 2025-09-03 DIAGNOSIS — K59.00 CONSTIPATION, UNSPECIFIED CONSTIPATION TYPE: Primary | ICD-10-CM

## 2025-09-03 PROCEDURE — 99999 PR PBB SHADOW E&M-EST. PATIENT-LVL IV: CPT | Mod: PBBFAC,,,

## 2025-09-03 PROCEDURE — 99214 OFFICE O/P EST MOD 30 MIN: CPT | Mod: PBBFAC,PN

## 2025-09-03 PROCEDURE — 99213 OFFICE O/P EST LOW 20 MIN: CPT | Mod: S$PBB,,,

## 2025-09-03 RX ORDER — LACTULOSE 10 G/10G
10 SOLUTION ORAL 2 TIMES DAILY PRN
Qty: 30 EACH | Refills: 0 | Status: SHIPPED | OUTPATIENT
Start: 2025-09-03

## (undated) DEVICE — GLOVE SURGICAL LATEX SZ 7

## (undated) DEVICE — WIPE MICRO TIP

## (undated) DEVICE — SYR 10CC LUER LOCK

## (undated) DEVICE — APPLICATOR CHLORAPREP CLR 10.5

## (undated) DEVICE — SYR GLASS 5CC LUER LOK

## (undated) DEVICE — SUT 5-0 CHROMIC GUT / P-3

## (undated) DEVICE — APPLICATOR ARISTA FLEX XL

## (undated) DEVICE — MARKER SKIN STND TIP BLUE BARR

## (undated) DEVICE — NDL HYPO 27G X 1 1/2

## (undated) DEVICE — APPLICATOR STERILE 6IN 100/CA

## (undated) DEVICE — SUT PLN GUT 4-0 SC-1SC-1 1

## (undated) DEVICE — NDL TUOHY EPIDURAL 20G X 3.5

## (undated) DEVICE — SOL NS 1000CC

## (undated) DEVICE — TRAY NERVE BLOCK

## (undated) DEVICE — KIT ANTIFOG

## (undated) DEVICE — SEE MEDLINE ITEM 146313

## (undated) DEVICE — BLADE INFERIOR TURBINATE 2MM

## (undated) DEVICE — HANDLE SURG LIGHT NONRIGID

## (undated) DEVICE — GLOVE SURG BIOGEL LATEX SZ 7.5

## (undated) DEVICE — SPONGE PATTY SURGICAL .5X3IN

## (undated) DEVICE — SEE MEDLINE ITEM 152622

## (undated) DEVICE — POWDER ARISTA AH 3G

## (undated) DEVICE — SHEET EENT SPLIT

## (undated) DEVICE — Device

## (undated) DEVICE — GLOVE BIOGEL ECLIPSE SZ 7.5

## (undated) DEVICE — MARKER SKIN RULER STERILE

## (undated) DEVICE — DRAPE HEAD